# Patient Record
Sex: FEMALE | Race: WHITE | Employment: UNEMPLOYED | URBAN - METROPOLITAN AREA
[De-identification: names, ages, dates, MRNs, and addresses within clinical notes are randomized per-mention and may not be internally consistent; named-entity substitution may affect disease eponyms.]

---

## 2018-09-10 ENCOUNTER — HOSPITAL ENCOUNTER (EMERGENCY)
Age: 43
Discharge: HOME | End: 2018-09-10
Payer: COMMERCIAL

## 2018-09-10 VITALS
HEART RATE: 90 BPM | HEIGHT: 61 IN | RESPIRATION RATE: 18 BRPM | WEIGHT: 170 LBS | OXYGEN SATURATION: 98 % | SYSTOLIC BLOOD PRESSURE: 118 MMHG | BODY MASS INDEX: 32.1 KG/M2 | DIASTOLIC BLOOD PRESSURE: 83 MMHG | TEMPERATURE: 97.6 F

## 2018-09-10 DIAGNOSIS — L02.416 ABSCESS OF LEFT THIGH: Primary | ICD-10-CM

## 2018-09-10 PROCEDURE — 96372 THER/PROPH/DIAG INJ SC/IM: CPT

## 2018-09-10 PROCEDURE — 99281 EMR DPT VST MAYX REQ PHY/QHP: CPT

## 2018-09-10 PROCEDURE — 6370000000 HC RX 637 (ALT 250 FOR IP): Performed by: PHYSICIAN ASSISTANT

## 2018-09-10 PROCEDURE — 6360000002 HC RX W HCPCS: Performed by: PHYSICIAN ASSISTANT

## 2018-09-10 RX ORDER — SULFAMETHOXAZOLE AND TRIMETHOPRIM 800; 160 MG/1; MG/1
2 TABLET ORAL 2 TIMES DAILY
Qty: 40 TABLET | Refills: 0 | Status: SHIPPED | OUTPATIENT
Start: 2018-09-10 | End: 2018-09-20

## 2018-09-10 RX ORDER — KETOROLAC TROMETHAMINE 30 MG/ML
60 INJECTION, SOLUTION INTRAMUSCULAR; INTRAVENOUS ONCE
Status: COMPLETED | OUTPATIENT
Start: 2018-09-10 | End: 2018-09-10

## 2018-09-10 RX ORDER — CEPHALEXIN 500 MG/1
500 CAPSULE ORAL 3 TIMES DAILY
Qty: 30 CAPSULE | Refills: 0 | Status: SHIPPED | OUTPATIENT
Start: 2018-09-10 | End: 2018-09-20

## 2018-09-10 RX ORDER — OXYCODONE HYDROCHLORIDE AND ACETAMINOPHEN 5; 325 MG/1; MG/1
1 TABLET ORAL ONCE
Status: COMPLETED | OUTPATIENT
Start: 2018-09-10 | End: 2018-09-10

## 2018-09-10 RX ORDER — CHOLECALCIFEROL (VITAMIN D3) 125 MCG
500 CAPSULE ORAL DAILY
COMMUNITY
End: 2019-08-26

## 2018-09-10 RX ORDER — IBUPROFEN AND FAMOTIDINE 26.6; 8 MG/1; MG/1
1 TABLET, FILM COATED ORAL 3 TIMES DAILY PRN
COMMUNITY
End: 2019-05-07 | Stop reason: ALTCHOICE

## 2018-09-10 RX ORDER — HYDROCODONE BITARTRATE AND ACETAMINOPHEN 5; 325 MG/1; MG/1
1 TABLET ORAL EVERY 6 HOURS PRN
Qty: 20 TABLET | Refills: 0 | Status: SHIPPED | OUTPATIENT
Start: 2018-09-10 | End: 2018-09-15

## 2018-09-10 RX ADMIN — KETOROLAC TROMETHAMINE 60 MG: 30 INJECTION, SOLUTION INTRAMUSCULAR at 08:29

## 2018-09-10 RX ADMIN — OXYCODONE HYDROCHLORIDE AND ACETAMINOPHEN 1 TABLET: 5; 325 TABLET ORAL at 08:29

## 2018-09-10 ASSESSMENT — PAIN SCALES - GENERAL: PAINLEVEL_OUTOF10: 6

## 2018-09-10 NOTE — ED PROVIDER NOTES
[facility]      HPI:  9/10/18,   Time: 8:26 AM         Kimberly Wolfe is a 41 y.o. female presenting to the ED for painful swelling left thigh, beginning 3 days ago. The complaint has been persistent, moderate in severity, and worsened by pressure/walking. Pt states she was in bed Friday night and started feeling something itching. States the area started turning red and swelling. Continues to itch and burn. Increased pain with pressure/touching. Reports no drainage from the site. .  Denies fever, chills or vomiting. No hx of skin or soft tissue infection. She is not a diabetic. ROS:     Constitutional: Negative for fever and chills  HENT: Negative for ear pain, sore throat and sinus pressure  Eyes: Negative for pain, discharge and redness  Respiratory:  Negative for shortness of breath, cough and wheezing  Cardiovascular: Negative for CP, edema or palpitations  Gastrointestinal: Negative for nausea, vomiting, diarrhea and abdominal distention  Genitourinary: Negative for dysuria and frequency  Musculoskeletal:  See HPI  Skin: See HPI  Neurological: Negative for weakness and headaches  Hematological: Negative for adenopathy    All other systems reviewed and are negative      --------------------------------------------- PAST HISTORY ---------------------------------------------  Past Medical History:  has a past medical history of Flank Pain; Hx of cardiovascular stress test; Lupus; RA (rheumatoid arthritis) ([facility] [address] Radiculopathy; and [facility] Past Surgical History:  has a past surgical history that includes  section; Dilation and curettage of uterus; and Knee arthroscopy (Left, 5 27 16). Social History:  reports that she has never smoked. She has never used smokeless tobacco. She reports that she does not drink alcohol or use drugs. Family History: family history includes Cancer in her father; Heart Failure in her mother;  Thyroid Disease in her maternal grandmother, mother, sister, sister, and sister. The patients home medications have been reviewed. Allergies: Methotrexate derivatives    -------------------------------------------------- RESULTS -------------------------------------------------  All laboratory and radiology results have been personally reviewed by myself   LABS:  No results found for this visit on 09/10/18. RADIOLOGY:  Interpreted by Radiologist.  No orders to display       ------------------------- NURSING NOTES AND VITALS REVIEWED ---------------------------   The nursing notes within the ED encounter and vital signs as below have been reviewed. /83   Pulse 90   Temp 97.6 °F (36.4 °C) (Oral)   Resp 18   Ht 5' 1\" (1.549 m)   Wt 170 lb (77.1 kg)   SpO2 98%   BMI 32.12 kg/m²   Oxygen Saturation Interpretation: Normal      ---------------------------------------------------PHYSICAL EXAM--------------------------------------      Constitutional/General: Alert and oriented x3, well appearing, non toxic in NAD  Head: NC/AT  Eyes: PERRL, EOMI  Mouth: Oropharynx clear, handling secretions, no trismus  Neck: Supple, full ROM, no meningeal signs  Pulmonary: Lungs clear to auscultation bilaterally, no wheezes, rales, or rhonchi. Not in respiratory distress  Cardiovascular:  Regular rate and rhythm, no murmurs, gallops, or rubs. 2+ distal pulses  Extremities: Moves all extremities x 4. Pt has small abscess posterior left thigh. The central area of redness and swelling is 3-4 cm. Indurated and very painful. There is larger area of erythema/cellulitis measuring 16 cm x 12 cm. Warm. Warm and well perfused  Skin: See above  Neurologic: GCS 15,  Intact.   No focal deficits  Psych: Normal Affect      ------------------------------ ED COURSE/MEDICAL DECISION MAKING----------------------  Medications   ketorolac (TORADOL) injection 60 mg (60 mg Intramuscular Given 9/10/18 0850)   oxyCODONE-acetaminophen (PERCOCET) 5-325 MG per tablet 1

## 2018-09-10 NOTE — ED NOTES
Colby Dimas in room draining the abscess to the back of the left thigh. Packing inserted per her.   Dressing applied and taped securely in place     Ryland Keating, [address]  09/10/18 4010

## 2018-09-13 ENCOUNTER — OFFICE VISIT (OUTPATIENT)
Dept: SURGERY | Age: 43
End: 2018-09-13
Payer: COMMERCIAL

## 2018-09-13 VITALS
HEART RATE: 80 BPM | BODY MASS INDEX: 32.1 KG/M2 | DIASTOLIC BLOOD PRESSURE: 72 MMHG | WEIGHT: 170 LBS | HEIGHT: 61 IN | RESPIRATION RATE: 14 BRPM | SYSTOLIC BLOOD PRESSURE: 112 MMHG | TEMPERATURE: 98.5 F

## 2018-09-13 DIAGNOSIS — L02.91 ABSCESS: Primary | ICD-10-CM

## 2018-09-13 PROCEDURE — 99204 OFFICE O/P NEW MOD 45 MIN: CPT | Performed by: SURGERY

## 2018-09-13 RX ORDER — IBUPROFEN AND FAMOTIDINE 26.6; 8 MG/1; MG/1
1 TABLET, FILM COATED ORAL EVERY 6 HOURS PRN
Qty: 20 TABLET | Refills: 1 | Status: SHIPPED | OUTPATIENT
Start: 2018-09-13 | End: 2019-05-07

## 2018-10-31 ENCOUNTER — HOSPITAL ENCOUNTER (EMERGENCY)
Age: 43
Discharge: HOME | End: 2018-10-31
Attending: EMERGENCY MEDICINE
Payer: COMMERCIAL

## 2018-10-31 VITALS
TEMPERATURE: 98.4 F | DIASTOLIC BLOOD PRESSURE: 88 MMHG | HEART RATE: 91 BPM | OXYGEN SATURATION: 100 % | HEIGHT: 61 IN | RESPIRATION RATE: 16 BRPM | SYSTOLIC BLOOD PRESSURE: 143 MMHG | BODY MASS INDEX: 32.47 KG/M2 | WEIGHT: 172 LBS

## 2018-10-31 DIAGNOSIS — L03.317 CELLULITIS OF BUTTOCK, RIGHT: Primary | ICD-10-CM

## 2018-10-31 PROCEDURE — 99283 EMERGENCY DEPT VISIT LOW MDM: CPT

## 2018-10-31 RX ORDER — SULFAMETHOXAZOLE AND TRIMETHOPRIM 800; 160 MG/1; MG/1
2 TABLET ORAL 2 TIMES DAILY
Qty: 40 TABLET | Refills: 0 | Status: SHIPPED | OUTPATIENT
Start: 2018-10-31 | End: 2018-11-10

## 2018-10-31 RX ORDER — IBUPROFEN 800 MG/1
800 TABLET ORAL EVERY 6 HOURS PRN
Qty: 21 TABLET | Refills: 0 | Status: SHIPPED | OUTPATIENT
Start: 2018-10-31 | End: 2019-05-22

## 2018-10-31 ASSESSMENT — PAIN DESCRIPTION - LOCATION: LOCATION: BACK

## 2018-10-31 ASSESSMENT — PAIN SCALES - GENERAL: PAINLEVEL_OUTOF10: 6

## 2018-10-31 ASSESSMENT — PAIN DESCRIPTION - ORIENTATION: ORIENTATION: LOWER

## 2018-10-31 ASSESSMENT — PAIN DESCRIPTION - PAIN TYPE: TYPE: ACUTE PAIN

## 2018-11-02 ENCOUNTER — HOSPITAL ENCOUNTER (EMERGENCY)
Age: 43
Discharge: HOME | End: 2018-11-02
Attending: EMERGENCY MEDICINE
Payer: COMMERCIAL

## 2018-11-02 VITALS
SYSTOLIC BLOOD PRESSURE: 103 MMHG | TEMPERATURE: 99.1 F | BODY MASS INDEX: 34.36 KG/M2 | OXYGEN SATURATION: 98 % | HEART RATE: 71 BPM | WEIGHT: 182 LBS | DIASTOLIC BLOOD PRESSURE: 58 MMHG | RESPIRATION RATE: 14 BRPM | HEIGHT: 61 IN

## 2018-11-02 DIAGNOSIS — L05.01 PILONIDAL CYST WITH ABSCESS: Primary | ICD-10-CM

## 2018-11-02 DIAGNOSIS — L03.317 CELLULITIS OF BUTTOCK: ICD-10-CM

## 2018-11-02 LAB
ALBUMIN SERPL-MCNC: 3.6 G/DL (ref 3.5–5.2)
ALP BLD-CCNC: 75 U/L (ref 35–104)
ALT SERPL-CCNC: 23 U/L (ref 0–32)
ANION GAP SERPL CALCULATED.3IONS-SCNC: 11 MMOL/L (ref 7–16)
ANISOCYTOSIS: ABNORMAL
AST SERPL-CCNC: 19 U/L (ref 0–31)
BASOPHILS ABSOLUTE: 0 E9/L (ref 0–0.2)
BASOPHILS RELATIVE PERCENT: 0.2 % (ref 0–2)
BILIRUB SERPL-MCNC: <0.2 MG/DL (ref 0–1.2)
BUN BLDV-MCNC: 9 MG/DL (ref 6–20)
CALCIUM SERPL-MCNC: 8.3 MG/DL (ref 8.6–10.2)
CHLORIDE BLD-SCNC: 100 MMOL/L (ref 98–107)
CO2: 25 MMOL/L (ref 22–29)
CREAT SERPL-MCNC: 1 MG/DL (ref 0.5–1)
EOSINOPHILS ABSOLUTE: 0.27 E9/L (ref 0.05–0.5)
EOSINOPHILS RELATIVE PERCENT: 5.5 % (ref 0–6)
GFR AFRICAN AMERICAN: >60
GFR NON-AFRICAN AMERICAN: >60 ML/MIN/1.73
GLUCOSE BLD-MCNC: 92 MG/DL (ref 74–109)
HCT VFR BLD CALC: 34.7 % (ref 34–48)
HEMOGLOBIN: 10.8 G/DL (ref 11.5–15.5)
LACTIC ACID: 1.3 MMOL/L (ref 0.5–2.2)
LYMPHOCYTES ABSOLUTE: 0.34 E9/L (ref 1.5–4)
LYMPHOCYTES RELATIVE PERCENT: 7.3 % (ref 20–42)
MCH RBC QN AUTO: 24.1 PG (ref 26–35)
MCHC RBC AUTO-ENTMCNC: 31.1 % (ref 32–34.5)
MCV RBC AUTO: 77.3 FL (ref 80–99.9)
MONOCYTES ABSOLUTE: 0.29 E9/L (ref 0.1–0.95)
MONOCYTES RELATIVE PERCENT: 5.5 % (ref 2–12)
NEUTROPHILS ABSOLUTE: 4.02 E9/L (ref 1.8–7.3)
NEUTROPHILS RELATIVE PERCENT: 81.8 % (ref 43–80)
OVALOCYTES: ABNORMAL
PDW BLD-RTO: 14.1 FL (ref 11.5–15)
PLATELET # BLD: 198 E9/L (ref 130–450)
PMV BLD AUTO: 11.2 FL (ref 7–12)
POIKILOCYTES: ABNORMAL
POLYCHROMASIA: ABNORMAL
POTASSIUM SERPL-SCNC: 3.9 MMOL/L (ref 3.5–5)
RBC # BLD: 4.49 E12/L (ref 3.5–5.5)
SODIUM BLD-SCNC: 136 MMOL/L (ref 132–146)
SPHEROCYTES: ABNORMAL
TOTAL PROTEIN: 6.7 G/DL (ref 6.4–8.3)
WBC # BLD: 4.9 E9/L (ref 4.5–11.5)

## 2018-11-02 PROCEDURE — 2580000003 HC RX 258: Performed by: STUDENT IN AN ORGANIZED HEALTH CARE EDUCATION/TRAINING PROGRAM

## 2018-11-02 PROCEDURE — 85025 COMPLETE CBC W/AUTO DIFF WBC: CPT

## 2018-11-02 PROCEDURE — 80053 COMPREHEN METABOLIC PANEL: CPT

## 2018-11-02 PROCEDURE — 87075 CULTR BACTERIA EXCEPT BLOOD: CPT

## 2018-11-02 PROCEDURE — 87186 SC STD MICRODIL/AGAR DIL: CPT

## 2018-11-02 PROCEDURE — 87205 SMEAR GRAM STAIN: CPT

## 2018-11-02 PROCEDURE — 83605 ASSAY OF LACTIC ACID: CPT

## 2018-11-02 PROCEDURE — 99283 EMERGENCY DEPT VISIT LOW MDM: CPT

## 2018-11-02 PROCEDURE — 87040 BLOOD CULTURE FOR BACTERIA: CPT

## 2018-11-02 PROCEDURE — 87070 CULTURE OTHR SPECIMN AEROBIC: CPT

## 2018-11-02 PROCEDURE — 36415 COLL VENOUS BLD VENIPUNCTURE: CPT

## 2018-11-02 RX ORDER — 0.9 % SODIUM CHLORIDE 0.9 %
1000 INTRAVENOUS SOLUTION INTRAVENOUS ONCE
Status: COMPLETED | OUTPATIENT
Start: 2018-11-02 | End: 2018-11-02

## 2018-11-02 RX ORDER — CEPHALEXIN 500 MG/1
500 CAPSULE ORAL 4 TIMES DAILY
Qty: 40 CAPSULE | Refills: 0 | Status: SHIPPED | OUTPATIENT
Start: 2018-11-02 | End: 2018-11-12

## 2018-11-02 RX ADMIN — SODIUM CHLORIDE 1000 ML: 9 INJECTION, SOLUTION INTRAVENOUS at 22:43

## 2018-11-02 ASSESSMENT — ENCOUNTER SYMPTOMS
DIARRHEA: 0
CONSTIPATION: 0
ABDOMINAL PAIN: 0
VOMITING: 0
COUGH: 0
COLOR CHANGE: 0
NAUSEA: 0
SHORTNESS OF BREATH: 0
WHEEZING: 0

## 2018-11-02 ASSESSMENT — PAIN DESCRIPTION - LOCATION: LOCATION: BUTTOCKS

## 2018-11-02 ASSESSMENT — PAIN SCALES - GENERAL: PAINLEVEL_OUTOF10: 7

## 2018-11-02 ASSESSMENT — PAIN DESCRIPTION - PAIN TYPE: TYPE: ACUTE PAIN

## 2018-11-03 NOTE — ED PROVIDER NOTES
Patient is a 59-year-old female with history of lupus, rheumatoid arthritis, who presents to the ED with possible pilonidal cyst that was seen on Wednesday. Patient was seen here in the ED on Wednesday, was found to have a potential pilonidal abscess, ultrasound was performed, which did not show any obvious abscess at that time. Patient was discharged, and given Bactrim at that time. Since then, she states that she has had worsening symptoms, that after she had taken the Bactrim, she felt red, and began having systemic joint pains. She states that today, she had worsening swollen hands. She states that she has had a fever of 101, and given Tylenol a few hours ago. Patient was told to follow up with their PCP, but could not get anything scheduled until Tuesday. Patient states that she has had diffuse body aches, has had more difficult time bending her joints. She does state that everytime she takes bactrim, she gets a lupus flare, and the red diffuse erythema. The history is provided by the patient. No  was used. Review of Systems   Constitutional: Positive for chills, diaphoresis and fever. Respiratory: Negative for cough, shortness of breath and wheezing. Cardiovascular: Negative for chest pain and palpitations. Gastrointestinal: Negative for abdominal pain, constipation, diarrhea, nausea and vomiting. Genitourinary: Negative for dysuria and hematuria. Musculoskeletal: Positive for arthralgias and joint swelling. Negative for gait problem, neck pain and neck stiffness. Skin: Positive for wound (pilonidal cyst). Negative for color change, pallor and rash. Neurological: Positive for dizziness and light-headedness. Negative for syncope, numbness and headaches. Psychiatric/Behavioral: Negative for confusion and decreased concentration. The patient is not nervous/anxious. Physical Exam   Constitutional: She is oriented to person, place, and time.  She appears well-developed and well-nourished. No distress. HENT:   Head: Normocephalic and atraumatic. Right Ear: External ear normal.   Left Ear: External ear normal.   Eyes: Pupils are equal, round, and reactive to light. EOM are normal.   Neck: Normal range of motion. Cardiovascular: Normal rate, regular rhythm, normal heart sounds and intact distal pulses. Exam reveals no gallop and no friction rub. No murmur heard. Pulmonary/Chest: Effort normal and breath sounds normal. No respiratory distress. She has no wheezes. She has no rales. She exhibits no tenderness. Abdominal: Soft. Bowel sounds are normal. She exhibits no distension and no mass. There is no tenderness. There is no rebound and no guarding. Musculoskeletal: Normal range of motion. She exhibits no edema, tenderness or deformity. Neurological: She is alert and oriented to person, place, and time. No cranial nerve deficit. Skin: Skin is warm. No rash noted. She is not diaphoretic. There is erythema (diffuse). No pallor. Diaphoretic    Pilonidal cyst, hard and indurated, with mild amount of exudate, surrounding blanching erythematous skin. Psychiatric: She has a normal mood and affect. Her behavior is normal. Judgment and thought content normal.   Nursing note and vitals reviewed. Procedures  --------------------------------------------- PAST HISTORY ---------------------------------------------  Past Medical History:  has a past medical history of Flank Pain; Hx of cardiovascular stress test; Lupus; RA (rheumatoid arthritis) ([facility] [address] Radiculopathy; and Sjogren's disease ([facility] [address] Past Surgical History:  has a past surgical history that includes  section; Dilation and curettage of uterus; and Knee arthroscopy (Left, 5 27 16). Social History:  reports that she has never smoked. She has never used smokeless tobacco. She reports that she does not drink alcohol or use drugs.     Family History: family history includes Cancer in her

## 2018-11-05 LAB
ANAEROBIC CULTURE: NORMAL
GRAM STAIN RESULT: ABNORMAL
ORGANISM: ABNORMAL
WOUND/ABSCESS: ABNORMAL
WOUND/ABSCESS: ABNORMAL

## 2018-11-08 LAB
BLOOD CULTURE, ROUTINE: NORMAL
CULTURE, BLOOD 2: NORMAL

## 2019-05-07 ENCOUNTER — OFFICE VISIT (OUTPATIENT)
Dept: PAIN MANAGEMENT | Age: 44
End: 2019-05-07
Payer: COMMERCIAL

## 2019-05-07 ENCOUNTER — PREP FOR PROCEDURE (OUTPATIENT)
Dept: PAIN MANAGEMENT | Age: 44
End: 2019-05-07

## 2019-05-07 VITALS
WEIGHT: 169 LBS | SYSTOLIC BLOOD PRESSURE: 106 MMHG | BODY MASS INDEX: 31.91 KG/M2 | HEIGHT: 61 IN | TEMPERATURE: 98.5 F | RESPIRATION RATE: 16 BRPM | HEART RATE: 64 BPM | DIASTOLIC BLOOD PRESSURE: 66 MMHG

## 2019-05-07 DIAGNOSIS — M54.16 LUMBAR RADICULOPATHY: Primary | ICD-10-CM

## 2019-05-07 DIAGNOSIS — M54.16 LUMBAR RADICULOPATHY: ICD-10-CM

## 2019-05-07 DIAGNOSIS — M51.36 DDD (DEGENERATIVE DISC DISEASE), LUMBAR: ICD-10-CM

## 2019-05-07 DIAGNOSIS — M51.36 DDD (DEGENERATIVE DISC DISEASE), LUMBAR: Primary | ICD-10-CM

## 2019-05-07 DIAGNOSIS — M47.817 LUMBOSACRAL SPONDYLOSIS WITHOUT MYELOPATHY: ICD-10-CM

## 2019-05-07 DIAGNOSIS — M53.3 SACROILIAC DYSFUNCTION: ICD-10-CM

## 2019-05-07 PROBLEM — M51.369 DDD (DEGENERATIVE DISC DISEASE), LUMBAR: Status: ACTIVE | Noted: 2019-05-07

## 2019-05-07 PROCEDURE — 99204 OFFICE O/P NEW MOD 45 MIN: CPT | Performed by: ANESTHESIOLOGY

## 2019-05-07 RX ORDER — SENNOSIDES 8.6 MG
650 CAPSULE ORAL EVERY 8 HOURS PRN
COMMUNITY
End: 2019-06-18

## 2019-05-07 ASSESSMENT — PATIENT HEALTH QUESTIONNAIRE - PHQ9
SUM OF ALL RESPONSES TO PHQ QUESTIONS 1-9: 0
SUM OF ALL RESPONSES TO PHQ QUESTIONS 1-9: 0
SUM OF ALL RESPONSES TO PHQ9 QUESTIONS 1 & 2: 0
2. FEELING DOWN, DEPRESSED OR HOPELESS: 0
1. LITTLE INTEREST OR PLEASURE IN DOING THINGS: 0

## 2019-05-07 NOTE — PROGRESS NOTES
HISTORY OF PRESENT ILLNESS:        Torie Germain presents today, 5/7/2019, to the [address] with complaints of pain in her back, neck, both hips and both shoulders, and left foot which is described as aching, burning, sharp, stabbing and cramping. She reports that the pain has been present for 21 years. Torie Germain rates the pain as a 10/10 on her worst day and a 5/10 on her best day, on the VAS scale. Jose Schafer has  numbness which does radiate to both lower extremities. She states the pain began following childbirth. Alleviating factors include: heat, medications. Aggravating factors include:  cold, movement, walking, standing, bending, lifting. She states her pain is constant. Jose Schafer states that the pain does keep her from sleeping at night. PREVIOUS THERAPIES:  She has not had any Injections. She has tried Physical Therapy 10 years ago, 2 years ago did yoga. She has had Chiropractic Care 5 years ago, pt states she thinks it helped. Medications which have helped her pain include:  none. Medications which have not helped her pain include:  neurontin. She is currently prescribed advil or tylenol arthiritis by Dr. Ayala Roldan. She took her last dose of pain medication, tylenol arthritis four days ago. She is not on anticoagulation medications. She is not under psychiatric care. Please see EMR (Media) for Quality of Life, Zung Depression and Falls Risk Assessment scores. VITAL SIGNS:  /66   Pulse 64   Temp 98.5 °F (36.9 °C) (Oral)   Resp 16   Ht 5' 1\" (1.549 m)   Wt 169 lb (76.7 kg)   Breastfeeding?  No   BMI 31.93 kg/m²

## 2019-05-07 NOTE — PROGRESS NOTES
[address]        [address] [facility], [address] [facility]      [phone]          Consult Note      Patient:  FAVIOLA Lopez 1975    Date of Service:  19    Requesting Physician:  Oliver Marcelo MD    Reason for Consult:      Patient presents with complaints of low back and b/l LE pain for about 20 yrs. HISTORY OF PRESENT ILLNESS:      Ms. Toni Oglesby is a 41 yrs F with h/o chronic low back pain and b/l lower extremity pain for about 20 yrs. Pain mainly in the low back in the lumbar area and radiates to the b/l Lower extremities upto the foot. Feels left foot tingling/ numbness over the last year or so. Pain does radiate to both lower extremities. She  has numbness, tingling of the left leg and does not have bladder or bowel dysfunction. She follows up with rheumatologist for h/o sjogren's/ rheumatoid / SLE. Previous treatments:   Physical Therapy   Chiropractic treatment  Yoga/ Pilates  Ongoing Core exercises for the last year. TENS  and medications- NSAID's, oral steroids, gabapentin etc,. Does not tolerate meds well   Conservative RX has not provided good long term relief. Toni Oglesby rates the pain as a 10/10 on her worst day and a 5/10 on her best day, on the VAS scale. Alleviating factors include: heat, medications. Aggravating factors include:  cold, movement, walking, standing, bending, lifting. She states her pain is constant. Patience Creeks states that the pain does keep her from sleeping at night. She has not been on anticoagulation medications to include none and has not been on herbal supplements. She is not diabetic. Social:  She is a   Social drinking  Denies smoking or illicit drug use. Imaging:     Findings: Bone marrow signal is normal. No fracture, subluxation, or   marrow replacement disorder.  The conus medullaris is at T12.       The discs at L1-2, L2-3 and L3-4 are normal.       At L4-5, there is dehydration of the disc with mild bulging but no   central canal or foraminal stenosis. The disc bulges eccentrically and   may contact the right exiting L4 nerve root.       There is a discrete disc extrusion at L5-S1. The disc extrusion is 1   cm in breadth and 5 mm in depth. It may contact the traversing S1   nerve roots in the canal. A broad-based protrusion is also present at   this level and there is disc material in both neuroforamina. However,   I believe it is below the level of the exiting nerve roots.           Impression   1. The seminal abnormality is a relatively large central disc   extrusion at L5-S1.   2. The underlying disc protrudes into the lower halves of both   neuroforamina   3. There is bulging of the disc at L4-5 which is eccentric to the   right and may extend into the right neuroforamen. Past Medical History:   Diagnosis Date    Flank Pain     right    H/O rheumatoid arthritis     Hx of cardiovascular stress test 2013    treadmill nuclear stress    Joint pain     Low back pain     Lupus     Neck pain     RA (rheumatoid arthritis) ([address]     RH    Radiculopathy     Sjogren's disease ([facility] [address]        Past Surgical History:   Procedure Laterality Date     SECTION      x2    COLONOSCOPY      DILATION AND CURETTAGE OF UTERUS      x2    KNEE ARTHROSCOPY Left 5 27 16    medial menisectomy, debridement, acl synovectomy, chondroplasty       Prior to Admission medications    Medication Sig Start Date End Date Taking?  Authorizing Provider   acetaminophen (TYLENOL 8 HOUR ARTHRITIS PAIN) 650 MG extended release tablet Take 650 mg by mouth every 8 hours as needed for Pain   Yes Historical Provider, MD   ibuprofen (ADVIL;MOTRIN) 800 MG tablet Take 1 tablet by mouth every 6 hours as needed for Pain 10/31/18  Yes Cary Barnhart, APRN - CNP   vitamin B-12 (CYANOCOBALAMIN) 500 MCG tablet Take 500 mcg by mouth daily   Yes Historical Provider, MD levonorgestrel (MIRENA) IUD 52 mg 1 each by Intrauterine route once Placed in 2015   Yes Historical Provider, MD   loratadine (CLARITIN) 10 MG tablet Take 10 mg by mouth daily as needed (Allergies)    Yes Historical Provider, MD   omeprazole (PRILOSEC) 20 MG capsule Take 20 mg by mouth daily as needed (Reflux)    Yes Historical Provider, MD       Allergies   Allergen Reactions    Methotrexate Derivatives Nausea And Vomiting and Other (See Comments)     Mouth sores       Social History     Socioeconomic History    Marital status:      Spouse name: Not on file    Number of children: Not on file    Years of education: Not on file    Highest education level: Not on file   Occupational History    Not on file   Social Needs    Financial resource strain: Not on file    Food insecurity:     Worry: Not on file     Inability: Not on file    Transportation needs:     Medical: Not on file     Non-medical: Not on file   Tobacco Use    Smoking status: Never Smoker    Smokeless tobacco: Never Used   Substance and Sexual Activity    Alcohol use: No     Comment: wine occ    Drug use: No    Sexual activity: Yes     Partners: Male   Lifestyle    Physical activity:     Days per week: Not on file     Minutes per session: Not on file    Stress: Not on file   Relationships    Social connections:     Talks on phone: Not on file     Gets together: Not on file     Attends Anabaptist service: Not on file     Active member of club or organization: Not on file     Attends meetings of clubs or organizations: Not on file     Relationship status: Not on file    Intimate partner violence:     Fear of current or ex partner: Not on file     Emotionally abused: Not on file     Physically abused: Not on file     Forced sexual activity: Not on file   Other Topics Concern    Not on file   Social History Narrative    Not on file       Family History   Problem Relation Age of Onset    Heart Failure Mother     Thyroid Disease points in rhomboids:absent bilaterally  Trigger points in Paravertebral:absent bilaterally  Trigger points in supraspinatus/infraspinatus:absent  Spurling's:negative right, negative left    SI joint tenderness:positive right, positive left              MARIO test:positive right, positive             left  Piriformis tenderness:negative right, negative left  Trochanteric bursa tenderness:negative right, positive left  SLR:negative right, negative left, sitting     Extremities:    Tremors:None bilaterally upper and lower  Range of motion:Generally normal shoulders, pain with internal rotation of hips negative. Intact:Yes  Varicose veins:absent   Pulses:present Lt radial  Cyanosis:none  Edema:none x all 4 extremities    Knee:    Knee ROM normal- no pain    Neurological:      Sensory:normal to light touch     Motor:   Right Grip5/5              Left Grip5/5               Right Bicep5/5           Left Bicep5/5              Right Triceps5/5       Left Triceps5/5          Right Deltoid5/5     Left Deltoid5/5                  Right Quadriceps5/5          Left Quadriceps5/5           Right Gastrocnemius5/5    Left Gastrocnemius5/5  Right Ant Tibialis5/5  Left Ant Tibialis5/5    Reflexes:    Right Brachioradialis reflex2+  Left Brachioradialis reflex2+  Right Biceps reflex2+  Left Biceps reflex2+  Right Triceps reflex2+  Left Triceps reflex2+  Right Quadriceps reflex2+  Left Quadriceps reflex2+  Right Achilles reflex2+  Left Achilles reflex2+  Gait:normal    Dermatology:    Skin: redness over the face    Assessment/Plan:     Diagnosis Orders   1. Lumbar radiculopathy     2. Chronic low back pain with sciatica, sciatica laterality unspecified, unspecified back pain laterality     3. DDD (degenerative disc disease), lumbar     4. Lumbosacral spondylosis without myelopathy     5. Sacroiliac dysfunction         43 yrs f with h/o Chronic low back and LE pain.     Mri changes significant at L5-S1 and also changes noted at L4-5    Also has facet and SIJ pain. Failed conservative RX including PT/ chiro/ meds    Lumbar epidural steroid injection L5- S1 (left paramedian approach). If axial low back pain persists, consider lumbar facet MBNB     Future SIj interventions if SIj pain persists. Continue HEP. Counseling:  weight reduction and regular exercise program.     Reviewed referral documents and imaging    Urine screen today- no    OARRS report reviewed- yes. Not on narcotics    Patient encouraged to stay active and to lose weight  Treatment plan discussed with the patient including medication and procedure side effects       Marci Doty MD     Dear Dr. Morena Ibrahim,   Thank you for referring Ms. Nena Em and allowing us to participate in her care. Please do not hesitate to contact me if you have any questions regarding her care.     Lan Deutsch MD      CC:  Judy Mccormick MD  [place] [address]

## 2019-05-22 RX ORDER — MULTIVIT-MIN/IRON/FOLIC ACID/K 18-600-40
2000 CAPSULE ORAL
COMMUNITY
End: 2019-08-26

## 2019-05-28 ENCOUNTER — HOSPITAL ENCOUNTER (OUTPATIENT)
Dept: OPERATING ROOM | Age: 44
Setting detail: OUTPATIENT SURGERY
Discharge: HOME | End: 2019-05-28
Attending: ANESTHESIOLOGY
Payer: COMMERCIAL

## 2019-05-28 ENCOUNTER — HOSPITAL ENCOUNTER (OUTPATIENT)
Age: 44
Setting detail: OUTPATIENT SURGERY
Discharge: HOME | End: 2019-05-28
Attending: ANESTHESIOLOGY | Admitting: ANESTHESIOLOGY
Payer: COMMERCIAL

## 2019-05-28 VITALS
OXYGEN SATURATION: 98 % | DIASTOLIC BLOOD PRESSURE: 75 MMHG | RESPIRATION RATE: 16 BRPM | HEART RATE: 64 BPM | SYSTOLIC BLOOD PRESSURE: 115 MMHG

## 2019-05-28 DIAGNOSIS — M51.36 DDD (DEGENERATIVE DISC DISEASE), LUMBAR: ICD-10-CM

## 2019-05-28 PROCEDURE — 7100000010 HC PHASE II RECOVERY - FIRST 15 MIN: Performed by: ANESTHESIOLOGY

## 2019-05-28 PROCEDURE — 3600000005 HC SURGERY LEVEL 5 BASE: Performed by: ANESTHESIOLOGY

## 2019-05-28 PROCEDURE — 3209999900 FLUORO FOR SURGICAL PROCEDURES

## 2019-05-28 PROCEDURE — 62323 NJX INTERLAMINAR LMBR/SAC: CPT | Performed by: ANESTHESIOLOGY

## 2019-05-28 PROCEDURE — 2500000003 HC RX 250 WO HCPCS: Performed by: ANESTHESIOLOGY

## 2019-05-28 PROCEDURE — 7100000011 HC PHASE II RECOVERY - ADDTL 15 MIN: Performed by: ANESTHESIOLOGY

## 2019-05-28 PROCEDURE — 6360000002 HC RX W HCPCS: Performed by: ANESTHESIOLOGY

## 2019-05-28 PROCEDURE — 2709999900 HC NON-CHARGEABLE SUPPLY: Performed by: ANESTHESIOLOGY

## 2019-05-28 PROCEDURE — 6360000004 HC RX CONTRAST MEDICATION: Performed by: ANESTHESIOLOGY

## 2019-05-28 RX ORDER — LIDOCAINE HYDROCHLORIDE 5 MG/ML
INJECTION, SOLUTION INFILTRATION; INTRAVENOUS PRN
Status: DISCONTINUED | OUTPATIENT
Start: 2019-05-28 | End: 2019-05-28 | Stop reason: ALTCHOICE

## 2019-05-28 ASSESSMENT — PAIN - FUNCTIONAL ASSESSMENT: PAIN_FUNCTIONAL_ASSESSMENT: 0-10

## 2019-05-28 ASSESSMENT — PAIN SCALES - GENERAL
PAINLEVEL_OUTOF10: 0
PAINLEVEL_OUTOF10: 0

## 2019-05-28 NOTE — H&P
[facility]  [address] [facility], [address] [place] [address]  [phone]    Procedure History & Physical      Kai Faust     HPI:    Patient  is here for [facility]  for Low back/LE pain  Labs/imaging studies reviewed   All question and concerns addressed including R/B/A associated with the procedure    Past Medical History:   Diagnosis Date    Flank Pain     right    H/O rheumatoid arthritis     Hx of cardiovascular stress test 2013    treadmill nuclear stress    Joint pain     Low back pain     Lupus     Neck pain     RA (rheumat[facility]) ([facility] [address]     RH    Radiculopathy     Sjogren's disease ([facility] [address] [facility]        Past Surgical History:   Procedure Laterality Date     SECTION      x2    COLONOSCOPY      DILATION AND CURETTAGE OF UTERUS      x2    KNEE ARTHROSCOPY Left 16    medial menisectomy, debridement, acl synovectomy, chondroplasty       Prior to Admission medications    Medication Sig Start Date End Date Taking?  Authorizing Provider   Cholecalciferol (VITAMIN D) 2000 units CAPS capsule Take 2,000 Units by mouth every 7 days   Yes Historical Provider, MD   acetaminophen (TYLENOL 8 HOUR ARTHRITIS PAIN) 650 MG extended release tablet Take 650 mg by mouth every 8 hours as needed for Pain   Yes Historical Provider, MD   vitamin B-12 (CYANOCOBALAMIN) 500 MCG tablet Take 500 mcg by mouth daily   Yes Historical Provider, MD   levonorgestrel (MIRENA) IUD 52 mg 1 each by Intrauterine route once Placed in    Yes Historical Provider, MD   loratadine (CLARITIN) 10 MG tablet Take 10 mg by mouth daily as needed (Allergies)    Yes Historical Provider, MD   omeprazole (PRILOSEC) 20 MG capsule Take 20 mg by mouth daily as needed (Reflux)    Yes Historical Provider, MD       Allergies   Allergen Reactions    Methotrexate Derivatives Nausea And Vomiting and Other (See Comments)     Mouth sores       Social History     Socioeconomic History    Marital status:  Spouse name: Not on file    Number of children: Not on file    Years of education: Not on file    Highest education level: Not on file   Occupational History    Not on file   Social Needs    Financial resource strain: Not on file    Food insecurity:     Worry: Not on file     Inability: Not on file    Transportation needs:     Medical: Not on file     Non-medical: Not on file   Tobacco Use    Smoking status: Never Smoker    Smokeless tobacco: Never Used   Substance and Sexual Activity    Alcohol use: No     Comment: wine occ    Drug use: No    Sexual activity: Yes     Partners: Male   Lifestyle    Physical activity:     Days per week: Not on file     Minutes per session: Not on file    Stress: Not on file   Relationships    Social connections:     Talks on phone: Not on file     Gets together: Not on file     Attends Worship service: Not on file     Active member of club or organization: Not on file     Attends meetings of clubs or organizations: Not on file     Relationship status: Not on file    Intimate partner violence:     Fear of current or ex partner: Not on file     Emotionally abused: Not on file     Physically abused: Not on file     Forced sexual activity: Not on file   Other Topics Concern    Not on file   Social History Narrative    Not on file       Family History   Problem Relation Age of Onset    Heart Failure Mother     Thyroid Disease Mother     Cancer Father     Thyroid Disease Sister     Thyroid Disease Maternal Grandmother     Thyroid Disease Sister     Thyroid Disease Sister          REVIEW OF SYSTEMS:    CONSTITUTIONAL:  negative for  fevers, chills, sweats and fatigue    RESPIRATORY:  negative for  dry cough, cough with sputum, dyspnea, wheezing and chest pain    CARDIOVASCULAR:  negative for chest pain, dyspnea, palpitations, syncope    GASTROINTESTINAL:  negative for nausea, vomiting, change in bowel habits, diarrhea, constipation and abdominal pain    MUSCULOSKELETAL: negative for muscle weakness    SKIN: negative for itching or rashes. BEHAVIOR/PSYCH:  negative for poor appetite, increased appetite, decreased sleep and poor concentration    All other systems negative      PHYSICAL EXAM:    VITALS:  /85   Pulse 76   Resp 18   SpO2 99%     CONSTITUTIONAL:  awake, alert, cooperative, no apparent distress, and appears stated age    EYES: PERRLA, EOMI    LUNGS:  No increased work of breathing, no audible wheezing    CARDIOVASCULAR:  regular rate and rhythm    ABDOMEN:  Soft non tender non distended     EXTREMITIES: no signs of clubbing or cyanosis. MUSCULOSKELETAL: negative for flaccid muscle tone or spastic movements. SKIN: gross examination reveals no signs of rashes, or diaphoresis. NEURO: Cranial nerves II-XII grossly intact. No signs of agitated mood.        Assessment/Plan:    Patient  is here for [facility]  for Low back/LE pain    Ninfa Macias MD

## 2019-05-28 NOTE — OP NOTE
2019    Patient: Torie Germain  :  1975  Age:  41 y.o. Sex:  female     PRE-OPERATIVE DIAGNOSIS: Lumbar disc displacement, lumbar radiculopathy. POST-OPERATIVE DIAGNOSIS: Same. PROCEDURE: Fluoroscopic guided therapeutic lumbar epidural steroid injection at the L5-S1 level (# 1). SURGEON: Ninfa Macias MD    ANESTHESIA: Local    ESTIMATED BLOOD LOSS: None.  ______________________________________________________________________    BRIEF HISTORY:  Torie Germain comes in today for first lumbar epidural injection at L5-S1 level. The potential complications of this procedure were discussed with her again today. She has elected to undergo the aforementioned procedure. Candelario complete History & Physical examination were reviewed in depth, a copy of which is in the chart. DESCRIPTION OF PROCEDURE:    After confirming written and informed consent, a time-out was performed and Candelario name and date of birth, the procedure to be performed as well as the plan for the location of the needle insertion were confirmed. The patient was brought into the procedure room and placed in the prone position on the fluoroscopy table. A pillow was placed under the patient's lower abdomen/upper pelvis to increase lumbar interlaminar space. Standard monitors were placed, and vital signs were observed throughout the procedure. The area of the lumbar spine was prepped with chloraprep and draped in a sterile manner. The L5-S1 interspace was identified and marked under AP fluoroscopy. The skin and subcutaneous tissues at the above level were anesthestized with 0.5% lidocaine. With intermittent fluoroscopy, an # 18 gauge 3 1/2 tuohy epidural needle was inserted and directed toward the interlaminar space.  The needle was slowly advanced using loss of resistance technique and 5 cc glass syringe  until the tip of the epidural needle has passed through the ligamentum flavum and entered the epidural space. AP and lateral fluoroscopic imaging is performed to verify that the epidural needle is properly placed. Negative aspiration of blood and CSF was confirmed. 0.5 ml of omnipaque 240 was used for confirmation of even epidural spread under both live and AP fluoroscopy. After negative aspiration, a solution of 0.5 % Lidocaine 3 ml and 40 mg DepoMedrol was easily injected. The needle was gently removed intact . The patient neck was cleaned and a Band-Aid was placed over the needle insertion point. Disposition the patient tolerated the procedure well and there were no complications . Vital signs remained stable throughout the procedure. The patient was escorted to the recovery area where they remained until discharge and written discharge instructions for the procedure were given. Plan: Lilia Madden will return to our pain management center as scheduled.      Toni Muller MD

## 2019-06-18 ENCOUNTER — PREP FOR PROCEDURE (OUTPATIENT)
Dept: PAIN MANAGEMENT | Age: 44
End: 2019-06-18

## 2019-06-18 ENCOUNTER — OFFICE VISIT (OUTPATIENT)
Dept: PAIN MANAGEMENT | Age: 44
End: 2019-06-18
Payer: COMMERCIAL

## 2019-06-18 VITALS
RESPIRATION RATE: 16 BRPM | OXYGEN SATURATION: 97 % | TEMPERATURE: 98.5 F | HEART RATE: 68 BPM | SYSTOLIC BLOOD PRESSURE: 118 MMHG | HEIGHT: 61 IN | BODY MASS INDEX: 31.15 KG/M2 | WEIGHT: 165 LBS | DIASTOLIC BLOOD PRESSURE: 68 MMHG

## 2019-06-18 DIAGNOSIS — M51.36 DDD (DEGENERATIVE DISC DISEASE), LUMBAR: ICD-10-CM

## 2019-06-18 DIAGNOSIS — M54.16 LUMBAR RADICULOPATHY: Primary | ICD-10-CM

## 2019-06-18 DIAGNOSIS — M47.817 LUMBOSACRAL SPONDYLOSIS WITHOUT MYELOPATHY: ICD-10-CM

## 2019-06-18 DIAGNOSIS — M53.3 SACROILIAC DYSFUNCTION: ICD-10-CM

## 2019-06-18 DIAGNOSIS — G89.4 CHRONIC PAIN SYNDROME: ICD-10-CM

## 2019-06-18 PROCEDURE — 99214 OFFICE O/P EST MOD 30 MIN: CPT | Performed by: ANESTHESIOLOGY

## 2019-06-18 PROCEDURE — 99213 OFFICE O/P EST LOW 20 MIN: CPT | Performed by: ANESTHESIOLOGY

## 2019-06-18 NOTE — PROGRESS NOTES
Lakisha Jordan presents to the [address] [address] on 6/18/2019. Lorena Sandoval is complaining of pain lower back pain that goes down both legs. The pain is constant. The pain is described as aching, throbbing, shooting, stabbing, sharp, tender, burning, exhausting, penetrating, nagging, miserable, tiring and unbearable. Pain is rated on her best day at a 4, on her worst day at a 10, and on average at a 7 on the VAS scale. She took her last dose of nothing         Any procedures since your last visit: yes, with 100 % relief for a week and one half. All the pain was transferred to the other side. She has not been on anticoagulation medications to include none and is managed by . Pacemaker or defibrilator: No Physician managing device is .        /68 (Site: Right Upper Arm, Position: Sitting, Cuff Size: Medium Adult)   Pulse 68   Temp 98.5 °F (36.9 °C) (Oral)   Resp 16   Ht 5' 1\" (1.549 m)   Wt 165 lb (74.8 kg)   SpO2 97%   BMI 31.18 kg/m²

## 2019-06-18 NOTE — PROGRESS NOTES
[address] [facility], [address] [place] [address] Adebayo  [phone]    Follow up Note      Toni Oglesby     Date of Visit:  6/18/2019    CC:  Patient presents for follow up   Chief Complaint   Patient presents with    Back Pain     lower back and pelvis down right and left leg       HPI:    Low back and B/l LE pain. S/P ILESI which provided significant pain relief of almost 100 % for a week and half. Significant pain relief on the left side but right side still bothering. Now pain is back to baseline mainly on the right side and also notices pain on the left side. No new weakness or numbness or bowel or bladder symptoms. She follows up with rheumatologist for h/o sjogren's/ rheumatoid / SLE.     Previous treatments:   Physical Therapy   Chiropractic treatment  Yoga/ Pilates  Ongoing Core exercises for the last year. TENS  and medications- NSAID's, oral steroids, gabapentin etc,. Does not tolerate meds well   Conservative RX has not provided good long term relief.     Pain > 6. Continues HEP    Alleviating factors include: heat, medications.       Aggravating factors include:  cold, movement, walking, standing, bending, lifting.  She states her pain is constant.  Dannielle states that the pain does keep her from sleeping at night.       She has not been on anticoagulation medications to include none and has not been on herbal supplements. She is not diabetic. Imaging:      Findings: Bone marrow signal is normal. No fracture, subluxation, or   marrow replacement disorder. The conus medullaris is at T12.       The discs at L1-2, L2-3 and L3-4 are normal.       At L4-5, there is dehydration of the disc with mild bulging but no   central canal or foraminal stenosis. The disc bulges eccentrically and   may contact the right exiting L4 nerve root.       There is a discrete disc extrusion at L5-S1. The disc extrusion is 1   cm in breadth and 5 mm in depth.  It may contact the traversing S1   nerve roots in the canal. A broad-based protrusion is also present at   this level and there is disc material in both neuroforamina. However,   I believe it is below the level of the exiting nerve roots.           Impression   1. The seminal abnormality is a relatively large central disc   extrusion at L5-S1.   2. The underlying disc protrudes into the lower halves of both   neuroforamina   3. There is bulging of the disc at L4-5 which is eccentric to the   right and may extend into the right neuroforamen. Potential Aberrant Drug-Related Behavior:  No    Urine Drug Screening:No    OARRS report[de-identified] yes- not on chronic narcotics    Past Medical History:   Diagnosis Date    Flank Pain     right    H/O rheumatoid arthritis     Hx of cardiovascular stress test 2013    treadmill nuclear stress    Joint pain     Low back pain     Lupus (HCC)     Neck pain     RA (rheumatoid arthritis) ([address]     RH    Radiculopathy     Sjogren's disease ([address]        Past Surgical History:   Procedure Laterality Date     SECTION      x2    COLONOSCOPY      DILATION AND CURETTAGE OF UTERUS      x2    EPIDURAL STEROID INJECTION Left 2019    LUMBAR EPIDURAL STEROID INJECTION L5-S1 (LEFT PARAMEDIAN APPROACH) UNDER FLUOROSCOPIC GUIDANCE) performed by Jadyn Lucas MD at [facility] ARTHROSCOPY Left 16    medial menisectomy, debridement, acl synovectomy, chondroplasty    NERVE BLOCK Left 2019    lumbar       Prior to Admission medications    Medication Sig Start Date End Date Taking?  Authorizing Provider   Cholecalciferol (VITAMIN D) 2000 units CAPS capsule Take 2,000 Units by mouth every 7 days   Yes Historical Provider, MD   vitamin B-12 (CYANOCOBALAMIN) 500 MCG tablet Take 500 mcg by mouth daily   Yes Historical Provider, MD   levonorgestrel (MIRENA) IUD 52 mg 1 each by Intrauterine route once Placed in    Yes Historical Provider, MD loratadine (CLARITIN) 10 MG tablet Take 10 mg by mouth daily as needed (Allergies)    Yes Historical Provider, MD   omeprazole (PRILOSEC) 20 MG capsule Take 20 mg by mouth daily as needed (Reflux)    Yes Historical Provider, MD       Allergies   Allergen Reactions    Methotrexate Derivatives Nausea And Vomiting and Other (See Comments)     Mouth sores       Social History     Socioeconomic History    Marital status:      Spouse name: Not on file    Number of children: Not on file    Years of education: Not on file    Highest education level: Not on file   Occupational History    Not on file   Social Needs    Financial resource strain: Not on file    Food insecurity:     Worry: Not on file     Inability: Not on file    Transportation needs:     Medical: Not on file     Non-medical: Not on file   Tobacco Use    Smoking status: Never Smoker    Smokeless tobacco: Never Used   Substance and Sexual Activity    Alcohol use: No     Comment: wine occ    Drug use: No    Sexual activity: Yes     Partners: Male   Lifestyle    Physical activity:     Days per week: Not on file     Minutes per session: Not on file    Stress: Not on file   Relationships    Social connections:     Talks on phone: Not on file     Gets together: Not on file     Attends Episcopal service: Not on file     Active member of club or organization: Not on file     Attends meetings of clubs or organizations: Not on file     Relationship status: Not on file    Intimate partner violence:     Fear of current or ex partner: Not on file     Emotionally abused: Not on file     Physically abused: Not on file     Forced sexual activity: Not on file   Other Topics Concern    Not on file   Social History Narrative    Not on file       Family History   Problem Relation Age of Onset    Heart Failure Mother     Thyroid Disease Mother     Cancer Father     Thyroid Disease Sister     Thyroid Disease Maternal Grandmother     Thyroid Disease Sister     Thyroid Disease Sister        REVIEW OF SYSTEMS:     Marivel Rehman denies fever/chills, chest pain, shortness of breath, new bowel or bladder complaints. All other review of systems was negative. PHYSICAL EXAMINATION:      /68 (Site: Right Upper Arm, Position: Sitting, Cuff Size: Medium Adult)   Pulse 68   Temp 98.5 °F (36.9 °C) (Oral)   Resp 16   Ht 5' 1\" (1.549 m)   Wt 165 lb (74.8 kg)   SpO2 97%   BMI 31.18 kg/m²   General:       General appearance:   pleasant and well-hydrated. , in no distress and A & O x3  Build:Obese  Function:Moves about room without difficulty.     HEENT:     Head:normocephalic, atraumatic  Pupils:regular, round, equal  Sclera: icterus absent     Lungs:     Breathing:normal breathing pattern      CVS :   RRR     Abdomen:     Shape:non-distended and normal  Tenderness:none  Guarding:none     Cervical spine:     Inspection:normal  Palpation:tenderness paravertebral muscles, tenderness trapezium, left, right and positive. Range of motion:abnormal mildly flexion, extension rotation bilateral and is  painful.     Thoracic spine:     Spine inspection:normal   Palpation:non-tender midline and paraspinals, left and right. Range of motion:normal in flexion, extension rotation bilateral and is not painful.     Lumbar spine:     Spine inspection:normal   CVA tenderness:No   Palpation:tenderness paravertebral muscles, left, right and positive. Range of motion: Reduced, Pain on Lateral bending, flexion, extension rotation bilateral and is Painful.   B/l Lumbar paraspinal tenderness +  B/l Lumbar facet loading +  SIJ tenderness + bilateral  Sensory and motor in b/l LE normal  DTRs' b/l LE equal     Musculoskeletal:     Trigger points in trapezius:absent bilaterally  Trigger points in rhomboids:absent bilaterally  Trigger points in Paravertebral:absent bilaterally  Trigger points in supraspinatus/infraspinatus:absent  Spurling's:negative right, negative left    SI joint sides.    Will schedule for bilateral Lumbar TFESI at L5-S1 under fluoroscopy. Continue HEP.    NSAID's for prn use. Counseling reg : regular HEP and weight watching.       Desean Gatica MD

## 2019-07-01 ENCOUNTER — PREP FOR PROCEDURE (OUTPATIENT)
Dept: PAIN MANAGEMENT | Age: 44
End: 2019-07-01

## 2019-07-01 DIAGNOSIS — M54.16 LUMBAR RADICULOPATHY: Primary | ICD-10-CM

## 2019-07-09 ENCOUNTER — HOSPITAL ENCOUNTER (OUTPATIENT)
Age: 44
Setting detail: OUTPATIENT SURGERY
Discharge: HOME | End: 2019-07-09
Attending: ANESTHESIOLOGY | Admitting: ANESTHESIOLOGY
Payer: COMMERCIAL

## 2019-07-09 ENCOUNTER — HOSPITAL ENCOUNTER (OUTPATIENT)
Dept: OPERATING ROOM | Age: 44
Setting detail: OUTPATIENT SURGERY
Discharge: HOME | End: 2019-07-09
Attending: ANESTHESIOLOGY
Payer: COMMERCIAL

## 2019-07-09 VITALS
HEART RATE: 58 BPM | DIASTOLIC BLOOD PRESSURE: 71 MMHG | SYSTOLIC BLOOD PRESSURE: 110 MMHG | RESPIRATION RATE: 16 BRPM | OXYGEN SATURATION: 97 %

## 2019-07-09 DIAGNOSIS — M51.36 LUMBAR DEGENERATIVE DISC DISEASE: ICD-10-CM

## 2019-07-09 LAB
HCG, URINE, POC: NEGATIVE
Lab: NORMAL
NEGATIVE QC PASS/FAIL: NORMAL
POSITIVE QC PASS/FAIL: NORMAL

## 2019-07-09 PROCEDURE — 2709999900 HC NON-CHARGEABLE SUPPLY: Performed by: ANESTHESIOLOGY

## 2019-07-09 PROCEDURE — 6360000002 HC RX W HCPCS: Performed by: ANESTHESIOLOGY

## 2019-07-09 PROCEDURE — 64484 NJX AA&/STRD TFRM EPI L/S EA: CPT | Performed by: ANESTHESIOLOGY

## 2019-07-09 PROCEDURE — 81025 URINE PREGNANCY TEST: CPT | Performed by: ANESTHESIOLOGY

## 2019-07-09 PROCEDURE — 6360000004 HC RX CONTRAST MEDICATION: Performed by: ANESTHESIOLOGY

## 2019-07-09 PROCEDURE — 7100000011 HC PHASE II RECOVERY - ADDTL 15 MIN: Performed by: ANESTHESIOLOGY

## 2019-07-09 PROCEDURE — 64483 NJX AA&/STRD TFRM EPI L/S 1: CPT | Performed by: ANESTHESIOLOGY

## 2019-07-09 PROCEDURE — 3600000005 HC SURGERY LEVEL 5 BASE: Performed by: ANESTHESIOLOGY

## 2019-07-09 PROCEDURE — 2500000003 HC RX 250 WO HCPCS: Performed by: ANESTHESIOLOGY

## 2019-07-09 PROCEDURE — 3600000015 HC SURGERY LEVEL 5 ADDTL 15MIN: Performed by: ANESTHESIOLOGY

## 2019-07-09 PROCEDURE — 3209999900 FLUORO FOR SURGICAL PROCEDURES

## 2019-07-09 PROCEDURE — 7100000010 HC PHASE II RECOVERY - FIRST 15 MIN: Performed by: ANESTHESIOLOGY

## 2019-07-09 RX ORDER — LIDOCAINE HYDROCHLORIDE 5 MG/ML
INJECTION, SOLUTION INFILTRATION; INTRAVENOUS PRN
Status: DISCONTINUED | OUTPATIENT
Start: 2019-07-09 | End: 2019-07-09 | Stop reason: ALTCHOICE

## 2019-07-09 ASSESSMENT — PAIN - FUNCTIONAL ASSESSMENT: PAIN_FUNCTIONAL_ASSESSMENT: 0-10

## 2019-07-09 ASSESSMENT — PAIN DESCRIPTION - LOCATION: LOCATION: FOOT

## 2019-07-09 ASSESSMENT — PAIN SCALES - GENERAL
PAINLEVEL_OUTOF10: 0
PAINLEVEL_OUTOF10: 1

## 2019-07-09 ASSESSMENT — PAIN DESCRIPTION - DESCRIPTORS: DESCRIPTORS: ACHING

## 2019-07-09 NOTE — H&P
Vomiting and Other (See Comments)     Mouth sores       Social History     Socioeconomic History    Marital status:      Spouse name: Not on file    Number of children: Not on file    Years of education: Not on file    Highest education level: Not on file   Occupational History    Not on file   Social Needs    Financial resource strain: Not on file    Food insecurity:     Worry: Not on file     Inability: Not on file    Transportation needs:     Medical: Not on file     Non-medical: Not on file   Tobacco Use    Smoking status: Never Smoker    Smokeless tobacco: Never Used   Substance and Sexual Activity    Alcohol use: No     Comment: wine occ    Drug use: No    Sexual activity: Yes     Partners: Male   Lifestyle    Physical activity:     Days per week: Not on file     Minutes per session: Not on file    Stress: Not on file   Relationships    Social connections:     Talks on phone: Not on file     Gets together: Not on file     Attends Quaker service: Not on file     Active member of club or organization: Not on file     Attends meetings of clubs or organizations: Not on file     Relationship status: Not on file    Intimate partner violence:     Fear of current or ex partner: Not on file     Emotionally abused: Not on file     Physically abused: Not on file     Forced sexual activity: Not on file   Other Topics Concern    Not on file   Social History Narrative    Not on file       Family History   Problem Relation Age of Onset    Heart Failure Mother     Thyroid Disease Mother     Cancer Father     Thyroid Disease Sister     Thyroid Disease Maternal Grandmother     Thyroid Disease Sister     Thyroid Disease Sister          REVIEW OF SYSTEMS:    CONSTITUTIONAL:  negative for  fevers, chills, sweats and fatigue    RESPIRATORY:  negative for  dry cough, cough with sputum, dyspnea, wheezing and chest pain    CARDIOVASCULAR:  negative for chest pain, dyspnea, palpitations,

## 2019-07-09 NOTE — OP NOTE
2019    Patient: Som Ho  :  1975  Age:  41 y.o. Sex:  female     PRE-OPERATIVE DIAGNOSIS: Lumbar disc displacement, lumbar neural foraminal stenosis, lumbar radiculopathy. POST-OPERATIVE DIAGNOSIS: Same. PROCEDURE: Right L4 and Left L5 Transforaminal epidural steroid injection under fluoroscopic guidance. SURGEON: Sav Bassett MD    ANESTHESIA: Local    ESTIMATED BLOOD LOSS: None.  ______________________________________________________________________  BRIEF HISTORY: Som Ho comes in today for the Right L4 and Left L5 transforaminal epidural steroid injection under fluoroscopic guidance . The potential complications of this procedure were discussed with her again today. She has elected to undergo the aforementioned procedure. Candelario complete History & Physical examination were reviewed in depth, a copy of which is in the chart. DESCRIPTION OF PROCEDURE:    After confirming written and informed consent, a time-out was performed and Candelario name and date of birth, the procedure to be performed as well as the plan for the location of the needle insertion were confirmed. The patient was brought into the procedure room and placed in the prone position on the fluoroscopy table. Standard monitors were placed and vital signs were observed throughout the procedure. The area of the lumbar spine was prepped with chloraprep and draped in a sterile manner. The vertebral body was identified with AP fluoroscopy. An oblique view was obtained to better visualize the inferior junction of the pedicle and transverse process . The 6 o'clock position of the pedicle was marked and identified. The skin and subcutaneous tissue were anesthetized with 0.5% lidocaine. A # 22 gauge pencil point needle was directed toward the targeted point at Right L4 and Left L5 under fluoroscopy until bone was contacted.  The needle was then walked inferiorly until the neural foramen was

## 2019-08-07 ENCOUNTER — PREP FOR PROCEDURE (OUTPATIENT)
Dept: PAIN MANAGEMENT | Age: 44
End: 2019-08-07

## 2019-08-07 ENCOUNTER — OFFICE VISIT (OUTPATIENT)
Dept: PAIN MANAGEMENT | Age: 44
End: 2019-08-07
Payer: COMMERCIAL

## 2019-08-07 VITALS
WEIGHT: 165 LBS | TEMPERATURE: 97.8 F | DIASTOLIC BLOOD PRESSURE: 66 MMHG | RESPIRATION RATE: 16 BRPM | SYSTOLIC BLOOD PRESSURE: 100 MMHG | BODY MASS INDEX: 31.15 KG/M2 | HEART RATE: 62 BPM | HEIGHT: 61 IN

## 2019-08-07 DIAGNOSIS — M47.817 LUMBOSACRAL SPONDYLOSIS WITHOUT MYELOPATHY: ICD-10-CM

## 2019-08-07 DIAGNOSIS — M53.3 SACROILIAC DYSFUNCTION: Primary | ICD-10-CM

## 2019-08-07 DIAGNOSIS — M54.16 LUMBAR RADICULOPATHY: ICD-10-CM

## 2019-08-07 DIAGNOSIS — M51.36 DDD (DEGENERATIVE DISC DISEASE), LUMBAR: ICD-10-CM

## 2019-08-07 PROCEDURE — 99213 OFFICE O/P EST LOW 20 MIN: CPT | Performed by: ANESTHESIOLOGY

## 2019-08-07 PROCEDURE — 99214 OFFICE O/P EST MOD 30 MIN: CPT | Performed by: ANESTHESIOLOGY

## 2019-08-07 RX ORDER — IBUPROFEN 200 MG
200 TABLET ORAL EVERY 6 HOURS PRN
COMMUNITY
End: 2020-11-17

## 2019-08-07 NOTE — PROGRESS NOTES
[address] [facility], [address] [place] [address] Adebayo  [phone]    Follow up Note      Katie Valentine     Date of Visit:  8/7/2019    CC:  Patient presents for follow up   Chief Complaint   Patient presents with    Follow-up    Back Pain     low back       HPI:    Low back and B/l LE pain. S/P ILESI which provided significant pain relief of almost 100 % for a week and half. Subsequently underwent lumbar TFESI - which helped her LE pain significantly. Currently her pain is mainly in the low back pain. Pain causes functional limitations. Continues HEP    No new weakness or numbness or bowel or bladder symptoms. She follows up with rheumatologist for h/o sjogren's/ rheumatoid / SLE.     Previous treatments:   Physical Therapy   Chiropractic treatment  Yoga/ Pilates  Ongoing Core exercises for the last year. TENS  and medications- NSAID's, oral steroids, gabapentin etc,. Does not tolerate meds well   Conservative RX has not provided good long term relief.     Continues HEP    Alleviating factors include: heat, medications.       Aggravating factors include:  cold, movement, walking, standing, bending, lifting.  She states her pain is constant.  Dannielle states that the pain does keep her from sleeping at night.       She has not been on anticoagulation medications to include none and has not been on herbal supplements. She is not diabetic. Imaging:      Findings: Bone marrow signal is normal. No fracture, subluxation, or   marrow replacement disorder. The conus medullaris is at T12.       The discs at L1-2, L2-3 and L3-4 are normal.       At L4-5, there is dehydration of the disc with mild bulging but no   central canal or foraminal stenosis. The disc bulges eccentrically and   may contact the right exiting L4 nerve root.       There is a discrete disc extrusion at L5-S1. The disc extrusion is 1   cm in breadth and 5 mm in depth.  It may contact the Historical Provider, MD   Cholecalciferol (VITAMIN D) 2000 units CAPS capsule Take 2,000 Units by mouth every 7 days   Yes Historical Provider, MD   levonorgestrel (MIRENA) IUD 52 mg 1 each by Intrauterine route once Placed in 2015   Yes Historical Provider, MD   loratadine (CLARITIN) 10 MG tablet Take 10 mg by mouth daily as needed (Allergies)    Yes Historical Provider, MD   omeprazole (PRILOSEC) 20 MG capsule Take 20 mg by mouth daily as needed (Reflux)    Yes Historical Provider, MD   vitamin B-12 (CYANOCOBALAMIN) 500 MCG tablet Take 500 mcg by mouth daily    Historical Provider, MD       Allergies   Allergen Reactions    Methotrexate Derivatives Nausea And Vomiting and Other (See Comments)     Mouth sores       Social History     Socioeconomic History    Marital status:      Spouse name: Not on file    Number of children: Not on file    Years of education: Not on file    Highest education level: Not on file   Occupational History    Not on file   Social Needs    Financial resource strain: Not on file    Food insecurity:     Worry: Not on file     Inability: Not on file    Transportation needs:     Medical: Not on file     Non-medical: Not on file   Tobacco Use    Smoking status: Never Smoker    Smokeless tobacco: Never Used   Substance and Sexual Activity    Alcohol use: No     Comment: wine occ    Drug use: No    Sexual activity: Yes     Partners: Male   Lifestyle    Physical activity:     Days per week: Not on file     Minutes per session: Not on file    Stress: Not on file   Relationships    Social connections:     Talks on phone: Not on file     Gets together: Not on file     Attends Episcopalian service: Not on file     Active member of club or organization: Not on file     Attends meetings of clubs or organizations: Not on file     Relationship status: Not on file    Intimate partner violence:     Fear of current or ex partner: Not on file     Emotionally abused: Not on file noted at L4-5.     Also has facet and SIJ pain.     Failed conservative RX including PT/ chiro/ meds- and ongoing HEP      Recent Lumbar epidural steroid injection L5- S1  And TFESI has provided good pain relief of the LE pain. Currently SIJ pain is the predominant component. Will schedule for bilateral Sacroiliac joint injection under fluoroscopy. RBA discussed and patient agreed. If axial low back pain persists, consider lumbar facet MBNB. Continue HEP.    NSAID's for prn use. Counseling reg : regular HEP and weight watching.       Kita Pedro MD

## 2019-08-30 ENCOUNTER — TELEPHONE (OUTPATIENT)
Dept: PAIN MANAGEMENT | Age: 44
End: 2019-08-30

## 2019-09-10 ENCOUNTER — INITIAL CONSULT (OUTPATIENT)
Dept: NEUROSURGERY | Age: 44
End: 2019-09-10
Payer: COMMERCIAL

## 2019-09-10 VITALS
SYSTOLIC BLOOD PRESSURE: 109 MMHG | HEIGHT: 61 IN | WEIGHT: 171 LBS | DIASTOLIC BLOOD PRESSURE: 67 MMHG | BODY MASS INDEX: 32.28 KG/M2 | HEART RATE: 65 BPM

## 2019-09-10 DIAGNOSIS — M54.16 LUMBAR RADICULOPATHY: Primary | ICD-10-CM

## 2019-09-10 PROCEDURE — 99203 OFFICE O/P NEW LOW 30 MIN: CPT | Performed by: PHYSICIAN ASSISTANT

## 2019-09-10 RX ORDER — COVID-19 ANTIGEN TEST
KIT MISCELLANEOUS
COMMUNITY
End: 2020-11-17

## 2019-09-10 ASSESSMENT — ENCOUNTER SYMPTOMS
ALLERGIC/IMMUNOLOGIC NEGATIVE: 1
RESPIRATORY NEGATIVE: 1
EYES NEGATIVE: 1
GASTROINTESTINAL NEGATIVE: 1
BACK PAIN: 1

## 2019-09-10 NOTE — PROGRESS NOTES
for the past 22 years. Plan: We will obtain lumbar x-rays and begin PT. She will continue with NSAIDS. If conservative measures fail, we will order a lumbar MRI.         NAVI Nichols

## 2019-09-10 NOTE — PATIENT INSTRUCTIONS
Patient Education        Back Pain: Care Instructions  Your Care Instructions    Back pain has many possible causes. It is often related to problems with muscles and ligaments of the back. It may also be related to problems with the nerves, discs, or bones of the back. Moving, lifting, standing, sitting, or sleeping in an awkward way can strain the back. Sometimes you don't notice the injury until later. Arthritis is another common cause of back pain. Although it may hurt a lot, back pain usually improves on its own within several weeks. Most people recover in 12 weeks or less. Using good home treatment and being careful not to stress your back can help you feel better sooner. Follow-up care is a key part of your treatment and safety. Be sure to make and go to all appointments, and call your doctor if you are having problems. It's also a good idea to know your test results and keep a list of the medicines you take. How can you care for yourself at home? · Sit or lie in positions that are most comfortable and reduce your pain. Try one of these positions when you lie down:  ? Lie on your back with your knees bent and supported by large pillows. ? Lie on the floor with your legs on the seat of a sofa or chair. ? Lie on your side with your knees and hips bent and a pillow between your legs. ? Lie on your stomach if it does not make pain worse. · Do not sit up in bed, and avoid soft couches and twisted positions. Bed rest can help relieve pain at first, but it delays healing. Avoid bed rest after the first day of back pain. · Change positions every 30 minutes. If you must sit for long periods of time, take breaks from sitting. Get up and walk around, or lie in a comfortable position. · Try using a heating pad on a low or medium setting for 15 to 20 minutes every 2 or 3 hours. Try a warm shower in place of one session with the heating pad. · You can also try an ice pack for 10 to 15 minutes every 2 to 3 hours.

## 2019-12-23 ENCOUNTER — APPOINTMENT (OUTPATIENT)
Dept: GENERAL RADIOLOGY | Age: 44
End: 2019-12-23
Payer: COMMERCIAL

## 2019-12-23 ENCOUNTER — HOSPITAL ENCOUNTER (EMERGENCY)
Age: 44
Discharge: HOME | End: 2019-12-23
Payer: COMMERCIAL

## 2019-12-23 VITALS
TEMPERATURE: 98.1 F | SYSTOLIC BLOOD PRESSURE: 126 MMHG | WEIGHT: 171 LBS | BODY MASS INDEX: 32.31 KG/M2 | RESPIRATION RATE: 20 BRPM | HEART RATE: 91 BPM | DIASTOLIC BLOOD PRESSURE: 84 MMHG | OXYGEN SATURATION: 98 %

## 2019-12-23 DIAGNOSIS — J40 BRONCHITIS: Primary | ICD-10-CM

## 2019-12-23 PROCEDURE — 71046 X-RAY EXAM CHEST 2 VIEWS: CPT

## 2019-12-23 PROCEDURE — 99212 OFFICE O/P EST SF 10 MIN: CPT

## 2019-12-23 RX ORDER — AZITHROMYCIN 250 MG/1
TABLET, FILM COATED ORAL
Qty: 6 TABLET | Refills: 0 | Status: SHIPPED | OUTPATIENT
Start: 2019-12-23 | End: 2020-01-02

## 2020-02-26 ENCOUNTER — OFFICE VISIT (OUTPATIENT)
Dept: NEUROSURGERY | Age: 45
End: 2020-02-26
Payer: COMMERCIAL

## 2020-02-26 VITALS
WEIGHT: 177 LBS | SYSTOLIC BLOOD PRESSURE: 124 MMHG | HEIGHT: 61 IN | HEART RATE: 68 BPM | DIASTOLIC BLOOD PRESSURE: 74 MMHG | BODY MASS INDEX: 33.42 KG/M2

## 2020-02-26 PROCEDURE — 99213 OFFICE O/P EST LOW 20 MIN: CPT | Performed by: PHYSICIAN ASSISTANT

## 2020-02-26 RX ORDER — METHOCARBAMOL 750 MG/1
TABLET, FILM COATED ORAL
COMMUNITY
Start: 2020-02-17 | End: 2020-11-17

## 2020-02-26 ASSESSMENT — ENCOUNTER SYMPTOMS
RESPIRATORY NEGATIVE: 1
EYES NEGATIVE: 1
GASTROINTESTINAL NEGATIVE: 1
BACK PAIN: 1
ALLERGIC/IMMUNOLOGIC NEGATIVE: 1

## 2020-02-26 NOTE — PROGRESS NOTES
Subjective:      Patient ID: Lety Edmond is a 45 y.o. female. Back Pain   This is a chronic problem. Episode onset: 22+ years. The problem occurs constantly. The problem has been gradually worsening since onset. The pain is present in the lumbar spine (and left leg pain into the foot. ). The quality of the pain is described as aching. The pain is severe. The symptoms are aggravated by twisting, standing, sitting and bending. She has tried NSAIDs and heat (BELEM, ) for the symptoms. The treatment provided mild relief. Review of Systems   Constitutional: Negative. HENT: Negative. Eyes: Negative. Respiratory: Negative. Cardiovascular: Negative. Gastrointestinal: Negative. Endocrine: Negative. Genitourinary: Negative. Musculoskeletal: Positive for back pain. Skin: Negative. Allergic/Immunologic: Negative. Neurological: Negative. Hematological: Negative. Psychiatric/Behavioral: Negative. Objective:   Physical Exam  Constitutional:       Appearance: She is well-developed. HENT:      Head: Normocephalic and atraumatic. Eyes:      Pupils: Pupils are equal, round, and reactive to light. Neck:      Musculoskeletal: Normal range of motion and neck supple. Pulmonary:      Effort: No respiratory distress. Abdominal:      General: There is no distension. Musculoskeletal:      Comments: Pain with ROM and palpation of the lumbar spine   Skin:     General: Skin is warm and dry. Neurological:      Mental Status: She is alert and oriented to person, place, and time. Cranial Nerves: No cranial nerve deficit. Sensory: Sensory deficit present. Gait: Gait normal.      Deep Tendon Reflexes:      Reflex Scores:       Patellar reflexes are 2+ on the right side and 1+ on the left side. Achilles reflexes are 2+ on the right side and 2+ on the left side.      Comments: Decreased sensation in left L4, L5 and S1 distribtuion         Assessment:      44 year old female with severe low back and left leg pain for the past 22 years. Lumbar CT reveals severe L5-S1 DDD and bilateral foraminal stenosis. Plan: We will obtain lumbar x-rays and begin PT. She will continue with NSAIDS. If conservative measures fail, we will order a lumbar MRI.         NAVI Reed

## 2020-06-11 ENCOUNTER — OFFICE VISIT (OUTPATIENT)
Dept: NEUROSURGERY | Age: 45
End: 2020-06-11
Payer: COMMERCIAL

## 2020-06-11 VITALS
TEMPERATURE: 98.4 F | WEIGHT: 174 LBS | BODY MASS INDEX: 32.85 KG/M2 | DIASTOLIC BLOOD PRESSURE: 80 MMHG | SYSTOLIC BLOOD PRESSURE: 120 MMHG | HEART RATE: 78 BPM | HEIGHT: 61 IN

## 2020-06-11 PROCEDURE — 99213 OFFICE O/P EST LOW 20 MIN: CPT | Performed by: PHYSICIAN ASSISTANT

## 2020-06-11 ASSESSMENT — ENCOUNTER SYMPTOMS
GASTROINTESTINAL NEGATIVE: 1
BACK PAIN: 1
EYES NEGATIVE: 1
RESPIRATORY NEGATIVE: 1
ALLERGIC/IMMUNOLOGIC NEGATIVE: 1

## 2020-06-27 ENCOUNTER — HOSPITAL ENCOUNTER (OUTPATIENT)
Dept: MRI IMAGING | Age: 45
Discharge: HOME | End: 2020-06-29
Payer: COMMERCIAL

## 2020-06-27 ENCOUNTER — HOSPITAL ENCOUNTER (OUTPATIENT)
Dept: GENERAL RADIOLOGY | Age: 45
Discharge: HOME | End: 2020-06-29
Payer: COMMERCIAL

## 2020-06-27 ENCOUNTER — HOSPITAL ENCOUNTER (OUTPATIENT)
Age: 45
Discharge: HOME | End: 2020-06-29
Payer: COMMERCIAL

## 2020-06-27 PROCEDURE — 72148 MRI LUMBAR SPINE W/O DYE: CPT

## 2020-06-27 PROCEDURE — 72120 X-RAY BEND ONLY L-S SPINE: CPT

## 2020-07-07 ENCOUNTER — OFFICE VISIT (OUTPATIENT)
Dept: NEUROSURGERY | Age: 45
End: 2020-07-07
Payer: COMMERCIAL

## 2020-07-07 VITALS
WEIGHT: 173 LBS | TEMPERATURE: 98.3 F | HEIGHT: 61 IN | DIASTOLIC BLOOD PRESSURE: 78 MMHG | HEART RATE: 65 BPM | BODY MASS INDEX: 32.66 KG/M2 | SYSTOLIC BLOOD PRESSURE: 125 MMHG

## 2020-07-07 PROCEDURE — 99213 OFFICE O/P EST LOW 20 MIN: CPT | Performed by: NEUROLOGICAL SURGERY

## 2020-07-07 NOTE — PROGRESS NOTES
Patient is here for follow up consult for: back pain. Her MRI shows a central L5-S1 herniated disk. Physical exam  Alert and Oriented X3  PERRLA, EOMI  SWENSON 5/5  Sensation intact to LT and PP  Reflexes are 2+ and symmetric    A/P: patient is here for follow up for: follow up for back and leg pain. Her MRI shows DDD and herniated disk at L5-S1. She has lupus ans RA. I am recommending facet injections and holding off on fusion as she is so young and could run into adjacent segment disease. She wishes to try facet injections.

## 2020-10-26 ENCOUNTER — OFFICE VISIT (OUTPATIENT)
Dept: ORTHOPEDIC SURGERY | Age: 45
End: 2020-10-26
Payer: COMMERCIAL

## 2020-10-26 VITALS — HEIGHT: 60 IN | TEMPERATURE: 98 F | WEIGHT: 175 LBS | BODY MASS INDEX: 34.36 KG/M2

## 2020-10-26 PROCEDURE — 99203 OFFICE O/P NEW LOW 30 MIN: CPT | Performed by: ORTHOPAEDIC SURGERY

## 2020-10-26 NOTE — PROGRESS NOTES
LIGATION         Current Outpatient Medications:     methocarbamol (ROBAXIN) 750 MG tablet, TK 1 T PO QPM PRN, Disp: , Rfl:     Phenylephrine-DM-GG (MUCINEX FAST-MAX CONGEST COUGH) 5- MG TABS, Take as directed on the package for cough or congestion, Disp: 20 tablet, Rfl: 0    Naproxen Sodium (ALEVE) 220 MG CAPS, Take by mouth, Disp: , Rfl:     Multiple Vitamins-Minerals (THERAPEUTIC MULTIVITAMIN-MINERALS) tablet, Take 1 tablet by mouth daily, Disp: , Rfl:     ibuprofen (ADVIL;MOTRIN) 200 MG tablet, Take 200 mg by mouth every 6 hours as needed for Pain, Disp: , Rfl:     levonorgestrel (MIRENA) IUD 52 mg, 1 each by Intrauterine route once Placed in 2015, Disp: , Rfl:     loratadine (CLARITIN) 10 MG tablet, Take 10 mg by mouth daily as needed (Allergies) , Disp: , Rfl:     omeprazole (PRILOSEC) 20 MG capsule, Take 20 mg by mouth daily as needed (Reflux) , Disp: , Rfl:   Allergies   Allergen Reactions    Methotrexate Derivatives Nausea And Vomiting and Other (See Comments)     Mouth sores     Social History     Socioeconomic History    Marital status:      Spouse name: Not on file    Number of children: Not on file    Years of education: Not on file    Highest education level: Not on file   Occupational History    Not on file   Social Needs    Financial resource strain: Not on file    Food insecurity     Worry: Not on file     Inability: Not on file    Transportation needs     Medical: Not on file     Non-medical: Not on file   Tobacco Use    Smoking status: Never Smoker    Smokeless tobacco: Never Used   Substance and Sexual Activity    Alcohol use: Yes     Comment: wine occ    Drug use: No    Sexual activity: Yes     Partners: Male   Lifestyle    Physical activity     Days per week: Not on file     Minutes per session: Not on file    Stress: Not on file   Relationships    Social connections     Talks on phone: Not on file     Gets together: Not on file     Attends Mandaen service: Not on file     Active member of club or organization: Not on file     Attends meetings of clubs or organizations: Not on file     Relationship status: Not on file    Intimate partner violence     Fear of current or ex partner: Not on file     Emotionally abused: Not on file     Physically abused: Not on file     Forced sexual activity: Not on file   Other Topics Concern    Not on file   Social History Narrative    Not on file     Family History   Problem Relation Age of Onset    Heart Failure Mother     Thyroid Disease Mother     Cancer Father     Thyroid Disease Sister     Thyroid Disease Maternal Grandmother     Thyroid Disease Sister     Thyroid Disease Sister        REVIEW OF SYSTEMS:     General/Constitution:  (-)weight loss, (-)fever, (-)chills, (-)weakness. Skin: (-) rash,(-) psoriasis,(-) eczema, (-)skin cancer. Musculoskeletal: (-) fractures,  (-) dislocations,(-) collagen vascular disease, (-) fibromyalgia, (-) multiple sclerosis, (-) muscular dystrophy, (-) RSD,(-) joint pain (-)swelling, (-) joint pain,swelling. Neurologic: (-) epilepsy, (-)seizures,(-) brain tumor,(-) TIA, (-)stroke, (-)headaches, (-)Parkinson disease,(-) memory loss, (-) LOC. Cardiovascular: (-) Chest pain, (-) swelling in legs/feet, (-) SOB, (-) cramping in legs/feet with walking. Respiratory: (-) SOB, (-) Coughing, (-) night sweats. GI: (-) nausea, (-) vomiting, (-) diarrhea, (-) blood in stool, (-) gastric ulcer. Psychiatric: (-) Depression, (-) Anxiety, (-) bipolar disease, (-) Alzheimer's Disease  Allergic/Immunologic: (-) allergies latex, (-) allergies metal, (-) skin sensitivity. Hematlogic: (-) anemia, (-) blood transfusion, (-) DVT/PE, (-) Clotting disorders      Subjective:    Constitution:  Temp 98 °F (36.7 °C)   Ht 5' (1.524 m)   Wt 175 lb (79.4 kg)   BMI 34.18 kg/m²     Psycihatric:  The patient is alert and oriented x 3, appears to be stated age and in no distress. Respiratory:  Respiratory effort is not labored. Patient is not gasping. Palpation of the chest reveals no tactile fremitus. Skin:  Upon inspection: the skin appears warm, dry and intact. There is not a previous scar over the affected area. There is not any cellulitis, lymphedema or cutaneous lesions noted in the lower extremities. Upon palpation there is no induration noted. Neurologic:  Motor exam of the upper extremities show: The reflexes in biceps/triceps/brachioradialis are equal and symmetric. Sensory exam C5-T1 are normal bilaterally except C6 and C7    Cardiovascular: The vascular exam is normal and is well perfused to distal extremities. There are 2+ radial pulses bilaterally, and motor and sensation is intact to median, ulnar, and radial, musclocutaneus, and axillary nerve distribution and grossly symmetric bilaterally. There is cap refill noted less than two seconds in all digits. There is not edema of the bilateral upper extremities. There is not varicosities noted in the distal extremities. Lymph:  Upon palpation,  there is no lymphadenopathy noted in bilateral upper extremities. Musculoskeletal:  Gait: normal; examination of the nails and digits reveal no cyanosis or clubbing. Cervical Exam:  On physical exam, Christina Casillas is well-developed, well-nourished, oriented to person, place and time. her gait is normal.  On evaluation of hercervical spine, She has limited range of motion of the cervical spine with pain. There is cervical tenderness to palpation. Shoulder Exam:  On evaluation of her bilaterally upper extremities, her left shoulder has no deformity. There is tenderness upon palpation of the trap, cervical and shoots down the arm to fingertips. There is not evidence of scapular dyskinesis. There is not muscle atrophy in shoulder girdle. The range of motion for the Right Shoulder is 150/50/t8 and for the Left shoulder is 140/40/l2.   Right shoulder Motor strength is 5/5 in the supraspinatus, 5/5 internal rotation and 5/5 in external rotation, and Left shoulder motor strength 5/5 in supraspinatus, 5/5 in internal rotation, 5/5 in external rotation. Right shoulder:  negative Impingement , negative Gleason ,negative  Speeds,negative  Apprehension ,negative Alvarez Load Shift, negative Cesar manuver, negative Cross arm test.     Left shoulder:  positive Impingement , positive Gleason ,positive  Speeds,negative  Apprehension ,negative Alvarez Load Shift, positive Cesar manuver, negative Cross arm test.     XRAY:  n/a    MRI:  n/a    Radiographic findings reviewed with patient    Impression:   Encounter Diagnosis   Name Primary?  Cervical stenosis of spine Yes       Plan: Natural history and expected course discussed. Questions answered. Educational material distributed. Reduction in offending activity. Gentle ROM exercises  RICE therapy.    MRI cervical

## 2020-11-03 ENCOUNTER — HOSPITAL ENCOUNTER (OUTPATIENT)
Dept: MRI IMAGING | Age: 45
Discharge: HOME | End: 2020-11-05
Payer: COMMERCIAL

## 2020-11-03 PROCEDURE — 72141 MRI NECK SPINE W/O DYE: CPT

## 2020-11-09 ENCOUNTER — OFFICE VISIT (OUTPATIENT)
Dept: ORTHOPEDIC SURGERY | Age: 45
End: 2020-11-09
Payer: COMMERCIAL

## 2020-11-09 VITALS — TEMPERATURE: 98 F | WEIGHT: 175 LBS | HEIGHT: 60 IN | BODY MASS INDEX: 34.36 KG/M2

## 2020-11-09 PROCEDURE — 99213 OFFICE O/P EST LOW 20 MIN: CPT | Performed by: ORTHOPAEDIC SURGERY

## 2020-11-09 NOTE — PROGRESS NOTES
Chief Complaint   Patient presents with    Pain     f/u MRI cervical spine        Sarah Mac is a 39y.o. year old   female who is seen today  for evaluation of left shoulder pain. She reports the pain has been ongoing for the past 6 weeks. She does not recall a specific injury which started the pain. She reports the pain is worse with activity, better with rest.  The patient does have mechanical symptoms. Shedoes have night pain. She denies a feeling of instability. The prior treatments have been ice, heat ,massage,medrol dose pk, tordol, muscle relaxer. The patient   has not responded to the treatment. The patient is right hand dominant. The patient is working. The patients occupation is .        Chief Complaint   Patient presents with    Pain     f/u MRI cervical spine     Past Medical History:   Diagnosis Date    Flank Pain     right    H/O rheumatoid arthritis     Hx of cardiovascular stress test 2/2013    treadmill nuclear stress    Joint pain     Low back pain     Lupus (HCC)     Neck pain     RA (rheumatoid arthritis) ([address]     RH    Radiculopathy     Sjogren's disease ([address]      Past Surgical History:   Procedure Laterality Date    ANESTHESIA NERVE BLOCK Bilateral 7/9/2019    BILATERAL LUMBAR TRANSFORAMINAL EPIDURAL STERIOD INJECTION AT L5-S1 UNDER FLUOROSCOPY performed by Ivy Fleming MD at [address] [place] [address] Drive      x2    COLONOSCOPY      DILATION AND CURETTAGE OF UTERUS      x2    EPIDURAL STEROID INJECTION Left 5/28/2019    LUMBAR EPIDURAL STEROID INJECTION L5-S1 (LEFT PARAMEDIAN APPROACH) UNDER FLUOROSCOPIC GUIDANCE) performed by Ivy Fleming MD at [facility] 96 ARTHROSCOPY Left 5 27 16    medial menisectomy, debridement, acl synovectomy, chondroplasty    NERVE BLOCK Left 05/28/2019    lumbar    NERVE BLOCK Bilateral 07/09/2019    TUBAL LIGATION         Current Outpatient Medications:     methocarbamol (ROBAXIN) 750 MG tablet, TK 1 T PO QPM PRN, Disp: , Rfl:     Naproxen Sodium (ALEVE) 220 MG CAPS, Take by mouth, Disp: , Rfl:     ibuprofen (ADVIL;MOTRIN) 200 MG tablet, Take 200 mg by mouth every 6 hours as needed for Pain, Disp: , Rfl:     levonorgestrel (MIRENA) IUD 52 mg, 1 each by Intrauterine route once Placed in 2015, Disp: , Rfl:     loratadine (CLARITIN) 10 MG tablet, Take 10 mg by mouth daily as needed (Allergies) , Disp: , Rfl:     omeprazole (PRILOSEC) 20 MG capsule, Take 20 mg by mouth daily as needed (Reflux) , Disp: , Rfl:     Phenylephrine-DM-GG (MUCINEX FAST-MAX CONGEST COUGH) 5- MG TABS, Take as directed on the package for cough or congestion (Patient not taking: Reported on 11/9/2020), Disp: 20 tablet, Rfl: 0    Multiple Vitamins-Minerals (THERAPEUTIC MULTIVITAMIN-MINERALS) tablet, Take 1 tablet by mouth daily, Disp: , Rfl:   Allergies   Allergen Reactions    Methotrexate Derivatives Nausea And Vomiting and Other (See Comments)     Mouth sores     Social History     Socioeconomic History    Marital status:      Spouse name: Not on file    Number of children: Not on file    Years of education: Not on file    Highest education level: Not on file   Occupational History    Not on file   Social Needs    Financial resource strain: Not on file    Food insecurity     Worry: Not on file     Inability: Not on file    Transportation needs     Medical: Not on file     Non-medical: Not on file   Tobacco Use    Smoking status: Never Smoker    Smokeless tobacco: Never Used   Substance and Sexual Activity    Alcohol use: Yes     Comment: wine occ    Drug use: No    Sexual activity: Yes     Partners: Male   Lifestyle    Physical activity     Days per week: Not on file     Minutes per session: Not on file    Stress: Not on file   Relationships    Social connections     Talks on phone: Not on file     Gets together: Not on file     Attends [employer] service: Not on file Active member of club or organization: Not on file     Attends meetings of clubs or organizations: Not on file     Relationship status: Not on file    Intimate partner violence     Fear of current or ex partner: Not on file     Emotionally abused: Not on file     Physically abused: Not on file     Forced sexual activity: Not on file   Other Topics Concern    Not on file   Social History Narrative    Not on file     Family History   Problem Relation Age of Onset    Heart Failure Mother     Thyroid Disease Mother     Cancer Father     Thyroid Disease Sister     Thyroid Disease Maternal Grandmother     Thyroid Disease Sister     Thyroid Disease Sister        REVIEW OF SYSTEMS:     General/Constitution:  (-)weight loss, (-)fever, (-)chills, (-)weakness. Skin: (-) rash,(-) psoriasis,(-) eczema, (-)skin cancer. Musculoskeletal: (-) fractures,  (-) dislocations,(-) collagen vascular disease, (-) fibromyalgia, (-) multiple sclerosis, (-) muscular dystrophy, (-) RSD,(-) joint pain (-)swelling, (-) joint pain,swelling. Neurologic: (-) epilepsy, (-)seizures,(-) brain tumor,(-) TIA, (-)stroke, (-)headaches, (-)Parkinson disease,(-) memory loss, (-) LOC. Cardiovascular: (-) Chest pain, (-) swelling in legs/feet, (-) SOB, (-) cramping in legs/feet with walking. Respiratory: (-) SOB, (-) Coughing, (-) night sweats. GI: (-) nausea, (-) vomiting, (-) diarrhea, (-) blood in stool, (-) gastric ulcer. Psychiatric: (-) Depression, (-) Anxiety, (-) bipolar disease, (-) Alzheimer's Disease  Allergic/Immunologic: (-) allergies latex, (-) allergies metal, (-) skin sensitivity. Hematlogic: (-) anemia, (-) blood transfusion, (-) DVT/PE, (-) Clotting disorders      Subjective:    Constitution:  Temp 98 °F (36.7 °C)   Ht 5' (1.524 m)   Wt 175 lb (79.4 kg)   BMI 34.18 kg/m²     Psycihatric:  The patient is alert and oriented x 3, appears to be stated age and in no distress.       Respiratory:  Respiratory effort is not labored. Patient is not gasping. Palpation of the chest reveals no tactile fremitus. Skin:  Upon inspection: the skin appears warm, dry and intact. There is not a previous scar over the affected area. There is not any cellulitis, lymphedema or cutaneous lesions noted in the lower extremities. Upon palpation there is no induration noted. Neurologic:  Motor exam of the upper extremities show: The reflexes in biceps/triceps/brachioradialis are equal and symmetric. Sensory exam C5-T1 are normal bilaterally except C6 and C7    Cardiovascular: The vascular exam is normal and is well perfused to distal extremities. There are 2+ radial pulses bilaterally, and motor and sensation is intact to median, ulnar, and radial, musclocutaneus, and axillary nerve distribution and grossly symmetric bilaterally. There is cap refill noted less than two seconds in all digits. There is not edema of the bilateral upper extremities. There is not varicosities noted in the distal extremities. Lymph:  Upon palpation,  there is no lymphadenopathy noted in bilateral upper extremities. Musculoskeletal:  Gait: normal; examination of the nails and digits reveal no cyanosis or clubbing. Cervical Exam:  On physical exam, Roxanna Virgen is well-developed, well-nourished, oriented to person, place and time. her gait is normal.  On evaluation of hercervical spine, She has limited range of motion of the cervical spine with pain. There is cervical tenderness to palpation. Shoulder Exam:  On evaluation of her bilaterally upper extremities, her left shoulder has no deformity. There is tenderness upon palpation of the trap, cervical and shoots down the arm to fingertips. There is not evidence of scapular dyskinesis. There is not muscle atrophy in shoulder girdle. The range of motion for the Right Shoulder is 150/50/t8 and for the Left shoulder is 140/40/l2.   Right shoulder Motor strength is 5/5 in the supraspinatus, 5/5 internal rotation and 5/5 in external rotation, and Left shoulder motor strength 5/5 in supraspinatus, 5/5 in internal rotation, 5/5 in external rotation. Right shoulder:  negative Impingement , negative Gleason ,negative  Speeds,negative  Apprehension ,negative Alvarez Load Shift, negative Cesar manuver, negative Cross arm test.     Left shoulder:  positive Impingement , positive Gleason ,positive  Speeds,negative  Apprehension ,negative Alvarez Load Shift, positive Cesar manuver, negative Cross arm test.     XRAY:  n/a    MRI:    1. Mild central spinal canal narrowing at C5-C6, and moderate central spinal    canal narrowing at C6-C7 have slightly increased. 2. Multilevel foraminal narrowing, as above, greatest on the left at C4-C5,    and left at C6-C7. 3. No evidence of an acute fracture. Radiographic findings reviewed with patient    Impression:   Encounter Diagnosis   Name Primary?  Cervical stenosis of spine Yes       Plan: Natural history and expected course discussed. Questions answered. Educational material distributed. Reduction in offending activity. Gentle ROM exercises  RICE therapy.    I discussed with patient that her pains are coming from her cervical spine  Pain management referral for possible epidurals

## 2020-11-17 ENCOUNTER — OFFICE VISIT (OUTPATIENT)
Dept: PAIN MANAGEMENT | Age: 45
End: 2020-11-17
Payer: COMMERCIAL

## 2020-11-17 ENCOUNTER — PREP FOR PROCEDURE (OUTPATIENT)
Dept: PAIN MANAGEMENT | Age: 45
End: 2020-11-17

## 2020-11-17 VITALS
OXYGEN SATURATION: 98 % | SYSTOLIC BLOOD PRESSURE: 115 MMHG | RESPIRATION RATE: 16 BRPM | WEIGHT: 175 LBS | DIASTOLIC BLOOD PRESSURE: 72 MMHG | TEMPERATURE: 97.1 F | BODY MASS INDEX: 34.36 KG/M2 | HEART RATE: 62 BPM | HEIGHT: 60 IN

## 2020-11-17 PROBLEM — M50.30 DDD (DEGENERATIVE DISC DISEASE), CERVICAL: Status: ACTIVE | Noted: 2020-11-17

## 2020-11-17 PROBLEM — M54.12 CERVICAL RADICULOPATHY: Status: ACTIVE | Noted: 2020-11-17

## 2020-11-17 PROBLEM — M43.02 CERVICAL SPONDYLOLYSIS: Status: ACTIVE | Noted: 2020-11-17

## 2020-11-17 PROCEDURE — 99215 OFFICE O/P EST HI 40 MIN: CPT | Performed by: ANESTHESIOLOGY

## 2020-11-17 PROCEDURE — 99213 OFFICE O/P EST LOW 20 MIN: CPT | Performed by: ANESTHESIOLOGY

## 2020-11-17 RX ORDER — METHOCARBAMOL 750 MG/1
750 TABLET, FILM COATED ORAL 2 TIMES DAILY PRN
Qty: 30 TABLET | Refills: 1 | Status: SHIPPED | OUTPATIENT
Start: 2020-11-17 | End: 2020-12-17

## 2020-11-17 RX ORDER — NABUMETONE 750 MG/1
750 TABLET, FILM COATED ORAL 2 TIMES DAILY PRN
Qty: 60 TABLET | Refills: 1 | Status: SHIPPED
Start: 2020-11-17 | End: 2021-01-14

## 2020-11-17 NOTE — PROGRESS NOTES
marrow signal intensity is low in    the T1 weighted imaging, similar to the previous exam, likely related to red    marrow versus anemia.  Endplate degenerative changes are noted at C6-C7.  No    areas of marrow edema to suggest an acute fracture.         SPINAL CORD: The cervical spinal cord demonstrates normal signal intensity    without evidence of an expansile mass lesion.         SOFT TISSUES: No paraspinal masses or edema.         C2-C3: There is a 2 mm central disc protrusion indenting the anterior thecal    sac.  Mild central spinal canal narrowing.  The neural foramen are patent.         C3-C4: There is a central disc protrusion measuring 2 mm indenting the    anterior thecal sac.  Minimal central spinal canal narrowing.  The neural    foramen are patent.         C4-C5: Asymmetric left-sided uncovertebral spurring and facet arthropathy    have increased with moderate left foraminal narrowing, increased in severity. No right foraminal narrowing, disc herniation, or central spinal canal    stenosis.         C5-C6: There is a broad-based central/left paracentral disc protrusion    measuring 3 mm, increased in size.  Mild central spinal canal narrowing has    slightly increased.  Asymmetric left-sided uncovertebral spurring with mild    left foraminal narrowing, increased.  No right foraminal narrowing.         C6-C7: There is a broad-based left paracentral central disc protrusion    measuring 3 mm.  Ligamentum flavum thickening.  Moderate central spinal canal    narrowing has increased.  Asymmetric left-sided uncovertebral spurring with    moderate left foraminal narrowing and mild right foraminal narrowing,    increased in severity.         C7-T1: There is no significant disc protrusion, spinal canal stenosis or    neural foraminal narrowing.              Impression    1. Mild central spinal canal narrowing at C5-C6, and moderate central spinal    canal narrowing at C6-C7 have slightly increased.     2. Multilevel foraminal narrowing, as above, greatest on the left at C4-C5,    and left at C6-C7. 3. No evidence of an acute fracture.           X ray C spine: 10/26/2020: Impression    Degenerative disc changes at C6-7        MRI of LS spine: 6/27/2020:  FINDINGS:    BONES/ALIGNMENT: There is mild degenerative disc disease at L4-L5, and L5-S1.     There are Modic type 1 degenerative endplate changes at I6-K2.         SPINAL CORD: The conus terminates normally.         SOFT TISSUES: No paraspinal mass identified.         L1-L2:  The thecal sac and neural foramina are intact.         L2-L3:  The thecal sac and neural foramina are intact.         L3-L4:  The thecal sac and neural foramina are intact.         L4-L5: There is a minimum disc bulge measuring 2 mm.   There is mild facet    and ligamentum flavum hypertrophy.  The thecal sac is not stenotic.  Disc    and/or osteophytes cause minimal narrowing of the neural foramina bilaterally.         L5-S1: There is a disc protrusion with annular fissure centrally at L5-S1    measuring approximately 4.5 mm.  This effaces the anterior aspect of the    thecal sac.  The thecal sac is not significantly stenotic.  The S1 nerve    roots are intact.  There is mild narrowing of the neural foramina bilaterally.         There may be slight increase in size of the disc protrusion compared to the    prior study.              Impression    Disc protrusion with annular fissure at L5-S1.  No significant stenosis of    the thecal sac.  The S1 nerve roots are intact.  There is mild narrowing of    the neural foramina bilaterally at L5-S1.         Mild narrowing of the neural foramina at L4-L5 as discussed above.          Potential Aberrant Drug-Related Behavior:  No    Urine Drug Screening:No    OARRS report[de-identified]  11/17/20  yes- not on chronic narcotics    Past Medical History:   Diagnosis Date    Flank Pain     right    H/O rheumatoid arthritis     Hx of cardiovascular stress test 2/2013 treadmill nuclear stress    Joint pain     Low back pain     Lupus (HCC)     Neck pain     RA [facility]rhe[facility]oid arthritis) ([facility])     RH    Radiculopathy     Sjogren's disease ([facility] [address]        Past Surgical History:   Procedure Laterality Date    ANESTHESIA NERVE BLOCK Bilateral 7/9/2019    BILATERAL LUMBAR TRANSFORAMINAL EPIDURAL STERIOD INJECTION AT L5-S1 UNDER FLUOROSCOPY performed by Tremaine Ellis MD at [address] [place] [address] Drive      x2    COLONOSCOPY      DILATION AND CURETTAGE OF UTERUS      x2    EPIDURAL STEROID INJECTION Left 5/28/2019    LUMBAR EPIDURAL STEROID INJECTION L5-S1 (LEFT PARAMEDIAN APPROACH) UNDER FLUOROSCOPIC GUIDANCE) performed by Tremaine Ellis MD at [facility] 96 ARTHROSCOPY Left 5 27 16    medial menisectomy, debridement, acl synovectomy, chondroplasty    NERVE BLOCK Left 05/28/2019    lumbar    NERVE BLOCK Bilateral 07/09/2019    TUBAL LIGATION         Prior to Admission medications    Medication Sig Start Date End Date Taking?  Authorizing Provider   methocarbamol (ROBAXIN) 750 MG tablet TK 1 T PO QPM PRN 2/17/20  Yes Historical Provider, MD   Naproxen Sodium (ALEVE) 220 MG CAPS Take by mouth   Yes Historical Provider, MD   Multiple Vitamins-Minerals (THERAPEUTIC MULTIVITAMIN-MINERALS) tablet Take 1 tablet by mouth daily   Yes Historical Provider, MD   ibuprofen (ADVIL;MOTRIN) 200 MG tablet Take 200 mg by mouth every 6 hours as needed for Pain   Yes Historical Provider, MD   loratadine (CLARITIN) 10 MG tablet Take 10 mg by mouth daily as needed (Allergies)    Yes Historical Provider, MD   omeprazole (PRILOSEC) 20 MG capsule Take 20 mg by mouth daily as needed (Reflux)    Yes Historical Provider, MD       Allergies   Allergen Reactions    Methotrexate Derivatives Nausea And Vomiting and Other (See Comments)     Mouth sores       Social History     Socioeconomic History    Marital status:      Spouse name: Not on file    Number of children: Not on file    Years of education: Not on file    Highest education level: Not on file   Occupational History    Not on file   Social Needs    Financial resource strain: Not on file    Food insecurity     Worry: Not on file     Inability: Not on file    Transportation needs     Medical: Not on file     Non-medical: Not on file   Tobacco Use    Smoking status: Never Smoker    Smokeless tobacco: Never Used   Substance and Sexual Activity    Alcohol use: Yes     Comment: wine occ    Drug use: No    Sexual activity: Yes     Partners: Male   Lifestyle    Physical activity     Days per week: Not on file     Minutes per session: Not on file    Stress: Not on file   Relationships    Social connections     Talks on phone: Not on file     Gets together: Not on file     Attends Sikhism service: Not on file     Active member of club or organization: Not on file     Attends meetings of clubs or organizations: Not on file     Relationship status: Not on file    Intimate partner violence     Fear of current or ex partner: Not on file     Emotionally abused: Not on file     Physically abused: Not on file     Forced sexual activity: Not on file   Other Topics Concern    Not on file   Social History Narrative    Not on file       Family History   Problem Relation Age of Onset    Heart Failure Mother     Thyroid Disease Mother     Cancer Father     Thyroid Disease Sister     Thyroid Disease Maternal Grandmother     Thyroid Disease Sister     Thyroid Disease Sister        REVIEW OF SYSTEMS:     Oma Mars denies fever/chills, chest pain, shortness of breath, new bowel or bladder complaints. All other review of systems was negative. PHYSICAL EXAMINATION:      /72   Pulse 62   Temp 97.1 °F (36.2 °C) (Infrared)   Resp 16   Ht 5' (1.524 m)   Wt 175 lb (79.4 kg)   SpO2 98%   BMI 34.18 kg/m²   General:       General appearance:   pleasant and well-hydrated.    , in no distress and A & O x3  Build:Obese  Function:Moves about room without difficulty.     HEENT:     Head:normocephalic, atraumatic  Pupils:regular, round, equal  Sclera: icterus absent     Lungs:     Breathing:normal breathing pattern      CVS :   RRR     Abdomen:     Shape:non-distended and normal  Tenderness:none  Guarding:none     Cervical spine:     Inspection:normal  Palpation:tenderness paravertebral muscles, tenderness trapezium, left, right and positive. Range of motion:abnormal mildly flexion, extension rotation bilateral and is painful.     Thoracic spine:     Spine inspection:normal   Palpation:non-tender midline and paraspinals, left and right. Range of motion:normal in flexion, extension rotation bilateral and is not painful.     Lumbar spine:     Spine inspection:normal   CVA tenderness:No   Palpation:tenderness paravertebral muscles, left, right and positive. Range of motion: Reduced, Pain on Lateral bending, flexion, extension rotation bilateral and is Painful. B/l Lumbar paraspinal tenderness +  B/l Lumbar facet loading +  SIJ tenderness + bilateral,   Sensory and motor in b/l LE normal  DTRs' b/l LE equal     Musculoskeletal:     Trigger points in trapezius:absent bilaterally  Trigger points in rhomboids:absent bilaterally  Trigger points in Paravertebral:absent bilaterally  Trigger points in supraspinatus/infraspinatus:absent  Spurling's:negative right, negative left    SI joint tenderness:positive right, positive left              MARIO test:positive right, positive             left  Piriformis tenderness:negative right, negative left  Trochanteric bursa tenderness:negative right, positive left  SLR:negative right, negative left, sitting      Extremities:     Tremors:None bilaterally upper and lower  Range of motion:Generally normal shoulders, pain with internal rotation of hips negative.   Intact:Yes  Varicose veins:absent   Pulses:present Lt radial  Cyanosis:none  Edema:none x all 4 extremities     Knee:     Knee ROM normal- no pain     Neurological:        Sensory:normal to light touch - except for sensory changes over the left UE.     Motor:   Right Grip5/5              Left Grip5/5               Right Bicep5/5           Left Bicep5/5              Right Triceps5/5       Left Triceps5/5          Right Deltoid5/5     Left Deltoid5/5                  Right Quadriceps5/5          Left Quadriceps5/5           Right Gastrocnemius5/5    Left Gastrocnemius5/5  Right Ant Tibialis5/5  Left Ant Tibialis5/5     Reflexes:    Right Brachioradialis reflex2+  Left Brachioradialis reflex2+  Right Biceps reflex2+  Left Biceps reflex2+  Right Triceps reflex2+  Left Triceps reflex2+  Right Quadriceps reflex2+  Left Quadriceps reflex2+  Right Achilles reflex2+  Left Achilles reflex2+  Gait:normal     Dermatology:     Skin: redness over the face      Assessment/Plan:   Diagnosis Orders   1. DDD (degenerative disc disease), cervical     2. Cervical radiculopathy     3. Cervical spondylolysis     4. DDD (degenerative disc disease), lumbar     5. Lumbosacral spondylosis without myelopathy     6. Sacroiliac dysfunction         39 y.o.  lady with neck pain and left UE pain. Acute exacerbation - not responding to conservative treatment. Failed Meds/ HEP / oral steroids. Recent MRI of C spine and LS spine reviewed. Schedule for cervical epidural steroid injection under fluoroscopy. RBA discussed and patient agreed to proceed. Set up for PT ( can start after pain relief - since pain limits exercise). Relafen for prn use. Use instructions and side effects explained. Methocarbamol for prn use for muscle spasm . Use instructions and side effects explained. Also has H/o Chronic low back and LE pain. Excellent pain relief form the prior BELEM.     Recent MRI reviewed. Has been evaluated by Dr. Shine Overall recently- did not recommend surgery.     Has facet and SIJ pain.     Failed conservative RX including PT/ chiro/ meds- and ongoing HEP    Lumbar epidural steroid injection L5- S1  And TFESI has provided good pain relief of the LE pain. Plan SIJ injection Vs Facet injection in future if low back pain worsens. Continue HEP.    NSAID's for prn use. Counseling reg : regular HEP and weight watching, appropriate medication use     Recent notes form ortho and NSG, findings of the MRI of LS spine and C spine reviewed, treatment plan discussed with the patient.     John Holland MD    CC:  Daniel Flores MD

## 2020-11-17 NOTE — PROGRESS NOTES
Do you currently have any of the following:    Fever: No  Headache:  No  Cough: No  Shortness of breath: No  Exposed to anyone with these symptoms: Irina Medrano presents to the [facility] on 11/17/2020. Laura Hickey is complaining of pain in her neck and radiates down left arm. . The pain is constant. The pain is described as aching, throbbing, shooting, stabbing and sharp. Pain is rated on her best day at a 8, on her worst day at a 10, and on average at a 9 on the VAS scale. She took her last dose of Aleve PRN . Laura Hickey does not have issues with constipation. Any procedures since your last visit: No    She is  on NSAIDS and  is not on anticoagulation medications to include none and is managed by NA. Pacemaker or defibrilator: No Physician managing device is NA. Medication Contract and Consent for Opioid Use Documents Filed     Patient Documents       Type of Document Status Date Received Received By Description     Medication Contract Received 5/7/2019  8:46 AM SANDRITA KABA [facility] Management Pt Agreement 5/7/2019                   /72   Pulse 62   Temp 97.1 °F (36.2 °C) (Infrared)   Resp 16   Ht 5' (1.524 m)   Wt 175 lb (79.4 kg)   SpO2 98%   BMI 34.18 kg/m²      No LMP recorded. Patient has had an ablation.

## 2020-12-01 ENCOUNTER — HOSPITAL ENCOUNTER (OUTPATIENT)
Age: 45
Discharge: HOME | End: 2020-12-03
Payer: COMMERCIAL

## 2020-12-01 PROCEDURE — U0003 INFECTIOUS AGENT DETECTION BY NUCLEIC ACID (DNA OR RNA); SEVERE ACUTE RESPIRATORY SYNDROME CORONAVIRUS 2 (SARS-COV-2) (CORONAVIRUS DISEASE [COVID-19]), AMPLIFIED PROBE TECHNIQUE, MAKING USE OF HIGH THROUGHPUT TECHNOLOGIES AS DESCRIBED BY CMS-2020-01-R: HCPCS

## 2020-12-03 ENCOUNTER — TELEPHONE (OUTPATIENT)
Dept: PAIN MANAGEMENT | Age: 45
End: 2020-12-03

## 2020-12-03 LAB
SARS-COV-2: NOT DETECTED
SOURCE: NORMAL

## 2020-12-03 NOTE — TELEPHONE ENCOUNTER
12-3-20-call to [facility], advised her to hold her relafen 6 days before her 12-8-20 procedure, last dose to be 12-1-20, she shows an understanding.     Loni Hanks RN  Pain Management

## 2020-12-08 ENCOUNTER — HOSPITAL ENCOUNTER (OUTPATIENT)
Dept: OPERATING ROOM | Age: 45
Setting detail: OUTPATIENT SURGERY
Discharge: HOME | End: 2020-12-08
Attending: ANESTHESIOLOGY
Payer: COMMERCIAL

## 2020-12-08 ENCOUNTER — HOSPITAL ENCOUNTER (OUTPATIENT)
Age: 45
Setting detail: OUTPATIENT SURGERY
Discharge: HOME | End: 2020-12-08
Attending: ANESTHESIOLOGY | Admitting: ANESTHESIOLOGY
Payer: COMMERCIAL

## 2020-12-08 VITALS
SYSTOLIC BLOOD PRESSURE: 127 MMHG | WEIGHT: 170 LBS | HEIGHT: 60 IN | BODY MASS INDEX: 33.38 KG/M2 | RESPIRATION RATE: 10 BRPM | DIASTOLIC BLOOD PRESSURE: 82 MMHG | OXYGEN SATURATION: 99 % | HEART RATE: 58 BPM

## 2020-12-08 PROCEDURE — 7100000011 HC PHASE II RECOVERY - ADDTL 15 MIN: Performed by: ANESTHESIOLOGY

## 2020-12-08 PROCEDURE — 3600000005 HC SURGERY LEVEL 5 BASE: Performed by: ANESTHESIOLOGY

## 2020-12-08 PROCEDURE — 2500000003 HC RX 250 WO HCPCS: Performed by: ANESTHESIOLOGY

## 2020-12-08 PROCEDURE — 62321 NJX INTERLAMINAR CRV/THRC: CPT | Performed by: ANESTHESIOLOGY

## 2020-12-08 PROCEDURE — 6360000002 HC RX W HCPCS: Performed by: ANESTHESIOLOGY

## 2020-12-08 PROCEDURE — 3209999900 FLUORO FOR SURGICAL PROCEDURES

## 2020-12-08 PROCEDURE — 3600000015 HC SURGERY LEVEL 5 ADDTL 15MIN: Performed by: ANESTHESIOLOGY

## 2020-12-08 PROCEDURE — 6360000004 HC RX CONTRAST MEDICATION: Performed by: ANESTHESIOLOGY

## 2020-12-08 PROCEDURE — 2580000003 HC RX 258: Performed by: ANESTHESIOLOGY

## 2020-12-08 PROCEDURE — 2709999900 HC NON-CHARGEABLE SUPPLY: Performed by: ANESTHESIOLOGY

## 2020-12-08 PROCEDURE — 7100000010 HC PHASE II RECOVERY - FIRST 15 MIN: Performed by: ANESTHESIOLOGY

## 2020-12-08 RX ORDER — METHYLPREDNISOLONE ACETATE 40 MG/ML
INJECTION, SUSPENSION INTRA-ARTICULAR; INTRALESIONAL; INTRAMUSCULAR; SOFT TISSUE PRN
Status: DISCONTINUED | OUTPATIENT
Start: 2020-12-08 | End: 2020-12-08 | Stop reason: ALTCHOICE

## 2020-12-08 RX ORDER — SODIUM CHLORIDE 9 MG/ML
INJECTION INTRAVENOUS PRN
Status: DISCONTINUED | OUTPATIENT
Start: 2020-12-08 | End: 2020-12-08 | Stop reason: ALTCHOICE

## 2020-12-08 RX ORDER — LIDOCAINE HYDROCHLORIDE 5 MG/ML
INJECTION, SOLUTION INFILTRATION; INTRAVENOUS PRN
Status: DISCONTINUED | OUTPATIENT
Start: 2020-12-08 | End: 2020-12-08 | Stop reason: ALTCHOICE

## 2020-12-08 ASSESSMENT — PAIN - FUNCTIONAL ASSESSMENT: PAIN_FUNCTIONAL_ASSESSMENT: 0-10

## 2020-12-08 ASSESSMENT — PAIN DESCRIPTION - PAIN TYPE: TYPE: CHRONIC PAIN

## 2020-12-08 ASSESSMENT — PAIN DESCRIPTION - LOCATION: LOCATION: NECK

## 2020-12-08 ASSESSMENT — PAIN SCALES - GENERAL
PAINLEVEL_OUTOF10: 0
PAINLEVEL_OUTOF10: 3

## 2020-12-08 ASSESSMENT — PAIN DESCRIPTION - DESCRIPTORS: DESCRIPTORS: ACHING

## 2020-12-08 NOTE — H&P
Disease Sister          REVIEW OF SYSTEMS:    CONSTITUTIONAL:  negative for  fevers, chills, sweats and fatigue    RESPIRATORY:  negative for  dry cough, cough with sputum, dyspnea, wheezing and chest pain    CARDIOVASCULAR:  negative for chest pain, dyspnea, palpitations, syncope    GASTROINTESTINAL:  negative for nausea, vomiting, change in bowel habits, diarrhea, constipation and abdominal pain    MUSCULOSKELETAL: negative for muscle weakness    SKIN: negative for itching or rashes. BEHAVIOR/PSYCH:  negative for poor appetite, increased appetite, decreased sleep and poor concentration    All other systems negative      PHYSICAL EXAM:    VITALS:  /73   Pulse 57   Resp 16   Ht 5' (1.524 m)   Wt 170 lb (77.1 kg)   SpO2 100%   BMI 33.20 kg/m²     CONSTITUTIONAL:  awake, alert, cooperative, no apparent distress, and appears stated age    EYES: PERRLA, EOMI    LUNGS:  No increased work of breathing, no audible wheezing    CARDIOVASCULAR:  regular rate and rhythm    ABDOMEN:  Soft non tender non distended     EXTREMITIES: no signs of clubbing or cyanosis. MUSCULOSKELETAL: negative for flaccid muscle tone or spastic movements. SKIN: gross examination reveals no signs of rashes, or diaphoresis. NEURO: Cranial nerves II-XII grossly intact. No signs of agitated mood. Assessment/Plan:    Patient  is here for Cervical BELEM for neck pain/ UE pain. The patient was counseled at length about the risks of ric Covid-19 during their perioperative period and any recovery window from their procedure. The patient was made aware that ric Covid-19  may worsen their prognosis for recovering from their procedure  and lend to a higher morbidity and/or mortality risk. All material risks, benefits, and reasonable alternatives including postponing the procedure were discussed. The patient does wish to proceed with the procedure at this time.       Hari Gonzalez MD

## 2020-12-08 NOTE — OP NOTE
Operative Note      Patient: Yaquelin Donohue  YOB: 1975  MRN: [id number]    Date of Procedure: 2020    Pre-Op Diagnosis: CERVICAL RADICULOPATHY, cervical DDD    Post-Op Diagnosis: Same       Procedure(s):  CERVICAL EPIDURAL STEROID INJECTION UNDER FLUOROSCOPIC GUIDANCE AT C7-T1 LEFT PARAMEDIAN (CPT 36907)    Surgeon(s):  Loi Patel MD    Assistant:   * No surgical staff found *    Anesthesia: Local    Estimated Blood Loss (mL): Minimal    Complications: None    Specimens:   * No specimens in log *    Implants:  * No implants in log *      Drains: * No LDAs found *    Findings: good needle placement    Detailed Description of Procedure:   2020    Patient: Yaquelin Donohue  :  1975  Age:  39 y.o. Sex:  female     PRE-PROCEDURE DIAGNOSIS: Cervical degenerative disc disease, cervical radicular pain. POST-PROCEDURE DIAGNOSIS: Same. PROCEDURE: Fluoroscopic guided cervical epidural steroid injection, #1 at C7-T1 level. SURGEON: Loi Patel MD    ANESTHESIA: local    ESTIMATED BLOOD LOSS: None.  ______________________________________________________________________  BRIEF HISTORY:  Yaquelin Donohue comes in today for the first  therapeutic cervical epidural injection under fluoroscopic guidance. The potential complications of this procedure were discussed with the her again today. She has elected to undergo the aforementioned procedure. Candelario complete History & Physical examination were reviewed in depth, a copy of which is in the chart. DESCRIPTION OF PROCEDURE:    After confirming written and informed consent, a time-out was performed and Candelario name and date of birth, the procedure to be performed as well as the plan for the location of the needle insertion were confirmed. The patient was brought into the procedure room and placed in the prone position with the head flexed midline on the fluoroscopy table.  A pillow was placed under the patient's head to increase cervical interlaminar space. Standard monitors were placed, and vital signs were observed throughout the procedure. The area was prepped with chloraprep and the C7-T1 interspace was marked under fluoroscopy. The skin and subcutaneous tissues at the above level were anesthestized with 0.5% lidocaine. An # 18 gauge 3-1/2 tuohy epidural needle was inserted and advanced toward the inferior lamina until bony contact was made. The needle was then advanced superiorly toward epidural space . From this point on hanging drop/loss of resistance technique with 5 cc glass syringe was used to confirm entrance of the needle into the epidural space under intermittent lateral fluoroscopy. Once in the epidural space , negative aspiration for blood and CSF was confirmed . Needle tip placement was confirmed by visualizing epidural spread of 1 ml of omnipaque 300 visualized in both AP and lateral live fluoroscopic views. Then after negative aspiration, a solution of 0.9 % Saline 3 ml and 60 mg DepoMedrol was easily injected. The needle was gently removed intact. The patient neck was cleaned and a Band-Aid was placed over the needle insertion point. Disposition the patient tolerated the procedure well and there were no complications . Vital signs remained stable throughout the procedure. The patient was escorted to the recovery area where they remained until discharge and written discharge instructions for the procedure were given. Plan: Ronen Lou will return to our pain management center as scheduled.      Derick Gan MD

## 2021-01-08 ENCOUNTER — OFFICE VISIT (OUTPATIENT)
Dept: PAIN MANAGEMENT | Age: 46
End: 2021-01-08
Payer: COMMERCIAL

## 2021-01-08 VITALS
TEMPERATURE: 96.9 F | DIASTOLIC BLOOD PRESSURE: 68 MMHG | WEIGHT: 178 LBS | HEART RATE: 72 BPM | RESPIRATION RATE: 16 BRPM | HEIGHT: 60 IN | BODY MASS INDEX: 34.95 KG/M2 | SYSTOLIC BLOOD PRESSURE: 112 MMHG

## 2021-01-08 DIAGNOSIS — M51.36 DDD (DEGENERATIVE DISC DISEASE), LUMBAR: ICD-10-CM

## 2021-01-08 DIAGNOSIS — M43.02 CERVICAL SPONDYLOLYSIS: ICD-10-CM

## 2021-01-08 DIAGNOSIS — M47.817 LUMBOSACRAL SPONDYLOSIS WITHOUT MYELOPATHY: ICD-10-CM

## 2021-01-08 DIAGNOSIS — M50.30 DDD (DEGENERATIVE DISC DISEASE), CERVICAL: Primary | ICD-10-CM

## 2021-01-08 DIAGNOSIS — M54.12 CERVICAL RADICULOPATHY: ICD-10-CM

## 2021-01-08 PROCEDURE — 99213 OFFICE O/P EST LOW 20 MIN: CPT | Performed by: ANESTHESIOLOGY

## 2021-01-08 RX ORDER — METHOCARBAMOL 750 MG/1
750 TABLET, FILM COATED ORAL 4 TIMES DAILY
COMMUNITY
End: 2021-08-18

## 2021-01-08 NOTE — PROGRESS NOTES
Do you currently have any of the following:    Fever: No  Headache:  No  Cough: No  Shortness of breath: No  Exposed to anyone with these symptoms: Irina Steiner presents to the [facility] on 1/8/2021. Fei Amador is complaining of pain down her spine into sciatic. . The pain is intermittent. The pain is described as pressure when she lays down at night. . Pain is rated on her best day at a 2, on her worst day at a 10, and on average at a 4 on the VAS scale. She took her last dose of Relafen . Fei Amador does not have issues with constipation. Any procedures since your last visit: Yes, with 99 % relief. She is  on NSAIDS and  is not on anticoagulation medications to include none and is managed by NA. Pacemaker or defibrillator: No Physician managing device is NA. Medication Contract and Consent for Opioid Use Documents Filed     Patient Documents       Type of Document Status Date Received Received By Description     Medication Contract Received 5/7/2019  8:46 AM SANDRITA KABA Pain Management Pt Agreement 5/7/2019                   /68   Pulse 72   Temp 96.9 °F (36.1 °C) (Infrared)   Resp 16   Ht 5' (1.524 m)   Wt 178 lb (80.7 kg)   BMI 34.76 kg/m²      No LMP recorded. Patient has had an ablation.

## 2021-01-08 NOTE — PROGRESS NOTES
Pt    [address]  [address] [facility], [address] [facility]  [phone]    Follow up Note      Rommel Dodge     Date of Visit:  1/8/2021    CC:  Patient presents for follow up   Chief Complaint   Patient presents with    Follow Up After Procedure     CERVICAL EPIDURAL STEROID INJECTION UNDER FLUOROSCOPIC GUIDANCE AT C7-T1 LEFT PARAMEDIAN (CPT 77033)       HPI:    H/o Neck pain and upper extremity pain radiating to the left UE. Failed conservative treatment with NSAID's, muscle relaxants, oral steroids and HEP. S/P Cervical BELEM on 12/8/2020 with excellent pain relief of more than 90%. Overall she is doing very well at this point of time. She also has H/o chronic low back and B/l LE pain. S/P BELEM in 2019 with excellent pain relief. Does not have significant LE pain. Pain is mainly in the low back. She has been evaluated with Dr. Elbert Lobato in the past and recommended lumbar facet interventions. Low back pain is currently stable. Continues HEP    No new weakness or numbness or bowel or bladder symptoms. Alleviating factors include: heat, medications.       She follows up with rheumatologist for h/o sjogren's/ rheumatoid / SLE. Nursing notes and details of the pain history reviewed. Please see intake notes for details.     Previous treatments:   Physical Therapy   Chiropractic treatment  Ongoing Core exercises for the last year. TENS  and medications- NSAID's, oral steroids, gabapentin etc,. Does not tolerate meds well   Conservative RX has not provided good long term relief.     Continues HEP    She has not been on anticoagulation medications to include none and has not been on herbal supplements. She is not diabetic.     Imaging:    MRI of C spine: 11/3/2020:     FINDINGS:    BONES/ALIGNMENT: There is straightening of the cervical spine with loss of    the normal lordotic curvature.  The bone marrow signal intensity is low in the T1 weighted imaging, similar to the previous exam, likely related to red    marrow versus anemia.  Endplate degenerative changes are noted at C6-C7.  No    areas of marrow edema to suggest an acute fracture.         SPINAL CORD: The cervical spinal cord demonstrates normal signal intensity    without evidence of an expansile mass lesion.         SOFT TISSUES: No paraspinal masses or edema.         C2-C3: There is a 2 mm central disc protrusion indenting the anterior thecal    sac.  Mild central spinal canal narrowing.  The neural foramen are patent.         C3-C4: There is a central disc protrusion measuring 2 mm indenting the    anterior thecal sac.  Minimal central spinal canal narrowing.  The neural    foramen are patent.         C4-C5: Asymmetric left-sided uncovertebral spurring and facet arthropathy    have increased with moderate left foraminal narrowing, increased in severity. No right foraminal narrowing, disc herniation, or central spinal canal    stenosis.         C5-C6: There is a broad-based central/left paracentral disc protrusion    measuring 3 mm, increased in size.  Mild central spinal canal narrowing has    slightly increased.  Asymmetric left-sided uncovertebral spurring with mild    left foraminal narrowing, increased.  No right foraminal narrowing.         C6-C7: There is a broad-based left paracentral central disc protrusion    measuring 3 mm.  Ligamentum flavum thickening.  Moderate central spinal canal    narrowing has increased.  Asymmetric left-sided uncovertebral spurring with    moderate left foraminal narrowing and mild right foraminal narrowing,    increased in severity.         C7-T1: There is no significant disc protrusion, spinal canal stenosis or    neural foraminal narrowing.              Impression    1. Mild central spinal canal narrowing at C5-C6, and moderate central spinal    canal narrowing at C6-C7 have slightly increased. 2. Multilevel foraminal narrowing, as above, greatest on the left at C4-C5,    and left at C6-C7. 3. No evidence of an acute fracture.           X ray C spine: 10/26/2020: Impression    Degenerative disc changes at C6-7        MRI of LS spine: 6/27/2020:  FINDINGS:    BONES/ALIGNMENT: There is mild degenerative disc disease at L4-L5, and L5-S1.     There are Modic type 1 degenerative endplate changes at C6-B4.         SPINAL CORD: The conus terminates normally.         SOFT TISSUES: No paraspinal mass identified.         L1-L2:  The thecal sac and neural foramina are intact.         L2-L3:  The thecal sac and neural foramina are intact.         L3-L4:  The thecal sac and neural foramina are intact.         L4-L5: There is a minimum disc bulge measuring 2 mm.   There is mild facet    and ligamentum flavum hypertrophy.  The thecal sac is not stenotic.  Disc    and/or osteophytes cause minimal narrowing of the neural foramina bilaterally.         L5-S1: There is a disc protrusion with annular fissure centrally at L5-S1    measuring approximately 4.5 mm.  This effaces the anterior aspect of the    thecal sac.  The thecal sac is not significantly stenotic.  The S1 nerve    roots are intact.  There is mild narrowing of the neural foramina bilaterally.         There may be slight increase in size of the disc protrusion compared to the    prior study.              Impression    Disc protrusion with annular fissure at L5-S1.  No significant stenosis of    the thecal sac.  The S1 nerve roots are intact.  There is mild narrowing of    the neural foramina bilaterally at L5-S1.         Mild narrowing of the neural foramina at L4-L5 as discussed above.          Potential Aberrant Drug-Related Behavior:  No    Urine Drug Screening:No    OARRS report[de-identified]  1/8/21  yes- not on chronic narcotics    Past Medical History:   Diagnosis Date    Flank Pain     right    H/O rheumatoid arthritis  Hx of cardiovascular stress test 2/2013    treadmill nuclear stress    Joint pain     Low back pain     Lupus (HCC)     Neck pain     RA (rheumatoid arthritis) ([facility] [address]     RH    Radiculopathy     Sjogren's disease ([facility] [address]        Past Surgical History:   Procedure Laterality Date    ANESTHESIA NERVE BLOCK Bilateral 7/9/2019    BILATERAL LUMBAR TRANSFORAMINAL EPIDURAL STERIOD INJECTION AT L5-S1 UNDER FLUOROSCOPY performed by Shay Posey MD at [address] [place] [address] Drive      x2    COLONOSCOPY      DILATION AND CURETTAGE OF UTERUS      x2    EPIDURAL STEROID INJECTION Left 5/28/2019    LUMBAR EPIDURAL STEROID INJECTION L5-S1 (LEFT PARAMEDIAN APPROACH) UNDER FLUOROSCOPIC GUIDANCE) performed by Shay Posey MD at [facility] 96 ARTHROSCOPY Left 5 27 16    medial menisectomy, debridement, acl synovectomy, chondroplasty    NERVE BLOCK Left 05/28/2019    lumbar    NERVE BLOCK Bilateral 07/09/2019    NERVE BLOCK Left 12/08/2020    Cervical epidural steroid injection under fouroscopic guidance c7-t1 left paramedian    PAIN MANAGEMENT PROCEDURE Left 12/8/2020    CERVICAL EPIDURAL STEROID INJECTION UNDER FLUOROSCOPIC GUIDANCE AT C7-T1 LEFT PARAMEDIAN (CPT 30107) performed by Shay Posey MD at [address] [place] [address]         Prior to Admission medications    Medication Sig Start Date End Date Taking?  Authorizing Provider   methocarbamol (ROBAXIN) 750 MG tablet Take 750 mg by mouth 4 times daily   Yes Historical Provider, MD   nabumetone (RELAFEN) 750 MG tablet Take 1 tablet by mouth 2 times daily as needed for Pain 11/17/20  Yes Shay Posey MD   Multiple Vitamins-Minerals (THERAPEUTIC MULTIVITAMIN-MINERALS) tablet Take 1 tablet by mouth daily   Yes Historical Provider, MD   loratadine (CLARITIN) 10 MG tablet Take 10 mg by mouth daily as needed (Allergies)    Yes Historical Provider, MD omeprazole (PRILOSEC) 20 MG capsule Take 20 mg by mouth daily as needed (Reflux)    Yes Historical Provider, MD       Allergies   Allergen Reactions    Methotrexate Derivatives Nausea And Vomiting and Other (See Comments)     Mouth sores       Social History     Socioeconomic History    Marital status:      Spouse name: Not on file    Number of children: Not on file    Years of education: Not on file    Highest education level: Not on file   Occupational History    Not on file   Social Needs    Financial resource strain: Not on file    Food insecurity     Worry: Not on file     Inability: Not on file    Transportation needs     Medical: Not on file     Non-medical: Not on file   Tobacco Use    Smoking status: Never Smoker    Smokeless tobacco: Never Used   Substance and Sexual Activity    Alcohol use: Yes     Comment: wine occ    Drug use: No    Sexual activity: Yes     Partners: Male   Lifestyle    Physical activity     Days per week: Not on file     Minutes per session: Not on file    Stress: Not on file   Relationships    Social connections     Talks on phone: Not on file     Gets together: Not on file     Attends Congregation service: Not on file     Active member of club or organization: Not on file     Attends meetings of clubs or organizations: Not on file     Relationship status: Not on file    Intimate partner violence     Fear of current or ex partner: Not on file     Emotionally abused: Not on file     Physically abused: Not on file     Forced sexual activity: Not on file   Other Topics Concern    Not on file   Social History Narrative    Not on file       Family History   Problem Relation Age of Onset    Heart Failure Mother     Thyroid Disease Mother     Cancer Father     Thyroid Disease Sister     Thyroid Disease Maternal Grandmother     Thyroid Disease Sister     Thyroid Disease Sister        REVIEW OF SYSTEMS: [facility] denies fever/chills, chest pain, shortness of breath, new bowel or bladder complaints. All other review of systems was negative. PHYSICAL EXAMINATION:      /68   Pulse 72   Temp 96.9 °F (36.1 °C) (Infrared)   Resp 16   Ht 5' (1.524 m)   Wt 178 lb (80.7 kg)   BMI 34.76 kg/m²   General:       General appearance:   pleasant and well-hydrated. , in no distress and A & O x3  Build:Obese  Function:Moves about room without difficulty.     HEENT:     Head:normocephalic, atraumatic  Pupils:regular, round, equal  Sclera: icterus absent     Lungs:     Breathing:normal breathing pattern      CVS :   RRR     Abdomen:     Shape:non-distended and normal  Tenderness:none  Guarding:none     Cervical spine:     Inspection:normal  Palpation:tenderness paravertebral muscles, tenderness trapezium, left, right and negative  Range of motion:abnormal mildly flexion, extension rotation bilateral and is painful.     Thoracic spine:     Spine inspection:normal   Palpation:non-tender midline and paraspinals, left and right. Range of motion:normal in flexion, extension rotation bilateral and is not painful.     Lumbar spine:     Spine inspection:normal   CVA tenderness:No   Palpation:tenderness paravertebral muscles, left, right and positive. Range of motion: Reduced, Pain on Lateral bending, flexion, extension rotation bilateral and is Painful.   B/l Lumbar paraspinal tenderness +  B/l Lumbar facet loading +  SIJ tenderness + bilateral,   Sensory and motor in b/l LE normal  DTRs' b/l LE equal     Musculoskeletal:     Trigger points in trapezius:absent bilaterally  Trigger points in rhomboids:absent bilaterally  Trigger points in Paravertebral:absent bilaterally  Trigger points in supraspinatus/infraspinatus:absent  Spurling's:negative right, negative left    SI joint tenderness:positive right, positive left              MARIO test:positive right, positive             left Piriformis tenderness:negative right, negative left  Trochanteric bursa tenderness:negative right, positive left  SLR:negative right, negative left, sitting      Extremities:     Tremors:None bilaterally upper and lower  Edema:none x all 4 extremities     Knee:     Knee ROM normal- no pain     Neurological:        Sensory:normal to light touch - except for sensory changes over the left UE.     Motor:   Right Grip5/5              Left Grip5/5               Right Bicep5/5           Left Bicep5/5              Right Triceps5/5       Left Triceps5/5          Right Deltoid5/5     Left Deltoid5/5                  Right Quadriceps5/5          Left Quadriceps5/5           Right Gastrocnemius5/5    Left Gastrocnemius5/5  Right Ant Tibialis5/5  Left Ant Tibialis5/5     Gait:normal     Dermatology:     Skin: redness over the face      Assessment/Plan:   Diagnosis Orders   1. DDD (degenerative disc disease), cervical     2. Cervical radiculopathy     3. Cervical spondylolysis     4. DDD (degenerative disc disease), lumbar     5. Lumbosacral spondylosis without myelopathy         39 y.o.  lady with neck pain and left UE pain. Acute exacerbation - Failed Meds/ HEP / oral steroids. MRI of C spine and LS spine reviewed. S/P  cervical epidural steroid injection  On 12/8/2020 with > 90% pain relief. PT    Relafen for prn use. Use instructions and side effects explained. Methocarbamol for prn use for muscle spasm . Use instructions and side effects explained. Also has H/o Chronic low back and LE pain. Excellent pain relief form the prior BELEM.     Prior MRI reviewed. Has been evaluated by Dr. Issa Sewell recently- did not recommend surgery. Has facet and SIJ pain.     Plan SIJ injection Vs Facet injection in future if low back pain worsens. Continue HEP.    NSAID's for prn use.     Counseling reg : regular HEP and weight watching, appropriate medication use Recent notes form ortho and NSG, findings of the MRI of LS spine and C spine reviewed, treatment plan discussed with the patient. She has been having chest discomfort intermittently has history of reflux disease and a hiatal hernia. Recommend follow-up with her primary care physician or GI to address this.     Follow-up in 3 months    John Lau MD    CC:  Grace Luis MD

## 2021-01-14 RX ORDER — NABUMETONE 750 MG/1
TABLET, FILM COATED ORAL
Qty: 60 TABLET | Refills: 1 | Status: SHIPPED
Start: 2021-01-14 | End: 2021-04-15

## 2021-02-10 ENCOUNTER — HOSPITAL ENCOUNTER (OUTPATIENT)
Dept: ULTRASOUND IMAGING | Age: 46
Discharge: HOME | End: 2021-02-10
Payer: COMMERCIAL

## 2021-02-10 DIAGNOSIS — K80.50 BILIARY COLIC: ICD-10-CM

## 2021-02-10 PROCEDURE — 76705 ECHO EXAM OF ABDOMEN: CPT

## 2021-02-11 ENCOUNTER — TELEPHONE (OUTPATIENT)
Dept: SURGERY | Age: 46
End: 2021-02-11

## 2021-02-11 ENCOUNTER — HOSPITAL ENCOUNTER (OUTPATIENT)
Age: 46
Discharge: HOME | End: 2021-02-13
Payer: COMMERCIAL

## 2021-02-11 ENCOUNTER — OFFICE VISIT (OUTPATIENT)
Dept: SURGERY | Age: 46
End: 2021-02-11
Payer: COMMERCIAL

## 2021-02-11 VITALS
SYSTOLIC BLOOD PRESSURE: 124 MMHG | DIASTOLIC BLOOD PRESSURE: 78 MMHG | HEIGHT: 60 IN | BODY MASS INDEX: 34.95 KG/M2 | HEART RATE: 68 BPM | WEIGHT: 178 LBS | TEMPERATURE: 97.8 F | OXYGEN SATURATION: 98 %

## 2021-02-11 DIAGNOSIS — Z01.818 PRE-OP TESTING: Primary | ICD-10-CM

## 2021-02-11 DIAGNOSIS — K80.50 BILIARY COLIC: Primary | ICD-10-CM

## 2021-02-11 DIAGNOSIS — Z01.818 PRE-OP TESTING: ICD-10-CM

## 2021-02-11 PROCEDURE — U0003 INFECTIOUS AGENT DETECTION BY NUCLEIC ACID (DNA OR RNA); SEVERE ACUTE RESPIRATORY SYNDROME CORONAVIRUS 2 (SARS-COV-2) (CORONAVIRUS DISEASE [COVID-19]), AMPLIFIED PROBE TECHNIQUE, MAKING USE OF HIGH THROUGHPUT TECHNOLOGIES AS DESCRIBED BY CMS-2020-01-R: HCPCS

## 2021-02-11 PROCEDURE — 99204 OFFICE O/P NEW MOD 45 MIN: CPT | Performed by: SURGERY

## 2021-02-11 RX ORDER — PANTOPRAZOLE SODIUM 40 MG/1
40 TABLET, DELAYED RELEASE ORAL PRN
COMMUNITY
Start: 2021-01-15

## 2021-02-11 NOTE — TELEPHONE ENCOUNTER
Patient provided with surgery instructions during office visit. Patient scheduled for follow up visit with Dr. Stacy Delcid on 03/01/2021. Surgery scheduling form faxed and confirmation received.     Electronically signed by Jennifer Luna MA on 2/11/2021 at 10:22 AM

## 2021-02-11 NOTE — PROGRESS NOTES
 nabumetone (RELAFEN) 750 MG tablet TAKE 1 TABLET BY MOUTH TWICE DAILY AS NEEDED FOR PAIN (Patient not taking: Reported on 2/11/2021) 60 tablet 1    methocarbamol (ROBAXIN) 750 MG tablet Take 750 mg by mouth 4 times daily      Multiple Vitamins-Minerals (THERAPEUTIC MULTIVITAMIN-MINERALS) tablet Take 1 tablet by mouth daily      loratadine (CLARITIN) 10 MG tablet Take 10 mg by mouth daily as needed (Allergies)       omeprazole (PRILOSEC) 20 MG capsule Take 20 mg by mouth daily as needed (Reflux)        No current facility-administered medications for this visit. Allergies   Allergen Reactions    Methotrexate Derivatives Nausea And Vomiting and Other (See Comments)     Mouth sores       The patient has a family history that is negative for severe cardiovascular or respiratory issues, negative for reaction to anesthesia.     Social History     Socioeconomic History    Marital status:      Spouse name: Not on file    Number of children: Not on file    Years of education: Not on file    Highest education level: Not on file   Occupational History    Not on file   Social Needs    Financial resource strain: Not on file    Food insecurity     Worry: Not on file     Inability: Not on file    Transportation needs     Medical: Not on file     Non-medical: Not on file   Tobacco Use    Smoking status: Never Smoker    Smokeless tobacco: Never Used   Substance and Sexual Activity    Alcohol use: Yes     Comment: wine occ    Drug use: No    Sexual activity: Yes     Partners: Male   Lifestyle    Physical activity     Days per week: Not on file     Minutes per session: Not on file    Stress: Not on file   Relationships    Social connections     Talks on phone: Not on file     Gets together: Not on file     Attends Shinto service: Not on file     Active member of club or organization: Not on file     Attends meetings of clubs or organizations: Not on file     Relationship status: Not on file  Intimate partner violence     Fear of current or ex partner: Not on file     Emotionally abused: Not on file     Physically abused: Not on file     Forced sexual activity: Not on file   Other Topics Concern    Not on file   Social History Narrative    Not on file           Review of Systems  Review of Systems -  General ROS: negative for - chills, fatigue or malaise  ENT ROS: negative for - hearing change, nasal congestion or nasal discharge  Allergy and Immunology ROS: negative for - hives, itchy/watery eyes or nasal congestion  Hematological and Lymphatic ROS: negative for - blood clots, blood transfusions, bruising or fatigue  Endocrine ROS: negative for - malaise/lethargy, mood swings, palpitations or polydipsia/polyuria  Breast ROS: negative for - new or changing breast lumps or nipple changes  Respiratory ROS: negative for - sputum changes, stridor, tachypnea or wheezing  Cardiovascular ROS: negative for - irregular heartbeat, loss of consciousness, murmur or orthopnea  Gastrointestinal ROS: negative for - constipation, diarrhea, gas/bloating, heartburn or hematemesis, pain and nausea  Genito-Urinary ROS: negative for -  genital discharge, genital ulcers or hematuria  Musculoskeletal ROS: negative for - gait disturbance, muscle pain or muscular weakness    Physical exam:   /78   Pulse 68   Temp 97.8 °F (36.6 °C) (Temporal)   Ht 5' (1.524 m)   Wt 178 lb (80.7 kg)   SpO2 98%   BMI 34.76 kg/m²   General appearance:  NAD  Pyscho/social: negative for tremors and hallucinations  Head: NCAT, PERRLA, EOMI, red conjunctiva  Neck: supple, no masses  Lungs: CTAB, equal chest rise bilateral  Heart: Reg rate  Abdomen: soft, nontender, nondistended  Skin; no lesions  Gu: no cva tenderness  Extremities: extremities normal, atraumatic, no cyanosis or edema    Assessment:  Biliary colic     Plan:   To OR Discussed the risk, benefits and alternatives of surgery including wound infections, bleeding, scar and hernia formation and the risks of general anesthetic including MI, CVA, sudden death or reactions to anesthetic medications. The patient understands the risks and alternatives and the possibility of converting to an open procedure. All questions were answered to the patient's satisfaction and they freely signed the consent. We discussed with a normal gb workup but classic symptoms of gallbladder disease the chance of symptomatic resolution is 85% and there is a chance of no changes of symptoms post op. They understand and wish to proceed.       Yrn Zhou MD  10:18 AM  2/11/2021

## 2021-02-11 NOTE — TELEPHONE ENCOUNTER
Prior Authorization Form:      DEMOGRAPHICS:                     Patient Name:  Mike Calderon  Patient :  1975            Insurance:  Payor: Hamida Warren / Plan: Hamida Warren - [place] PPO / Product Type: *No Product type* /   Insurance ID Number:    Payor/Plan Subscr  Sex Relation Sub. Ins. ID Effective Group Num   1.  [address] 1975 Male Spouse [id number]                                    [id number]         DIAGNOSIS & PROCEDURE:                       Procedure/Operation: lap melly           CPT Code: 24596    Diagnosis:  Biliary colic    ZQY99 Code: K28.10    Location:  [address] [place] [address]    Surgeon:  Ashley Fernández INFORMATION:                          Date: 2021    Time:               Anesthesia:                                                         Status:  Outpatient        Special Comments:         Electronically signed by Ethel Archuleta MA on 2021 at 10:21 AM

## 2021-02-12 ENCOUNTER — PREP FOR PROCEDURE (OUTPATIENT)
Dept: SURGERY | Age: 46
End: 2021-02-12

## 2021-02-12 RX ORDER — SODIUM CHLORIDE 0.9 % (FLUSH) 0.9 %
10 SYRINGE (ML) INJECTION PRN
Status: CANCELLED | OUTPATIENT
Start: 2021-02-12

## 2021-02-12 RX ORDER — SODIUM CHLORIDE 0.9 % (FLUSH) 0.9 %
10 SYRINGE (ML) INJECTION EVERY 12 HOURS SCHEDULED
Status: CANCELLED | OUTPATIENT
Start: 2021-02-12

## 2021-02-12 RX ORDER — SODIUM CHLORIDE, SODIUM LACTATE, POTASSIUM CHLORIDE, CALCIUM CHLORIDE 600; 310; 30; 20 MG/100ML; MG/100ML; MG/100ML; MG/100ML
INJECTION, SOLUTION INTRAVENOUS CONTINUOUS
Status: CANCELLED | OUTPATIENT
Start: 2021-02-12

## 2021-02-13 LAB
SARS-COV-2: NOT DETECTED
SOURCE: NORMAL

## 2021-02-15 NOTE — PROGRESS NOTES
[address] [facility]                                                                                                                    PRE OP INSTRUCTIONS FOR  Dayron Sanchez        Date: 2/15/2021    Date of surgery: 2/16/21   Arrival Time: Hospital will call you between 5pm and 7pm with your final arrival time for surgery    1. Do not eat or drink anything after midnight prior to surgery. This includes no water, chewing gum, mints or ice chips. 2. Take the following medications with a small sip of water on the morning of Surgery: pantoprazole, prn robaxin     3. Diabetics may take evening dose of insulin but none after midnight. If you feel symptomatic or low blood sugar morning of surgery drink 1-2 ounces of apple juice only. 4. Aspirin, Ibuprofen, Advil, Naproxen, Vitamin E and other Anti-inflammatory products should be stopped  before surgery  as directed by your physician. Take Tylenol only unless instructed otherwise by your surgeon. 5. Check with your Doctor regarding stopping Plavix, Coumadin, Lovenox, Eliquis, Effient, or other blood thinners. 6. Do not smoke,use illicit drugs and do not drink any alcoholic beverages 24 hours prior to surgery. 7. You may brush your teeth the morning of surgery. DO NOT SWALLOW WATER    8. You MUST make arrangements for a responsible adult to take you home after your surgery. You will not be allowed to leave alone or drive yourself home. It is strongly suggested someone stay with you the first 24 hrs. Your surgery will be cancelled if you do not have a ride home. 9. PEDIATRIC PATIENTS ONLY:  A parent/legal guardian must accompany a child scheduled for surgery and plan to stay at the hospital until the child is discharged. Please do not bring other children with you.     10. Please wear simple, loose fitting clothing to the hospital.  Claudia Gene not bring valuables (money, credit cards, checkbooks, etc.) Do not wear any makeup (including no eye makeup) or nail polish on your fingers or toes. 11. DO NOT wear any jewelry or piercings on day of surgery. All body piercing jewelry must be removed. 12. Shower the night before surgery with ___Antibacterial soap /COLT WIPES________    13. TOTAL JOINT REPLACEMENT/HYSTERECTOMY PATIENTS ONLY---Remember to bring Blood Bank bracelet to the hospital on the day of surgery. 14. If you have a Living Will and Durable Power of  for Healthcare, please bring in a copy. 15. If appropriate bring crutches, inspirex, WALKER, CANE etc... 12. Notify your Surgeon if you develop any illness between now and surgery time, cough, cold, fever, sore throat, nausea, vomiting, etc.  Please notify your surgeon if you experience dizziness, shortness of breath or blurred vision between now & the time of your surgery. 17. If you have ___dentures, they will be removed before going to the OR; we will provide you a container. If you wear ___contact lenses or ___glasses, they will be removed; please bring a case for them. 18. To provide excellent care visitors will be limited to 1 in the room at any given time. 19. Please bring picture ID and insurance card. 20. Sleep apnea patients need to bring CPAP AND SETTINGS to hospital on day of surgery. 21. During flu season no children under the age of 15 are permitted in the hospital for the safety of all patients. 22. Other please check in at the information desk. Wear a mask. Please call AMBULATORY CARE if you have any further questions.    [address] [facility]     [address]

## 2021-02-15 NOTE — PROGRESS NOTES
Pt denies vominting/dehydration. Also, states would like to 'waive pregnancy test' due to tubal. I explained it would be at the discretion of the anesthesiologist. She voiced understanding.

## 2021-02-16 ENCOUNTER — ANESTHESIA EVENT (OUTPATIENT)
Dept: OPERATING ROOM | Age: 46
End: 2021-02-16
Payer: COMMERCIAL

## 2021-02-16 ENCOUNTER — TELEPHONE (OUTPATIENT)
Dept: SURGERY | Age: 46
End: 2021-02-16

## 2021-02-16 ENCOUNTER — PREP FOR PROCEDURE (OUTPATIENT)
Dept: SURGERY | Age: 46
End: 2021-02-16

## 2021-02-16 ENCOUNTER — HOSPITAL ENCOUNTER (OUTPATIENT)
Age: 46
Setting detail: OUTPATIENT SURGERY
Discharge: HOME | End: 2021-02-16
Attending: SURGERY | Admitting: SURGERY
Payer: COMMERCIAL

## 2021-02-16 ENCOUNTER — ANESTHESIA (OUTPATIENT)
Dept: OPERATING ROOM | Age: 46
End: 2021-02-16
Payer: COMMERCIAL

## 2021-02-16 VITALS
RESPIRATION RATE: 6 BRPM | OXYGEN SATURATION: 98 % | DIASTOLIC BLOOD PRESSURE: 71 MMHG | SYSTOLIC BLOOD PRESSURE: 149 MMHG

## 2021-02-16 VITALS
HEIGHT: 60 IN | WEIGHT: 175 LBS | HEART RATE: 68 BPM | TEMPERATURE: 97.3 F | RESPIRATION RATE: 16 BRPM | OXYGEN SATURATION: 95 % | SYSTOLIC BLOOD PRESSURE: 123 MMHG | BODY MASS INDEX: 34.36 KG/M2 | DIASTOLIC BLOOD PRESSURE: 69 MMHG

## 2021-02-16 DIAGNOSIS — G89.18 POST-OP PAIN: Primary | ICD-10-CM

## 2021-02-16 LAB — HCG(URINE) PREGNANCY TEST: NEGATIVE

## 2021-02-16 PROCEDURE — 6360000002 HC RX W HCPCS

## 2021-02-16 PROCEDURE — 7100000011 HC PHASE II RECOVERY - ADDTL 15 MIN: Performed by: SURGERY

## 2021-02-16 PROCEDURE — 43282 LAP PARAESOPH HER RPR W/MESH: CPT | Performed by: SURGERY

## 2021-02-16 PROCEDURE — 81025 URINE PREGNANCY TEST: CPT

## 2021-02-16 PROCEDURE — 7100000010 HC PHASE II RECOVERY - FIRST 15 MIN: Performed by: SURGERY

## 2021-02-16 PROCEDURE — 3600000014 HC SURGERY LEVEL 4 ADDTL 15MIN: Performed by: SURGERY

## 2021-02-16 PROCEDURE — 2580000003 HC RX 258: Performed by: SURGERY

## 2021-02-16 PROCEDURE — 2500000003 HC RX 250 WO HCPCS: Performed by: NURSE ANESTHETIST, CERTIFIED REGISTERED

## 2021-02-16 PROCEDURE — 7100000001 HC PACU RECOVERY - ADDTL 15 MIN: Performed by: SURGERY

## 2021-02-16 PROCEDURE — 3700000000 HC ANESTHESIA ATTENDED CARE: Performed by: SURGERY

## 2021-02-16 PROCEDURE — C1781 MESH (IMPLANTABLE): HCPCS | Performed by: SURGERY

## 2021-02-16 PROCEDURE — 15734 MUSCLE-SKIN GRAFT TRUNK: CPT | Performed by: SURGERY

## 2021-02-16 PROCEDURE — 3700000001 HC ADD 15 MINUTES (ANESTHESIA): Performed by: SURGERY

## 2021-02-16 PROCEDURE — 7100000000 HC PACU RECOVERY - FIRST 15 MIN: Performed by: SURGERY

## 2021-02-16 PROCEDURE — 6360000002 HC RX W HCPCS: Performed by: SURGERY

## 2021-02-16 PROCEDURE — 2500000003 HC RX 250 WO HCPCS: Performed by: SURGERY

## 2021-02-16 PROCEDURE — 3600000004 HC SURGERY LEVEL 4 BASE: Performed by: SURGERY

## 2021-02-16 PROCEDURE — 2720000010 HC SURG SUPPLY STERILE: Performed by: SURGERY

## 2021-02-16 PROCEDURE — 6360000002 HC RX W HCPCS: Performed by: NURSE ANESTHETIST, CERTIFIED REGISTERED

## 2021-02-16 PROCEDURE — 6360000002 HC RX W HCPCS: Performed by: ANESTHESIOLOGY

## 2021-02-16 PROCEDURE — 2709999900 HC NON-CHARGEABLE SUPPLY: Performed by: SURGERY

## 2021-02-16 DEVICE — MESH SURG W7XL10CM SEPRA TECHNOLOGY RECT PHASIX: Type: IMPLANTABLE DEVICE | Site: ABDOMEN | Status: FUNCTIONAL

## 2021-02-16 RX ORDER — MEPERIDINE HYDROCHLORIDE 25 MG/ML
12.5 INJECTION INTRAMUSCULAR; INTRAVENOUS; SUBCUTANEOUS EVERY 5 MIN PRN
Status: DISCONTINUED | OUTPATIENT
Start: 2021-02-16 | End: 2021-02-16 | Stop reason: HOSPADM

## 2021-02-16 RX ORDER — IBUPROFEN 800 MG/1
800 TABLET ORAL EVERY 6 HOURS PRN
Qty: 20 TABLET | Refills: 0 | Status: SHIPPED | OUTPATIENT
Start: 2021-02-16 | End: 2021-04-15

## 2021-02-16 RX ORDER — KETOROLAC TROMETHAMINE 30 MG/ML
INJECTION, SOLUTION INTRAMUSCULAR; INTRAVENOUS
Status: COMPLETED
Start: 2021-02-16 | End: 2021-02-16

## 2021-02-16 RX ORDER — CEFAZOLIN SODIUM 2 G/50ML
2000 SOLUTION INTRAVENOUS
Status: COMPLETED | OUTPATIENT
Start: 2021-02-16 | End: 2021-02-16

## 2021-02-16 RX ORDER — OXYCODONE HYDROCHLORIDE AND ACETAMINOPHEN 5; 325 MG/1; MG/1
1 TABLET ORAL EVERY 4 HOURS PRN
Status: DISCONTINUED | OUTPATIENT
Start: 2021-02-16 | End: 2021-02-16 | Stop reason: HOSPADM

## 2021-02-16 RX ORDER — MEPERIDINE HYDROCHLORIDE 25 MG/ML
INJECTION INTRAMUSCULAR; INTRAVENOUS; SUBCUTANEOUS
Status: COMPLETED
Start: 2021-02-16 | End: 2021-02-16

## 2021-02-16 RX ORDER — SODIUM CHLORIDE 0.9 % (FLUSH) 0.9 %
10 SYRINGE (ML) INJECTION PRN
Status: DISCONTINUED | OUTPATIENT
Start: 2021-02-16 | End: 2021-02-16 | Stop reason: HOSPADM

## 2021-02-16 RX ORDER — SODIUM CHLORIDE, SODIUM LACTATE, POTASSIUM CHLORIDE, CALCIUM CHLORIDE 600; 310; 30; 20 MG/100ML; MG/100ML; MG/100ML; MG/100ML
INJECTION, SOLUTION INTRAVENOUS CONTINUOUS
Status: DISCONTINUED | OUTPATIENT
Start: 2021-02-16 | End: 2021-02-16 | Stop reason: HOSPADM

## 2021-02-16 RX ORDER — BUPIVACAINE HYDROCHLORIDE AND EPINEPHRINE 2.5; 5 MG/ML; UG/ML
INJECTION, SOLUTION EPIDURAL; INFILTRATION; INTRACAUDAL; PERINEURAL PRN
Status: DISCONTINUED | OUTPATIENT
Start: 2021-02-16 | End: 2021-02-16 | Stop reason: ALTCHOICE

## 2021-02-16 RX ORDER — HYDROCODONE BITARTRATE AND ACETAMINOPHEN 5; 325 MG/1; MG/1
1 TABLET ORAL EVERY 6 HOURS PRN
Qty: 10 TABLET | Refills: 0 | Status: SHIPPED | OUTPATIENT
Start: 2021-02-16 | End: 2021-02-19

## 2021-02-16 RX ORDER — KETOROLAC TROMETHAMINE 30 MG/ML
30 INJECTION, SOLUTION INTRAMUSCULAR; INTRAVENOUS ONCE
Status: COMPLETED | OUTPATIENT
Start: 2021-02-16 | End: 2021-02-16

## 2021-02-16 RX ORDER — SODIUM CHLORIDE 0.9 % (FLUSH) 0.9 %
10 SYRINGE (ML) INJECTION EVERY 12 HOURS SCHEDULED
Status: CANCELLED | OUTPATIENT
Start: 2021-02-16

## 2021-02-16 RX ORDER — GLYCOPYRROLATE 1 MG/5 ML
SYRINGE (ML) INTRAVENOUS PRN
Status: DISCONTINUED | OUTPATIENT
Start: 2021-02-16 | End: 2021-02-16 | Stop reason: SDUPTHER

## 2021-02-16 RX ORDER — ONDANSETRON 2 MG/ML
INJECTION INTRAMUSCULAR; INTRAVENOUS PRN
Status: DISCONTINUED | OUTPATIENT
Start: 2021-02-16 | End: 2021-02-16 | Stop reason: SDUPTHER

## 2021-02-16 RX ORDER — MIDAZOLAM HYDROCHLORIDE 1 MG/ML
INJECTION INTRAMUSCULAR; INTRAVENOUS PRN
Status: DISCONTINUED | OUTPATIENT
Start: 2021-02-16 | End: 2021-02-16 | Stop reason: SDUPTHER

## 2021-02-16 RX ORDER — CEFAZOLIN SODIUM 2 G/50ML
2000 SOLUTION INTRAVENOUS
Status: DISCONTINUED | OUTPATIENT
Start: 2021-02-16 | End: 2021-02-16 | Stop reason: SDUPTHER

## 2021-02-16 RX ORDER — SODIUM CHLORIDE 0.9 % (FLUSH) 0.9 %
10 SYRINGE (ML) INJECTION EVERY 12 HOURS SCHEDULED
Status: DISCONTINUED | OUTPATIENT
Start: 2021-02-16 | End: 2021-02-16 | Stop reason: HOSPADM

## 2021-02-16 RX ORDER — PROPOFOL 10 MG/ML
INJECTION, EMULSION INTRAVENOUS PRN
Status: DISCONTINUED | OUTPATIENT
Start: 2021-02-16 | End: 2021-02-16 | Stop reason: SDUPTHER

## 2021-02-16 RX ORDER — NEOSTIGMINE METHYLSULFATE 1 MG/ML
INJECTION, SOLUTION INTRAVENOUS PRN
Status: DISCONTINUED | OUTPATIENT
Start: 2021-02-16 | End: 2021-02-16 | Stop reason: SDUPTHER

## 2021-02-16 RX ORDER — SODIUM CHLORIDE 0.9 % (FLUSH) 0.9 %
10 SYRINGE (ML) INJECTION EVERY 12 HOURS SCHEDULED
Status: DISCONTINUED | OUTPATIENT
Start: 2021-02-16 | End: 2021-02-16 | Stop reason: SDUPTHER

## 2021-02-16 RX ORDER — PROCHLORPERAZINE EDISYLATE 5 MG/ML
5 INJECTION INTRAMUSCULAR; INTRAVENOUS
Status: DISCONTINUED | OUTPATIENT
Start: 2021-02-16 | End: 2021-02-16 | Stop reason: HOSPADM

## 2021-02-16 RX ORDER — DEXAMETHASONE SODIUM PHOSPHATE 4 MG/ML
INJECTION, SOLUTION INTRA-ARTICULAR; INTRALESIONAL; INTRAMUSCULAR; INTRAVENOUS; SOFT TISSUE PRN
Status: DISCONTINUED | OUTPATIENT
Start: 2021-02-16 | End: 2021-02-16 | Stop reason: SDUPTHER

## 2021-02-16 RX ORDER — SODIUM CHLORIDE, SODIUM LACTATE, POTASSIUM CHLORIDE, CALCIUM CHLORIDE 600; 310; 30; 20 MG/100ML; MG/100ML; MG/100ML; MG/100ML
INJECTION, SOLUTION INTRAVENOUS CONTINUOUS
Status: CANCELLED | OUTPATIENT
Start: 2021-02-16

## 2021-02-16 RX ORDER — ONDANSETRON 2 MG/ML
4 INJECTION INTRAMUSCULAR; INTRAVENOUS
Status: DISCONTINUED | OUTPATIENT
Start: 2021-02-16 | End: 2021-02-16 | Stop reason: HOSPADM

## 2021-02-16 RX ORDER — FENTANYL CITRATE 50 UG/ML
INJECTION, SOLUTION INTRAMUSCULAR; INTRAVENOUS PRN
Status: DISCONTINUED | OUTPATIENT
Start: 2021-02-16 | End: 2021-02-16 | Stop reason: SDUPTHER

## 2021-02-16 RX ORDER — ROCURONIUM BROMIDE 10 MG/ML
INJECTION, SOLUTION INTRAVENOUS PRN
Status: DISCONTINUED | OUTPATIENT
Start: 2021-02-16 | End: 2021-02-16 | Stop reason: SDUPTHER

## 2021-02-16 RX ORDER — LIDOCAINE HYDROCHLORIDE 20 MG/ML
INJECTION, SOLUTION INTRAVENOUS PRN
Status: DISCONTINUED | OUTPATIENT
Start: 2021-02-16 | End: 2021-02-16 | Stop reason: SDUPTHER

## 2021-02-16 RX ORDER — DIPHENHYDRAMINE HYDROCHLORIDE 50 MG/ML
12.5 INJECTION INTRAMUSCULAR; INTRAVENOUS
Status: DISCONTINUED | OUTPATIENT
Start: 2021-02-16 | End: 2021-02-16 | Stop reason: HOSPADM

## 2021-02-16 RX ORDER — SODIUM CHLORIDE 0.9 % (FLUSH) 0.9 %
10 SYRINGE (ML) INJECTION PRN
Status: CANCELLED | OUTPATIENT
Start: 2021-02-16

## 2021-02-16 RX ORDER — SODIUM CHLORIDE 0.9 % (FLUSH) 0.9 %
10 SYRINGE (ML) INJECTION PRN
Status: DISCONTINUED | OUTPATIENT
Start: 2021-02-16 | End: 2021-02-16 | Stop reason: SDUPTHER

## 2021-02-16 RX ORDER — LABETALOL HYDROCHLORIDE 5 MG/ML
5 INJECTION, SOLUTION INTRAVENOUS EVERY 10 MIN PRN
Status: DISCONTINUED | OUTPATIENT
Start: 2021-02-16 | End: 2021-02-16 | Stop reason: HOSPADM

## 2021-02-16 RX ADMIN — KETOROLAC TROMETHAMINE 30 MG: 30 INJECTION, SOLUTION INTRAMUSCULAR; INTRAVENOUS at 13:50

## 2021-02-16 RX ADMIN — DEXAMETHASONE SODIUM PHOSPHATE 4 MG: 4 INJECTION, SOLUTION INTRAMUSCULAR; INTRAVENOUS at 12:19

## 2021-02-16 RX ADMIN — MEPERIDINE HYDROCHLORIDE 12.5 MG: 25 INJECTION INTRAMUSCULAR; INTRAVENOUS; SUBCUTANEOUS at 13:58

## 2021-02-16 RX ADMIN — FENTANYL CITRATE 50 MCG: 50 INJECTION, SOLUTION INTRAMUSCULAR; INTRAVENOUS at 12:55

## 2021-02-16 RX ADMIN — MIDAZOLAM 2 MG: 1 INJECTION INTRAMUSCULAR; INTRAVENOUS at 12:15

## 2021-02-16 RX ADMIN — MEPERIDINE HYDROCHLORIDE 12.5 MG: 25 INJECTION INTRAMUSCULAR; INTRAVENOUS; SUBCUTANEOUS at 13:25

## 2021-02-16 RX ADMIN — LIDOCAINE HYDROCHLORIDE 50 MG: 20 INJECTION, SOLUTION INTRAVENOUS at 12:19

## 2021-02-16 RX ADMIN — Medication 0.6 MG: at 12:54

## 2021-02-16 RX ADMIN — HYDROMORPHONE HYDROCHLORIDE 0.5 MG: 1 INJECTION, SOLUTION INTRAMUSCULAR; INTRAVENOUS; SUBCUTANEOUS at 13:45

## 2021-02-16 RX ADMIN — SODIUM CHLORIDE, POTASSIUM CHLORIDE, SODIUM LACTATE AND CALCIUM CHLORIDE: 600; 310; 30; 20 INJECTION, SOLUTION INTRAVENOUS at 11:53

## 2021-02-16 RX ADMIN — Medication 0.5 MG: at 13:45

## 2021-02-16 RX ADMIN — CEFAZOLIN SODIUM 2000 MG: 2 SOLUTION INTRAVENOUS at 12:15

## 2021-02-16 RX ADMIN — ONDANSETRON 4 MG: 2 INJECTION INTRAMUSCULAR; INTRAVENOUS at 12:55

## 2021-02-16 RX ADMIN — ROCURONIUM BROMIDE 30 MG: 10 SOLUTION INTRAVENOUS at 12:19

## 2021-02-16 RX ADMIN — ROCURONIUM BROMIDE 10 MG: 10 SOLUTION INTRAVENOUS at 12:34

## 2021-02-16 RX ADMIN — SODIUM CHLORIDE, POTASSIUM CHLORIDE, SODIUM LACTATE AND CALCIUM CHLORIDE: 600; 310; 30; 20 INJECTION, SOLUTION INTRAVENOUS at 12:15

## 2021-02-16 RX ADMIN — DEXAMETHASONE SODIUM PHOSPHATE 4 MG: 4 INJECTION, SOLUTION INTRAMUSCULAR; INTRAVENOUS at 12:55

## 2021-02-16 RX ADMIN — FENTANYL CITRATE 100 MCG: 50 INJECTION, SOLUTION INTRAMUSCULAR; INTRAVENOUS at 12:20

## 2021-02-16 RX ADMIN — FENTANYL CITRATE 50 MCG: 50 INJECTION, SOLUTION INTRAMUSCULAR; INTRAVENOUS at 12:51

## 2021-02-16 RX ADMIN — PROPOFOL 200 MG: 10 INJECTION, EMULSION INTRAVENOUS at 12:19

## 2021-02-16 RX ADMIN — FENTANYL CITRATE 50 MCG: 50 INJECTION, SOLUTION INTRAMUSCULAR; INTRAVENOUS at 12:33

## 2021-02-16 RX ADMIN — KETOROLAC TROMETHAMINE 30 MG: 30 INJECTION, SOLUTION INTRAMUSCULAR at 13:50

## 2021-02-16 RX ADMIN — ONDANSETRON 4 MG: 2 INJECTION INTRAMUSCULAR; INTRAVENOUS at 12:19

## 2021-02-16 RX ADMIN — Medication 3 MG: at 12:54

## 2021-02-16 ASSESSMENT — PULMONARY FUNCTION TESTS
PIF_VALUE: 21
PIF_VALUE: 15
PIF_VALUE: 21
PIF_VALUE: 17
PIF_VALUE: 3
PIF_VALUE: 20
PIF_VALUE: 18
PIF_VALUE: 0
PIF_VALUE: 0
PIF_VALUE: 2
PIF_VALUE: 0
PIF_VALUE: 0
PIF_VALUE: 15
PIF_VALUE: 2
PIF_VALUE: 17
PIF_VALUE: 0
PIF_VALUE: 1
PIF_VALUE: 15
PIF_VALUE: 1
PIF_VALUE: 20
PIF_VALUE: 15
PIF_VALUE: 1

## 2021-02-16 ASSESSMENT — PAIN - FUNCTIONAL ASSESSMENT: PAIN_FUNCTIONAL_ASSESSMENT: 0-10

## 2021-02-16 ASSESSMENT — PAIN SCALES - GENERAL
PAINLEVEL_OUTOF10: 0
PAINLEVEL_OUTOF10: 10
PAINLEVEL_OUTOF10: 9

## 2021-02-16 ASSESSMENT — PAIN DESCRIPTION - DESCRIPTORS
DESCRIPTORS: ACHING;SORE
DESCRIPTORS: ACHING;DISCOMFORT;PRESSURE

## 2021-02-16 ASSESSMENT — PAIN DESCRIPTION - PAIN TYPE: TYPE: SURGICAL PAIN

## 2021-02-16 ASSESSMENT — PAIN DESCRIPTION - FREQUENCY: FREQUENCY: CONTINUOUS

## 2021-02-16 ASSESSMENT — PAIN DESCRIPTION - ONSET: ONSET: AWAKENED FROM SLEEP

## 2021-02-16 NOTE — PROGRESS NOTES
Pt ambulated in the hallway, voided. Requesting discharge, met criteria. Instructions reviewed and additional hiatal hernia info given and reviewed by pt's .

## 2021-02-16 NOTE — ANESTHESIA POSTPROCEDURE EVALUATION
Department of Anesthesiology  Postprocedure Note    Patient: Rommel Dodge  MRN: [id number]  YOB: 1975  Date of evaluation: 2/16/2021  Time:  2:08 PM     Procedure Summary     Date: 02/16/21 Room / Location: [address] [place] [address] 03 / [facility]    Anesthesia Start: 3597 Anesthesia Stop: 1025    Procedure: LAPAROSCOPIC HIATAL HERNIA RTEPAIR WITH MESH (N/A Abdomen) Diagnosis: (HIATAL HERNIA)    Surgeons: Nicky Olvera MD Responsible Provider: Joe Choi MD    Anesthesia Type: general ASA Status: 2          Anesthesia Type: general    Fredi Phase I: Fredi Score: 8    Fredi Phase II:      Last vitals: Reviewed and per EMR flowsheets.        Anesthesia Post Evaluation    Patient location during evaluation: PACU  Patient participation: complete - patient participated  Level of consciousness: awake and alert  Pain score: 4  Airway patency: patent  Nausea & Vomiting: no nausea and no vomiting  Complications: no  Cardiovascular status: hemodynamically stable  Respiratory status: acceptable  Hydration status: euvolemic

## 2021-02-16 NOTE — TELEPHONE ENCOUNTER
Due to patient being more symptomatic, she has decided to change her surgery from a lap melly to a hiatal hernia repair. Glory in surgery scheduling and Bisi in PAT, and Dr. Veronika Lynn notified. Hiatal diet and instructions were faxed to [facility] per Andrew Newton at [phone]. Post op details also changed.     Electronically signed by William Yoon MA on 2/16/2021 at 8:27 AM

## 2021-02-16 NOTE — H&P
General Surgery History and Physical    Patient's Name/Date of Birth: Rommel Dodge / 1975    Date: February 16, 2021     Surgeon: Bharti Cortez M.D.    PCP: Da Angeles MD     Chief Complaint: hiatal hernia    HPI:   Rommel Dodge is a 39 y.o. female who presents for evaluation of symptomatic hiatal hernia that was responsive to medical treatment but has become recalcitrant. Has sever GERD, EGD showed hiatal hernia, Has not had manometry.   Timing is constant, radiation to epigastrium, alleviated by nothing and started years ago and severity is 2-7/10       Past Medical History:   Diagnosis Date    Flank Pain     right    H/O rheumatoid arthritis     Hx of cardiovascular stress test 2/2013    treadmill nuclear stress    Joint pain     Low back pain     Lupus (HCC)     Neck pain     RA (rheumatoid arthritis) ([address]     RH    Radiculopathy     Sjogren's disease [facility]        Past Surgical History:   Procedure Laterality Date    ANESTHESIA NERVE BLOCK Bilateral 7/9/2019    BILATERAL LUMBAR TRANSFORAMINAL EPIDURAL STERIOD INJECTION AT L5-S1 UNDER FLUOROSCOPY performed by Tyler Moon MD at [address] [place] [address] Drive      x2    COLONOSCOPY      DILATION AND CURETTAGE OF UTERUS      x2    ENDOSCOPY, COLON, DIAGNOSTIC      EPIDURAL STEROID INJECTION Left 5/28/2019    LUMBAR EPIDURAL STEROID INJECTION L5-S1 (LEFT PARAMEDIAN APPROACH) UNDER FLUOROSCOPIC GUIDANCE) performed by Tyler Moon MD at [facility] 96 ARTHROSCOPY Left 5 27 16    medial menisectomy, debridement, acl synovectomy, chondroplasty    NERVE BLOCK Left 05/28/2019    lumbar    NERVE BLOCK Bilateral 07/09/2019    NERVE BLOCK Left 12/08/2020    Cervical epidural steroid injection under fouroscopic guidance c7-t1 left paramedian    PAIN MANAGEMENT PROCEDURE Left 12/8/2020    CERVICAL EPIDURAL STEROID INJECTION UNDER FLUOROSCOPIC GUIDANCE AT C7-T1 LEFT PARAMEDIAN (CPT 55419) performed by Tim Andre Cameron Carrasco MD at [facility]      TUBAL LIGATION         Current Facility-Administered Medications   Medication Dose Route Frequency Provider Last Rate Last Admin    HYDROmorphone (DILAUDID) injection 0.25 mg  0.25 mg Intravenous Q5 Min PRN Carlos Amato MD        HYDROmorphone (DILAUDID) injection 0.5 mg  0.5 mg Intravenous Q5 Min PRN Carlos Amato MD        HYDROmorphone (DILAUDID) injection 0.25 mg  0.25 mg Intravenous Q5 Min PRN Carlos Amato MD        HYDROmorphone (DILAUDID) injection 0.5 mg  0.5 mg Intravenous Q5 Min PRN Carlos Amato MD        oxyCODONE-acetaminophen (PERCOCET) 5-325 MG per tablet 1 tablet  1 tablet Oral Q4H PRN Carlos Amato MD        diphenhydrAMINE (BENADRYL) injection 12.5 mg  12.5 mg Intravenous Once PRN Carlos Amato MD        ondansetron [facility]) injection 4 mg  4 mg Intravenous Once PRN Carlos Amato MD        prochlorperazine (COMPAZINE) injection 5 mg  5 mg Intravenous Once PRN Carlos Amato MD        labetalol (NORMODYNE;TRANDATE) injection 5 mg  5 mg Intravenous Q10 Min PRN Carlos Amato MD        meperidine (DEMEROL) injection 12.5 mg  12.5 mg Intravenous Q5 Min PRN Carlos Amato MD        ceFAZolin (ANCEF) 2000 mg in dextrose 3 % 50 mL IVPB (duplex)  2,000 mg Intravenous On Call to [address] [place] [address] 58, MD        lactated ringers infusion   Intravenous Continuous Clary Degroot  mL/hr at 02/16/21 1153 New Bag at 02/16/21 1153    lactated ringers infusion   Intravenous Continuous Clary Degroot MD        sodium chloride flush 0.9 % injection 10 mL  10 mL Intravenous 2 times per day Clary Degroot MD        sodium chloride flush 0.9 % injection 10 mL  10 mL Intravenous PRN Clary Degroot MD           Allergies   Allergen Reactions    Methotrexate Derivatives Nausea And Vomiting and Other (See Comments)     Mouth sores       The patient has a family history that is negative for severe cardiovascular or respiratory issues, negative for reaction to anesthesia.     Social History     Socioeconomic History    Marital status:      Spouse name: Not on file    Number of children: Not on file    Years of education: Not on file    Highest education level: Not on file   Occupational History    Not on file   Social Needs    Financial resource strain: Not on file    Food insecurity     Worry: Not on file     Inability: Not on file    Transportation needs     Medical: Not on file     Non-medical: Not on file   Tobacco Use    Smoking status: Never Smoker    Smokeless tobacco: Never Used   Substance and Sexual Activity    Alcohol use: Yes     Comment: wine occ    Drug use: No    Sexual activity: Yes     Partners: Male   Lifestyle    Physical activity     Days per week: Not on file     Minutes per session: Not on file    Stress: Not on file   Relationships    Social connections     Talks on phone: Not on file     Gets together: Not on file     Attends Church service: Not on file     Active member of club or organization: Not on file     Attends meetings of clubs or organizations: Not on file     Relationship status: Not on file    Intimate partner violence     Fear of current or ex partner: Not on file     Emotionally abused: Not on file     Physically abused: Not on file     Forced sexual activity: Not on file   Other Topics Concern    Not on file   Social History Narrative    Not on file           Review of Systems  Review of Systems -  General ROS: negative for - chills, fatigue or malaise  ENT ROS: negative for - hearing change, nasal congestion or nasal discharge  Allergy and Immunology ROS: negative for - hives, itchy/watery eyes or nasal congestion  Hematological and Lymphatic ROS: negative for - blood clots, blood transfusions, bruising or fatigue  Endocrine ROS: negative for - malaise/lethargy, mood swings, palpitations or polydipsia/polyuria  Respiratory ROS: negative for - sputum changes, stridor, tachypnea or wheezing  Cardiovascular ROS: negative for - irregular heartbeat, loss of consciousness, murmur or orthopnea  Gastrointestinal ROS: negative for - constipation, diarrhea, gas/bloating, or hematemesis, positive for heartburn and other epigastric pain  Genito-Urinary ROS: negative for -  genital discharge, genital ulcers or hematuria  Musculoskeletal ROS: negative for - gait disturbance, muscle pain or muscular weakness    Physical exam:   BP (!) 109/56   Pulse 75   Temp 98.2 °F (36.8 °C) (Infrared)   Resp 16   Ht 5' (1.524 m)   Wt 175 lb (79.4 kg)   SpO2 98%   BMI 34.18 kg/m²   General appearance:  NAD  Pyscho/social: negative for tremors and hallucinations  Head: NCAT, PERRLA, EOMI, red conjunctiva  Neck: supple, no masses  Lungs: CTAB, equal chest rise bilateral  Heart: Reg rate  Abdomen: soft, nontender, nondistended  Skin; no lesions  Gu: no cva tenderness  Extremities: extremities normal, atraumatic, no cyanosis or edema      Assessment:  39 y.o. female with  symptomatic hiatal hernia    Plan:   TO OR   Discussed the risk, benefits and alternatives of surgery including wound infections, bleeding, scar and hernia formation and the risks of general anesthetic including MI, CVA, sudden death or reactions to anesthetic medications. The patient understands the risks and alternatives and the possibility of converting to an open procedure. All questions were answered to the patient's satisfaction and they freely signed the consent.       Guilherme Max MD  12:21 PM  2/16/2021

## 2021-02-16 NOTE — ANESTHESIA PRE PROCEDURE
Department of Anesthesiology  Preprocedure Note       Name:  Toney Muniz   Age:  39 y.o.  :  1975                                          MRN:  [id number]         Date:  2021      Surgeon: Vika Damon):  Kody Lazo MD    Procedure: Procedure(s):  LAPAROSCOPIC HIATAL HERNIA RTEPAIR WITH MESH POSS OPEN    Medications prior to admission:   Prior to Admission medications    Medication Sig Start Date End Date Taking? Authorizing Provider   medroxyPROGESTERone Acetate (DEPO-PROVERA IM) Inject into the muscle every 3 months   Yes Historical Provider, MD   pantoprazole (PROTONIX) 40 MG tablet TAKE 1 TABLET BY MOUTH EVERY DAY 1/15/21   Historical Provider, MD   nabumetone (RELAFEN) 750 MG tablet TAKE 1 TABLET BY MOUTH TWICE DAILY AS NEEDED FOR PAIN  Patient not taking: Reported on 2021   Joni Owens MD   methocarbamol (ROBAXIN) 750 MG tablet Take 750 mg by mouth 4 times daily    Historical Provider, MD   Multiple Vitamins-Minerals (THERAPEUTIC MULTIVITAMIN-MINERALS) tablet Take 1 tablet by mouth daily    Historical Provider, MD   loratadine (CLARITIN) 10 MG tablet Take 10 mg by mouth daily as needed (Allergies)     Historical Provider, MD       Current medications:    No current facility-administered medications for this encounter.       Current Outpatient Medications   Medication Sig Dispense Refill    medroxyPROGESTERone Acetate (DEPO-PROVERA IM) Inject into the muscle every 3 months      pantoprazole (PROTONIX) 40 MG tablet TAKE 1 TABLET BY MOUTH EVERY DAY      nabumetone (RELAFEN) 750 MG tablet TAKE 1 TABLET BY MOUTH TWICE DAILY AS NEEDED FOR PAIN (Patient not taking: Reported on 2021) 60 tablet 1    methocarbamol (ROBAXIN) 750 MG tablet Take 750 mg by mouth 4 times daily      Multiple Vitamins-Minerals (THERAPEUTIC MULTIVITAMIN-MINERALS) tablet Take 1 tablet by mouth daily      loratadine (CLARITIN) 10 MG tablet Take 10 mg by mouth daily as needed (Allergies) 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    Sensory peripheral neuropathy G60.8    Cold feet R20.9    Insomnia G47.00    Lupus ([address] M32.9    Tear, knee, medial meniscus S83.249A    ACL tear S83.519A    Post-traumatic osteoarthritis of left knee M17.32    Synovitis M65.9    Lumbar radiculopathy M54.16    DDD (degenerative disc disease), lumbar M51.36    Lumbosacral spondylosis without myelopathy M47.817    Sacroiliac dysfunction M53.3    DDD (degenerative disc disease), cervical M50.30    Cervical radiculopathy M54.12    Cervical spondylolysis M43.02       Past Medical History:        Diagnosis Date    Flank Pain     right    H/O rheumatoid arthritis     Hx of cardiovascular stress test 2/2013    treadmill nuclear stress    Joint pain     Low back pain     Lupus (HCC)     Neck pain     RA (rheumatoid arthritis) ([facility]     RH    Radiculopathy     Sjogren's disease ([address]        Past Surgical History:        Procedure Laterality Date    ANESTHESIA NERVE BLOCK Bilateral 7/9/2019    BILATERAL LUMBAR TRANSFORAMINAL EPIDURAL STERIOD INJECTION AT L5-S1 UNDER FLUOROSCOPY performed by Alexandra Shirley MD at [address] [place] [address] Drive      x2    COLONOSCOPY      DILATION AND CURETTAGE OF UTERUS      x2    ENDOSCOPY, COLON, DIAGNOSTIC      EPIDURAL STEROID INJECTION Left 5/28/2019    LUMBAR EPIDURAL STEROID INJECTION L5-S1 (LEFT PARAMEDIAN APPROACH) UNDER FLUOROSCOPIC GUIDANCE) performed by Alexandra Shirley MD at [facility] 96 ARTHROSCOPY Left 5 27 16    medial menisectomy, debridement, acl synovectomy, chondroplasty    NERVE BLOCK Left 05/28/2019    lumbar    NERVE BLOCK Bilateral 07/09/2019    NERVE BLOCK Left 12/08/2020    Cervical epidural steroid injection under fouroscopic guidance c7-t1 left paramedian    PAIN MANAGEMENT PROCEDURE Left 12/8/2020    CERVICAL EPIDURAL STEROID INJECTION UNDER FLUOROSCOPIC GUIDANCE AT C7-T1 LEFT PARAMEDIAN (CPT 21263) performed by Alexandra Shirley MD at [address]         Social History:    Social History     Tobacco Use    Smoking status: Never Smoker    Smokeless tobacco: Never Used   Substance Use Topics    Alcohol use: Yes     Comment: wine occ                                Counseling given: Not Answered      Vital Signs (Current):   Vitals:    02/15/21 1045   Weight: 175 lb (79.4 kg)   Height: 5' (1.524 m)                                              BP Readings from Last 3 Encounters:   02/11/21 124/78   01/08/21 112/68   12/08/20 127/82       NPO Status:                                                                                 BMI:   Wt Readings from Last 3 Encounters:   02/15/21 175 lb (79.4 kg)   02/11/21 178 lb (80.7 kg)   01/08/21 178 lb (80.7 kg)     Body mass index is 34.18 kg/m². CBC:   Lab Results   Component Value Date    WBC 4.9 11/02/2018    RBC 4.49 11/02/2018    HGB 10.8 11/02/2018    HCT 34.7 11/02/2018    MCV 77.3 11/02/2018    RDW 14.1 11/02/2018     11/02/2018       CMP:   Lab Results   Component Value Date     11/02/2018    K 3.9 11/02/2018     11/02/2018    CO2 25 11/02/2018    BUN 9 11/02/2018    CREATININE 1.0 11/02/2018    GFRAA >60 11/02/2018    LABGLOM >60 11/02/2018    GLUCOSE 92 11/02/2018    GLUCOSE 97 08/30/2011    PROT 6.7 11/02/2018    CALCIUM 8.3 11/02/2018    BILITOT <0.2 11/02/2018    ALKPHOS 75 11/02/2018    AST 19 11/02/2018    ALT 23 11/02/2018       POC Tests: No results for input(s): POCGLU, POCNA, POCK, POCCL, POCBUN, POCHEMO, POCHCT in the last 72 hours.     Coags: No results found for: PROTIME, INR, APTT    HCG (If Applicable): No results found for: PREGTESTUR, PREGSERUM, HCG, HCGQUANT     ABGs: No results found for: PHART, PO2ART, BYV8BQL, JJT7XOM, BEART, O0QGYDAG     Type & Screen (If Applicable):  No results found for: LABABO, LABRH    Drug/Infectious Status (If Applicable):  No results found for: HIV, HEPCAB    COVID-19 Screening (If Applicable):   Lab Results   Component Value Date    COVID19 Not Detected 02/11/2021         Anesthesia Evaluation  Patient summary reviewed  Airway: Mallampati: III  TM distance: >3 FB   Neck ROM: full  Mouth opening: > = 3 FB Dental:      Comment: Dentition intact, a couple of lower molars are missing, patient denies any loose teeth. Pulmonary:Negative Pulmonary ROS and normal exam  breath sounds clear to auscultation                             Cardiovascular:Negative CV ROS  Exercise tolerance: good (>4 METS),           Rhythm: regular  Rate: normal                    Neuro/Psych:   (+) neuromuscular disease (Chronic lumbar back pain):,              ROS comment: Cervical and lumbar spine disk herniations GI/Hepatic/Renal:   (+) hiatal hernia, GERD: poorly controlled,           Endo/Other:    (+) : arthritis (DDD affecting cervical and lumbar spine, lumbosacral spondylosis ):., .                  ROS comment: Lupus, Sjogren's disease  Abdominal:   (+) obese,         Vascular: negative vascular ROS. Anesthesia Plan      general     ASA 2       Induction: intravenous. Anesthetic plan and risks discussed with patient. Plan discussed with CRNA.                 Iban Zuniga MD   2/16/2021

## 2021-02-16 NOTE — OP NOTE
blunt  dissection. We were able to expose the entire esophagus and stomach that was  into this hiatal hernia and reduce it into the abdomen. We bluntly dissected  around the entire esophagus, mobilizing that down into the abdomen to have  approximately 3 cm of intraabdominal esophagus. At this time, we repaired the crura in a posterior fashion with a running,  nonabsorbable V-Loc suture. Once this was completed, a Bio A mesh was cut to keyhole around the esophagus and was placed posteriorly and sewn in place with v lock vicryl sutures. A falciform myofascial flap was then placed by taking the falciform down from abdominal wall and was placed posteriorly around to buttress our repair and protect the esophagus from the mesh. This was to support our repair as well as to  anchor the stomach and esophagus intra-abdominally. This was also done with  V naheed sutures. Once this was completed, an EGD scope was done to assure that  this was proper. The repair did not appear to be too tight, and the  endoscope passed easily into the stomach. It was retroflexed at that point  and the hiatus was intact without appearing to be too tight. The endoscope  was then removed. . Pneumoperitoneum was  evacuated. All lap and suture counts were correct, and the trocar sites were  closed with 4-0 Monocryl sutures in a subcuticular fashion, and then  Dermabond was placed. The patient tolerated the procedure well and was taken to PACU.

## 2021-02-16 NOTE — TELEPHONE ENCOUNTER
Prior Authorization Form:      DEMOGRAPHICS:                     Patient Name:  Emily Hay  Patient :  1975            Insurance:  Payor: Alex Inoue / Plan: Marlan Inoue - [place] PPO / Product Type: *No Product type* /   Insurance ID Number:    Payor/Plan Subscr  Sex Relation Sub. Ins. ID Effective Group Num   1.  [address] 1975 Male Spouse [id number]                                    [id number]         DIAGNOSIS & PROCEDURE:                       Procedure/Operation: lap hiatal hernia repair w/ mesh           CPT Code: 32353    Diagnosis:  Hiatal hernia    ICD10 Code: K44.9    Location:  [address] [place] [address]    Surgeon:  Darcy Mojica INFORMATION:                          Date: 2021    Time:               Anesthesia:                                                         Status:  Outpatient        Special Comments:         Electronically signed by Bree Munoz MA on 2021 at 8:35 AM

## 2021-03-01 ENCOUNTER — TELEPHONE (OUTPATIENT)
Dept: SURGERY | Age: 46
End: 2021-03-01

## 2021-03-01 ENCOUNTER — OFFICE VISIT (OUTPATIENT)
Dept: SURGERY | Age: 46
End: 2021-03-01

## 2021-03-01 VITALS
HEIGHT: 60 IN | TEMPERATURE: 97.1 F | BODY MASS INDEX: 34.36 KG/M2 | SYSTOLIC BLOOD PRESSURE: 112 MMHG | DIASTOLIC BLOOD PRESSURE: 72 MMHG | WEIGHT: 175 LBS | HEART RATE: 62 BPM

## 2021-03-01 DIAGNOSIS — K44.9 HIATAL HERNIA: Primary | ICD-10-CM

## 2021-03-01 PROCEDURE — 99024 POSTOP FOLLOW-UP VISIT: CPT | Performed by: SURGERY

## 2021-03-01 NOTE — TELEPHONE ENCOUNTER
Patient provided with surgery instructions during office visit. Patient scheduled for follow up visit with Dr. Leena Cornelius on 04/29/2021. Surgery scheduling form faxed and confirmation received.     Electronically signed by Nhung Wong MA on 3/1/2021 at 2:30 PM

## 2021-03-01 NOTE — TELEPHONE ENCOUNTER
Prior Authorization Form:      DEMOGRAPHICS:                     Patient Name:  Monica Delgado  Patient :  1975            Insurance:  Payor: Dimple Gonzalez 150 / Plan: Dimple Gonzalez 150 - [place] PPO / Product Type: *No Product type* /   Insurance ID Number:    Payor/Plan Subscr  Sex Relation Sub. Ins. ID Effective Group Num   1.  [address] 1975 Male Spouse [id number]                                    [id number]         DIAGNOSIS & PROCEDURE:                       Procedure/Operation: lap melly           CPT Code: 25474    Diagnosis:  Biliary colic    RN1 Code: P15.63    Location:  [facility]    Surgeon:  Neville Brunson INFORMATION:                          Date: 2021    Time:               Anesthesia:                                                         Status:  Outpatient        Special Comments:           Electronically signed by Gabi Mccoy MA on 3/1/2021 at 2:29 PM

## 2021-03-01 NOTE — PROGRESS NOTES
Surgery Progress Note            Chief complaint:   Patient Active Problem List   Diagnosis    Intercostal neuralgia    Sensory peripheral neuropathy    Cold feet    Insomnia    Lupus (HCC)    Tear, knee, medial meniscus    ACL tear    Post-traumatic osteoarthritis of left knee    Synovitis    Lumbar radiculopathy    DDD (degenerative disc disease), lumbar    Lumbosacral spondylosis without myelopathy    Sacroiliac dysfunction    DDD (degenerative disc disease), cervical    Cervical radiculopathy    Cervical spondylolysis    Hiatal hernia       S: doing well    O:   Vitals:    03/01/21 1334   BP: 112/72   Pulse: 62   Temp: 97.1 °F (36.2 °C)     No intake or output data in the 24 hours ending 03/01/21 1420        Labs:  No results for input(s): WBC, HGB, HCT in the last 72 hours. Invalid input(s): PLR  Lab Results   Component Value Date    CREATININE 1.0 11/02/2018    BUN 9 11/02/2018     11/02/2018    K 3.9 11/02/2018     11/02/2018    CO2 25 11/02/2018     No results for input(s): LIPASE, AMYLASE in the last 72 hours. Physical exam:   /72   Pulse 62   Temp 97.1 °F (36.2 °C) (Temporal)   Ht 5' (1.524 m)   Wt 175 lb (79.4 kg)   BMI 34.18 kg/m²   General appearance: NAD  Head: NCAT  Neck: supple, no masses  Lungs: equal chest rise bilateral  Heart: S1S2 present  Abdomen: soft, nontender, nondistended  Skin; no new lesions, incisions clean and intact  Gu: no cva tenderness  Extremities: extremities normal, atraumatic, no cyanosis or edema    A:  Post op lap HHR with mesh    P: follow up as needed.      Abel Green MD  3/1/2021

## 2021-03-14 ENCOUNTER — PREP FOR PROCEDURE (OUTPATIENT)
Dept: SURGERY | Age: 46
End: 2021-03-14

## 2021-03-14 RX ORDER — SODIUM CHLORIDE, SODIUM LACTATE, POTASSIUM CHLORIDE, CALCIUM CHLORIDE 600; 310; 30; 20 MG/100ML; MG/100ML; MG/100ML; MG/100ML
INJECTION, SOLUTION INTRAVENOUS CONTINUOUS
Status: CANCELLED | OUTPATIENT
Start: 2021-03-14

## 2021-03-14 RX ORDER — SODIUM CHLORIDE 0.9 % (FLUSH) 0.9 %
10 SYRINGE (ML) INJECTION PRN
Status: CANCELLED | OUTPATIENT
Start: 2021-03-14

## 2021-03-14 RX ORDER — SODIUM CHLORIDE 0.9 % (FLUSH) 0.9 %
10 SYRINGE (ML) INJECTION EVERY 12 HOURS SCHEDULED
Status: CANCELLED | OUTPATIENT
Start: 2021-03-14

## 2021-04-12 ENCOUNTER — HOSPITAL ENCOUNTER (OUTPATIENT)
Age: 46
Discharge: HOME | End: 2021-04-14
Payer: COMMERCIAL

## 2021-04-12 DIAGNOSIS — Z01.818 PREOP TESTING: ICD-10-CM

## 2021-04-12 PROCEDURE — U0005 INFEC AGEN DETEC AMPLI PROBE: HCPCS

## 2021-04-12 PROCEDURE — U0003 INFECTIOUS AGENT DETECTION BY NUCLEIC ACID (DNA OR RNA); SEVERE ACUTE RESPIRATORY SYNDROME CORONAVIRUS 2 (SARS-COV-2) (CORONAVIRUS DISEASE [COVID-19]), AMPLIFIED PROBE TECHNIQUE, MAKING USE OF HIGH THROUGHPUT TECHNOLOGIES AS DESCRIBED BY CMS-2020-01-R: HCPCS

## 2021-04-15 RX ORDER — MULTIVIT-MIN/IRON/FOLIC ACID/K 18-600-40
CAPSULE ORAL DAILY
COMMUNITY

## 2021-04-15 NOTE — PROGRESS NOTES
[facility]                                                                                                                    PRE OP INSTRUCTIONS FOR  Gail Cook        Date: 4/15/2021    Date of surgery: 4/16/2021  Arrival Time: Hospital will call you between 5pm and 7pm with your final arrival time for surgery    1. Do not eat or drink anything after 12 prior to surgery. This includes no water, chewing gum, mints or ice chips. 2. Take the following medications with a small sip of water on the morning of Surgery: none     3. Diabetics may take evening dose of insulin but none after midnight. If you feel symptomatic or low blood sugar morning of surgery drink 1-2 ounces of apple juice only. 4. Aspirin, Ibuprofen, Advil, Naproxen, Vitamin E and other Anti-inflammatory products should be stopped  before surgery  as directed by your physician. Take Tylenol only unless instructed otherwise by your surgeon. 5. Check with your Doctor regarding stopping Plavix, Coumadin, Lovenox, Eliquis, Effient, or other blood thinners. 6. Do not smoke,use illicit drugs and do not drink any alcoholic beverages 24 hours prior to surgery. 7. You may brush your teeth the morning of surgery. DO NOT SWALLOW WATER    8. You MUST make arrangements for a responsible adult to take you home after your surgery. You will not be allowed to leave alone or drive yourself home. It is strongly suggested someone stay with you the first 24 hrs. Your surgery will be cancelled if you do not have a ride home. 9. PEDIATRIC PATIENTS ONLY:  A parent/legal guardian must accompany a child scheduled for surgery and plan to stay at the hospital until the child is discharged. Please do not bring other children with you.     10. Please wear simple, loose fitting clothing to the hospital.  Angelashe Mirza not bring valuables (money, credit cards, checkbooks, etc.) Do not wear any makeup (including no eye makeup) or nail polish on your fingers or toes. 11. DO NOT wear any jewelry or piercings on day of surgery. All body piercing jewelry must be removed. 12. Shower the night before surgery with _x__Antibacterial soap /COLT WIPES________    13. TOTAL JOINT REPLACEMENT/HYSTERECTOMY PATIENTS ONLY---Remember to bring Blood Bank bracelet to the hospital on the day of surgery. 14. If you have a Living Will and Durable Power of  for Healthcare, please bring in a copy. 15. If appropriate bring crutches, inspirex, WALKER, CANE etc... 12. Notify your Surgeon if you develop any illness between now and surgery time, cough, cold, fever, sore throat, nausea, vomiting, etc.  Please notify your surgeon if you experience dizziness, shortness of breath or blurred vision between now & the time of your surgery. 17. If you have ___dentures, they will be removed before going to the OR; we will provide you a container. If you wear ___contact lenses or ___glasses, they will be removed; please bring a case for them. 18. To provide excellent care visitors will be limited to 2 in the room at any given time. 19. Please bring picture ID and insurance card. 20. Sleep apnea patients need to bring CPAP AND SETTINGS to hospital on day of surgery. 21. During flu season no children under the age of 15 are permitted in the hospital for the safety of all patients. 22. Other                Please call AMBULATORY CARE if you have any further questions.    [address]     [address]

## 2021-04-16 ENCOUNTER — ANESTHESIA EVENT (OUTPATIENT)
Dept: OPERATING ROOM | Age: 46
End: 2021-04-16
Payer: COMMERCIAL

## 2021-04-16 ENCOUNTER — HOSPITAL ENCOUNTER (OUTPATIENT)
Age: 46
Setting detail: OUTPATIENT SURGERY
Discharge: HOME | End: 2021-04-16
Attending: SURGERY | Admitting: SURGERY
Payer: COMMERCIAL

## 2021-04-16 ENCOUNTER — ANESTHESIA (OUTPATIENT)
Dept: OPERATING ROOM | Age: 46
End: 2021-04-16
Payer: COMMERCIAL

## 2021-04-16 VITALS
WEIGHT: 167 LBS | OXYGEN SATURATION: 94 % | DIASTOLIC BLOOD PRESSURE: 76 MMHG | RESPIRATION RATE: 16 BRPM | BODY MASS INDEX: 32.79 KG/M2 | TEMPERATURE: 96.7 F | SYSTOLIC BLOOD PRESSURE: 121 MMHG | HEIGHT: 60 IN | HEART RATE: 63 BPM

## 2021-04-16 VITALS
RESPIRATION RATE: 14 BRPM | SYSTOLIC BLOOD PRESSURE: 142 MMHG | DIASTOLIC BLOOD PRESSURE: 88 MMHG | OXYGEN SATURATION: 99 %

## 2021-04-16 DIAGNOSIS — Z01.818 PREOP TESTING: Primary | ICD-10-CM

## 2021-04-16 LAB
HCG(URINE) PREGNANCY TEST: NEGATIVE
SARS-COV-2: NOT DETECTED
SOURCE: NORMAL

## 2021-04-16 PROCEDURE — 3600000014 HC SURGERY LEVEL 4 ADDTL 15MIN: Performed by: SURGERY

## 2021-04-16 PROCEDURE — 6360000002 HC RX W HCPCS

## 2021-04-16 PROCEDURE — 3700000000 HC ANESTHESIA ATTENDED CARE: Performed by: SURGERY

## 2021-04-16 PROCEDURE — 6360000002 HC RX W HCPCS: Performed by: ANESTHESIOLOGY

## 2021-04-16 PROCEDURE — 2580000003 HC RX 258: Performed by: SURGERY

## 2021-04-16 PROCEDURE — 7100000011 HC PHASE II RECOVERY - ADDTL 15 MIN: Performed by: SURGERY

## 2021-04-16 PROCEDURE — 3600000004 HC SURGERY LEVEL 4 BASE: Performed by: SURGERY

## 2021-04-16 PROCEDURE — 47562 LAPAROSCOPIC CHOLECYSTECTOMY: CPT | Performed by: SURGERY

## 2021-04-16 PROCEDURE — 7100000010 HC PHASE II RECOVERY - FIRST 15 MIN: Performed by: SURGERY

## 2021-04-16 PROCEDURE — 6360000002 HC RX W HCPCS: Performed by: SURGERY

## 2021-04-16 PROCEDURE — 2500000003 HC RX 250 WO HCPCS: Performed by: SURGERY

## 2021-04-16 PROCEDURE — 7100000000 HC PACU RECOVERY - FIRST 15 MIN: Performed by: SURGERY

## 2021-04-16 PROCEDURE — 6360000002 HC RX W HCPCS: Performed by: NURSE ANESTHETIST, CERTIFIED REGISTERED

## 2021-04-16 PROCEDURE — 7100000001 HC PACU RECOVERY - ADDTL 15 MIN: Performed by: SURGERY

## 2021-04-16 PROCEDURE — 81025 URINE PREGNANCY TEST: CPT

## 2021-04-16 PROCEDURE — 99024 POSTOP FOLLOW-UP VISIT: CPT | Performed by: SURGERY

## 2021-04-16 PROCEDURE — 2709999900 HC NON-CHARGEABLE SUPPLY: Performed by: SURGERY

## 2021-04-16 PROCEDURE — 2500000003 HC RX 250 WO HCPCS: Performed by: NURSE ANESTHETIST, CERTIFIED REGISTERED

## 2021-04-16 PROCEDURE — 88304 TISSUE EXAM BY PATHOLOGIST: CPT

## 2021-04-16 PROCEDURE — 2720000010 HC SURG SUPPLY STERILE: Performed by: SURGERY

## 2021-04-16 PROCEDURE — 3700000001 HC ADD 15 MINUTES (ANESTHESIA): Performed by: SURGERY

## 2021-04-16 RX ORDER — SODIUM CHLORIDE 0.9 % (FLUSH) 0.9 %
10 SYRINGE (ML) INJECTION EVERY 12 HOURS SCHEDULED
Status: DISCONTINUED | OUTPATIENT
Start: 2021-04-16 | End: 2021-04-16 | Stop reason: HOSPADM

## 2021-04-16 RX ORDER — CEFAZOLIN SODIUM 2 G/50ML
2000 SOLUTION INTRAVENOUS
Status: COMPLETED | OUTPATIENT
Start: 2021-04-16 | End: 2021-04-16

## 2021-04-16 RX ORDER — ROCURONIUM BROMIDE 10 MG/ML
INJECTION, SOLUTION INTRAVENOUS PRN
Status: DISCONTINUED | OUTPATIENT
Start: 2021-04-16 | End: 2021-04-16 | Stop reason: SDUPTHER

## 2021-04-16 RX ORDER — HYDROCODONE BITARTRATE AND ACETAMINOPHEN 5; 325 MG/1; MG/1
1 TABLET ORAL EVERY 6 HOURS PRN
Qty: 10 TABLET | Refills: 0 | Status: SHIPPED | OUTPATIENT
Start: 2021-04-16 | End: 2021-04-19

## 2021-04-16 RX ORDER — PROCHLORPERAZINE EDISYLATE 5 MG/ML
5 INJECTION INTRAMUSCULAR; INTRAVENOUS
Status: COMPLETED | OUTPATIENT
Start: 2021-04-16 | End: 2021-04-16

## 2021-04-16 RX ORDER — ONDANSETRON 2 MG/ML
INJECTION INTRAMUSCULAR; INTRAVENOUS PRN
Status: DISCONTINUED | OUTPATIENT
Start: 2021-04-16 | End: 2021-04-16 | Stop reason: SDUPTHER

## 2021-04-16 RX ORDER — MIDAZOLAM HYDROCHLORIDE 1 MG/ML
INJECTION INTRAMUSCULAR; INTRAVENOUS PRN
Status: DISCONTINUED | OUTPATIENT
Start: 2021-04-16 | End: 2021-04-16 | Stop reason: SDUPTHER

## 2021-04-16 RX ORDER — PROMETHAZINE HYDROCHLORIDE 25 MG/ML
6.25 INJECTION, SOLUTION INTRAMUSCULAR; INTRAVENOUS
Status: DISCONTINUED | OUTPATIENT
Start: 2021-04-16 | End: 2021-04-16 | Stop reason: HOSPADM

## 2021-04-16 RX ORDER — PROCHLORPERAZINE EDISYLATE 5 MG/ML
INJECTION INTRAMUSCULAR; INTRAVENOUS
Status: COMPLETED
Start: 2021-04-16 | End: 2021-04-16

## 2021-04-16 RX ORDER — MEPERIDINE HYDROCHLORIDE 25 MG/ML
12.5 INJECTION INTRAMUSCULAR; INTRAVENOUS; SUBCUTANEOUS EVERY 5 MIN PRN
Status: DISCONTINUED | OUTPATIENT
Start: 2021-04-16 | End: 2021-04-16 | Stop reason: HOSPADM

## 2021-04-16 RX ORDER — MEPERIDINE HYDROCHLORIDE 25 MG/ML
INJECTION INTRAMUSCULAR; INTRAVENOUS; SUBCUTANEOUS
Status: COMPLETED
Start: 2021-04-16 | End: 2021-04-16

## 2021-04-16 RX ORDER — SODIUM CHLORIDE, SODIUM LACTATE, POTASSIUM CHLORIDE, CALCIUM CHLORIDE 600; 310; 30; 20 MG/100ML; MG/100ML; MG/100ML; MG/100ML
INJECTION, SOLUTION INTRAVENOUS CONTINUOUS
Status: DISCONTINUED | OUTPATIENT
Start: 2021-04-16 | End: 2021-04-16 | Stop reason: HOSPADM

## 2021-04-16 RX ORDER — DEXAMETHASONE SODIUM PHOSPHATE 10 MG/ML
INJECTION, SOLUTION INTRAMUSCULAR; INTRAVENOUS PRN
Status: DISCONTINUED | OUTPATIENT
Start: 2021-04-16 | End: 2021-04-16 | Stop reason: SDUPTHER

## 2021-04-16 RX ORDER — GLYCOPYRROLATE 1 MG/5 ML
SYRINGE (ML) INTRAVENOUS PRN
Status: DISCONTINUED | OUTPATIENT
Start: 2021-04-16 | End: 2021-04-16 | Stop reason: SDUPTHER

## 2021-04-16 RX ORDER — SODIUM CHLORIDE 0.9 % (FLUSH) 0.9 %
10 SYRINGE (ML) INJECTION PRN
Status: DISCONTINUED | OUTPATIENT
Start: 2021-04-16 | End: 2021-04-16 | Stop reason: HOSPADM

## 2021-04-16 RX ORDER — NEOSTIGMINE METHYLSULFATE 1 MG/ML
INJECTION, SOLUTION INTRAVENOUS PRN
Status: DISCONTINUED | OUTPATIENT
Start: 2021-04-16 | End: 2021-04-16 | Stop reason: SDUPTHER

## 2021-04-16 RX ORDER — KETOROLAC TROMETHAMINE 30 MG/ML
30 INJECTION, SOLUTION INTRAMUSCULAR; INTRAVENOUS ONCE
Status: COMPLETED | OUTPATIENT
Start: 2021-04-16 | End: 2021-04-16

## 2021-04-16 RX ORDER — FENTANYL CITRATE 50 UG/ML
INJECTION, SOLUTION INTRAMUSCULAR; INTRAVENOUS PRN
Status: DISCONTINUED | OUTPATIENT
Start: 2021-04-16 | End: 2021-04-16 | Stop reason: SDUPTHER

## 2021-04-16 RX ORDER — LABETALOL HYDROCHLORIDE 5 MG/ML
5 INJECTION, SOLUTION INTRAVENOUS EVERY 10 MIN PRN
Status: DISCONTINUED | OUTPATIENT
Start: 2021-04-16 | End: 2021-04-16 | Stop reason: HOSPADM

## 2021-04-16 RX ORDER — BUPIVACAINE HYDROCHLORIDE AND EPINEPHRINE 2.5; 5 MG/ML; UG/ML
INJECTION, SOLUTION EPIDURAL; INFILTRATION; INTRACAUDAL; PERINEURAL PRN
Status: DISCONTINUED | OUTPATIENT
Start: 2021-04-16 | End: 2021-04-16 | Stop reason: ALTCHOICE

## 2021-04-16 RX ORDER — HYDROCODONE BITARTRATE AND ACETAMINOPHEN 5; 325 MG/1; MG/1
2 TABLET ORAL PRN
Status: DISCONTINUED | OUTPATIENT
Start: 2021-04-16 | End: 2021-04-16 | Stop reason: HOSPADM

## 2021-04-16 RX ORDER — LIDOCAINE HYDROCHLORIDE 20 MG/ML
INJECTION, SOLUTION INTRAVENOUS PRN
Status: DISCONTINUED | OUTPATIENT
Start: 2021-04-16 | End: 2021-04-16 | Stop reason: SDUPTHER

## 2021-04-16 RX ORDER — PROPOFOL 10 MG/ML
INJECTION, EMULSION INTRAVENOUS PRN
Status: DISCONTINUED | OUTPATIENT
Start: 2021-04-16 | End: 2021-04-16 | Stop reason: SDUPTHER

## 2021-04-16 RX ORDER — DIPHENHYDRAMINE HYDROCHLORIDE 50 MG/ML
12.5 INJECTION INTRAMUSCULAR; INTRAVENOUS
Status: DISCONTINUED | OUTPATIENT
Start: 2021-04-16 | End: 2021-04-16 | Stop reason: HOSPADM

## 2021-04-16 RX ORDER — KETOROLAC TROMETHAMINE 30 MG/ML
INJECTION, SOLUTION INTRAMUSCULAR; INTRAVENOUS
Status: COMPLETED
Start: 2021-04-16 | End: 2021-04-16

## 2021-04-16 RX ORDER — HYDROCODONE BITARTRATE AND ACETAMINOPHEN 5; 325 MG/1; MG/1
1 TABLET ORAL PRN
Status: DISCONTINUED | OUTPATIENT
Start: 2021-04-16 | End: 2021-04-16 | Stop reason: HOSPADM

## 2021-04-16 RX ORDER — IBUPROFEN 800 MG/1
800 TABLET ORAL EVERY 6 HOURS PRN
Qty: 20 TABLET | Refills: 0 | Status: SHIPPED | OUTPATIENT
Start: 2021-04-16 | End: 2021-07-28 | Stop reason: ALTCHOICE

## 2021-04-16 RX ADMIN — PROPOFOL 160 MG: 10 INJECTION, EMULSION INTRAVENOUS at 07:35

## 2021-04-16 RX ADMIN — SODIUM CHLORIDE, POTASSIUM CHLORIDE, SODIUM LACTATE AND CALCIUM CHLORIDE: 600; 310; 30; 20 INJECTION, SOLUTION INTRAVENOUS at 07:25

## 2021-04-16 RX ADMIN — KETOROLAC TROMETHAMINE 30 MG: 30 INJECTION, SOLUTION INTRAMUSCULAR; INTRAVENOUS at 08:43

## 2021-04-16 RX ADMIN — MIDAZOLAM 2 MG: 1 INJECTION INTRAMUSCULAR; INTRAVENOUS at 07:29

## 2021-04-16 RX ADMIN — HYDROMORPHONE HYDROCHLORIDE 0.25 MG: 1 INJECTION, SOLUTION INTRAMUSCULAR; INTRAVENOUS; SUBCUTANEOUS at 08:38

## 2021-04-16 RX ADMIN — MEPERIDINE HYDROCHLORIDE 12.5 MG: 25 INJECTION INTRAMUSCULAR; INTRAVENOUS; SUBCUTANEOUS at 08:55

## 2021-04-16 RX ADMIN — Medication 3 MG: at 07:54

## 2021-04-16 RX ADMIN — KETOROLAC TROMETHAMINE 30 MG: 30 INJECTION, SOLUTION INTRAMUSCULAR at 08:43

## 2021-04-16 RX ADMIN — CEFAZOLIN SODIUM 2000 MG: 2 SOLUTION INTRAVENOUS at 07:33

## 2021-04-16 RX ADMIN — SODIUM CHLORIDE, POTASSIUM CHLORIDE, SODIUM LACTATE AND CALCIUM CHLORIDE: 600; 310; 30; 20 INJECTION, SOLUTION INTRAVENOUS at 06:11

## 2021-04-16 RX ADMIN — HYDROMORPHONE HYDROCHLORIDE 0.25 MG: 1 INJECTION, SOLUTION INTRAMUSCULAR; INTRAVENOUS; SUBCUTANEOUS at 08:33

## 2021-04-16 RX ADMIN — DEXAMETHASONE SODIUM PHOSPHATE 10 MG: 10 INJECTION, SOLUTION INTRAMUSCULAR; INTRAVENOUS at 07:40

## 2021-04-16 RX ADMIN — MEPERIDINE HYDROCHLORIDE 12.5 MG: 25 INJECTION INTRAMUSCULAR; INTRAVENOUS; SUBCUTANEOUS at 09:12

## 2021-04-16 RX ADMIN — FENTANYL CITRATE 25 MCG: 50 INJECTION, SOLUTION INTRAMUSCULAR; INTRAVENOUS at 08:06

## 2021-04-16 RX ADMIN — LIDOCAINE HYDROCHLORIDE 60 MG: 20 INJECTION, SOLUTION INTRAVENOUS at 07:35

## 2021-04-16 RX ADMIN — FENTANYL CITRATE 100 MCG: 50 INJECTION, SOLUTION INTRAMUSCULAR; INTRAVENOUS at 07:35

## 2021-04-16 RX ADMIN — PROCHLORPERAZINE EDISYLATE 5 MG: 5 INJECTION INTRAMUSCULAR; INTRAVENOUS at 09:08

## 2021-04-16 RX ADMIN — ONDANSETRON 4 MG: 2 INJECTION INTRAMUSCULAR; INTRAVENOUS at 07:57

## 2021-04-16 RX ADMIN — FENTANYL CITRATE 50 MCG: 50 INJECTION, SOLUTION INTRAMUSCULAR; INTRAVENOUS at 07:39

## 2021-04-16 RX ADMIN — ROCURONIUM BROMIDE 30 MG: 10 SOLUTION INTRAVENOUS at 07:35

## 2021-04-16 RX ADMIN — FENTANYL CITRATE 50 MCG: 50 INJECTION, SOLUTION INTRAMUSCULAR; INTRAVENOUS at 07:44

## 2021-04-16 RX ADMIN — Medication 0.6 MG: at 07:54

## 2021-04-16 RX ADMIN — FENTANYL CITRATE 25 MCG: 50 INJECTION, SOLUTION INTRAMUSCULAR; INTRAVENOUS at 08:03

## 2021-04-16 ASSESSMENT — PULMONARY FUNCTION TESTS
PIF_VALUE: 30
PIF_VALUE: 4
PIF_VALUE: 2
PIF_VALUE: 16
PIF_VALUE: 26
PIF_VALUE: 10
PIF_VALUE: 19
PIF_VALUE: 30
PIF_VALUE: 30
PIF_VALUE: 29
PIF_VALUE: 18
PIF_VALUE: 10

## 2021-04-16 ASSESSMENT — PAIN SCALES - GENERAL
PAINLEVEL_OUTOF10: 2
PAINLEVEL_OUTOF10: 0

## 2021-04-16 ASSESSMENT — PAIN - FUNCTIONAL ASSESSMENT: PAIN_FUNCTIONAL_ASSESSMENT: PREVENTS OR INTERFERES SOME ACTIVE ACTIVITIES AND ADLS

## 2021-04-16 ASSESSMENT — PAIN DESCRIPTION - ONSET: ONSET: ON-GOING

## 2021-04-16 ASSESSMENT — PAIN DESCRIPTION - PROGRESSION
CLINICAL_PROGRESSION: GRADUALLY WORSENING
CLINICAL_PROGRESSION: GRADUALLY IMPROVING
CLINICAL_PROGRESSION: GRADUALLY IMPROVING

## 2021-04-16 ASSESSMENT — PAIN DESCRIPTION - FREQUENCY
FREQUENCY: CONTINUOUS
FREQUENCY: CONTINUOUS

## 2021-04-16 ASSESSMENT — PAIN DESCRIPTION - DESCRIPTORS
DESCRIPTORS: PRESSURE
DESCRIPTORS: PRESSURE

## 2021-04-16 ASSESSMENT — PAIN DESCRIPTION - PAIN TYPE: TYPE: SURGICAL PAIN

## 2021-04-16 ASSESSMENT — LIFESTYLE VARIABLES: SMOKING_STATUS: 0

## 2021-04-16 NOTE — ANESTHESIA PRE PROCEDURE
PRN Beau Vargas MD        Or    HYDROcodone-acetaminophen [facility]) 5-325 MG per tablet 2 tablet  2 tablet Oral PRN Beau Vargas MD        diphenhydrAMINE (BENADRYL) injection 12.5 mg  12.5 mg Intravenous Once PRN Beau Vargas MD        prochlorperazine (COMPAZINE) injection 5 mg  5 mg Intravenous Once PRN Beau Vargas MD        promethazine [facility]) injection 6.25 mg  6.25 mg Intramuscular Once PRN Beau Vargas MD        labetalol (NORMODYNE;TRANDATE) injection 5 mg  5 mg Intravenous Q10 Min PRN Beau Vargas MD        meperidine (DEMEROL) injection 12.5 mg  12.5 mg Intravenous Q5 Min PRN Beau Vargas MD           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    Sensory peripheral neuropathy G60.8    Cold feet R20.9    Insomnia G47.00    Lupus ([address] M32.9    Tear, knee, medial meniscus S83.249A    ACL tear S83.519A    Post-traumatic osteoarthritis of left knee M17.32    Synovitis M65.9    Lumbar radiculopathy M54.16    DDD (degenerative disc disease), lumbar M51.36    Lumbosacral spondylosis without myelopathy M47.817    Sacroiliac dysfunction M53.3    DDD (degenerative disc disease), cervical M50.30    Cervical radiculopathy M54.12    Cervical spondylolysis M43.02    Hiatal hernia K44.9       Past Medical History:        Diagnosis Date    Flank Pain     right    H/O rheumatoid arthritis     Hx of cardiovascular stress test 2/2013    treadmill nuclear stress    Joint pain     Low back pain     Lupus (HCC)     Neck pain     RA (rheumatoid arthritis) ([address]     RH    Radiculopathy     Sjogren's disease ([address]        Past Surgical History:        Procedure Laterality Date    ANESTHESIA NERVE BLOCK Bilateral 7/9/2019    BILATERAL LUMBAR TRANSFORAMINAL EPIDURAL STERIOD HGB 10.8 11/02/2018    HCT 34.7 11/02/2018    MCV 77.3 11/02/2018    RDW 14.1 11/02/2018     11/02/2018       CMP:   Lab Results   Component Value Date     11/02/2018    K 3.9 11/02/2018     11/02/2018    CO2 25 11/02/2018    BUN 9 11/02/2018    CREATININE 1.0 11/02/2018    GFRAA >60 11/02/2018    LABGLOM >60 11/02/2018    GLUCOSE 92 11/02/2018    GLUCOSE 97 08/30/2011    PROT 6.7 11/02/2018    CALCIUM 8.3 11/02/2018    BILITOT <0.2 11/02/2018    ALKPHOS 75 11/02/2018    AST 19 11/02/2018    ALT 23 11/02/2018       POC Tests: No results for input(s): POCGLU, POCNA, POCK, POCCL, POCBUN, POCHEMO, POCHCT in the last 72 hours. Coags: No results found for: PROTIME, INR, APTT    HCG (If Applicable):   Lab Results   Component Value Date    PREGTESTUR NEGATIVE 04/16/2021        ABGs: No results found for: PHART, PO2ART, YBW1OAL, CQY3SGU, BEART, U0BDZUVK     Type & Screen (If Applicable):  No results found for: LABABO, LABRH    Drug/Infectious Status (If Applicable):  No results found for: HIV, HEPCAB    COVID-19 Screening (If Applicable):   Lab Results   Component Value Date    COVID19 Not Detected 04/12/2021         Anesthesia Evaluation  Patient summary reviewed  Airway: Mallampati: III  TM distance: >3 FB   Neck ROM: full  Mouth opening: > = 3 FB Dental:      Comment: Dentition intact, a couple of lower molars are missing, patient denies any loose teeth.     Pulmonary:Negative Pulmonary ROS and normal exam  breath sounds clear to auscultation      (-) not a current smoker                           Cardiovascular:Negative CV ROS  Exercise tolerance: good (>4 METS),           Rhythm: regular  Rate: normal                    Neuro/Psych:   (+) neuromuscular disease (Chronic lumbar back pain):,              ROS comment: Cervical and lumbar spine disk herniations GI/Hepatic/Renal:   (+) hiatal hernia, GERD: poorly controlled,           Endo/Other:    (+) : arthritis (DDD affecting cervical and lumbar spine, lumbosacral spondylosis ):., .                  ROS comment: Lupus, Sjogren's disease  Abdominal:   (+) obese,         Vascular: negative vascular ROS. Anesthesia Plan      general     ASA 2       Induction: intravenous. Anesthetic plan and risks discussed with patient. Plan discussed with CRNA.                   Rao Hopkins MD   4/16/2021

## 2021-04-16 NOTE — OP NOTE
DATE OF PROCEDURE:  4/16/2021      SURGEON:  Amadou Harman      PREOPERATIVE DIAGNOSIS:  Biliary colic. POSTOPERATIVE DIAGNOSIS:  same      OPERATION:  Laparoscopic cholecystectomy. ANESTHESIA:  General.      ESTIMATED BLOOD LOSS:minimal      COMPLICATIONS:  None. FLUIDS:  Crystalloid. SPECIMEN:  Gallbladder. DISPOSITION:  To  home. INDICATION:  Varsha Slaughter is a 39 y.o. female who presented     for evaluation of right upper quadrant abdominal pain consistent with  colic. We explained the risks, benefits, potential outcomes, and   alternatives of treatment to the aforementioned procedure, including risk of   potential biliary leak or bile duct injury requiring further surgeries, infection or   bleeding requiring procedure or surgeries. she agreed to proceed   understanding those risks and potential outcomes. PROCEDURE:  The patient was brought into the operative suite and was given   preoperative antibiotics 30 minutes before incision, was placed under general   anesthesia, and then was prepped and draped in normal sterile condition. Once this was done, a 5-mm incision was made supraumbilically and a Veress   needle was passed through this incision into the peritoneum. Once this was done, CO2 was used to insufflate the abdomen to a pressure of 15 mmHg. At that time, the Veress needle was removed and replaced with a 5-mm trocar. The laparoscope was placed through that trocar and port, and once this was done, under direct visualization with   the laparoscope, an additional  10-mm port was placed in the subxiphoid area. Two right lateral abdominal ports were placed, 5 mm in size, under direct   visualization with the laparoscope. Once this was done, the gallbladder was retracted cephaladly with blunt   graspers. Blunt dissection as well as electrocautery were able to free the   gallbladder and expose the cystic duct and artery. These were taken with   ligamax clip appliers, 2 distally and 1 proximally and then ligated with   Endoshears. Electrocautery then was able to remove the gallbladder from   liver bed. Any bleeding was electrocauterized for hemostasis. The   gallbladder was removed previous to this with the grasper. It was sent for   specimen at that time. The 10-mm abdominal incision and defect in the fascia was closed in a   figure-of-8 fashion with 0 Vicryl suture. Once this was done, the skin was   approximated with 4-0 Monocryl suture in a subcuticular fashion. The patient   was then woken up in stable and taken to PACU.     Shella Skiff, MD  7:54 AM  4/16/2021

## 2021-04-16 NOTE — H&P
General Surgery History and Physical    Patient's Name/Date of Birth: Joseph Love / 1975    Date: April 16, 2021     Surgeon: Nghia Du M.D.    PCP: Rosibel Bailey MD     Chief Complaint: biliary colic    HPI:   Joseph Love is a 39 y.o. female who presents for evaluation of biliary colic.  Timing is intermittent, radiation to back, alleviated by npo and started several weeks ago      Past Medical History:   Diagnosis Date    Flank Pain     right    H/O rheumatoid arthritis     Hx of cardiovascular stress test 2/2013    treadmill nuclear stress    Joint pain     Low back pain     Lupus (HCC)     Neck pain     RA (rheumatoid [facility]) ([facility]     RH    Radiculopathy     Sjogren's disease ([facility]        Past Surgical History:   Procedure Laterality Date    ANESTHESIA NERVE BLOCK Bilateral 7/9/2019    BILATERAL LUMBAR TRANSFORAMINAL EPIDURAL STERIOD INJECTION AT L5-S1 UNDER FLUOROSCOPY performed by Ifrah Perez MD at [facility]      x2    COLONOSCOPY      DILATION AND CURETTAGE OF UTERUS      x2    ENDOSCOPY, COLON, DIAGNOSTIC      EPIDURAL STEROID INJECTION Left 5/28/2019    LUMBAR EPIDURAL STEROID INJECTION L5-S1 (LEFT PARAMEDIAN APPROACH) UNDER FLUOROSCOPIC GUIDANCE) performed by Ifrah Perez MD at [address] [place] [address] N/A 2/16/2021    LAPAROSCOPIC HIATAL HERNIA RTEPAIR WITH MESH performed by Steven Feliciano MD at [place] [address] ARTHROSCOPY Left 5 27 16    medial menisectomy, debridement, acl synovectomy, chondroplasty    NERVE BLOCK Left 05/28/2019    lumbar    NERVE BLOCK Bilateral 07/09/2019    NERVE BLOCK Left 12/08/2020    Cervical epidural steroid injection under fouroscopic guidance c7-t1 left paramedian    PAIN MANAGEMENT PROCEDURE Left 12/8/2020    CERVICAL EPIDURAL STEROID INJECTION UNDER FLUOROSCOPIC GUIDANCE AT C7-T1 LEFT PARAMEDIAN (CPT 34572) performed by Ifrah Perez MD at [facility] RHINOPLASTY      TUBAL LIGATION      TUBAL LIGATION         Current Facility-Administered Medications   Medication Dose Route Frequency Provider Last Rate Last Admin    ceFAZolin (ANCEF) 2000 mg in dextrose 3 % 50 mL IVPB (duplex)  2,000 mg Intravenous On Call to [address], MD        lactated ringers infusion   Intravenous Continuous Barry Conner  mL/hr at 04/16/21 0611 New Bag at 04/16/21 0611    sodium chloride flush 0.9 % injection 10 mL  10 mL Intravenous 2 times per day Barry Conner MD        sodium chloride flush 0.9 % injection 10 mL  10 mL Intravenous PRN Barry Conner MD        HYDROmorphone (DILAUDID) injection 0.25 mg  0.25 mg Intravenous Q5 Min PRN Demetrius Gomez MD        HYDROmorphone (DILAUDID) injection 0.5 mg  0.5 mg Intravenous Q5 Min PRN Demetrius Gomez MD        HYDROmorphone (DILAUDID) injection 0.25 mg  0.25 mg Intravenous Q5 Min PRN Demetrius Goemz MD        HYDROmorphone (DILAUDID) injection 0.5 mg  0.5 mg Intravenous Q5 Min PRN Demetrius Gomez MD        HYDROcodone-acetaminophen [facility]) 5-325 MG per tablet 1 tablet  1 tablet Oral PRN Demetrius Gomez MD        Or    HYDROcodone-acetaminophen [facility]) 5-325 MG per tablet 2 tablet  2 tablet Oral PRRIYA Gomez MD        diphenhydrAMINE (BENADRYL) injection 12.5 mg  12.5 mg Intravenous Once PRN Demetrius Gomez MD        prochlorperazine (COMPAZINE) injection 5 mg  5 mg Intravenous Once PRN Demetrius Gomez MD        promethazine (PHENERGAN) injection 6.25 mg  6.25 mg Intramuscular Once PRN Demetrius Gomez MD        labetalol (NORMODYNE;TRANDATE) injection 5 mg  5 mg Intravenous Q10 Min PRN Demetrius Gomez MD        meperidine (DEMEROL) injection 12.5 mg  12.5 mg Intravenous Q5 Min PRN Demetrius Gomez MD           Allergies   Allergen Reactions    Methotrexate Derivatives Nausea And Vomiting and Other (See Comments)     Mouth sores       The patient has a family history that is negative for severe cardiovascular or respiratory issues, negative for reaction to anesthesia.     Social History     Socioeconomic History    Marital status:      Spouse name: Not on file    Number of children: Not on file    Years of education: Not on file    Highest education level: Not on file   Occupational History    Not on file   Social Needs    Financial resource strain: Not on file    Food insecurity     Worry: Not on file     Inability: Not on file    Transportation needs     Medical: Not on file     Non-medical: Not on file   Tobacco Use    Smoking status: Never Smoker    Smokeless tobacco: Never Used   Substance and Sexual Activity    Alcohol use: Yes     Comment: wine occ    Drug use: No    Sexual activity: Yes     Partners: Male   Lifestyle    Physical activity     Days per week: Not on file     Minutes per session: Not on file    Stress: Not on file   Relationships    Social connections     Talks on phone: Not on file     Gets together: Not on file     Attends [employer] service: Not on file     Active member of club or organization: Not on file     Attends meetings of clubs or organizations: Not on file     Relationship status: Not on file    Intimate partner violence     Fear of current or ex partner: Not on file     Emotionally abused: Not on file     Physically abused: Not on file     Forced sexual activity: Not on file   Other Topics Concern    Not on file   Social History Narrative    Not on file           Review of Systems  Review of Systems -  General ROS: negative for - chills, fatigue or malaise  ENT ROS: negative for - hearing change, nasal congestion or nasal discharge  Allergy and Immunology ROS: negative for - hives, itchy/watery eyes or nasal congestion  Hematological and Lymphatic ROS: negative for - blood clots, blood transfusions, bruising or fatigue  Endocrine ROS: negative for - malaise/lethargy, mood swings, palpitations or polydipsia/polyuria  Breast ROS: negative for - new or changing breast lumps or nipple changes  Respiratory ROS: negative for - sputum changes, stridor, tachypnea or wheezing  Cardiovascular ROS: negative for - irregular heartbeat, loss of consciousness, murmur or orthopnea  Gastrointestinal ROS: negative for - constipation, diarrhea, gas/bloating, heartburn or hematemesis, pain and nausea  Genito-Urinary ROS: negative for -  genital discharge, genital ulcers or hematuria  Musculoskeletal ROS: negative for - gait disturbance, muscle pain or muscular weakness    Physical exam:   /63   Pulse 82   Temp 97.5 °F (36.4 °C) (Infrared)   Resp 16   Ht 5' (1.524 m)   Wt 167 lb (75.8 kg)   SpO2 97%   BMI 32.61 kg/m²   General appearance:  NAD  Pyscho/social: negative for tremors and hallucinations  Head: NCAT, PERRLA, EOMI, red conjunctiva  Neck: supple, no masses  Lungs: CTAB, equal chest rise bilateral  Heart: Reg rate  Abdomen: soft, nontender, nondistended  Skin; no lesions  Gu: no cva tenderness  Extremities: extremities normal, atraumatic, no cyanosis or edema    Assessment:  Biliary colic     Plan: To OR  Discussed the risk, benefits and alternatives of surgery including wound infections, bleeding, scar and hernia formation and the risks of general anesthetic including MI, CVA, sudden death or reactions to anesthetic medications. The patient understands the risks and alternatives and the possibility of converting to an open procedure. All questions were answered to the patient's satisfaction and they freely signed the consent. We discussed with a normal gb workup but classic symptoms of gallbladder disease the chance of symptomatic resolution is 85% and there is a chance of no changes of symptoms post op. They understand and wish to proceed.       Leila Lynn MD  7:09 AM  4/16/2021

## 2021-04-16 NOTE — ANESTHESIA POSTPROCEDURE EVALUATION
Department of Anesthesiology  Postprocedure Note    Patient: Kathryn Gonsales  MRN: [id number]  YOB: 1975  Date of evaluation: 4/16/2021  Time:  10:56 AM     Procedure Summary     Date: 04/16/21 Room / Location: [address] [place] [address] 03 / [address] [facility]    Anesthesia Start: [place] Anesthesia Stop: 3135    Procedure: LAPAROSCOPIC CHOLECYSTECTOMY (N/A Abdomen) Diagnosis: (BILIARY COLIC)    Surgeons: Steve Ness MD Responsible Provider: Gunnar Roth MD    Anesthesia Type: general ASA Status: 2          Anesthesia Type: general    Fredi Phase I: Fredi Score: 10    Fredi Phase II: Fredi Score: 10    Last vitals: Reviewed and per EMR flowsheets.        Anesthesia Post Evaluation    Patient location during evaluation: PACU  Patient participation: complete - patient participated  Level of consciousness: awake  Airway patency: patent  Nausea & Vomiting: no nausea and no vomiting  Complications: no  Cardiovascular status: hemodynamically stable  Respiratory status: acceptable  Hydration status: stable

## 2021-04-29 ENCOUNTER — OFFICE VISIT (OUTPATIENT)
Dept: SURGERY | Age: 46
End: 2021-04-29

## 2021-04-29 VITALS
BODY MASS INDEX: 32.79 KG/M2 | TEMPERATURE: 98.2 F | WEIGHT: 167 LBS | DIASTOLIC BLOOD PRESSURE: 81 MMHG | HEART RATE: 65 BPM | HEIGHT: 60 IN | SYSTOLIC BLOOD PRESSURE: 114 MMHG

## 2021-04-29 DIAGNOSIS — K80.20 SYMPTOMATIC CHOLELITHIASIS: Primary | ICD-10-CM

## 2021-04-29 PROCEDURE — 99024 POSTOP FOLLOW-UP VISIT: CPT | Performed by: SURGERY

## 2021-04-29 RX ORDER — KETOROLAC TROMETHAMINE 5 MG/ML
SOLUTION OPHTHALMIC
COMMUNITY
Start: 2021-04-07 | End: 2021-07-28

## 2021-04-29 RX ORDER — NEOMYCIN SULFATE, POLYMYXIN B SULFATE AND DEXAMETHASONE 3.5; 10000; 1 MG/ML; [USP'U]/ML; MG/ML
SUSPENSION/ DROPS OPHTHALMIC
COMMUNITY
Start: 2021-04-07 | End: 2021-07-28

## 2021-04-29 NOTE — PROGRESS NOTES
Surgery Progress Note            Chief complaint:   Patient Active Problem List   Diagnosis    Intercostal neuralgia    Sensory peripheral neuropathy    Cold feet    Insomnia    Lupus (HCC)    Tear, knee, medial meniscus    ACL tear    Post-traumatic osteoarthritis of left knee    Synovitis    Lumbar radiculopathy    DDD (degenerative disc disease), lumbar    Lumbosacral spondylosis without myelopathy    Sacroiliac dysfunction    DDD (degenerative disc disease), cervical    Cervical radiculopathy    Cervical spondylolysis    Hiatal hernia    Biliary colic       S: doing well    O:   Vitals:    04/29/21 0827   BP: 114/81   Pulse: 65   Temp: 98.2 °F (36.8 °C)     No intake or output data in the 24 hours ending 04/29/21 0838        Labs:  No results for input(s): WBC, HGB, HCT in the last 72 hours. Invalid input(s): PLR  Lab Results   Component Value Date    CREATININE 1.0 11/02/2018    BUN 9 11/02/2018     11/02/2018    K 3.9 11/02/2018     11/02/2018    CO2 25 11/02/2018     No results for input(s): LIPASE, AMYLASE in the last 72 hours. Physical exam:   /81   Pulse 65   Temp 98.2 °F (36.8 °C) (Temporal)   Ht 5' (1.524 m)   Wt 167 lb (75.8 kg)   BMI 32.61 kg/m²   General appearance: NAD  Head: NCAT  Neck: supple, no masses  Lungs: equal chest rise bilateral  Heart: S1S2 present  Abdomen: soft, nontender, nondistended  Skin; no new lesions, incisions clean and intact  Gu: no cva tenderness  Extremities: extremities normal, atraumatic, no cyanosis or edema    A:  Post op jaz pickard     P: follow up as needed.      Joyce Reeves MD  4/29/2021

## 2021-07-28 ENCOUNTER — PREP FOR PROCEDURE (OUTPATIENT)
Dept: PAIN MANAGEMENT | Age: 46
End: 2021-07-28

## 2021-07-28 ENCOUNTER — OFFICE VISIT (OUTPATIENT)
Dept: PAIN MANAGEMENT | Age: 46
End: 2021-07-28
Payer: COMMERCIAL

## 2021-07-28 VITALS
OXYGEN SATURATION: 95 % | HEART RATE: 59 BPM | WEIGHT: 167 LBS | SYSTOLIC BLOOD PRESSURE: 122 MMHG | HEIGHT: 60 IN | TEMPERATURE: 97.8 F | DIASTOLIC BLOOD PRESSURE: 80 MMHG | BODY MASS INDEX: 32.79 KG/M2 | RESPIRATION RATE: 16 BRPM

## 2021-07-28 DIAGNOSIS — M51.36 DDD (DEGENERATIVE DISC DISEASE), LUMBAR: ICD-10-CM

## 2021-07-28 DIAGNOSIS — M43.02 CERVICAL SPONDYLOLYSIS: ICD-10-CM

## 2021-07-28 DIAGNOSIS — M50.30 DDD (DEGENERATIVE DISC DISEASE), CERVICAL: ICD-10-CM

## 2021-07-28 DIAGNOSIS — M54.16 LUMBAR RADICULOPATHY: ICD-10-CM

## 2021-07-28 DIAGNOSIS — M54.12 CERVICAL RADICULOPATHY: Primary | ICD-10-CM

## 2021-07-28 PROCEDURE — 99213 OFFICE O/P EST LOW 20 MIN: CPT | Performed by: NURSE PRACTITIONER

## 2021-07-28 RX ORDER — METAXALONE 800 MG/1
800 TABLET ORAL 3 TIMES DAILY
Qty: 30 TABLET | Refills: 0 | Status: SHIPPED
Start: 2021-07-28 | End: 2021-08-18 | Stop reason: SDUPTHER

## 2021-07-28 NOTE — PROGRESS NOTES
[facility] PRE-ADMISSION TESTING INSTRUCTIONS    The Preadmission Testing patient is instructed accordingly using the following criteria (check applicable):    ARRIVAL INSTRUCTIONS:  [x] Parking the day of Surgery is located in the Main Entrance lot. Upon entering the door, make an immediate right to the surgery reception desk    [x] Bring photo ID and insurance card    [] Bring in a copy of Living will or Durable Power of  papers. [x] Please be sure to arrange for responsible adult to provide transportation to and from the hospital    [x] Please arrange for responsible adult to be with you for the 24 hour period post procedure due to having anesthesia      GENERAL INSTRUCTIONS:    [x] Nothing by mouth after midnight, including gum, candy, mints or water    [x] You may brush your teeth, but do not swallow any water    [x] Take medications as instructed with 1-2 oz of water    [x] Stop herbal supplements and vitamins 5 days prior to procedure    [] Follow preop dosing of blood thinners per physician instructions    [] Take 1/2 dose of evening insulin, but no insulin after midnight    [] No oral diabetic medications after midnight    [] If diabetic and have low blood sugar or feel symptomatic, take 1-2oz apple juice only    [] Bring inhalers day of surgery    [] Bring C-PAP/ Bi-Pap day of surgery    [x] Bring urine specimen day of surgery    [x] Shower or bath with soap, lather and rinse well, AM of Surgery, no lotion, powders or creams to surgical site    [] Follow bowel prep as instructed per surgeon    [x] No tobacco products within 24 hours of surgery     [x] No alcohol or illegal drug use within 24 hours of surgery.     [x] Jewelry, body piercing's, eyeglasses, contact lenses and dentures are not permitted into surgery (bring cases)      [x] Please do not wear any nail polish, make up or hair products on the day of surgery    [x] You can expect a call the business day prior to procedure to notify you if your arrival time changes    [x] If you receive a survey after surgery we would greatly appreciate your comments    [] Parent/guardian of a minor must accompany their child and remain on the premises  the entire time they are under our care     [] Pediatric patients may bring favorite toy, blanket or comfort item with them    [] A caregiver or family member must remain with the patient during their stay if they are mentally handicapped, have dementia, disoriented or unable to use a call light or would be a safety concern if left unattended    [x] Please notify surgeon if you develop any illness between now and time of surgery (cold, cough, sore throat, fever, nausea, vomiting) or any signs of infections  including skin, wounds, and dental.    [x]  The Outpatient Pharmacy is available to fill your prescription here on your day of surgery, ask your preop nurse for details    [] Other instructions    EDUCATIONAL MATERIALS PROVIDED:    [] PAT Preoperative Education Packet/Booklet     [] Medication List    [] Transfusion bracelet applied with instructions    [] Shower with soap, lather and rinse well, and use CHG wipes provided the evening before surgery as instructed    [] Incentive spirometer with instructions

## 2021-07-28 NOTE — PROGRESS NOTES
[address] [place] [address], [address] [place] [id number]  [phone]    Follow up Note      [facility]     Date of Visit:  7/28/2021    CC:  Patient presents for follow up worsened neck pain    HPI: Followed for acute exacerbation of right sided neck pain radiating to right shoulder down to mid thoracic spine with weakness and n/t to right arm. Neck pain is main complaint today. Pt has a hx Chronic low back and B/l LE pain. History of Cervical BELEM on 12/8/2020 with excellent pain relief of more than 90% and BELEM in 2019 with excellent pain relief. No new numbness or bowel or bladder symptoms. Pertinent information:  Failed conservative treatment with NSAID's, muscle relaxants, oral steroids and HEP. She has been previously evaluated by Dr. Jp Pearson and recommended lumbar facet interventions. Followed by rheumatologist for h/o sjogren's/ rheumatoid / SLE.       She has not been on anticoagulation medications to include none and has not been on herbal supplements.  She is not diabetic. Nursing notes and details of the pain history reviewed. Please see intake notes for details. Previous treatments:   Physical Therapy   Chiropractic treatment  Ongoing Core exercises for the last year. TENS and medications- NSAID's, oral steroids, gabapentin,baclofen, tizanidine, lyrica, flexeril, diclofenac, mobic  Does not tolerate meds well   Conservative RX has not provided good long term relief.   Alleviating factors include: heat, medications.      Imaging:     MRI of C spine: 11/3/2020:      FINDINGS:    BONES/ALIGNMENT: There is straightening of the cervical spine with loss of    the normal lordotic curvature.  The bone marrow signal intensity is low in    the T1 weighted imaging, similar to the previous exam, likely related to red    marrow versus anemia.  Endplate degenerative changes are noted at C6-C7.  No    areas of marrow edema to suggest an acute fracture.         SPINAL CORD: The cervical spinal cord demonstrates normal signal intensity    without evidence of an expansile mass lesion.         SOFT TISSUES: No paraspinal masses or edema.         C2-C3: There is a 2 mm central disc protrusion indenting the anterior thecal    sac.  Mild central spinal canal narrowing.  The neural foramen are patent.         C3-C4: There is a central disc protrusion measuring 2 mm indenting the    anterior thecal sac.  Minimal central spinal canal narrowing.  The neural    foramen are patent.         C4-C5: Asymmetric left-sided uncovertebral spurring and facet arthropathy    have increased with moderate left foraminal narrowing, increased in severity. No right foraminal narrowing, disc herniation, or central spinal canal    stenosis.         C5-C6: There is a broad-based central/left paracentral disc protrusion    measuring 3 mm, increased in size.  Mild central spinal canal narrowing has    slightly increased.  Asymmetric left-sided uncovertebral spurring with mild    left foraminal narrowing, increased.  No right foraminal narrowing.         C6-C7: There is a broad-based left paracentral central disc protrusion    measuring 3 mm.  Ligamentum flavum thickening.  Moderate central spinal canal    narrowing has increased.  Asymmetric left-sided uncovertebral spurring with    moderate left foraminal narrowing and mild right foraminal narrowing,    increased in severity.         C7-T1: There is no significant disc protrusion, spinal canal stenosis or    neural foraminal narrowing.              Impression    1. Mild central spinal canal narrowing at C5-C6, and moderate central spinal    canal narrowing at C6-C7 have slightly increased. 2. Multilevel foraminal narrowing, as above, greatest on the left at C4-C5,    and left at C6-C7. 3. No evidence of an acute fracture.            X ray C spine: 10/26/2020:    Impression    Degenerative disc changes at C6-7          MRI of LS spine: 6/27/2020:  FINDINGS:    BONES/ALIGNMENT: There is mild degenerative disc disease at L4-L5, and L5-S1.     There are Modic type 1 degenerative endplate changes at Z4-Q3.         SPINAL CORD: The conus terminates normally.         SOFT TISSUES: No paraspinal mass identified.         L1-L2:  The thecal sac and neural foramina are intact.         L2-L3:  The thecal sac and neural foramina are intact.         L3-L4:  The thecal sac and neural foramina are intact.         L4-L5: There is a minimum disc bulge measuring 2 mm.   There is mild facet    and ligamentum flavum hypertrophy.  The thecal sac is not stenotic.  Disc    and/or osteophytes cause minimal narrowing of the neural foramina bilaterally.         L5-S1: There is a disc protrusion with annular fissure centrally at L5-S1    measuring approximately 4.5 mm.  This effaces the anterior aspect of the    thecal sac.  The thecal sac is not significantly stenotic.  The S1 nerve    roots are intact.  There is mild narrowing of the neural foramina bilaterally.         There may be slight increase in size of the disc protrusion compared to the    prior study.              Impression    Disc protrusion with annular fissure at L5-S1.  No significant stenosis of    the thecal sac.  The S1 nerve roots are intact.  There is mild narrowing of    the neural foramina bilaterally at L5-S1.         Mild narrowing of the neural foramina at L4-L5 as discussed above.           Potential Aberrant Drug-Related Behavior:  No     Urine Drug Screening:No     OARRS report[de-identified]  1/8/21  yes- not on chronic narcotics      Past Medical History:   Diagnosis Date    Flank Pain     right    H/O rheumatoid arthritis     Hx of cardiovascular stress test 2/2013    treadmill nuclear stress    Joint pain     Low back pain     Lupus (HCC)     Neck pain     RA [facility]rheumatoid arthritis) ([facility])     RH    Radiculopathy     Sjogren's disease ([facility] [address]        Past Surgical History:   Procedure Laterality Date  ANESTHESIA NERVE BLOCK Bilateral 7/9/2019    BILATERAL LUMBAR TRANSFORAMINAL EPIDURAL STERIOD INJECTION AT L5-S1 UNDER FLUOROSCOPY performed by Laura Han MD at [address]      x2   [address], LAPAROSCOPIC N/A 4/16/2021    LAPAROSCOPIC CHOLECYSTECTOMY performed by Ngoc Barron MD at [address]      x2    ENDOSCOPY, COLON, DIAGNOSTIC      EPIDURAL STEROID INJECTION Left 5/28/2019    LUMBAR EPIDURAL STEROID INJECTION L5-S1 (LEFT PARAMEDIAN APPROACH) UNDER FLUOROSCOPIC GUIDANCE) performed by Laura Han MD at [address] [facility] N/A 2/16/2021    LAPAROSCOPIC HIATAL HERNIA RTEPAIR WITH MESH performed by Ngoc Barron MD at [facility] ARTHROSCOPY Left 5 27 16    medial menisectomy, debridement, acl synovectomy, chondroplasty    NERVE BLOCK Left 05/28/2019    lumbar    NERVE BLOCK Bilateral 07/09/2019    NERVE BLOCK Left 12/08/2020    Cervical epidural steroid injection under fouroscopic guidance c7-t1 left paramedian    PAIN MANAGEMENT PROCEDURE Left 12/8/2020    CERVICAL EPIDURAL STEROID INJECTION UNDER FLUOROSCOPIC GUIDANCE AT C7-T1 LEFT PARAMEDIAN (CPT 75955) performed by Laura Han MD at [address] [facility]         Prior to Admission medications    Medication Sig Start Date End Date Taking?  Authorizing Provider   ketorolac (ACULAR) 0.5 % ophthalmic solution INSTILL 1 DROP INTO OPERATED EYE EVERY DAY 4/7/21   Historical Provider, MD   neomycin-polymyxin-dexameth (MAXITROL) 3.5-33271-2.1 ophthalmic suspension INSTILL 1 DROP INTO OPERATED EYE EVERY DAY 4/7/21   Historical Provider, MD   ibuprofen (ADVIL;MOTRIN) 800 MG tablet Take 1 tablet by mouth every 6 hours as needed for Pain 4/16/21   Ngoc Barron MD   Cholecalciferol (VITAMIN D) 50 MCG (2000 UT) CAPS capsule Take by mouth    Historical Provider, MD pantoprazole (PROTONIX) 40 MG tablet Take 40 mg by mouth as needed  1/15/21   Historical Provider, MD   methocarbamol (ROBAXIN) 750 MG tablet Take 750 mg by mouth 4 times daily    Historical Provider, MD       Allergies   Allergen Reactions    Methotrexate Derivatives Nausea And Vomiting and Other (See Comments)     Mouth sores       Social History     Socioeconomic History    Marital status:      Spouse name: Not on file    Number of children: Not on file    Years of education: Not on file    Highest education level: Not on file   Occupational History    Not on file   Tobacco Use    Smoking status: Never Smoker    Smokeless tobacco: Never Used   Vaping Use    Vaping Use: Never used   Substance and Sexual Activity    Alcohol use: Yes     Comment: wine occ    Drug use: No    Sexual activity: Yes     Partners: Male   Other Topics Concern    Not on file   Social History Narrative    Not on file     Social Determinants of Health     Financial Resource Strain:     Difficulty of Paying Living Expenses:    Food Insecurity:     Worried About Running Out of Food in the Last Year:     [address] in the Last Year:    Transportation Needs:     Lack of Transportation (Medical):      Lack of Transportation (Non-Medical):    Physical Activity:     Days of Exercise per Week:     Minutes of Exercise per Session:    Stress:     Feeling of Stress :    Social Connections:     Frequency of Communication with Friends and Family:     Frequency of Social Gatherings with Friends and Family:     Attends Orthodoxy Services:     Active Member of Clubs or Organizations:     Attends Club or Organization Meetings:     Marital Status:    Intimate Partner Violence:     Fear of Current or Ex-Partner:     Emotionally Abused:     Physically Abused:     Sexually Abused:        Family History   Problem Relation Age of Onset    Heart Failure Mother     Thyroid Disease Mother     Cancer Father     Thyroid positive left              MARIO test:positive right, positive             left  Piriformis tenderness:negative right, negative left  Trochanteric bursa tenderness:negative right, positive left  SLR:negative right, negative left, sitting      Extremities:     Tremors:None bilaterally upper and lower  Edema:none x all 4 extremities  Limited ROM right shoulder, unable to lift beyond 45 degrees     Knee:     Knee ROM normal- no pain     Neurological:        Sensory:normal to light touch - except for sensory changes over the left UE.     Motor:   Right Grip3/5              Left Grip5/5               Right Bicep3/5           Left Bicep5/5              Right Triceps5/5       Left Triceps5/5          Right Deltoid3/5     Left Deltoid5/5                       Gait:normal     Dermatology:     Skin: redness over the face        Assessment/Plan:    Diagnosis Orders   1. DDD (degenerative disc disease), cervical      2. Cervical radiculopathy      3. Cervical spondylolysis      4. DDD (degenerative disc disease), lumbar      5. Lumbosacral spondylosis without myelopathy         39 y.o.  lady with neck pain and left UE pain. Acute exacerbation  - not responding to conservative treatment for neck pain down right arm accompanied by n/t and limited ROM and right shoulder pain    Also has H/o Chronic low back and LE pain. Excellent pain relief form the prior BELEM. Failed Meds/ HEP / oral steroids    Cervical epidural steroid injection  12/8/2020 with > 90% pain relief. DC Relafen and Robaxin: not helping.      Schedule for Repeat CERVICAL EPIDURAL STEROID INJECTION UNDER FLUOROSCOPIC GUIDANCE AT C7-T1 Right PARAMEDIAN with sedation x1, last injection >90% relief    DC Robaxin    Trial Skelaxin 800mg po bid-tid prn pain x 10 days     Trial Licart sample q 24 hours for acute right shoulder/neck pain    Has been evaluated by Dr. Hadley Marrufo recently- not a surgical candidate     Consider repeat SIJ injection Vs Facet injection in future if low back pain worsens, +facet and SIJ pain. Continue HEP as tolerated. NSAID's for prn use.     Counseling reg : regular HEP and weight watching, appropriate medication use        Electronically signed by Collette Bryant, APRN - CNP on 7/28/21 at 8:50 AM EDT

## 2021-07-28 NOTE — PROGRESS NOTES
Do you currently have any of the following:    Fever: No  Headache:  No  Cough: No  Shortness of breath: No  Exposed to anyone with these symptoms: Irina Medrano presents to the [facility] on 7/28/2021. Laura Hickey is complaining of pain in right shoulder and right side of neck. . The pain is constant. The pain is described as aching, throbbing, shooting, stabbing, sharp and burning. Pain is rated on her best day at a 3, on her worst day at a 10, and on average at a 7 on the VAS scale. She took her last dose of Motrin . Laura Hickey does not have issues with constipation. Any procedures since your last visit: No,    She is  on NSAIDS and  is not on anticoagulation medications to include none and is managed by NA. Pacemaker or defibrillator: No Physician managing device is NA. Medication Contract and Consent for Opioid Use Documents Filed     Patient Documents       Type of Document Status Date Received Received By Description     Medication Contract Received 5/7/2019  8:46 AM SANDRITA KABA Pain Management Pt Agreement 5/7/2019                   /80   Pulse 59   Temp 97.8 °F (36.6 °C) (Infrared)   Resp 16   Ht 5' (1.524 m)   Wt 167 lb (75.8 kg)   SpO2 95%   BMI 32.61 kg/m²      No LMP recorded. Patient has had an ablation.

## 2021-07-28 NOTE — PROGRESS NOTES
Have you been tested for COVID  Yes           Have you been told you were positive for COVID No  Have you had any known exposure to someone that is positive for COVID No  Do you have a cough                   No              Do you have shortness of breath No                 Do you have a sore throat            No                Are you having chills                    No                Are you having muscle aches. No                    Please come to the hospital wearing a mask and have your significant other wear a mask as well. Both of you should check your temperature before leaving to come here,  if it is 100 or higher please call [phone] for instruction.

## 2021-07-29 ENCOUNTER — ANESTHESIA EVENT (OUTPATIENT)
Dept: OPERATING ROOM | Age: 46
End: 2021-07-29
Payer: COMMERCIAL

## 2021-07-29 ENCOUNTER — HOSPITAL ENCOUNTER (OUTPATIENT)
Dept: GENERAL RADIOLOGY | Age: 46
Setting detail: OUTPATIENT SURGERY
Discharge: HOME | End: 2021-07-31
Attending: ANESTHESIOLOGY
Payer: COMMERCIAL

## 2021-07-29 ENCOUNTER — ANESTHESIA (OUTPATIENT)
Dept: OPERATING ROOM | Age: 46
End: 2021-07-29
Payer: COMMERCIAL

## 2021-07-29 ENCOUNTER — HOSPITAL ENCOUNTER (OUTPATIENT)
Age: 46
Setting detail: OUTPATIENT SURGERY
Discharge: HOME | End: 2021-07-29
Attending: ANESTHESIOLOGY | Admitting: ANESTHESIOLOGY
Payer: COMMERCIAL

## 2021-07-29 VITALS
OXYGEN SATURATION: 100 % | HEIGHT: 60 IN | SYSTOLIC BLOOD PRESSURE: 117 MMHG | TEMPERATURE: 97.5 F | WEIGHT: 167 LBS | RESPIRATION RATE: 15 BRPM | DIASTOLIC BLOOD PRESSURE: 61 MMHG | BODY MASS INDEX: 32.79 KG/M2 | HEART RATE: 62 BPM

## 2021-07-29 VITALS — DIASTOLIC BLOOD PRESSURE: 79 MMHG | SYSTOLIC BLOOD PRESSURE: 117 MMHG | OXYGEN SATURATION: 99 %

## 2021-07-29 DIAGNOSIS — R52 PAIN MANAGEMENT: ICD-10-CM

## 2021-07-29 LAB
HCG, URINE, POC: NEGATIVE
Lab: NORMAL
NEGATIVE QC PASS/FAIL: NORMAL
POSITIVE QC PASS/FAIL: NORMAL
SARS-COV-2, NAAT: NOT DETECTED

## 2021-07-29 PROCEDURE — 2500000003 HC RX 250 WO HCPCS: Performed by: ANESTHESIOLOGY

## 2021-07-29 PROCEDURE — 3600000002 HC SURGERY LEVEL 2 BASE: Performed by: ANESTHESIOLOGY

## 2021-07-29 PROCEDURE — 7100000010 HC PHASE II RECOVERY - FIRST 15 MIN: Performed by: ANESTHESIOLOGY

## 2021-07-29 PROCEDURE — 2709999900 HC NON-CHARGEABLE SUPPLY: Performed by: ANESTHESIOLOGY

## 2021-07-29 PROCEDURE — 6360000004 HC RX CONTRAST MEDICATION: Performed by: ANESTHESIOLOGY

## 2021-07-29 PROCEDURE — 6360000002 HC RX W HCPCS: Performed by: NURSE ANESTHETIST, CERTIFIED REGISTERED

## 2021-07-29 PROCEDURE — 6360000002 HC RX W HCPCS: Performed by: ANESTHESIOLOGY

## 2021-07-29 PROCEDURE — 3700000000 HC ANESTHESIA ATTENDED CARE: Performed by: ANESTHESIOLOGY

## 2021-07-29 PROCEDURE — 3700000001 HC ADD 15 MINUTES (ANESTHESIA): Performed by: ANESTHESIOLOGY

## 2021-07-29 PROCEDURE — 7100000011 HC PHASE II RECOVERY - ADDTL 15 MIN: Performed by: ANESTHESIOLOGY

## 2021-07-29 PROCEDURE — 2580000003 HC RX 258: Performed by: ANESTHESIOLOGY

## 2021-07-29 PROCEDURE — 3209999900 FLUORO FOR SURGICAL PROCEDURES

## 2021-07-29 PROCEDURE — 3600000012 HC SURGERY LEVEL 2 ADDTL 15MIN: Performed by: ANESTHESIOLOGY

## 2021-07-29 PROCEDURE — 62321 NJX INTERLAMINAR CRV/THRC: CPT | Performed by: ANESTHESIOLOGY

## 2021-07-29 PROCEDURE — 6360000002 HC RX W HCPCS

## 2021-07-29 PROCEDURE — 87635 SARS-COV-2 COVID-19 AMP PRB: CPT

## 2021-07-29 PROCEDURE — 2580000003 HC RX 258: Performed by: NURSE ANESTHETIST, CERTIFIED REGISTERED

## 2021-07-29 RX ORDER — SODIUM CHLORIDE 9 MG/ML
INJECTION INTRAVENOUS PRN
Status: DISCONTINUED | OUTPATIENT
Start: 2021-07-29 | End: 2021-07-29 | Stop reason: ALTCHOICE

## 2021-07-29 RX ORDER — METHYLPREDNISOLONE ACETATE 40 MG/ML
INJECTION, SUSPENSION INTRA-ARTICULAR; INTRALESIONAL; INTRAMUSCULAR; SOFT TISSUE PRN
Status: DISCONTINUED | OUTPATIENT
Start: 2021-07-29 | End: 2021-07-29 | Stop reason: ALTCHOICE

## 2021-07-29 RX ORDER — FENTANYL CITRATE 50 UG/ML
INJECTION, SOLUTION INTRAMUSCULAR; INTRAVENOUS PRN
Status: DISCONTINUED | OUTPATIENT
Start: 2021-07-29 | End: 2021-07-29 | Stop reason: SDUPTHER

## 2021-07-29 RX ORDER — MIDAZOLAM HYDROCHLORIDE 1 MG/ML
INJECTION INTRAMUSCULAR; INTRAVENOUS PRN
Status: DISCONTINUED | OUTPATIENT
Start: 2021-07-29 | End: 2021-07-29 | Stop reason: SDUPTHER

## 2021-07-29 RX ORDER — SODIUM CHLORIDE 9 MG/ML
INJECTION, SOLUTION INTRAVENOUS CONTINUOUS PRN
Status: DISCONTINUED | OUTPATIENT
Start: 2021-07-29 | End: 2021-07-29 | Stop reason: SDUPTHER

## 2021-07-29 RX ORDER — LIDOCAINE HYDROCHLORIDE 5 MG/ML
INJECTION, SOLUTION INFILTRATION; INTRAVENOUS PRN
Status: DISCONTINUED | OUTPATIENT
Start: 2021-07-29 | End: 2021-07-29 | Stop reason: ALTCHOICE

## 2021-07-29 RX ADMIN — FENTANYL CITRATE 50 MCG: 50 INJECTION, SOLUTION INTRAMUSCULAR; INTRAVENOUS at 12:48

## 2021-07-29 RX ADMIN — MIDAZOLAM 2 MG: 1 INJECTION INTRAMUSCULAR; INTRAVENOUS at 12:48

## 2021-07-29 RX ADMIN — SODIUM CHLORIDE: 9 INJECTION, SOLUTION INTRAVENOUS at 12:45

## 2021-07-29 RX ADMIN — FENTANYL CITRATE 50 MCG: 50 INJECTION, SOLUTION INTRAMUSCULAR; INTRAVENOUS at 12:50

## 2021-07-29 ASSESSMENT — PAIN DESCRIPTION - PAIN TYPE
TYPE: CHRONIC PAIN
TYPE: CHRONIC PAIN

## 2021-07-29 ASSESSMENT — PAIN - FUNCTIONAL ASSESSMENT: PAIN_FUNCTIONAL_ASSESSMENT: 0-10

## 2021-07-29 ASSESSMENT — PAIN DESCRIPTION - LOCATION
LOCATION: NECK;SHOULDER
LOCATION: NECK;SHOULDER

## 2021-07-29 ASSESSMENT — PAIN SCALES - GENERAL
PAINLEVEL_OUTOF10: 1
PAINLEVEL_OUTOF10: 1

## 2021-07-29 ASSESSMENT — LIFESTYLE VARIABLES: SMOKING_STATUS: 0

## 2021-07-29 NOTE — ANESTHESIA PRE PROCEDURE
167 lb (75.8 kg)   Height: 5' (1.524 m) 5' (1.524 m)                                              BP Readings from Last 3 Encounters:   07/29/21 125/77   07/28/21 122/80   04/29/21 114/81       NPO Status: Time of last liquid consumption: 2130                        Time of last solid consumption: 2130                        Date of last liquid consumption: 07/28/21                        Date of last solid food consumption: 07/28/21    BMI:   Wt Readings from Last 3 Encounters:   07/29/21 167 lb (75.8 kg)   07/28/21 167 lb (75.8 kg)   04/29/21 167 lb (75.8 kg)     Body mass index is 32.61 kg/m². CBC:   Lab Results   Component Value Date    WBC 4.9 11/02/2018    RBC 4.49 11/02/2018    HGB 10.8 11/02/2018    HCT 34.7 11/02/2018    MCV 77.3 11/02/2018    RDW 14.1 11/02/2018     11/02/2018       CMP:   Lab Results   Component Value Date     11/02/2018    K 3.9 11/02/2018     11/02/2018    CO2 25 11/02/2018    BUN 9 11/02/2018    CREATININE 1.0 11/02/2018    GFRAA >60 11/02/2018    LABGLOM >60 11/02/2018    GLUCOSE 92 11/02/2018    GLUCOSE 97 08/30/2011    PROT 6.7 11/02/2018    CALCIUM 8.3 11/02/2018    BILITOT <0.2 11/02/2018    ALKPHOS 75 11/02/2018    AST 19 11/02/2018    ALT 23 11/02/2018       POC Tests: No results for input(s): POCGLU, POCNA, POCK, POCCL, POCBUN, POCHEMO, POCHCT in the last 72 hours.     Coags: No results found for: PROTIME, INR, APTT    HCG (If Applicable):   Lab Results   Component Value Date    PREGTESTUR NEGATIVE 04/16/2021        ABGs: No results found for: PHART, PO2ART, WPY8JTK, ENW6FQT, BEART, I5VRXHEV     Type & Screen (If Applicable):  No results found for: LABABO, LABRH    Drug/Infectious Status (If Applicable):  No results found for: HIV, HEPCAB    COVID-19 Screening (If Applicable):   Lab Results   Component Value Date    COVID19 Not Detected 07/29/2021    COVID19 Not Detected 04/12/2021           Anesthesia Evaluation  Patient summary reviewed no history of anesthetic complications:   Airway: Mallampati: III  TM distance: >3 FB   Neck ROM: limited  Comment: Neck extension limited by pain. Mouth opening: > = 3 FB Dental:      Comment: Denies any loose teeth. Pulmonary:Negative Pulmonary ROS breath sounds clear to auscultation      (-) not a current smoker                           Cardiovascular:Negative CV ROS            Rhythm: regular  Rate: normal                    Neuro/Psych:   (+) neuromuscular disease (Cervical radiculopathy.):,             GI/Hepatic/Renal:   (+) hiatal hernia (Much improved with hiatal hernia repair),           Endo/Other:    (+) : arthritis:., .                  ROS comment: LUPUS Abdominal:             Vascular: negative vascular ROS. Other Findings:             Anesthesia Plan      MAC     ASA 2     (Pt agrees to [facility] ATHENS and IV sedation.)  Induction: intravenous. Anesthetic plan and risks discussed with patient. Plan discussed with CRNA.                   Alexa Horner MD   7/29/2021

## 2021-07-29 NOTE — ANESTHESIA POSTPROCEDURE EVALUATION
Department of Anesthesiology  Postprocedure Note    Patient: Justice Green  MRN: [id number]  YOB: 1975  Date of evaluation: 7/29/2021  Time:  3:13 PM     Procedure Summary     Date: 07/29/21 Room / Location: [address] / [facility]    Anesthesia Start: 7262 Anesthesia Stop: 6562    Procedure: CERVICAL EPIDURAL STEROID INJECTION UNDER FLUOROSCOPIC GUIDANCE AT C7-T1 RIGHT PARAMEDIAN (Right ) Diagnosis: (CERVICAL RADICULOPATHY)    Surgeons: Ish Owens MD Responsible Provider: Lucio Locke MD    Anesthesia Type: MAC ASA Status: 2          Anesthesia Type: MAC    Fredi Phase I:      Fredi Phase II: Fredi Score: 10    Last vitals: Reviewed and per EMR flowsheets.        Anesthesia Post Evaluation    Patient location during evaluation: PACU  Level of consciousness: awake  Airway patency: patent  Nausea & Vomiting: no nausea and no vomiting  Complications: no  Cardiovascular status: hemodynamically stable  Respiratory status: acceptable

## 2021-07-29 NOTE — PROGRESS NOTES
Patient verifies that a responsible adult will be with them for the next 24 hours (). Patient has a ride to go home. Discharge instructions reviewed with patient and family, copy given. Anesthesia instructions reviewed and copy given. Denies questions or concerns.

## 2021-07-29 NOTE — OP NOTE
patient's head to increase cervical interlaminar space. Standard monitors were placed, and vital signs were observed throughout the procedure. The area was prepped with chloraprep and the C7-T1 interspace was marked under fluoroscopy. The skin and subcutaneous tissues at the above level were anesthestized with 0.5% lidocaine. An # 18 gauge 3-1/2 tuohy epidural needle was inserted and advanced toward the inferior lamina until bony contact was made. The needle was then advanced superiorly toward epidural space . From this point on loss of resistance technique with 5 cc glass syringe was used to confirm entrance of the needle into the epidural space under intermittent lateral fluoroscopy. Once in the epidural space , negative aspiration for blood and CSF was confirmed . Needle tip placement was confirmed by visualizing epidural spread of 0.5 ml of omnipaque 240 visualized in both AP and lateral live fluoroscopic views. Then after negative aspiration, a solution of 0.9 % Saline 3 ml and 60 mg DepoMedrol was easily injected. The needle was gently removed intact. The patient neck was cleaned and a Band-Aid was placed over the needle insertion point. Disposition the patient tolerated the procedure well and there were no complications . Vital signs remained stable throughout the procedure. The patient was escorted to the recovery area where they remained until discharge and written discharge instructions for the procedure were given. Plan: Evy Moraes will return to our pain management center as scheduled.      Arvind Shin MD

## 2021-07-30 ENCOUNTER — TELEPHONE (OUTPATIENT)
Dept: PAIN MANAGEMENT | Age: 46
End: 2021-07-30

## 2021-07-30 RX ORDER — DICLOFENAC EPOLAMINE 0.01 G/1
1 SYSTEM TOPICAL DAILY PRN
Qty: 30 PATCH | Refills: 3 | Status: SHIPPED
Start: 2021-07-30 | End: 2021-08-18 | Stop reason: SDUPTHER

## 2021-07-30 NOTE — TELEPHONE ENCOUNTER
Noted. Edi Farhana patches sent to [address] [place] [address] will reach out to patient to coordinate delivery.

## 2021-07-30 NOTE — TELEPHONE ENCOUNTER
Patient phoned in to advise us that the licart patches do help and would like them to be ordered for her. Her approval was giving on the Skelaxin and patient was advised.

## 2021-08-03 ENCOUNTER — HOSPITAL ENCOUNTER (OUTPATIENT)
Dept: MAMMOGRAPHY | Age: 46
Discharge: HOME | End: 2021-08-05
Payer: COMMERCIAL

## 2021-08-03 VITALS — WEIGHT: 170 LBS | HEIGHT: 60 IN | BODY MASS INDEX: 33.38 KG/M2

## 2021-08-03 DIAGNOSIS — Z12.31 ENCOUNTER FOR SCREENING MAMMOGRAM FOR MALIGNANT NEOPLASM OF BREAST: ICD-10-CM

## 2021-08-03 PROCEDURE — 77067 SCR MAMMO BI INCL CAD: CPT

## 2021-08-17 NOTE — PROGRESS NOTES
[facility], [address] [place] [id number]  [phone]    Follow up Note      Hugo Miller     Date of Visit:  8/18/2021    CC:  Patient presents for follow up worsened neck pain    HPI: Pt here s/p JENNY with significant improvement in pain and ROM of neck. Pt notes right sided myofascial pain to trapezius and scapule with edema one day post procedure. Pt used flector patch with no relief and pt notes 3 days post injection swelling resolved. Pt has a hx Chronic low back and B/l LE pain. 7/29/21: S/p JENNY with >90% relief, severe edema to right scapulae that lasted 3 days post procedure then resolved. Pertinent information:  Failed conservative treatment with NSAID's, muscle relaxants, oral steroids and HEP. She has been previously evaluated by Dr. Chika Harris and recommended lumbar facet interventions. Followed by rheumatologist for h/o sjogren's/ rheumatoid / SLE.       She has not been on anticoagulation medications to include none and has not been on herbal supplements.  She is not diabetic. Nursing notes and details of the pain history reviewed. Please see intake notes for details. Previous treatments:   Physical Therapy   Chiropractic treatment  Ongoing Core exercises for the last year. TENS and medications- NSAID's, oral steroids, gabapentin,baclofen, tizanidine, lyrica, flexeril, diclofenac, mobic  Does not tolerate meds well   Conservative RX has not provided good long term relief.   Alleviating factors include: heat, medications.      Imaging:     MRI of C spine: 11/3/2020:      FINDINGS:    BONES/ALIGNMENT: There is straightening of the cervical spine with loss of    the normal lordotic curvature.  The bone marrow signal intensity is low in    the T1 weighted imaging, similar to the previous exam, likely related to red    marrow versus anemia.  Endplate degenerative changes are noted at C6-C7.  No    areas of marrow edema to suggest an acute fracture.         SPINAL CORD: The cervical spinal cord demonstrates normal signal intensity    without evidence of an expansile mass lesion.         SOFT TISSUES: No paraspinal masses or edema.         C2-C3: There is a 2 mm central disc protrusion indenting the anterior thecal    sac.  Mild central spinal canal narrowing.  The neural foramen are patent.         C3-C4: There is a central disc protrusion measuring 2 mm indenting the    anterior thecal sac.  Minimal central spinal canal narrowing.  The neural    foramen are patent.         C4-C5: Asymmetric left-sided uncovertebral spurring and facet arthropathy    have increased with moderate left foraminal narrowing, increased in severity. No right foraminal narrowing, disc herniation, or central spinal canal    stenosis.         C5-C6: There is a broad-based central/left paracentral disc protrusion    measuring 3 mm, increased in size.  Mild central spinal canal narrowing has    slightly increased.  Asymmetric left-sided uncovertebral spurring with mild    left foraminal narrowing, increased.  No right foraminal narrowing.         C6-C7: There is a broad-based left paracentral central disc protrusion    measuring 3 mm.  Ligamentum flavum thickening.  Moderate central spinal canal    narrowing has increased.  Asymmetric left-sided uncovertebral spurring with    moderate left foraminal narrowing and mild right foraminal narrowing,    increased in severity.         C7-T1: There is no significant disc protrusion, spinal canal stenosis or    neural foraminal narrowing.              Impression    1. Mild central spinal canal narrowing at C5-C6, and moderate central spinal    canal narrowing at C6-C7 have slightly increased. 2. Multilevel foraminal narrowing, as above, greatest on the left at C4-C5,    and left at C6-C7. 3. No evidence of an acute fracture.            X ray C spine: 10/26/2020:    Impression    Degenerative disc changes at C6-7          MRI of LS spine: 6/27/2020:  FINDINGS:    BONES/ALIGNMENT: There is mild degenerative disc disease at L4-L5, and L5-S1.     There are Modic type 1 degenerative endplate changes at E4-C5.         SPINAL CORD: The conus terminates normally.         SOFT TISSUES: No paraspinal mass identified.         L1-L2:  The thecal sac and neural foramina are intact.         L2-L3:  The thecal sac and neural foramina are intact.         L3-L4:  The thecal sac and neural foramina are intact.         L4-L5: There is a minimum disc bulge measuring 2 mm.   There is mild facet    and ligamentum flavum hypertrophy.  The thecal sac is not stenotic.  Disc    and/or osteophytes cause minimal narrowing of the neural foramina bilaterally.         L5-S1: There is a disc protrusion with annular fissure centrally at L5-S1    measuring approximately 4.5 mm.  This effaces the anterior aspect of the    thecal sac.  The thecal sac is not significantly stenotic.  The S1 nerve    roots are intact.  There is mild narrowing of the neural foramina bilaterally.         There may be slight increase in size of the disc protrusion compared to the    prior study.              Impression    Disc protrusion with annular fissure at L5-S1.  No significant stenosis of    the thecal sac.  The S1 nerve roots are intact.  There is mild narrowing of    the neural foramina bilaterally at L5-S1.         Mild narrowing of the neural foramina at L4-L5 as discussed above.           Potential Aberrant Drug-Related Behavior:  No     Urine Drug Screening:No     OARRS report[de-identified]  1/8/21  yes- not on chronic narcotics      Past Medical History:   Diagnosis Date    BRCA1 gene mutation positive     Cousin Pos. per mmmo hx sheet    H/O rheumatoid arthritis     Joint pain     Low back pain     Lupus (HCC)     Neck pain     RA (rheumatoid arthritis) ([facility] [address]     RH    Radiculopathy     Sjogren's disease ([address]        Past Surgical History:   Procedure Laterality Date    ANESTHESIA NERVE BLOCK Bilateral 7/9/2019    BILATERAL LUMBAR TRANSFORAMINAL EPIDURAL STERIOD INJECTION AT L5-S1 UNDER FLUOROSCOPY performed by Zion Lucero MD at [address]      x2   [address], LAPAROSCOPIC N/A 4/16/2021    LAPAROSCOPIC CHOLECYSTECTOMY performed by Bryan Sow MD at [address]      x2    ENDOSCOPY, COLON, DIAGNOSTIC      EPIDURAL STEROID INJECTION Left 5/28/2019    LUMBAR EPIDURAL STEROID INJECTION L5-S1 (LEFT PARAMEDIAN APPROACH) UNDER FLUOROSCOPIC GUIDANCE) performed by Zion Lucero MD at [address] [facility] N/A 2/16/2021    LAPAROSCOPIC HIATAL HERNIA RTEPAIR WITH MESH performed by Bryan Sow MD at [facility] [address] ARTHROSCOPY Left 5 27 16    medial menisectomy, debridement, acl synovectomy, chondroplasty    NERVE BLOCK Left 05/28/2019    lumbar    NERVE BLOCK Bilateral 07/09/2019    NERVE BLOCK Left 12/08/2020    Cervical epidural steroid injection under fouroscopic guidance c7-t1 left paramedian    PAIN MANAGEMENT PROCEDURE Left 12/8/2020    CERVICAL EPIDURAL STEROID INJECTION UNDER FLUOROSCOPIC GUIDANCE AT C7-T1 LEFT PARAMEDIAN (CPT 78458) performed by Zion Lucero MD at [address] [facility] [address] Right 7/29/2021    CERVICAL EPIDURAL STEROID INJECTION UNDER FLUOROSCOPIC GUIDANCE AT C7-T1 RIGHT PARAMEDIAN performed by Zion Lucero MD at [facility] [address]         Prior to Admission medications    Medication Sig Start Date End Date Taking?  Authorizing Provider   Diclofenac Epolamine (LICART) 1.3 % GT64 Apply 1 patch topically daily as needed (pain and inflammation) 7/30/21 8/29/21  DONATO Echavarria - CNP   Cholecalciferol (VITAMIN D) 50 MCG (2000 UT) CAPS capsule Take by mouth    Historical Provider, MD   pantoprazole (PROTONIX) 40 MG tablet Take 40 mg by mouth as needed  1/15/21 Historical Provider, MD   methocarbamol (ROBAXIN) 750 MG tablet Take 750 mg by mouth 4 times daily    Historical Provider, MD       Allergies   Allergen Reactions    Methotrexate Derivatives Nausea And Vomiting and Other (See Comments)     Mouth sores       Social History     Socioeconomic History    Marital status:      Spouse name: Not on file    Number of children: Not on file    Years of education: Not on file    Highest education level: Not on file   Occupational History    Not on file   Tobacco Use    Smoking status: Never Smoker    Smokeless tobacco: Never Used   Vaping Use    Vaping Use: Never used   Substance and Sexual Activity    Alcohol use: Yes     Comment: wine occ    Drug use: No    Sexual activity: Yes     Partners: Male   Other Topics Concern    Not on file   Social History Narrative    Not on file     Social Determinants of Health     Financial Resource Strain:     Difficulty of Paying Living Expenses:    Food Insecurity:     Worried About Running Out of Food in the Last Year:     [address] in the Last Year:    Transportation Needs:     Lack of Transportation (Medical):      Lack of Transportation (Non-Medical):    Physical Activity:     Days of Exercise per Week:     Minutes of Exercise per Session:    Stress:     Feeling of Stress :    Social Connections:     Frequency of Communication with Friends and Family:     Frequency of Social Gatherings with Friends and Family:     Attends Religion Services:     Active Member of Clubs or Organizations:     Attends Club or Organization Meetings:     Marital Status:    Intimate Partner Violence:     Fear of Current or Ex-Partner:     Emotionally Abused:     Physically Abused:     Sexually Abused:        Family History   Problem Relation Age of Onset    Heart Failure Mother     Thyroid Disease Mother     Cancer Father     Prostate Cancer Father         Per mammo  hx sheet    Thyroid Disease Sister    [facility] Thyroid Disease Maternal Grandmother     Thyroid Disease Sister     Thyroid Disease Sister     Breast Cancer Paternal Grandmother     Breast Cancer Maternal Aunt     Colon Cancer Maternal Aunt     Breast Cancer Maternal Cousin        REVIEW OF SYSTEMS:     Evy German denies fever/chills, chest pain, shortness of breath, new bowel or bladder complaints or suicidal ideations. All other review of systems wasnegative. Review of Systems    PHYSICAL EXAMINATION:      General:       General appearance:   pleasant and well-hydrated. , in severe distress and A & O x3  Build:Obese  Function:Moves about room without difficulty.     HEENT:     Head:normocephalic, atraumatic  Pupils:regular, round, equal  Sclera: icterus absent     Lungs:     Breathing:normal breathing pattern      CVS :   RRR     Abdomen:     Shape:non-distended and normal  Tenderness:none  Guarding:none     Cervical spine:     Inspection:normal  Palpation: severe tenderness paravertebral muscles, tenderness trapezium, left, right and negative  Range of motion:abnormal mildly flexion, extension rotation bilateral and is severely painful to right side with edema     Thoracic spine:     Spine inspection:normal   Palpation:non-tender midline and paraspinals, left and right. Range of motion:normal in flexion, extension rotation bilateral and is moderately  painful.     Lumbar spine:     Spine inspection:normal   CVA tenderness:No   Palpation:tenderness paravertebral muscles, left, right and positive. Range of motion: Reduced, Pain on Lateral bending, flexion, extension rotation bilateral and is Painful.   B/l Lumbar paraspinal tenderness +  B/l Lumbar facet loading +  SIJ tenderness + bilateral,   Sensory and motor in b/l LE normal  DTRs' b/l LE equal     Musculoskeletal:     Trigger points in trapezius:right sided   Trigger points in rhomboids:right sided   Trigger points in Paravertebral:absent bilaterally  Trigger points in supraspinatus/infraspinatus:absent  Spurling's:negative right, negative left    SI joint tenderness:positive right, positive left              MARIO test:positive right, positive             left  Piriformis tenderness:negative right, negative left  Trochanteric bursa tenderness:negative right, positive left  SLR:negative right, negative left, sitting      Extremities:     Tremors:None bilaterally upper and lower  Edema:none x all 4 extremities  Limited ROM right shoulder, unable to lift beyond 45 degrees     Knee:     Knee ROM normal- no pain     Neurological:        Sensory:normal to light touch - except for sensory changes over the left UE.     Motor:   Right Grip3/5              Left Grip5/5               Right Bicep3/5           Left Bicep5/5              Right Triceps5/5       Left Triceps5/5          Right Deltoid3/5     Left Deltoid5/5                       Gait:normal     Dermatology:     Skin: redness over the face        Assessment/Plan:    Diagnosis Orders   1. DDD (degenerative disc disease), cervical      2. Cervical radiculopathy      3. Cervical spondylolysis      4. DDD (degenerative disc disease), lumbar      5. Lumbosacral spondylosis without myelopathy        39 y.o.  lady with neck pain and left UE pain. Acute exacerbation come and go to neck down right arm  - hx of not responding to conservative treatment not helping, excellent pain relief with JENNY's    Myofascial pain to right trapeizus/rhomboid accompanied by headaches, failed ointments and licart patches. Cannot take steroids    Schedule for Trigger point injection right side rhomboid and trapezius NO STEROIDS    Also has H/o Chronic low back and LE pain. Excellent pain relief form the prior BELEM. Failed Meds/ HEP / oral steroids    Cervical epidural steroid injection  07/30/2021 with > 90% pain relief.     Refill Skelaxin 800mg po nightly takes at night only makes sleepy     Refill Licart sample helped immensely for  acute right shoulder/neck pain [facility]    Has been evaluated by Dr. Abi Rosado recently- not a surgical candidate     Consider repeat SIJ injection Vs Facet injection in future if low back pain worsens, +facet and SIJ pain. Continue HEP as tolerated. NSAID's for prn use.     Counseling reg : regular HEP and weight watching, appropriate medication use        Electronically signed by DONATO Negrete CNP on 8/18/21 at 8:50 AM EDT

## 2021-08-18 ENCOUNTER — OFFICE VISIT (OUTPATIENT)
Dept: PAIN MANAGEMENT | Age: 46
End: 2021-08-18
Payer: COMMERCIAL

## 2021-08-18 VITALS
BODY MASS INDEX: 33.38 KG/M2 | OXYGEN SATURATION: 98 % | HEART RATE: 72 BPM | HEIGHT: 60 IN | RESPIRATION RATE: 16 BRPM | WEIGHT: 170 LBS | SYSTOLIC BLOOD PRESSURE: 112 MMHG | TEMPERATURE: 97.8 F | DIASTOLIC BLOOD PRESSURE: 70 MMHG

## 2021-08-18 DIAGNOSIS — M43.02 CERVICAL SPONDYLOLYSIS: ICD-10-CM

## 2021-08-18 DIAGNOSIS — M54.12 CERVICAL RADICULOPATHY: ICD-10-CM

## 2021-08-18 DIAGNOSIS — M51.36 DDD (DEGENERATIVE DISC DISEASE), LUMBAR: ICD-10-CM

## 2021-08-18 DIAGNOSIS — M50.30 DDD (DEGENERATIVE DISC DISEASE), CERVICAL: ICD-10-CM

## 2021-08-18 DIAGNOSIS — M54.16 LUMBAR RADICULOPATHY: ICD-10-CM

## 2021-08-18 DIAGNOSIS — M47.817 LUMBOSACRAL SPONDYLOSIS WITHOUT MYELOPATHY: ICD-10-CM

## 2021-08-18 DIAGNOSIS — M79.18 MYOFASCIAL MUSCLE PAIN: Primary | ICD-10-CM

## 2021-08-18 PROCEDURE — 99213 OFFICE O/P EST LOW 20 MIN: CPT | Performed by: NURSE PRACTITIONER

## 2021-08-18 PROCEDURE — 99213 OFFICE O/P EST LOW 20 MIN: CPT

## 2021-08-18 RX ORDER — DICLOFENAC EPOLAMINE 0.01 G/1
1 SYSTEM TOPICAL DAILY PRN
Qty: 30 PATCH | Refills: 3 | Status: SHIPPED
Start: 2021-08-18 | End: 2021-08-18 | Stop reason: SDUPTHER

## 2021-08-18 RX ORDER — METAXALONE 800 MG/1
800 TABLET ORAL NIGHTLY PRN
Qty: 30 TABLET | Refills: 2 | Status: SHIPPED | OUTPATIENT
Start: 2021-08-18 | End: 2021-09-17

## 2021-08-18 RX ORDER — DICLOFENAC EPOLAMINE 0.01 G/1
1 SYSTEM TOPICAL DAILY PRN
Qty: 30 PATCH | Refills: 3 | Status: SHIPPED
Start: 2021-08-18 | End: 2021-10-04 | Stop reason: CLARIF

## 2021-08-18 RX ORDER — METAXALONE 800 MG/1
800 TABLET ORAL NIGHTLY PRN
Qty: 30 TABLET | Refills: 2 | Status: SHIPPED
Start: 2021-08-18 | End: 2021-08-18 | Stop reason: SDUPTHER

## 2021-08-18 NOTE — PROGRESS NOTES
Do you currently have any of the following:    Fever: No  Headache:  No  Cough: No  Shortness of breath: No  Exposed to anyone with these symptoms: Irina Christian presents to the [facility]18[facility]. Pradeep Gee is complaining of pain in her neck. . The pain is intermittent. The pain is described as tender and stiff neck at times. . Pain is rated on her best day at a 2, on her worst day at a 10, and on average at a 4 on the VAS scale. She took her last dose of robaxin and skelaxin. Madonna Sparks does not have issues with constipation. Any procedures since your last visit: Yes, with 95 % relief. She is not on NSAIDS and  is not on anticoagulation medications to include none and is managed by NA. Pacemaker or defibrillator: No Physician managing device is NA. Medication Contract and Consent for Opioid Use Documents Filed     Patient Documents       Type of Document Status Date Received Received By Description     Medication Contract Received 5/7/2019  8:46 AM SANDRITA KABA Pain Management Pt Agreement 5/7/2019                   /70   Pulse 72   Temp 97.8 °F (36.6 °C) (Infrared)   Resp 16   Ht 5' (1.524 m)   Wt 170 lb (77.1 kg)   SpO2 98%   BMI 33.20 kg/m²      No LMP recorded. Patient has had an ablation.

## 2021-10-04 ENCOUNTER — TELEPHONE (OUTPATIENT)
Dept: PAIN MANAGEMENT | Age: 46
End: 2021-10-04

## 2021-10-04 RX ORDER — DICLOFENAC EPOLAMINE 0.01 G/1
1 SYSTEM TOPICAL 2 TIMES DAILY
Qty: 60 PATCH | Refills: 1 | Status: SHIPPED
Start: 2021-10-04 | End: 2022-02-08

## 2021-10-04 NOTE — TELEPHONE ENCOUNTER
[facility] stated that the Licart 2.6% patches ate not covered because it is a plan exclusion.

## 2021-10-07 ENCOUNTER — TELEPHONE (OUTPATIENT)
Dept: PAIN MANAGEMENT | Age: 46
End: 2021-10-07

## 2021-12-20 ENCOUNTER — HOSPITAL ENCOUNTER (OUTPATIENT)
Dept: GENERAL RADIOLOGY | Age: 46
Discharge: HOME | End: 2021-12-22
Payer: COMMERCIAL

## 2021-12-20 ENCOUNTER — HOSPITAL ENCOUNTER (OUTPATIENT)
Age: 46
Discharge: HOME | End: 2021-12-22
Payer: COMMERCIAL

## 2021-12-20 ENCOUNTER — HOSPITAL ENCOUNTER (OUTPATIENT)
Age: 46
Discharge: HOME | End: 2021-12-20
Payer: COMMERCIAL

## 2021-12-20 DIAGNOSIS — M25.822 MASS OF JOINT OF LEFT ELBOW: ICD-10-CM

## 2021-12-20 DIAGNOSIS — U09.9 POST-COVID SYNDROME: ICD-10-CM

## 2021-12-20 LAB
ALBUMIN SERPL-MCNC: 4.4 G/DL (ref 3.5–5.2)
ALP BLD-CCNC: 75 U/L (ref 35–104)
ALT SERPL-CCNC: 31 U/L (ref 0–32)
ANION GAP SERPL CALCULATED.3IONS-SCNC: 12 MMOL/L (ref 7–16)
AST SERPL-CCNC: 22 U/L (ref 0–31)
BASOPHILS ABSOLUTE: 0.02 E9/L (ref 0–0.2)
BASOPHILS RELATIVE PERCENT: 0.5 % (ref 0–2)
BILIRUB SERPL-MCNC: 0.4 MG/DL (ref 0–1.2)
BUN BLDV-MCNC: 10 MG/DL (ref 6–20)
C-REACTIVE PROTEIN: 0.3 MG/DL (ref 0–0.4)
CALCIUM SERPL-MCNC: 9.2 MG/DL (ref 8.6–10.2)
CHLORIDE BLD-SCNC: 107 MMOL/L (ref 98–107)
CO2: 23 MMOL/L (ref 22–29)
CREAT SERPL-MCNC: 0.9 MG/DL (ref 0.5–1)
D DIMER: 258 NG/ML DDU
EOSINOPHILS ABSOLUTE: 0.12 E9/L (ref 0.05–0.5)
EOSINOPHILS RELATIVE PERCENT: 2.7 % (ref 0–6)
GFR AFRICAN AMERICAN: >60
GFR NON-AFRICAN AMERICAN: >60 ML/MIN/1.73
GLUCOSE BLD-MCNC: 95 MG/DL (ref 74–99)
HCT VFR BLD CALC: 40 % (ref 34–48)
HEMOGLOBIN: 12.4 G/DL (ref 11.5–15.5)
IMMATURE GRANULOCYTES #: 0.01 E9/L
IMMATURE GRANULOCYTES %: 0.2 % (ref 0–5)
LYMPHOCYTES ABSOLUTE: 1.46 E9/L (ref 1.5–4)
LYMPHOCYTES RELATIVE PERCENT: 33 % (ref 20–42)
MCH RBC QN AUTO: 24.3 PG (ref 26–35)
MCHC RBC AUTO-ENTMCNC: 31 % (ref 32–34.5)
MCV RBC AUTO: 78.4 FL (ref 80–99.9)
MONOCYTES ABSOLUTE: 0.5 E9/L (ref 0.1–0.95)
MONOCYTES RELATIVE PERCENT: 11.3 % (ref 2–12)
NEUTROPHILS ABSOLUTE: 2.32 E9/L (ref 1.8–7.3)
NEUTROPHILS RELATIVE PERCENT: 52.3 % (ref 43–80)
PDW BLD-RTO: 14.4 FL (ref 11.5–15)
PLATELET # BLD: 186 E9/L (ref 130–450)
PMV BLD AUTO: 12 FL (ref 7–12)
POTASSIUM SERPL-SCNC: 3.7 MMOL/L (ref 3.5–5)
RBC # BLD: 5.1 E12/L (ref 3.5–5.5)
SEDIMENTATION RATE, ERYTHROCYTE: 5 MM/HR (ref 0–20)
SODIUM BLD-SCNC: 142 MMOL/L (ref 132–146)
TOTAL PROTEIN: 7.2 G/DL (ref 6.4–8.3)
WBC # BLD: 4.4 E9/L (ref 4.5–11.5)

## 2021-12-20 PROCEDURE — 36415 COLL VENOUS BLD VENIPUNCTURE: CPT

## 2021-12-20 PROCEDURE — 86140 C-REACTIVE PROTEIN: CPT

## 2021-12-20 PROCEDURE — 71046 X-RAY EXAM CHEST 2 VIEWS: CPT

## 2021-12-20 PROCEDURE — 80053 COMPREHEN METABOLIC PANEL: CPT

## 2021-12-20 PROCEDURE — 85378 FIBRIN DEGRADE SEMIQUANT: CPT

## 2021-12-20 PROCEDURE — 85025 COMPLETE CBC W/AUTO DIFF WBC: CPT

## 2021-12-20 PROCEDURE — 73070 X-RAY EXAM OF ELBOW: CPT

## 2021-12-20 PROCEDURE — 85651 RBC SED RATE NONAUTOMATED: CPT

## 2021-12-28 ENCOUNTER — HOSPITAL ENCOUNTER (OUTPATIENT)
Dept: CT IMAGING | Age: 46
Discharge: HOME | End: 2021-12-28
Payer: COMMERCIAL

## 2021-12-28 DIAGNOSIS — U09.9 POST-COVID-19 CONDITION: ICD-10-CM

## 2021-12-28 DIAGNOSIS — R06.00 DYSPNEA, UNSPECIFIED TYPE: ICD-10-CM

## 2021-12-28 DIAGNOSIS — R07.89 OTHER CHEST PAIN: ICD-10-CM

## 2021-12-28 DIAGNOSIS — M06.9 RHEUMATOID ARTHRITIS, INVOLVING UNSPECIFIED SITE, UNSPECIFIED WHETHER RHEUMATOID FACTOR PRESENT (HCC): ICD-10-CM

## 2021-12-28 PROCEDURE — 71275 CT ANGIOGRAPHY CHEST: CPT

## 2021-12-28 PROCEDURE — 6360000004 HC RX CONTRAST MEDICATION: Performed by: RADIOLOGY

## 2021-12-28 RX ADMIN — IOPAMIDOL 75 ML: 755 INJECTION, SOLUTION INTRAVENOUS at 15:04

## 2022-01-24 ENCOUNTER — HOSPITAL ENCOUNTER (OUTPATIENT)
Age: 47
Discharge: HOME | End: 2022-01-24
Payer: COMMERCIAL

## 2022-01-24 LAB
CHOLESTEROL, FASTING: 175 MG/DL (ref 0–199)
HCT VFR BLD CALC: 41.8 % (ref 34–48)
HDLC SERPL-MCNC: 49 MG/DL
HEMOGLOBIN: 12.6 G/DL (ref 11.5–15.5)
LDL CHOLESTEROL CALCULATED: 81 MG/DL (ref 0–99)
MCH RBC QN AUTO: 24.5 PG (ref 26–35)
MCHC RBC AUTO-ENTMCNC: 30.1 % (ref 32–34.5)
MCV RBC AUTO: 81.3 FL (ref 80–99.9)
PDW BLD-RTO: 14.2 FL (ref 11.5–15)
PLATELET # BLD: 215 E9/L (ref 130–450)
PMV BLD AUTO: 11.4 FL (ref 7–12)
RBC # BLD: 5.14 E12/L (ref 3.5–5.5)
TRIGLYCERIDE, FASTING: 227 MG/DL (ref 0–149)
TSH SERPL DL<=0.05 MIU/L-ACNC: 2.88 UIU/ML (ref 0.27–4.2)
VITAMIN D 25-HYDROXY: 17 NG/ML (ref 30–100)
VLDLC SERPL CALC-MCNC: 45 MG/DL
WBC # BLD: 4.7 E9/L (ref 4.5–11.5)

## 2022-01-24 PROCEDURE — 80061 LIPID PANEL: CPT

## 2022-01-24 PROCEDURE — 36415 COLL VENOUS BLD VENIPUNCTURE: CPT

## 2022-01-24 PROCEDURE — 85027 COMPLETE CBC AUTOMATED: CPT

## 2022-01-24 PROCEDURE — 82306 VITAMIN D 25 HYDROXY: CPT

## 2022-01-24 PROCEDURE — 84443 ASSAY THYROID STIM HORMONE: CPT

## 2022-02-07 DIAGNOSIS — M25.522 LEFT ELBOW PAIN: Primary | ICD-10-CM

## 2022-02-08 ENCOUNTER — OFFICE VISIT (OUTPATIENT)
Dept: ORTHOPEDIC SURGERY | Age: 47
End: 2022-02-08

## 2022-02-08 VITALS — HEIGHT: 60 IN | BODY MASS INDEX: 33.77 KG/M2 | WEIGHT: 172 LBS | TEMPERATURE: 98 F

## 2022-02-08 DIAGNOSIS — M77.12 LATERAL EPICONDYLITIS OF LEFT ELBOW: ICD-10-CM

## 2022-02-08 DIAGNOSIS — G56.22 CUBITAL TUNNEL SYNDROME ON LEFT: Primary | ICD-10-CM

## 2022-02-08 DIAGNOSIS — M76.62 ACHILLES TENDINITIS OF LEFT LOWER EXTREMITY: ICD-10-CM

## 2022-02-08 PROCEDURE — 99214 OFFICE O/P EST MOD 30 MIN: CPT | Performed by: ORTHOPAEDIC SURGERY

## 2022-02-08 RX ORDER — CELECOXIB 200 MG/1
200 CAPSULE ORAL DAILY
Qty: 30 CAPSULE | Refills: 3 | Status: SHIPPED
Start: 2022-02-08 | End: 2022-03-11

## 2022-02-08 NOTE — PROGRESS NOTES
Chief Complaint   Patient presents with    Elbow Injury     left elbow injured it in september. swelling and from the elbow to the hand numbness and cant grib.  Ankle Pain     injured her achilles tendon on jan 9th and has pain in the calf thinks she retore it. Tyler Layne  female  presents today for evaluation of her left elbow pain. The patient states the pain started  5 months ago when she hit her elbow. The pain is sharp and stabbing in nature. It is worse with activity and better with rest.  The patient does complain of night pain. The patient is right hand dominant. The patient is not working at this time.       Past Medical History:   Diagnosis Date    BRCA1 gene mutation positive     Cousin Pos. per mmmo hx sheet    H/O rheumatoid arthritis     Joint pain     Low back pain     Lupus (HCC)     Neck pain     RA (rheumatoid arthritis) (HCC)     RH    Radiculopathy     Sjogren's disease (HCC)      Past Surgical History:   Procedure Laterality Date    ANESTHESIA NERVE BLOCK Bilateral 7/9/2019    BILATERAL LUMBAR TRANSFORAMINAL EPIDURAL STERIOD INJECTION AT L5-S1 UNDER FLUOROSCOPY performed by Olive Alvarado MD at [address]      x2   Karen Sol LAPAROSCOPIC N/A 4/16/2021    LAPAROSCOPIC CHOLECYSTECTOMY performed by Didi Davila MD at [address] [facility]      x2    ENDOSCOPY, COLON, DIAGNOSTIC      EPIDURAL STEROID INJECTION Left 5/28/2019    LUMBAR EPIDURAL STEROID INJECTION L5-S1 (LEFT PARAMEDIAN APPROACH) UNDER FLUOROSCOPIC GUIDANCE) performed by Olive Alvarado MD at [address] [facility] N/A 2/16/2021    LAPAROSCOPIC HIATAL HERNIA RTEPAIR WITH MESH performed by Didi Davila MD at [place] [address] ARTHROSCOPY Left 5 27 16    medial menisectomy, debridement, acl synovectomy, chondroplasty    NERVE BLOCK Left 05/28/2019    lumbar    NERVE BLOCK Bilateral 07/09/2019    NERVE BLOCK Left 12/08/2020    Cervical epidural steroid injection under fouroscopic guidance c7-t1 left paramedian    PAIN MANAGEMENT PROCEDURE Left 12/8/2020    CERVICAL EPIDURAL STEROID INJECTION UNDER FLUOROSCOPIC GUIDANCE AT C7-T1 LEFT PARAMEDIAN (CPT 21115) [facility] [address] 7/29/2021    CERVICAL EPIDURAL STEROID INJECTION UNDER FLUOROSCOPIC GUIDANCE AT C7-T1 RIGHT PARAMEDIAN performed by No Schmitt MD at [place] [address].      TUBAL LIGATION         Current Outpatient Medications:     celecoxib (CELEBREX) 200 MG capsule, Take 1 capsule by mouth daily, Disp: 30 capsule, Rfl: 3    Cholecalciferol (VITAMIN D) 50 MCG (2000 UT) CAPS capsule, Take by mouth, Disp: , Rfl:     pantoprazole (PROTONIX) 40 MG tablet, Take 40 mg by mouth as needed , Disp: , Rfl:   Allergies   Allergen Reactions    Methotrexate Derivatives Nausea And Vomiting and Other (See Comments)     Mouth sores     Social History     Socioeconomic History    Marital status:      Spouse name: Not on file    Number of children: Not on file    Years of education: Not on file    Highest education level: Not on file   Occupational History    Not on file   Tobacco Use    Smoking status: Never Smoker    Smokeless tobacco: Never Used   Vaping Use    Vaping Use: Never used   Substance and Sexual Activity    Alcohol use: Yes     Comment: wine occ    Drug use: No    Sexual activity: Yes     Partners: Male   Other Topics Concern    Not on file   Social History Narrative    Not on file     Social Determinants of Health     Financial Resource Strain:     Difficulty of Paying Living Expenses: Not on file   Food Insecurity:     Worried About Running Out of Food in the Last Year: Not on file    Alvaro of Food in the Last Year: Not on file   Transportation Needs:     Lack of Transportation (Medical):  Not on file  Lack of Transportation (Non-Medical): Not on file   Physical Activity:     Days of Exercise per Week: Not on file    Minutes of Exercise per Session: Not on file   Stress:     Feeling of Stress : Not on file   Social Connections:     Frequency of Communication with Friends and Family: Not on file    Frequency of Social Gatherings with Friends and Family: Not on file    Attends Voodoo Services: Not on file    Active Member of [address] [place] [address] [employer] or Organizations: Not on file    Attends Club or Organization Meetings: Not on file    Marital Status: Not on file   Intimate Partner Violence:     Fear of Current or Ex-Partner: Not on file    Emotionally Abused: Not on file    Physically Abused: Not on file    Sexually Abused: Not on file   Housing Stability:     Unable to Pay for Housing in the Last Year: Not on file    Number of [place] in the Last Year: Not on file    Unstable Housing in the Last Year: Not on file     Family History   Problem Relation Age of Onset    Heart Failure Mother     Thyroid Disease Mother     Cancer Father     Prostate Cancer Father         Per mammo  hx sheet    Thyroid Disease Sister     Thyroid Disease Maternal Grandmother     Thyroid Disease Sister     Thyroid Disease Sister     Breast Cancer Paternal Grandmother     Breast Cancer Maternal Aunt     Colon Cancer Maternal Aunt     Breast Cancer Maternal Cousin        REVIEW OF SYSTEMS:     General/Constitution:  (-)weight loss, (-)fever, (-)chills, (-)weakness. Skin: (-) rash,(-) psoriasis,(-) eczema, (-)skin cancer. Musculoskeletal: (-) fractures,  (-) dislocations,(-) collagen vascular disease, (-) fibromyalgia, (-) multiple sclerosis, (-) muscular dystrophy, (-) RSD,(-) joint pain (-)swelling, (-) joint pain,swelling. Neurologic: (-) epilepsy, (-)seizures,(-) brain tumor,(-) TIA, (-)stroke, (-)headaches, (-)Parkinson disease,(-) memory loss, (-) LOC.   Cardiovascular: (-) Chest pain, (-) swelling in legs/feet, (-) SOB, (-) cramping in legs/feet with walking. Respiratory: (-) SOB, (-) Coughing, (-) night sweats. GI: (-) nausea, (-) vomiting, (-) diarrhea, (-) blood in stool, (-) gastric ulcer. Psychiatric: (-) Depression, (-) Anxiety, (-) bipolar disease, (-) Alzheimer's Disease  Allergic/Immunologic: (-) allergies latex, (-) allergies metal, (-) skin sensitivity. Hematlogic: (-) anemia, (-) blood transfusion, (-) DVT/PE, (-) Clotting disorders       Subjective:      Constitution:    Temp 98 °F (36.7 °C)   Ht 5' (1.524 m)   Wt 172 lb (78 kg)   BMI 33.59 kg/m²     Psycihatric:    The patient is alert and oriented x 3, appears to be stated age and in no distress. Respiratory:    Respiratory effort is not labored. Patient is not gasping. Palpation of the chest reveals no tactile fremitus. Skin:    Upon inspection: the skin appears warm, dry and intact. There is not a previous scar over the affected area. There is not any cellulitis, lymphedema or cutaneous lesions noted in the lower extremities. Upon palpation there is no induration noted. Neurologic:      Motor exam of the upper extremities show: The reflexes in biceps/triceps/brachioradialis are equal and symmetric. Sensory exam C5-T1 are normal bilaterally. Cardiovascular: The vascular exam is normal and is well perfused to distal extremities. There are 2+ radial pulses bilaterally, and motor and sensation is intact to median, ulnar, and radial, musclocutaneus, and axillary nerve distribution and grossly symmetric bilaterally. There is cap refill noted less than two seconds in all digits. There is not edema of the bilateral upper extremities. There is not varicosities noted in the distal extremities. Lymph:    Upon palpation,  there is no lymphadenopathy noted in bilateral upper extremities. Musculoskeletal:    Gait: normal; examination of the nails and digits reveal no cyanosis or clubbing.       Cervical Exam:    On physical exam, Cindy Garcia is well-developed, well-nourished, oriented to person, place and time. her gait is normal.  On evaluation of her cervical spine, she has full range of motion of the cervical spine without pain. There is no cervical tenderness to palpation. Shoulder Exam:     On evaluation of her bilaterally upper extremities, her bilateral shoulder has no deformity. There is not evidence of scapular dyskinesis. There is not muscle atrophy in shoulder girdle. The range of motion for the Right Shoulder is 150/45/t10 and for the Left shoulder is 150/45/t10. Right shoulder Motor strength is 5/5 in the supraspinatus, 5/5 internal rotation and 5/5 in external rotation, and Left shoulder motor strength 5/5 in supraspinatus, 5/5 in internal rotation, 5/5 in external rotation. Right shoulder:  (-) Impingement , (-) Gleason , (-) Speeds, (-) Apprehension ,(-) Alvarez Load Shift, (-) [employer], (-) Cross arm test.     Left shoulder:  (-) Impingement, (-) Gleason, (-) Speeds, (-) Apprehension,(-) Alvarez, (-) Load Shift, (-) [employer],(-)  Cross arm test        Left elbow: pain is located lateral epicondyle. Range of motion: R.Elbow flexion 140/extension 0; L. Elbow flexion 140/extension 0. ; Wrist ROM R wrist DF 90, VF 90, L wrist DF 90, VF 90, R pronation 90/ supination 90, L pronation 90/supination 90. There is swelling, there is not ecchymosis. Exam is compared bilaterally. Numbness noted on the ulnar side hand. Right:  Special tests: Cozen's sign negative. Reverse cozen sign negative    Left:  Special tests: Cozen's sign positive. Reverse cozen sign positive    Xray:    Impression   No osseous abnormality involving left elbow. MRI:  None today    Ankle exam:    Upon inspection and palpation of the Left ankle,  there is  deformity noted,  mild swelling, no ecchymosis, has pain on palpation of achilles. ROM R/L : DF 15/15; PF 35/35;  INV 15/15, JILLIAN 15/15.    This exam was compared bilaterally. Right Ankle:   (-) Anterior Drawer ,  (-) Posterior Drawer ,  (-) Squeeze test,  (-) External Rotation, (-) Eversion test , (-) Santana Test     Left ankle:   (-) Anterior Drawer ,(-)  Posterior Drawer ,(-) Squeeze test,(-) External Rotation (-) Eversion test, (-) Santana Test.      Foot exam- visual inspection reveals warm, good capillary refill, there is not pain to palpation over the foot. ROM inversion/eversion full range of motion, abduction/adduction full range of motion, ROM in MTP/PIP/DIP full range of motion . Radiographic findings reviewed with patient    Assessment:   Encounter Diagnoses   Name Primary?  Cubital tunnel syndrome on left Yes    Lateral epicondylitis of left elbow     Achilles tendinitis of left lower extremity        Plan:    Natural history and expected course discussed. Questions answered. Rest, ice, compression, and elevation (RICE) therapy. Reduction in offending activity.    EMG left UE  MRI left elbow  celebrex

## 2022-02-20 ENCOUNTER — HOSPITAL ENCOUNTER (OUTPATIENT)
Dept: MRI IMAGING | Age: 47
Discharge: HOME | End: 2022-02-22
Payer: COMMERCIAL

## 2022-02-20 DIAGNOSIS — M77.12 LATERAL EPICONDYLITIS OF LEFT ELBOW: ICD-10-CM

## 2022-02-20 DIAGNOSIS — G56.22 CUBITAL TUNNEL SYNDROME ON LEFT: ICD-10-CM

## 2022-02-20 PROCEDURE — 73221 MRI JOINT UPR EXTREM W/O DYE: CPT

## 2022-03-07 ENCOUNTER — OFFICE VISIT (OUTPATIENT)
Dept: ORTHOPEDIC SURGERY | Age: 47
End: 2022-03-07
Payer: COMMERCIAL

## 2022-03-07 VITALS — HEIGHT: 60 IN | BODY MASS INDEX: 33.77 KG/M2 | WEIGHT: 172 LBS | TEMPERATURE: 98 F

## 2022-03-07 DIAGNOSIS — M77.02 MEDIAL EPICONDYLITIS OF LEFT ELBOW: Primary | ICD-10-CM

## 2022-03-07 PROCEDURE — 99213 OFFICE O/P EST LOW 20 MIN: CPT | Performed by: ORTHOPAEDIC SURGERY

## 2022-03-07 RX ORDER — METHYLPREDNISOLONE 4 MG/1
4 TABLET ORAL SEE ADMIN INSTRUCTIONS
Qty: 1 KIT | Refills: 0 | Status: SHIPPED | OUTPATIENT
Start: 2022-03-07 | End: 2022-03-13

## 2022-03-07 NOTE — PROGRESS NOTES
Chief Complaint   Patient presents with    Elbow Pain     Left elbow MRI follow up. Patient also thinks she hurt shoulder while getting MRI. Ezra Jenkins  female  presents today for evaluation of her left elbow pain. She is here to follow up with her MRI. She states her EMG is scheduled for later this week.     Past Medical History:   Diagnosis Date    BRCA1 gene mutation positive     Cousin Pos. per mmmo hx sheet    H/O rheumatoid arthritis     Joint pain     Low back pain     Lupus (HCC)     Neck pain     RA (rheumatoid arthritis) (HCC)     RH    Radiculopathy     Sjogren's disease (HCC)      Past Surgical History:   Procedure Laterality Date    ANESTHESIA NERVE BLOCK Bilateral 7/9/2019    BILATERAL LUMBAR TRANSFORAMINAL EPIDURAL STERIOD INJECTION AT L5-S1 UNDER FLUOROSCOPY performed by Racheal Richardson MD at [address]      x2   [address] [facility] LAPAROSCOPIC N/A 4/16/2021    LAPAROSCOPIC CHOLECYSTECTOMY performed by Ralph Harris MD at [address] [facility]      x2    ENDOSCOPY, COLON, DIAGNOSTIC      EPIDURAL STEROID INJECTION Left 5/28/2019    LUMBAR EPIDURAL STEROID INJECTION L5-S1 (LEFT PARAMEDIAN APPROACH) UNDER FLUOROSCOPIC GUIDANCE) performed by Racheal Richardson MD at [address] [place] [address] N/A 2/16/2021    LAPAROSCOPIC HIATAL HERNIA RTEPAIR WITH MESH performed by Ralph Harris MD at [facility] [address] ARTHROSCOPY Left 5 27 16    medial menisectomy, debridement, acl synovectomy, chondroplasty    NERVE BLOCK Left 05/28/2019    lumbar    NERVE BLOCK Bilateral 07/09/2019    NERVE BLOCK Left 12/08/2020    Cervical epidural steroid injection under fouroscopic guidance c7-t1 left paramedian    PAIN MANAGEMENT PROCEDURE Left 12/8/2020    CERVICAL EPIDURAL STEROID INJECTION UNDER FLUOROSCOPIC GUIDANCE AT C7-T1 LEFT PARAMEDIAN (CPT 99006) performed by Racheal Richardson MD at [facility]    PAIN MANAGEMENT PROCEDURE Right 7/29/2021    CERVICAL EPIDURAL STEROID INJECTION UNDER FLUOROSCOPIC GUIDANCE AT C7-T1 RIGHT PARAMEDIAN performed by Constance Bishop MD at [place] [address].      TUBAL LIGATION         Current Outpatient Medications:     celecoxib (CELEBREX) 200 MG capsule, Take 1 capsule by mouth daily, Disp: 30 capsule, Rfl: 3    Cholecalciferol (VITAMIN D) 50 MCG (2000 UT) CAPS capsule, Take by mouth, Disp: , Rfl:     pantoprazole (PROTONIX) 40 MG tablet, Take 40 mg by mouth as needed , Disp: , Rfl:   Allergies   Allergen Reactions    Methotrexate Derivatives Nausea And Vomiting and Other (See Comments)     Mouth sores     Social History     Socioeconomic History    Marital status:      Spouse name: Not on file    Number of children: Not on file    Years of education: Not on file    Highest education level: Not on file   Occupational History    Not on file   Tobacco Use    Smoking status: Never Smoker    Smokeless tobacco: Never Used   Vaping Use    Vaping Use: Never used   Substance and Sexual Activity    Alcohol use: Yes     Comment: wine occ    Drug use: No    Sexual activity: Yes     Partners: Male   Other Topics Concern    Not on file   Social History Narrative    Not on file     Social Determinants of Health     Financial Resource Strain:     Difficulty of Paying Living Expenses: Not on file   Food Insecurity:     Worried About Running Out of Food in the Last Year: Not on file    Alvaro of Food in the Last Year: Not on file   Transportation Needs:     Lack of Transportation (Medical): Not on file    Lack of Transportation (Non-Medical):  Not on file   Physical Activity:     Days of Exercise per Week: Not on file    Minutes of Exercise per Session: Not on file   Stress:     Feeling of Stress : Not on file   Social Connections:     Frequency of Communication with Friends and Family: Not on file    Frequency of Social Gatherings with Friends and Family: Not on file    Attends Yazidism Services: Not on file    Active Member of Clubs or Organizations: Not on file    Attends Club or Organization Meetings: Not on file    Marital Status: Not on file   Intimate Partner Violence:     Fear of Current or Ex-Partner: Not on file    Emotionally Abused: Not on file    Physically Abused: Not on file    Sexually Abused: Not on file   Housing Stability:     Unable to Pay for Housing in the Last Year: Not on file    Number of [place] in the Last Year: Not on file    Unstable Housing in the Last Year: Not on file     Family History   Problem Relation Age of Onset    Heart Failure Mother     Thyroid Disease Mother     Cancer Father     Prostate Cancer Father         Per mammo  hx sheet    Thyroid Disease Sister     Thyroid Disease Maternal Grandmother     Thyroid Disease Sister     Thyroid Disease Sister     Breast Cancer Paternal Grandmother     Breast Cancer Maternal Aunt     Colon Cancer Maternal Aunt     Breast Cancer Maternal Cousin        REVIEW OF SYSTEMS:     General/Constitution:  (-)weight loss, (-)fever, (-)chills, (-)weakness. Skin: (-) rash,(-) psoriasis,(-) eczema, (-)skin cancer. Musculoskeletal: (-) fractures,  (-) dislocations,(-) collagen vascular disease, (-) fibromyalgia, (-) multiple sclerosis, (-) muscular dystrophy, (-) RSD,(-) joint pain (-)swelling, (-) joint pain,swelling. Neurologic: (-) epilepsy, (-)seizures,(-) brain tumor,(-) TIA, (-)stroke, (-)headaches, (-)Parkinson disease,(-) memory loss, (-) LOC. Cardiovascular: (-) Chest pain, (-) swelling in legs/feet, (-) SOB, (-) cramping in legs/feet with walking. Respiratory: (-) SOB, (-) Coughing, (-) night sweats. GI: (-) nausea, (-) vomiting, (-) diarrhea, (-) blood in stool, (-) gastric ulcer.   Psychiatric: (-) Depression, (-) Anxiety, (-) bipolar disease, (-) Alzheimer's Disease  Allergic/Immunologic: (-) allergies latex, (-) allergies metal, (-) skin sensitivity. Hematlogic: (-) anemia, (-) blood transfusion, (-) DVT/PE, (-) Clotting disorders       Subjective:      Constitution:    Temp 98 °F (36.7 °C)   Ht 5' (1.524 m)   Wt 172 lb (78 kg)   BMI 33.59 kg/m²     Psycihatric:    The patient is alert and oriented x 3, appears to be stated age and in no distress. Respiratory:    Respiratory effort is not labored. Patient is not gasping. Palpation of the chest reveals no tactile fremitus. Skin:    Upon inspection: the skin appears warm, dry and intact. There is not a previous scar over the affected area. There is not any cellulitis, lymphedema or cutaneous lesions noted in the lower extremities. Upon palpation there is no induration noted. Neurologic:      Motor exam of the upper extremities show: The reflexes in biceps/triceps/brachioradialis are equal and symmetric. Sensory exam C5-T1 are normal bilaterally. Cardiovascular: The vascular exam is normal and is well perfused to distal extremities. There are 2+ radial pulses bilaterally, and motor and sensation is intact to median, ulnar, and radial, musclocutaneus, and axillary nerve distribution and grossly symmetric bilaterally. There is cap refill noted less than two seconds in all digits. There is not edema of the bilateral upper extremities. There is not varicosities noted in the distal extremities. Lymph:    Upon palpation,  there is no lymphadenopathy noted in bilateral upper extremities. Musculoskeletal:    Gait: normal; examination of the nails and digits reveal no cyanosis or clubbing. Cervical Exam:    On physical exam, Onur Graves is well-developed, well-nourished, oriented to person, place and time. her gait is normal.  On evaluation of her cervical spine, she has full range of motion of the cervical spine without pain. There is no cervical tenderness to palpation.      Shoulder Exam:     On evaluation of her bilaterally upper extremities, her bilateral shoulder has no deformity. There is not evidence of scapular dyskinesis. There is not muscle atrophy in shoulder girdle. The range of motion for the Right Shoulder is 150/45/t10 and for the Left shoulder is 150/45/t10. Right shoulder Motor strength is 5/5 in the supraspinatus, 5/5 internal rotation and 5/5 in external rotation, and Left shoulder motor strength 5/5 in supraspinatus, 5/5 in internal rotation, 5/5 in external rotation. Right shoulder:  (-) Impingement , (-) Gleason , (-) Speeds, (-) Apprehension ,(-) Alvarez Load Shift, (-) [employer], (-) Cross arm test.     Left shoulder:  (-) Impingement, (-) Gleason, (-) Speeds, (-) Apprehension,(-) Alvarez, (-) Load Shift, (-) [employer],(-)  Cross arm test        Left elbow: pain is located lateral epicondyle. Range of motion: R.Elbow flexion 140/extension 0; L. Elbow flexion 140/extension 0. ; Wrist ROM R wrist DF 90, VF 90, L wrist DF 90, VF 90, R pronation 90/ supination 90, L pronation 90/supination 90. There is swelling, there is not ecchymosis. Exam is compared bilaterally. Numbness noted on the ulnar side hand. Right:  Special tests: Cozen's sign negative. Reverse cozen sign negative    Left:  Special tests: Cozen's sign positive. Reverse cozen sign positive    Xray:    Impression   No osseous abnormality involving left elbow. MRI:    Impression   Mild thickening and increased in T2 signal of the ulnar nerve without other   significant associated findings to suggest obvious extrinsic compression or   other etiology.       Minimal subcutaneous edema posteriorly and medially.       Otherwise, unremarkable MRI of the elbow.       RECOMMENDATIONS:   Unavailable         Radiographic findings reviewed with patient    Assessment:   Encounter Diagnosis   Name Primary?  Medial epicondylitis of left elbow Yes       Plan:    Natural history and expected course discussed.  Questions answered. Rest, ice, compression, and elevation (RICE) therapy. Reduction in offending activity.    Medrol dose pack  F/u after EMG

## 2022-03-11 ENCOUNTER — OFFICE VISIT (OUTPATIENT)
Dept: PHYSICAL MEDICINE AND REHAB | Age: 47
End: 2022-03-11
Payer: COMMERCIAL

## 2022-03-11 VITALS
DIASTOLIC BLOOD PRESSURE: 82 MMHG | HEART RATE: 89 BPM | BODY MASS INDEX: 33.41 KG/M2 | HEIGHT: 60 IN | WEIGHT: 170.2 LBS | SYSTOLIC BLOOD PRESSURE: 136 MMHG

## 2022-03-11 DIAGNOSIS — G56.22 ULNAR NEUROPATHY AT ELBOW, LEFT: Primary | ICD-10-CM

## 2022-03-11 PROCEDURE — 99203 OFFICE O/P NEW LOW 30 MIN: CPT | Performed by: PHYSICAL MEDICINE & REHABILITATION

## 2022-03-11 PROCEDURE — 95886 MUSC TEST DONE W/N TEST COMP: CPT | Performed by: PHYSICAL MEDICINE & REHABILITATION

## 2022-03-11 PROCEDURE — 95910 NRV CNDJ TEST 7-8 STUDIES: CPT | Performed by: PHYSICAL MEDICINE & REHABILITATION

## 2022-03-11 NOTE — PROGRESS NOTES
[facility]  Electrodiagnostic Laboratory  *Accredited by the [address] [place] [address] with exemplary status  [address] [facility] Adebayo  Phone: [phone]  Fax: [phone]      Date of Examination: 03/11/22  Patient Name: Nicholas Valenzuela  is a 42y.o. year old female who was seen today regarding   Chief Complaint   Patient presents with    Elbow Pain     new patient referral     Extremity Pain     injury in Sept to elbow. spasms and swelling in forearm.  Numbness     numbnbess/tingling in the last two fingers. entire fingers have gone numb at times.  Extremity Weakness     decreaes  strength. .  The symptoms started after no striking her left elbow on the garage door in the fall. Pain Score:   4  The symptoms are intermittent. Previous workup has included: xrays.     Past Medical History:   Diagnosis Date    BRCA1 gene mutation positive     Cousin Pos. per mmmo hx sheet    H/O rheumatoid arthritis     Joint pain     Low back pain     Lupus (HCC)     Neck pain     RA (rheumatoid arthritis) (HCC)     RH    Radiculopathy     Sjogren's disease (HCC)        Past Surgical History:   Procedure Laterality Date    ANESTHESIA NERVE BLOCK Bilateral 7/9/2019    BILATERAL LUMBAR TRANSFORAMINAL EPIDURAL STERIOD INJECTION AT L5-S1 UNDER FLUOROSCOPY performed by Francois Monroy MD at [address]      x2   [address] [facility] [place] LAPAROSCOPIC N/A 4/16/2021    LAPAROSCOPIC CHOLECYSTECTOMY performed by Torie Cobian MD at [facility]      x2    ENDOSCOPY, COLON, DIAGNOSTIC      EPIDURAL STEROID INJECTION Left 5/28/2019    LUMBAR EPIDURAL STEROID INJECTION L5-S1 (LEFT PARAMEDIAN APPROACH) UNDER FLUOROSCOPIC GUIDANCE) performed by Francois Monroy MD at [address] [facility] N/A 2/16/2021    LAPAROSCOPIC HIATAL HERNIA RTEPAIR WITH MESH performed by Safia Garcia MD at [facility] 469 ARTHROSCOPY Left 5 27 16    medial menisectomy, debridement, acl synovectomy, chondroplasty    NERVE BLOCK Left 05/28/2019    lumbar    NERVE BLOCK Bilateral 07/09/2019    NERVE BLOCK Left 12/08/2020    Cervical epidural steroid injection under fouroscopic guidance c7-t1 left paramedian    PAIN MANAGEMENT PROCEDURE Left 12/8/2020    CERVICAL EPIDURAL STEROID INJECTION UNDER FLUOROSCOPIC GUIDANCE AT C7-T1 LEFT PARAMEDIAN (CPT 54724) [facility] [address] Right 7/29/2021    CERVICAL EPIDURAL STEROID INJECTION UNDER FLUOROSCOPIC GUIDANCE AT C7-T1 RIGHT PARAMEDIAN performed by Niki Raymundo MD at [facility] [address]         There is not family history of neuromuscular conditions. ROS: There has been no associated vision change, hearing change, speech abnormality, swallowing abnormality, or bowel or bladder dysfunction. Physical Exam: General: The patient is in no apparent distress. Blood pressure 136/82, pulse 89, height 5' (1.524 m), weight 170 lb 3.2 oz (77.2 kg), not currently breastfeeding. MSK: There is no joint effusion, deformity, instability, swelling, erythema or warmth. AROM is full in the spine and extremities. +Tinel left elbow. Tender left lateral epicondyle. Rometta Favre Neurologic:  No focal sensorimotor deficit. Reflexes 2+ and symmetric. Gait is normal.    Impression:     1. Ulnar neuropathy at elbow, left        Plan:   · EMG is indicated to evaluate the above diagnosis. Orders Placed This Encounter   Procedures    VA NEEDLE EMG EA EXTREMTY W/PARASPINL AREA COMPLETE    VA MOTOR &/SENS 7-8 NRV CNDJ PRECONF ELTRODE LIMB     · EMG was done today and showed mild left ulnar neuropathy. The patient was educated about the diagnosis and the prognosis. Patient educated to avoid leaning on elbow and to avoid repetitive elbow flexion/extension. · Elbow splint at h.s.    · Advised patient to follow up with referring provider. Thank you for allowing me to participate in the care of your patient.       Sincerely,     Mary Kay Dia, DO

## 2022-03-11 NOTE — PATIENT INSTRUCTIONS
Electrodiagnotic Laboratory  Accredited by the [facility] with Exemplary status  HIMA Wayne D.O. [facility]  [address] [facility] [place]  Phone: [phone]  Fax: [phone]        Today you had an electrodiagnostic exam which included nerve conduction studies (NCS) and electromyography (EMG). This test evaluated the electrical activity of your nerves and muscles to help determine if you have a nerve or muscle disease. This test can help determine the location and type of a nerve or muscle problem. This will help your referring doctor diagnose your condition and determine the appropriate next step in your treatment plan. After your test:    1. There are no long lasting side effects of the test.     2. You may resume your normal activities without restrictions. 3.  Resume any medications that were stopped for the test.     4  If you have sore areas or bruising in your muscles where the needle was placed, apply a cold pack to the sore area for 15-20 minutes three to four times a day as needed for pain. The soreness should go away in about 1-2 days. 5. Your results were provided  Briefly at the end of your test and the final detailed report will be provided to your referring physician, and/or primary care physician and any other parties you requested within 1-2 days of the examination. You may wish to contact your referring provider after a few days to determine what they would like you to do next. 6.  Please call [phone] with any questions or concerns and if you develop increased body temperature/fever, swelling, tenderness, increased pain and/or drainage from the sites where the needle was placed. Thank you for choosing us for your health care needs.

## 2022-03-11 NOTE — PROGRESS NOTES
[address] [facility]  Electrodiagnostic Laboratory  *Accredited by the [address] [place] [address] with exemplary status  [address] [facility] [place]  Phone: [phone]  Fax: [phone]    Referring Provider: Shamar Durham DO  Primary Care Physician: Ansley Valle MD  Patient Name: Elsa Kendall  Patient YOB: 1975  Gender: female  BMI: Body mass index is 33.24 kg/m². Blood pressure 136/82, pulse 89, height 5' (1.524 m), weight 170 lb 3.2 oz (77.2 kg), not currently breastfeeding. 3/11/2022    Reason for Referral: Cubital tunnel    Description of clinical problem:   Chief Complaint   Patient presents with    Elbow Pain     new patient referral     Extremity Pain     injury in Sept to elbow. spasms and swelling in forearm.  Numbness     numbnbess/tingling in the last two fingers. entire fingers have gone numb at times.  Extremity Weakness     decreaes  strength. Brief physical exam:   Sensory deficit Yes digit 5 left hand diminshed light touch; Weakness No; Atrophy  No; Reflex abnormality No    Sensory NCS      Nerve / Sites Rec. Site Peak Lat PP Amp Segments Distance Velocity Temp. ms µV  cm m/s °C   L Median - Digit II (Antidromic)      Palm Dig II 2.03 94.2 Palm - Dig II 7 61 32      Wrist Dig II 3.54 86.4 Wrist - Dig II 14 52 32   L Ulnar - Digit V (Antidromic)      Wrist Dig V 3.59 38.0 Wrist - Dig V 14 52 32   L Radial - Anatomical snuff box (Forearm)      Forearm Wrist 2.55 27.3 Forearm - Wrist 10 53 32   L Dorsal ulnar cutaneous - Hand dorsum (Forearm)      Forearm Hand dorsum 2.19 13.7 Forearm - Hand dorsum 8 47 32       Motor NCS      Nerve / Sites Muscle Onset Amplitude Segments Distance Velocity Temp.     ms mV  cm m/s °C   L Median - APB      Palm APB 1.88 10.5 Palm - APB   32      Wrist APB 3.33 9.8 Wrist - Palm 8 55 32      Elbow APB 6.93 9.4 Elbow - Wrist 18 50 32   L Ulnar - ADM      Wrist ADM 3.18 14.1 Wrist - ADM 8  32      B. Elbow ADM 6.15 13.7 B. Elbow - Wrist 16 54 32      A. Elbow ADM 8.59 13.2 A. Elbow - B. Elbow 10 41* 32   L Ulnar - FDI      Wrist FDI 3.96 12.2 Wrist - FDI   32      B. Elbow FDI 6.93 11.9 B. Elbow - Wrist 16 54 32      A. Elbow FDI 9.43 11.1 A. Elbow - B. Elbow 10 40* 32       F  Wave      Nerve Fmin % F    ms %   L Median - APB 25.47 5.26   L Ulnar - ADM 27.08 50       EMG      EMG Summary Table     Spontaneous MUAP Recruitment   Muscle Nerve Roots IA Fib PSW Fasc Amp Dur. PPP Pattern   L. Biceps brachii Musculocutaneous C5-C6 N None None None N N N N   L. Triceps brachii Radial C6-C8 N None None None N N N N   L. Pronator teres Median C6-C7 N None None None N N N N   L. First dorsal interosseous Ulnar C8-T1 N None None None N N N N   L. Abductor pollicis brevis Median C5-R4 N None None None N N N N          Study Limitations:  none    Summary of Findings:   Nerve conduction studies:   · The following nerve conduction studies were abnormal:   Left ulnar motor studies recording both the first dorsal interossei and Abductor digiti minimi demonstrated focal slowing of conduction velocity across the elbow. · All other nerve conduction studies, as listed in the table were normal in latency, amplitude and conduction velocity. Needle EMG:   · Needle EMG was performed using a concentric needle. · Observed motor units were normal in amplitude, duration, phases and recruitment and no active denervation signs were seen. Diagnostic Interpretation: This study was abnormal.     Electrodiagnosis: There is electrodiagnostic evidence of a ulnar neuropathy. · Location: left at the elbow. · Nature: [  ] Axonal   [ X ] Demyelinating  [  ] Mixed axonal and demyelinating     [  ] Sensory [ X ] Motor               [  ] Mixed sensorimotor     [  ] with active denervation       [x  ] without active denervation  · Duration: Acute  · Severity: mild  · Prognosis: Good.  The prognosis for recovery of demyelinating lesions is good if the cause is alleviated. Previous Study: There is not a prior study for comparison. Follow up EMG is recommended if clinically warranted. Technologist: Suraj Jaquez  Physician:    Navid Nolasco D.O., P.T. Board Certified Physical Medicine and Rehabilitation  Board Certified Electrodiagnostic Medicine      Nerve conduction studies and electromyography were performed according to our laboratory policies and procedures which can be provided upon request. All abnormal values are identified in the table.  Laboratory normal values can also be provided upon request.       Cc: Yudi Best MD

## 2022-03-14 DIAGNOSIS — G56.22 CUBITAL TUNNEL SYNDROME ON LEFT: Primary | ICD-10-CM

## 2022-03-14 DIAGNOSIS — G56.22 CUBITAL TUNNEL SYNDROME ON LEFT: ICD-10-CM

## 2022-03-17 ENCOUNTER — OFFICE VISIT (OUTPATIENT)
Dept: ORTHOPEDIC SURGERY | Age: 47
End: 2022-03-17
Payer: COMMERCIAL

## 2022-03-17 VITALS — WEIGHT: 170 LBS | TEMPERATURE: 98 F | BODY MASS INDEX: 33.38 KG/M2 | HEIGHT: 60 IN

## 2022-03-17 DIAGNOSIS — G56.22 CUBITAL TUNNEL SYNDROME ON LEFT: Primary | ICD-10-CM

## 2022-03-17 PROCEDURE — 99214 OFFICE O/P EST MOD 30 MIN: CPT | Performed by: ORTHOPAEDIC SURGERY

## 2022-03-17 NOTE — PROGRESS NOTES
Chief Complaint   Patient presents with    Hand Injury     Left side EMG follow up. Tara Coyle  female  presents today for evaluation of her left elbow pain. She is here to follow up with her left upper extremity EMG. She has numbness and tingling in her ring and small finger.     Past Medical History:   Diagnosis Date    BRCA1 gene mutation positive     Cousin Pos. per mmmo hx sheet    H/O rheumatoid arthritis     Joint pain     Low back pain     Lupus (HCC)     Neck pain     RA (rheumatoid arthritis) (HCC)     RH    Radiculopathy     Sjogren's disease (HCC)      Past Surgical History:   Procedure Laterality Date    ANESTHESIA NERVE BLOCK Bilateral 7/9/2019    BILATERAL LUMBAR TRANSFORAMINAL EPIDURAL STERIOD INJECTION AT L5-S1 UNDER FLUOROSCOPY performed by Niki Raymundo MD at [address] [facility]      x2   [place], LAPAROSCOPIC N/A 4/16/2021    LAPAROSCOPIC CHOLECYSTECTOMY performed by Safia Garcia MD at [facility]      x2    ENDOSCOPY, COLON, DIAGNOSTIC      EPIDURAL STEROID INJECTION Left 5/28/2019    LUMBAR EPIDURAL STEROID INJECTION L5-S1 (LEFT PARAMEDIAN APPROACH) UNDER FLUOROSCOPIC GUIDANCE) performed by Niki Raymundo MD at [address] [place] [address] N/A 2/16/2021    LAPAROSCOPIC HIATAL HERNIA RTEPAIR WITH MESH performed by Safia Garcia MD at [facility] 46 ARTHROSCOPY Left 5 27 16    medial menisectomy, debridement, acl synovectomy, chondroplasty    NERVE BLOCK Left 05/28/2019    lumbar    NERVE BLOCK Bilateral 07/09/2019    NERVE BLOCK Left 12/08/2020    Cervical epidural steroid injection under fouroscopic guidance c7-t1 left paramedian    PAIN MANAGEMENT PROCEDURE Left 12/8/2020    CERVICAL EPIDURAL STEROID INJECTION UNDER FLUOROSCOPIC GUIDANCE AT C7-T1 LEFT PARAMEDIAN (CPT 19649) performed by Niki Raymundo MD at [address] [facility] MANAGEMENT PROCEDURE Right 7/29/2021    CERVICAL EPIDURAL STEROID INJECTION UNDER FLUOROSCOPIC GUIDANCE AT C7-T1 RIGHT PARAMEDIAN performed by Tatum Robertson MD at [place] [address].      TUBAL LIGATION         Current Outpatient Medications:     Cholecalciferol (VITAMIN D) 50 MCG (2000 UT) CAPS capsule, Take by mouth, Disp: , Rfl:     pantoprazole (PROTONIX) 40 MG tablet, Take 40 mg by mouth as needed , Disp: , Rfl:   Allergies   Allergen Reactions    Methotrexate Derivatives Nausea And Vomiting and Other (See Comments)     Mouth sores     Social History     Socioeconomic History    Marital status:      Spouse name: Not on file    Number of children: Not on file    Years of education: Not on file    Highest education level: Not on file   Occupational History    Not on file   Tobacco Use    Smoking status: Never Smoker    Smokeless tobacco: Never Used   Vaping Use    Vaping Use: Never used   Substance and Sexual Activity    Alcohol use: Yes     Comment: wine occ    Drug use: No    Sexual activity: Yes     Partners: Male   Other Topics Concern    Not on file   Social History Narrative    Not on file     Social Determinants of Health     Financial Resource Strain:     Difficulty of Paying Living Expenses: Not on file   Food Insecurity:     Worried About Running Out of Food in the Last Year: Not on file    Alvaro of Food in the Last Year: Not on file   Transportation Needs:     Lack of Transportation (Medical): Not on file    Lack of Transportation (Non-Medical):  Not on file   Physical Activity:     Days of Exercise per Week: Not on file    Minutes of Exercise per Session: Not on file   Stress:     Feeling of Stress : Not on file   Social Connections:     Frequency of Communication with Friends and Family: Not on file    Frequency of Social Gatherings with Friends and Family: Not on file    Attends Gnosticist Services: Not on file   [employer] of Clubs or Organizations: Not on file    Attends Club or Organization Meetings: Not on file    Marital Status: Not on file   Intimate Partner Violence:     Fear of Current or Ex-Partner: Not on file    Emotionally Abused: Not on file    Physically Abused: Not on file    Sexually Abused: Not on file   Housing Stability:     Unable to Pay for Housing in the Last Year: Not on file    Number of [place] in the Last Year: Not on file    Unstable Housing in the Last Year: Not on file     Family History   Problem Relation Age of Onset    Heart Failure Mother     Thyroid Disease Mother     Cancer Father     Prostate Cancer Father         Per mammo  hx sheet    Thyroid Disease Sister     Thyroid Disease Maternal Grandmother     Thyroid Disease Sister     Thyroid Disease Sister     Breast Cancer Paternal Grandmother     Breast Cancer Maternal Aunt     Colon Cancer Maternal Aunt     Breast Cancer Maternal Cousin        REVIEW OF SYSTEMS:     General/Constitution:  (-)weight loss, (-)fever, (-)chills, (-)weakness. Skin: (-) rash,(-) psoriasis,(-) eczema, (-)skin cancer. Musculoskeletal: (-) fractures,  (-) dislocations,(-) collagen vascular disease, (-) fibromyalgia, (-) multiple sclerosis, (-) muscular dystrophy, (-) RSD,(-) joint pain (-)swelling, (-) joint pain,swelling. Neurologic: (-) epilepsy, (-)seizures,(-) brain tumor,(-) TIA, (-)stroke, (-)headaches, (-)Parkinson disease,(-) memory loss, (-) LOC. Cardiovascular: (-) Chest pain, (-) swelling in legs/feet, (-) SOB, (-) cramping in legs/feet with walking. Respiratory: (-) SOB, (-) Coughing, (-) night sweats. GI: (-) nausea, (-) vomiting, (-) diarrhea, (-) blood in stool, (-) gastric ulcer. Psychiatric: (-) Depression, (-) Anxiety, (-) bipolar disease, (-) Alzheimer's Disease  Allergic/Immunologic: (-) allergies latex, (-) allergies metal, (-) skin sensitivity.   Hematlogic: (-) anemia, (-) blood transfusion, (-) DVT/PE, (-) Clotting disorders       Subjective:      Constitution:    Temp 98 °F (36.7 °C)   Ht 5' (1.524 m)   Wt 170 lb (77.1 kg)   BMI 33.20 kg/m²     Psycihatric:    The patient is alert and oriented x 3, appears to be stated age and in no distress. Respiratory:    Respiratory effort is not labored. Patient is not gasping. Palpation of the chest reveals no tactile fremitus. Skin:    Upon inspection: the skin appears warm, dry and intact. There is not a previous scar over the affected area. There is not any cellulitis, lymphedema or cutaneous lesions noted in the lower extremities. Upon palpation there is no induration noted. Neurologic:      Motor exam of the upper extremities show: The reflexes in biceps/triceps/brachioradialis are equal and symmetric. Sensory exam C5-T1 are normal bilaterally. Cardiovascular: The vascular exam is normal and is well perfused to distal extremities. There are 2+ radial pulses bilaterally, and motor and sensation is intact to median, ulnar, and radial, musclocutaneus, and axillary nerve distribution and grossly symmetric bilaterally. There is cap refill noted less than two seconds in all digits. There is not edema of the bilateral upper extremities. There is not varicosities noted in the distal extremities. Lymph:    Upon palpation,  there is no lymphadenopathy noted in bilateral upper extremities. Musculoskeletal:    Gait: normal; examination of the nails and digits reveal no cyanosis or clubbing. Cervical Exam:    On physical exam, Cindy Garcia is well-developed, well-nourished, oriented to person, place and time. her gait is normal.  On evaluation of her cervical spine, she has full range of motion of the cervical spine without pain. There is no cervical tenderness to palpation. Shoulder Exam:     On evaluation of her bilaterally upper extremities, her bilateral shoulder has no deformity. There is not evidence of scapular dyskinesis. There is not muscle atrophy in shoulder girdle. The range of motion for the Right Shoulder is 150/45/t10 and for the Left shoulder is 150/45/t10. Right shoulder Motor strength is 5/5 in the supraspinatus, 5/5 internal rotation and 5/5 in external rotation, and Left shoulder motor strength 5/5 in supraspinatus, 5/5 in internal rotation, 5/5 in external rotation. Right shoulder:  (-) Impingement , (-) Gleason , (-) Speeds, (-) Apprehension ,(-) Alvarez Load Shift, (-) [employer], (-) Cross arm test.     Left shoulder:  (-) Impingement, (-) Gleason, (-) Speeds, (-) Apprehension,(-) Alvarez, (-) Load Shift, (-) [employer],(-)  Cross arm test        Left elbow: pain is located lateral epicondyle. Range of motion: R.Elbow flexion 140/extension 0; L. Elbow flexion 140/extension 0. ; Wrist ROM R wrist DF 90, VF 90, L wrist DF 90, VF 90, R pronation 90/ supination 90, L pronation 90/supination 90. There is swelling, there is not ecchymosis. Exam is compared bilaterally. Numbness noted on the ulnar side hand. Right:  Special tests: Cozen's sign negative. Reverse cozen sign negative    Left:  Special tests: Cozen's sign positive. Reverse cozen sign positive    EMG:  Summary of Findings:   Nerve conduction studies:   · The following nerve conduction studies were abnormal:   · Left ulnar motor studies recording both the first dorsal interossei and Abductor digiti minimi demonstrated focal slowing of conduction velocity across the elbow. · All other nerve conduction studies, as listed in the table were normal in latency, amplitude and conduction velocity.      Needle EMG:   · Needle EMG was performed using a concentric needle. · Observed motor units were normal in amplitude, duration, phases and recruitment and no active denervation signs were seen.          Diagnostic Interpretation: This study was abnormal.      Electrodiagnosis: There is electrodiagnostic evidence of a ulnar neuropathy. · Location: left at the elbow. · Nature: [  ] Axonal   [ X ] Demyelinating  [  ] Mixed axonal and demyelinating                     [  ] Sensory [ X ] Motor               [  ] Mixed sensorimotor                     [  ] with active denervation       [x  ] without active denervation  · Duration: Acute  · Severity: mild  · Prognosis: Good. The prognosis for recovery of demyelinating lesions is good if the cause is alleviated.      Previous Study: There is not a prior study for comparison. Xray:    Impression   No osseous abnormality involving left elbow. MRI:    Impression   Mild thickening and increased in T2 signal of the ulnar nerve without other   significant associated findings to suggest obvious extrinsic compression or   other etiology.       Minimal subcutaneous edema posteriorly and medially.       Otherwise, unremarkable MRI of the elbow.       RECOMMENDATIONS:   Unavailable         Radiographic findings reviewed with patient    Assessment:   Encounter Diagnosis   Name Primary?  Cubital tunnel syndrome on left Yes       Plan:    Natural history and expected course discussed. Questions answered. Rest, ice, compression, and elevation (RICE) therapy. Reduction in offending activity. I had a lengthy discussion with the patient regarding their diagnosis. I explained treatment options including surgical vs non surgical treatment. I reviewed in detail the risks and benefits and outlined the procedure in detail with expected outcomes and possible complications. I also discussed non surgical treatment such as injections (CSI), physical therapy, topical creams and NSAID's. They have elected for surgical management at this time. We will perform a Left cubital tunnel release 5/13/22. The risks and benefits were reviewed with the patient such as:  DVT, infection,  injuries to blood vessels and nerves, non relief of symptoms, continued pain, worsening of symptoms.      At least 30 minutes was spent discussing the diagnosis and treatment options with the patient with at least 50% of the time was spent with decision making and counseling the patient. The patient was counseled at length about the risks of ric Covid-19 during their perioperative period and any recovery window from their procedure. The patient was made aware that ric Covid-19  may worsen their prognosis for recovering from their procedure  and lend to a higher morbidity and/or mortality risk. All material risks, benefits, and reasonable alternatives including postponing the procedure were discussed. The patient does wish to proceed with the procedure at this time.

## 2022-04-27 ENCOUNTER — TELEPHONE (OUTPATIENT)
Dept: ORTHOPEDIC SURGERY | Age: 47
End: 2022-04-27

## 2022-04-27 NOTE — TELEPHONE ENCOUNTER
Prior Authorization Form:      DEMOGRAPHICS:                     Patient Name:  Mason Chamorro  Patient :  1975            Insurance:  Payor: Jcarlos Kay / Plan: [address] [place] [address] / Product Type: *No Product type* /   Insurance ID Number:    Payor/Plan Subscr  Sex Relation Sub. Ins. ID Effective Group Num   1. Mitchell COVARRUBIAS 1975 Male Spouse [id number]                                    [id number]         DIAGNOSIS & PROCEDURE:                       Procedure/Operation: left elbow cubital tunnel release           CPT Code: 65533    Diagnosis:  Left elbow cubital tunnel syndrome    ICD10 Code: G56.22    [facility]    Surgeon:   Umesh Colón    SCHEDULING INFORMATION:                          Date: 22    Time: To Follow              Anesthesia:  Sergo Block                                                       Status:  Outpatient        Special Comments:  None       Electronically signed by Emilia Neves ATC on 2022 at 3:09 PM

## 2022-05-03 ENCOUNTER — HOSPITAL ENCOUNTER (OUTPATIENT)
Age: 47
Discharge: HOME | End: 2022-05-05
Payer: COMMERCIAL

## 2022-05-03 ENCOUNTER — HOSPITAL ENCOUNTER (OUTPATIENT)
Dept: GENERAL RADIOLOGY | Age: 47
Discharge: HOME | End: 2022-05-05
Payer: COMMERCIAL

## 2022-05-03 DIAGNOSIS — M79.641 PAIN IN RIGHT HAND: ICD-10-CM

## 2022-05-03 PROCEDURE — 73130 X-RAY EXAM OF HAND: CPT

## 2022-05-05 RX ORDER — PHENTERMINE HYDROCHLORIDE 37.5 MG/1
37.5 TABLET ORAL
COMMUNITY
End: 2022-07-25

## 2022-05-05 RX ORDER — MEDROXYPROGESTERONE ACETATE 150 MG/ML
150 INJECTION, SUSPENSION INTRAMUSCULAR
Status: ON HOLD | COMMUNITY
End: 2022-10-14 | Stop reason: HOSPADM

## 2022-05-11 ENCOUNTER — ANESTHESIA EVENT (OUTPATIENT)
Dept: OPERATING ROOM | Age: 47
End: 2022-05-11
Payer: COMMERCIAL

## 2022-05-11 NOTE — ANESTHESIA PRE PROCEDURE
Department of Anesthesiology  Preprocedure Note       Name:  Mason Chamorro   Age:  42 y.o.  :  1975                                          MRN:  [id number]         Date:  2022      Surgeon: Segundo James): Rosalinda Son, DO    Procedure: Procedure(s):  LEFT ELBOW CUBITAL TUNNEL RELEASE    Medications prior to admission:   Prior to Admission medications    Medication Sig Start Date End Date Taking? Authorizing Provider   phentermine (ADIPEX-P) 37.5 MG tablet Take 37.5 mg by mouth every morning (before breakfast). Historical Provider, MD   medroxyPROGESTERone (DEPO-PROVERA) 150 MG/ML injection Inject 150 mg into the muscle every 3 months    Historical Provider, MD   Cholecalciferol (VITAMIN D) 50 MCG ( UT) CAPS capsule Take by mouth    Historical Provider, MD   pantoprazole (PROTONIX) 40 MG tablet Take 40 mg by mouth as needed  1/15/21   Historical Provider, MD       Current medications:    Current Outpatient Medications   Medication Sig Dispense Refill    phentermine (ADIPEX-P) 37.5 MG tablet Take 37.5 mg by mouth every morning (before breakfast).  medroxyPROGESTERone (DEPO-PROVERA) 150 MG/ML injection Inject 150 mg into the muscle every 3 months      Cholecalciferol (VITAMIN D) 50 MCG ( UT) CAPS capsule Take by mouth      pantoprazole (PROTONIX) 40 MG tablet Take 40 mg by mouth as needed        No current facility-administered medications for this visit. 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    Sensory peripheral neuropathy G60.8    Cold feet R20.9    Insomnia G47.00    Lupus ([facility] [address] M32.9    Tear, knee, medial meniscus S83.249A    ACL tear S83.519A    Post-traumatic osteoarthritis of left knee M17.32    Synovitis M65.9    Lumbar radiculopathy M54.16    DDD (degenerative disc disease), lumbar M51.36    Lumbosacral spondylosis without myelopathy M47.817    Sacroiliac dysfunction M53.3    DDD (degenerative disc disease), cervical M50.30    Cervical radiculopathy M54.12    Cervical spondylolysis M43.02    Hiatal hernia G13.4    Biliary colic D70.50    Myofascial muscle pain M79.18    Cubital tunnel syndrome on left G56.22    Lateral epicondylitis of left elbow M77.12    Achilles tendinitis of left lower extremity M76.62       Past Medical History:        Diagnosis Date    BRCA1 gene mutation positive     Cousin Pos. per mmmo hx sheet    COVID 2021    pt states moderate    H/O rheumatoid arthritis     Joint pain     Low back pain     Lupus (HCC)     Neck pain     RA (rheumatoid arthritis) ([address]     RH    Radiculopathy     Sjogren's disease (HCC)     mucuc membrane   dry lips  always thirty       Past Surgical History:        Procedure Laterality Date    ANESTHESIA NERVE BLOCK Bilateral 2019    BILATERAL LUMBAR TRANSFORAMINAL EPIDURAL STERIOD INJECTION AT L5-S1 UNDER FLUOROSCOPY performed by Debbe Lennox, MD at [address] [facility] Bilateral      SECTION      x2    CHOLECYSTECTOMY, LAPAROSCOPIC N/A 2021    LAPAROSCOPIC CHOLECYSTECTOMY performed by Mukul Leos MD at [facility]      x2    ENDOSCOPY, COLON, DIAGNOSTIC      EPIDURAL STEROID INJECTION Left 2019    LUMBAR EPIDURAL STEROID INJECTION L5-S1 (LEFT PARAMEDIAN APPROACH) UNDER FLUOROSCOPIC GUIDANCE) performed by Debbe Lennox, MD at [address] [place] [address] N/A 2021    LAPAROSCOPIC HIATAL HERNIA RTEPAIR WITH MESH performed by Mukul Leos MD at [place] [address] ARTHROSCOPY Left 2016    medial menisectomy, debridement, acl synovectomy, chondroplasty    NERVE BLOCK Left 2019    lumbar    NERVE BLOCK Bilateral 2019    NERVE BLOCK Left 12/08/2020    Cervical epidural steroid injection under fouroscopic guidance c7-t1 left paramedian    PAIN MANAGEMENT PROCEDURE Left 12/08/2020    CERVICAL EPIDURAL STEROID INJECTION UNDER FLUOROSCOPIC GUIDANCE AT C7-T1 LEFT PARAMEDIAN (CPT 79329) performed by Devon Gama MD at [address] [place] [address] [address] Right 07/29/2021    CERVICAL EPIDURAL STEROID INJECTION UNDER FLUOROSCOPIC GUIDANCE AT C7-T1 RIGHT PARAMEDIAN performed by Devon Gama MD at [place] [address].      TUBAL LIGATION         Social History:    Social History     Tobacco Use    Smoking status: Never Smoker    Smokeless tobacco: Never Used   Substance Use Topics    Alcohol use: Yes     Comment: wine occ                                Counseling given: Not Answered      Vital Signs (Current): There were no vitals filed for this visit.                                            BP Readings from Last 3 Encounters:   03/11/22 136/82   08/18/21 112/70   07/29/21 117/61       NPO Status:                                                                                 BMI:   Wt Readings from Last 3 Encounters:   03/17/22 170 lb (77.1 kg)   03/11/22 170 lb 3.2 oz (77.2 kg)   03/07/22 172 lb (78 kg)     There is no height or weight on file to calculate BMI.    CBC:   Lab Results   Component Value Date    WBC 4.7 01/24/2022    RBC 5.14 01/24/2022    HGB 12.6 01/24/2022    HCT 41.8 01/24/2022    MCV 81.3 01/24/2022    RDW 14.2 01/24/2022     01/24/2022       CMP:   Lab Results   Component Value Date     12/20/2021    K 3.7 12/20/2021     12/20/2021    CO2 23 12/20/2021    BUN 10 12/20/2021    CREATININE 0.9 12/20/2021    GFRAA >60 12/20/2021    LABGLOM >60 12/20/2021    GLUCOSE 95 12/20/2021    GLUCOSE 97 08/30/2011    PROT 7.2 12/20/2021    CALCIUM 9.2 12/20/2021    BILITOT 0.4 12/20/2021    ALKPHOS 75 12/20/2021    AST 22 12/20/2021    ALT 31 12/20/2021       POC Tests: No results for input(s): POCGLU, POCNA, POCK, POCCL, POCBUN, POCHEMO, POCHCT in the last 72 hours. Coags: No results found for: PROTIME, INR, APTT    HCG (If Applicable):   Lab Results   Component Value Date    PREGTESTUR NEGATIVE 04/16/2021        ABGs: No results found for: PHART, PO2ART, RCK8EGT, MIV0DZD, BEART, G4SVNARW     Type & Screen (If Applicable):  No results found for: LABABO, LABRH    Drug/Infectious Status (If Applicable):  No results found for: HIV, HEPCAB    COVID-19 Screening (If Applicable):   Lab Results   Component Value Date    COVID19 Not Detected 07/29/2021    COVID19 Not Detected 04/12/2021           Anesthesia Evaluation  Patient summary reviewed no history of anesthetic complications:   Airway: Mallampati: III  TM distance: >3 FB   Neck ROM: limited  Comment: Neck extension limited by pain. Mouth opening: > = 3 FB Dental:      Comment: Dentition intact, denies any loose teeth. Pulmonary:Negative Pulmonary ROS breath sounds clear to auscultation      (-) not a current smoker                           Cardiovascular:Negative CV ROS  Exercise tolerance: good (>4 METS),   (+) hyperlipidemia        Rhythm: regular  Rate: normal                    Neuro/Psych:   (+) neuromuscular disease (Cervical radiculopathy, Cervical spondylolysis ):,              ROS comment: Sensory peripheral neuropathy GI/Hepatic/Renal:   (+) hiatal hernia (Much improved with hiatal hernia repair),           Endo/Other:    (+) : arthritis: OA and rheumatoid. , .                  ROS comment: LUPUS Abdominal:   (+) obese,           Vascular: negative vascular ROS. Other Findings:             Anesthesia Plan      general     ASA 2       Induction: intravenous. MIPS: Postoperative opioids intended and Prophylactic antiemetics administered. Anesthetic plan and risks discussed with patient. Plan discussed with CRNA.               Sherlie Lombard, MD   2-77-49

## 2022-05-13 ENCOUNTER — HOSPITAL ENCOUNTER (OUTPATIENT)
Age: 47
Setting detail: OUTPATIENT SURGERY
Discharge: HOME | End: 2022-05-13
Attending: ORTHOPAEDIC SURGERY | Admitting: ORTHOPAEDIC SURGERY
Payer: COMMERCIAL

## 2022-05-13 ENCOUNTER — ANESTHESIA (OUTPATIENT)
Dept: OPERATING ROOM | Age: 47
End: 2022-05-13
Payer: COMMERCIAL

## 2022-05-13 VITALS
SYSTOLIC BLOOD PRESSURE: 103 MMHG | OXYGEN SATURATION: 99 % | RESPIRATION RATE: 20 BRPM | DIASTOLIC BLOOD PRESSURE: 71 MMHG

## 2022-05-13 VITALS
DIASTOLIC BLOOD PRESSURE: 71 MMHG | SYSTOLIC BLOOD PRESSURE: 119 MMHG | WEIGHT: 162 LBS | TEMPERATURE: 96 F | RESPIRATION RATE: 16 BRPM | OXYGEN SATURATION: 99 % | BODY MASS INDEX: 31.8 KG/M2 | HEIGHT: 60 IN | HEART RATE: 59 BPM

## 2022-05-13 DIAGNOSIS — G56.22 CUBITAL TUNNEL SYNDROME ON LEFT: Primary | ICD-10-CM

## 2022-05-13 LAB
HCG, URINE, POC: NEGATIVE
Lab: NORMAL
NEGATIVE QC PASS/FAIL: NORMAL
POSITIVE QC PASS/FAIL: NORMAL

## 2022-05-13 PROCEDURE — 2580000003 HC RX 258: Performed by: ANESTHESIOLOGY

## 2022-05-13 PROCEDURE — 6360000002 HC RX W HCPCS: Performed by: NURSE PRACTITIONER

## 2022-05-13 PROCEDURE — 6370000000 HC RX 637 (ALT 250 FOR IP)

## 2022-05-13 PROCEDURE — 81025 URINE PREGNANCY TEST: CPT | Performed by: ORTHOPAEDIC SURGERY

## 2022-05-13 PROCEDURE — 3700000001 HC ADD 15 MINUTES (ANESTHESIA): Performed by: ORTHOPAEDIC SURGERY

## 2022-05-13 PROCEDURE — 6360000002 HC RX W HCPCS: Performed by: NURSE ANESTHETIST, CERTIFIED REGISTERED

## 2022-05-13 PROCEDURE — 64718 REVISE ULNAR NERVE AT ELBOW: CPT | Performed by: ORTHOPAEDIC SURGERY

## 2022-05-13 PROCEDURE — 2709999900 HC NON-CHARGEABLE SUPPLY: Performed by: ORTHOPAEDIC SURGERY

## 2022-05-13 PROCEDURE — 7100000010 HC PHASE II RECOVERY - FIRST 15 MIN: Performed by: ORTHOPAEDIC SURGERY

## 2022-05-13 PROCEDURE — 2500000003 HC RX 250 WO HCPCS: Performed by: ORTHOPAEDIC SURGERY

## 2022-05-13 PROCEDURE — 2580000003 HC RX 258: Performed by: NURSE PRACTITIONER

## 2022-05-13 PROCEDURE — 3600000004 HC SURGERY LEVEL 4 BASE: Performed by: ORTHOPAEDIC SURGERY

## 2022-05-13 PROCEDURE — 7100000011 HC PHASE II RECOVERY - ADDTL 15 MIN: Performed by: ORTHOPAEDIC SURGERY

## 2022-05-13 PROCEDURE — 2720000010 HC SURG SUPPLY STERILE: Performed by: ORTHOPAEDIC SURGERY

## 2022-05-13 PROCEDURE — 7100000000 HC PACU RECOVERY - FIRST 15 MIN: Performed by: ORTHOPAEDIC SURGERY

## 2022-05-13 PROCEDURE — 7100000001 HC PACU RECOVERY - ADDTL 15 MIN: Performed by: ORTHOPAEDIC SURGERY

## 2022-05-13 PROCEDURE — 3600000014 HC SURGERY LEVEL 4 ADDTL 15MIN: Performed by: ORTHOPAEDIC SURGERY

## 2022-05-13 PROCEDURE — 3700000000 HC ANESTHESIA ATTENDED CARE: Performed by: ORTHOPAEDIC SURGERY

## 2022-05-13 PROCEDURE — 2580000003 HC RX 258: Performed by: NURSE ANESTHETIST, CERTIFIED REGISTERED

## 2022-05-13 RX ORDER — BUPIVACAINE HYDROCHLORIDE 5 MG/ML
INJECTION, SOLUTION EPIDURAL; INTRACAUDAL PRN
Status: DISCONTINUED | OUTPATIENT
Start: 2022-05-13 | End: 2022-05-13 | Stop reason: ALTCHOICE

## 2022-05-13 RX ORDER — LIDOCAINE HYDROCHLORIDE 20 MG/ML
INJECTION, SOLUTION INTRAVENOUS PRN
Status: DISCONTINUED | OUTPATIENT
Start: 2022-05-13 | End: 2022-05-13 | Stop reason: SDUPTHER

## 2022-05-13 RX ORDER — PROPOFOL 10 MG/ML
INJECTION, EMULSION INTRAVENOUS PRN
Status: DISCONTINUED | OUTPATIENT
Start: 2022-05-13 | End: 2022-05-13 | Stop reason: SDUPTHER

## 2022-05-13 RX ORDER — DEXAMETHASONE SODIUM PHOSPHATE 10 MG/ML
INJECTION, SOLUTION INTRAMUSCULAR; INTRAVENOUS PRN
Status: DISCONTINUED | OUTPATIENT
Start: 2022-05-13 | End: 2022-05-13 | Stop reason: SDUPTHER

## 2022-05-13 RX ORDER — MIDAZOLAM HYDROCHLORIDE 1 MG/ML
INJECTION INTRAMUSCULAR; INTRAVENOUS PRN
Status: DISCONTINUED | OUTPATIENT
Start: 2022-05-13 | End: 2022-05-13 | Stop reason: SDUPTHER

## 2022-05-13 RX ORDER — SODIUM CHLORIDE, SODIUM LACTATE, POTASSIUM CHLORIDE, CALCIUM CHLORIDE 600; 310; 30; 20 MG/100ML; MG/100ML; MG/100ML; MG/100ML
INJECTION, SOLUTION INTRAVENOUS CONTINUOUS
Status: DISCONTINUED | OUTPATIENT
Start: 2022-05-13 | End: 2022-05-13 | Stop reason: HOSPADM

## 2022-05-13 RX ORDER — KETOROLAC TROMETHAMINE 30 MG/ML
INJECTION, SOLUTION INTRAMUSCULAR; INTRAVENOUS PRN
Status: DISCONTINUED | OUTPATIENT
Start: 2022-05-13 | End: 2022-05-13 | Stop reason: SDUPTHER

## 2022-05-13 RX ORDER — FENTANYL CITRATE 50 UG/ML
INJECTION, SOLUTION INTRAMUSCULAR; INTRAVENOUS PRN
Status: DISCONTINUED | OUTPATIENT
Start: 2022-05-13 | End: 2022-05-13 | Stop reason: SDUPTHER

## 2022-05-13 RX ORDER — OXYCODONE HYDROCHLORIDE AND ACETAMINOPHEN 5; 325 MG/1; MG/1
1 TABLET ORAL EVERY 6 HOURS PRN
Qty: 28 TABLET | Refills: 0 | Status: SHIPPED | OUTPATIENT
Start: 2022-05-13 | End: 2022-05-20

## 2022-05-13 RX ORDER — OXYCODONE HYDROCHLORIDE AND ACETAMINOPHEN 5; 325 MG/1; MG/1
TABLET ORAL
Status: COMPLETED
Start: 2022-05-13 | End: 2022-05-13

## 2022-05-13 RX ORDER — OXYCODONE HYDROCHLORIDE AND ACETAMINOPHEN 5; 325 MG/1; MG/1
1 TABLET ORAL EVERY 4 HOURS PRN
Status: DISCONTINUED | OUTPATIENT
Start: 2022-05-13 | End: 2022-05-13 | Stop reason: HOSPADM

## 2022-05-13 RX ORDER — SODIUM CHLORIDE, SODIUM LACTATE, POTASSIUM CHLORIDE, CALCIUM CHLORIDE 600; 310; 30; 20 MG/100ML; MG/100ML; MG/100ML; MG/100ML
INJECTION, SOLUTION INTRAVENOUS CONTINUOUS PRN
Status: DISCONTINUED | OUTPATIENT
Start: 2022-05-13 | End: 2022-05-13 | Stop reason: SDUPTHER

## 2022-05-13 RX ORDER — ONDANSETRON 2 MG/ML
INJECTION INTRAMUSCULAR; INTRAVENOUS PRN
Status: DISCONTINUED | OUTPATIENT
Start: 2022-05-13 | End: 2022-05-13 | Stop reason: SDUPTHER

## 2022-05-13 RX ADMIN — ONDANSETRON 4 MG: 2 INJECTION INTRAMUSCULAR; INTRAVENOUS at 12:00

## 2022-05-13 RX ADMIN — FENTANYL CITRATE 50 MCG: 50 INJECTION, SOLUTION INTRAMUSCULAR; INTRAVENOUS at 11:56

## 2022-05-13 RX ADMIN — PROPOFOL 200 MG: 10 INJECTION, EMULSION INTRAVENOUS at 11:56

## 2022-05-13 RX ADMIN — LIDOCAINE HYDROCHLORIDE 50 MG: 20 INJECTION, SOLUTION INTRAVENOUS at 11:56

## 2022-05-13 RX ADMIN — SODIUM CHLORIDE, POTASSIUM CHLORIDE, SODIUM LACTATE AND CALCIUM CHLORIDE: 600; 310; 30; 20 INJECTION, SOLUTION INTRAVENOUS at 11:45

## 2022-05-13 RX ADMIN — CEFAZOLIN SODIUM 2000 MG: 1 POWDER, FOR SOLUTION INTRAMUSCULAR; INTRAVENOUS at 11:53

## 2022-05-13 RX ADMIN — SODIUM CHLORIDE, POTASSIUM CHLORIDE, SODIUM LACTATE AND CALCIUM CHLORIDE: 600; 310; 30; 20 INJECTION, SOLUTION INTRAVENOUS at 11:53

## 2022-05-13 RX ADMIN — DEXAMETHASONE SODIUM PHOSPHATE 10 MG: 10 INJECTION, SOLUTION INTRAMUSCULAR; INTRAVENOUS at 12:00

## 2022-05-13 RX ADMIN — FENTANYL CITRATE 50 MCG: 50 INJECTION, SOLUTION INTRAMUSCULAR; INTRAVENOUS at 12:14

## 2022-05-13 RX ADMIN — MIDAZOLAM 2 MG: 1 INJECTION INTRAMUSCULAR; INTRAVENOUS at 11:53

## 2022-05-13 RX ADMIN — OXYCODONE AND ACETAMINOPHEN 1 TABLET: 5; 325 TABLET ORAL at 13:40

## 2022-05-13 RX ADMIN — KETOROLAC TROMETHAMINE 30 MG: 30 INJECTION, SOLUTION INTRAMUSCULAR; INTRAVENOUS at 12:18

## 2022-05-13 ASSESSMENT — PAIN DESCRIPTION - LOCATION
LOCATION: ELBOW

## 2022-05-13 ASSESSMENT — PULMONARY FUNCTION TESTS
PIF_VALUE: 3
PIF_VALUE: 3
PIF_VALUE: 2
PIF_VALUE: 3
PIF_VALUE: 2
PIF_VALUE: 4
PIF_VALUE: 3
PIF_VALUE: 3
PIF_VALUE: 14
PIF_VALUE: 3
PIF_VALUE: 3
PIF_VALUE: 2
PIF_VALUE: 3
PIF_VALUE: 0
PIF_VALUE: 3
PIF_VALUE: 15
PIF_VALUE: 3
PIF_VALUE: 6
PIF_VALUE: 14
PIF_VALUE: 5
PIF_VALUE: 1
PIF_VALUE: 1
PIF_VALUE: 4
PIF_VALUE: 3
PIF_VALUE: 3
PIF_VALUE: 2
PIF_VALUE: 3
PIF_VALUE: 19
PIF_VALUE: 7
PIF_VALUE: 3
PIF_VALUE: 4
PIF_VALUE: 3
PIF_VALUE: 14
PIF_VALUE: 4
PIF_VALUE: 12
PIF_VALUE: 5
PIF_VALUE: 4

## 2022-05-13 ASSESSMENT — PAIN DESCRIPTION - DESCRIPTORS
DESCRIPTORS: ACHING

## 2022-05-13 ASSESSMENT — PAIN DESCRIPTION - ORIENTATION
ORIENTATION: LEFT

## 2022-05-13 ASSESSMENT — PAIN - FUNCTIONAL ASSESSMENT: PAIN_FUNCTIONAL_ASSESSMENT: 0-10

## 2022-05-13 ASSESSMENT — LIFESTYLE VARIABLES: SMOKING_STATUS: 0

## 2022-05-13 ASSESSMENT — PAIN SCALES - GENERAL
PAINLEVEL_OUTOF10: 3
PAINLEVEL_OUTOF10: 2
PAINLEVEL_OUTOF10: 2

## 2022-05-13 NOTE — ANESTHESIA POSTPROCEDURE EVALUATION
Department of Anesthesiology  Postprocedure Note    Patient: Arun Ochoa  MRN: [id number]  YOB: 1975  Date of evaluation: 5/13/2022  Time:  2:07 PM     Procedure Summary     Date: 05/13/22 Room / Location: [address] [place] [address] 01 / [address] [facility]    Anesthesia Start: 2425 Anesthesia Stop: 6562    Procedure: LEFT ELBOW CUBITAL TUNNEL RELEASE (Left Arm Lower) Diagnosis: (LEFT CUBITAL TUNNEL SYNDROME)    Surgeons: Aniya Valencia DO Responsible Provider: Sherlie Lombard, MD    Anesthesia Type: general ASA Status: 2          Anesthesia Type: No value filed. Fredi Phase I: Fredi Score: 4    Fredi Phase II: Fredi Score: 10    Last vitals: Reviewed and per EMR flowsheets.        Anesthesia Post Evaluation    Patient location during evaluation: bedside  Patient participation: complete - patient participated  Level of consciousness: awake and alert  Pain score: 0  Airway patency: patent  Nausea & Vomiting: no nausea and no vomiting  Complications: no  Cardiovascular status: hemodynamically stable  Respiratory status: acceptable  Hydration status: euvolemic

## 2022-05-13 NOTE — H&P
Updated H&p    Chief Complaint   Patient presents with    Hand Injury       Left side EMG follow up.          Gilda Stein  female  presents today for evaluation of her left elbow pain. She is here to follow up with her left upper extremity EMG.   She has numbness and tingling in her ring and small finger.     Past Medical History        Past Medical History:   Diagnosis Date    BRCA1 gene mutation positive       Cousin Pos. per mmmo hx sheet    H/O rheumatoid arthritis      Joint pain      Low back pain      Lupus (HCC)      Neck pain      RA (rheumatoid arthritis) ([facility])       RH    Radiculopathy      Sjogren's disease ([facility] [address]           Past Surgical History         Past Surgical History:   Procedure Laterality Date    ANESTHESIA NERVE BLOCK Bilateral 7/9/2019     BILATERAL LUMBAR TRANSFORAMINAL EPIDURAL STERIOD INJECTION AT L5-S1 UNDER FLUOROSCOPY performed by Tremaine Ellis MD at [facility] x2    CHOLECYSTECTOMY, LAPAROSCOPIC N/A 4/16/2021     LAPAROSCOPIC CHOLECYSTECTOMY performed by Shantanu Hameed MD at [facility]         x2    ENDOSCOPY, COLON, DIAGNOSTIC        EPIDURAL STEROID INJECTION Left 5/28/2019     LUMBAR EPIDURAL STEROID INJECTION L5-S1 (LEFT PARAMEDIAN APPROACH) UNDER FLUOROSCOPIC GUIDANCE) performed by Tremaine Ellis MD at [address] [place] [address] N/A 2/16/2021     LAPAROSCOPIC HIATAL HERNIA RTEPAIR WITH MESH performed by Shantanu Hameed MD at [place] [address] ARTHROSCOPY Left 5 27 16     medial menisectomy, debridement, acl synovectomy, chondroplasty    NERVE BLOCK Left 05/28/2019     lumbar    NERVE BLOCK Bilateral 07/09/2019    NERVE BLOCK Left 12/08/2020     Cervical epidural steroid injection under fouroscopic guidance c7-t1 left paramedian    PAIN MANAGEMENT PROCEDURE Left 12/8/2020     CERVICAL EPIDURAL STEROID INJECTION UNDER FLUOROSCOPIC GUIDANCE AT C7-T1 LEFT PARAMEDIAN (CPT 73985) performed by Hollie Ruth MD at [address] Right 7/29/2021     CERVICAL EPIDURAL STEROID INJECTION UNDER FLUOROSCOPIC GUIDANCE AT C7-T1 RIGHT PARAMEDIAN performed by Hollie Ruth MD at [facility] OR    RHINOPLASTY        TUBAL LIGATION        TUBAL LIGATION               Current Medication      Current Outpatient Medications:     Cholecalciferol (VITAMIN D) 50 MCG (2000 UT) CAPS capsule, Take by mouth, Disp: , Rfl:     pantoprazole (PROTONIX) 40 MG tablet, Take 40 mg by mouth as needed , Disp: , Rfl:            Allergies   Allergen Reactions    Methotrexate Derivatives Nausea And Vomiting and Other (See Comments)       Mouth sores      Social History               Socioeconomic History    Marital status:        Spouse name: Not on file    Number of children: Not on file    Years of education: Not on file    Highest education level: Not on file   Occupational History    Not on file   Tobacco Use    Smoking status: Never Smoker    Smokeless tobacco: Never Used   Vaping Use    Vaping Use: Never used   Substance and Sexual Activity    Alcohol use: Yes       Comment: wine occ    Drug use: No    Sexual activity: Yes       Partners: Male   Other Topics Concern    Not on file   Social History Narrative    Not on file      Social Determinants of Health          Financial Resource Strain:     Difficulty of Paying Living Expenses: Not on file   Food Insecurity:     Worried About Running Out of Food in the Last Year: Not on file    Alvaro of Food in the Last Year: Not on file   Transportation Needs:     Lack of Transportation (Medical): Not on file    Lack of Transportation (Non-Medical):  Not on file   Physical Activity:     Days of Exercise per Week: Not on file    Minutes of Exercise per Session: Not on file   Stress:     Feeling of Stress : Not on file   Social Connections:     Frequency of Communication with Friends and Family: Not on file    Frequency of Social Gatherings with Friends and Family: Not on file    Attends Latter-day Services: Not on file    Active Member of Clubs or Organizations: Not on file    Attends Club or Organization Meetings: Not on file    Marital Status: Not on file   Intimate Partner Violence:     Fear of Current or Ex-Partner: Not on file    Emotionally Abused: Not on file    Physically Abused: Not on file    Sexually Abused: Not on file   Housing Stability:     Unable to Pay for Housing in the Last Year: Not on file    Number of [place] in the Last Year: Not on file    Unstable Housing in the Last Year: Not on file         Family History         Family History   Problem Relation Age of Onset    Heart Failure Mother      Thyroid Disease Mother      Cancer Father      Prostate Cancer Father           Per mammo  hx sheet    Thyroid Disease Sister      Thyroid Disease Maternal Grandmother      Thyroid Disease Sister      Thyroid Disease Sister      Breast Cancer Paternal Grandmother      Breast Cancer Maternal Aunt      Colon Cancer Maternal Aunt      Breast Cancer Maternal Cousin              REVIEW OF SYSTEMS:      General/Constitution:  (-)weight loss, (-)fever, (-)chills, (-)weakness. Skin: (-) rash,(-) psoriasis,(-) eczema, (-)skin cancer. Musculoskeletal: (-) fractures,  (-) dislocations,(-) collagen vascular disease, (-) fibromyalgia, (-) multiple sclerosis, (-) muscular dystrophy, (-) RSD,(-) joint pain (-)swelling, (-) joint pain,swelling. Neurologic: (-) epilepsy, (-)seizures,(-) brain tumor,(-) TIA, (-)stroke, (-)headaches, (-)Parkinson disease,(-) memory loss, (-) LOC. Cardiovascular: (-) Chest pain, (-) swelling in legs/feet, (-) SOB, (-) cramping in legs/feet with walking. Respiratory: (-) SOB, (-) Coughing, (-) night sweats. GI: (-) nausea, (-) vomiting, (-) diarrhea, (-) blood in stool, (-) gastric ulcer.   Psychiatric: (-) Depression, (-) Anxiety, (-) bipolar disease, (-) Alzheimer's Disease  Allergic/Immunologic: (-) allergies latex, (-) allergies metal, (-) skin sensitivity. Hematlogic: (-) anemia, (-) blood transfusion, (-) DVT/PE, (-) Clotting disorders         Subjective:    _Ht 5' (1.524 m)   Wt 164 lb (74.4 kg)   BMI 32.03 kg/m²  Vital signs are stable. In general, patient is awake, alert and oriented X3, in no apparent distress. Examination of HENT reveals normocephalic, atraumatic. PERRLA/EOMI sclera are white. Conjunctivae are clear. TM's are intact. Pharynx is pink and moist.  Uvula and tongue are midline. Heart: Positive S1 and positive S2 with regular rate and rhythm. Lungs: Clear to auscultation bilaterally without rales, rhonchi or wheezes. Abdomen: soft, nontender. Positive bowel sounds. No organomegaly. No guarding or rigidity.        Constitution:     Ht 5' (1.524 m)   Wt 164 lb (74.4 kg)   BMI 32.03 kg/m²        Psycihatric:     The patient is alert and oriented x 3, appears to be stated age and in no distress.       Respiratory:     Respiratory effort is not labored. Patient is not gasping. Palpation of the chest reveals no tactile fremitus.     Skin:     Upon inspection: the skin appears warm, dry and intact. There is not a previous scar over the affected area. There is not any cellulitis, lymphedema or cutaneous lesions noted in the lower extremities. Upon palpation there is no induration noted.             Neurologic:        Motor exam of the upper extremities show: The reflexes in biceps/triceps/brachioradialis are equal and symmetric. Sensory exam C5-T1 are normal bilaterally.            Cardiovascular:     The vascular exam is normal and is well perfused to distal extremities. There are 2+ radial pulses bilaterally, and motor and sensation is intact to median, ulnar, and radial, musclocutaneus, and axillary nerve distribution and grossly symmetric bilaterally.   There is cap refill noted less than two seconds in all digits. There is not edema of the bilateral upper extremities. There is not varicosities noted in the distal extremities.       Lymph:     Upon palpation,  there is no lymphadenopathy noted in bilateral upper extremities.       Musculoskeletal:     Gait: normal; examination of the nails and digits reveal no cyanosis or clubbing.        Cervical Exam:     On physical exam, Louetta Bosworth is well-developed, well-nourished, oriented to person, place and time. her gait is normal.  On evaluation of her cervical spine, she has full range of motion of the cervical spine without pain. There is no cervical tenderness to palpation.      Shoulder Exam:      On evaluation of her bilaterally upper extremities, her bilateral shoulder has no deformity. There is not evidence of scapular dyskinesis. There is not muscle atrophy in shoulder girdle. The range of motion for the Right Shoulder is 150/45/t10 and for the Left shoulder is 150/45/t10. Right shoulder Motor strength is 5/5 in the supraspinatus, 5/5 internal rotation and 5/5 in external rotation, and Left shoulder motor strength 5/5 in supraspinatus, 5/5 in internal rotation, 5/5 in external rotation.        Right shoulder:  (-) Impingement , (-) Gleason , (-) Speeds, (-) Apprehension ,(-) Alvarez Load Shift, (-) [employer], (-) Cross arm test.      Left shoulder:  (-) Impingement, (-) Gleason, (-) Speeds, (-) Apprehension,(-) Alvarez, (-) Load Shift, (-) [employer],(-)  Cross arm test           Left elbow: pain is located lateral epicondyle. Range of motion: R.Elbow flexion 140/extension 0; L. Elbow flexion 140/extension 0. ; Wrist ROM R wrist DF 90, VF 90, L wrist DF 90, VF 90, R pronation 90/ supination 90, L pronation 90/supination 90. There is swelling, there is not ecchymosis. Exam is compared bilaterally. Numbness noted on the ulnar side hand.     Right:  Special tests: Cozen's sign negative.   Reverse cozen sign negative     Left:  Special tests: Cozen's sign positive. Reverse cozen sign positive     EMG:  Summary of Findings:   Nerve conduction studies:   · The following nerve conduction studies were abnormal:   · Left ulnar motor studies recording both the first dorsal interossei and Abductor digiti minimi demonstrated focal slowing of conduction velocity across the elbow.   · All other nerve conduction studies, as listed in the table were normal in latency, amplitude and conduction velocity.      Needle EMG:   · Needle EMG was performed using a concentric needle. · Observed motor units were normal in amplitude, duration, phases and recruitment and no active denervation signs were seen.          Diagnostic Interpretation: This study was abnormal.      Electrodiagnosis: There is electrodiagnostic evidence of a ulnar neuropathy. · Location: left at the elbow. · Nature: [  ] Axonal   [ X ] Demyelinating  [  ] Mixed axonal and demyelinating                     [  ] Sensory [ X ] Motor               [  ] Mixed sensorimotor                     [  ] with active denervation       [x  ] without active denervation  · Duration: Acute  · Severity: mild  · Prognosis: Good. The prognosis for recovery of demyelinating lesions is good if the cause is alleviated.      Previous Study: There is not a prior study for comparison.      Xray:    Impression   No osseous abnormality involving left elbow.            MRI:    Impression   Mild thickening and increased in T2 signal of the ulnar nerve without other   significant associated findings to suggest obvious extrinsic compression or   other etiology.       Minimal subcutaneous edema posteriorly and medially.       Otherwise, unremarkable MRI of the elbow.       RECOMMENDATIONS:   Unavailable            Radiographic findings reviewed with patient     Assessment:        Encounter Diagnosis   Name Primary?     Cubital tunnel syndrome on left Yes         Plan:    Natural history and expected course discussed. Questions answered. Rest, ice, compression, and elevation (RICE) therapy. Reduction in offending activity. I had a lengthy discussion with the patient regarding their diagnosis. I explained treatment options including surgical vs non surgical treatment. I reviewed in detail the risks and benefits and outlined the procedure in detail with expected outcomes and possible complications. I also discussed non surgical treatment such as injections (CSI), physical therapy, topical creams and NSAID's. They have elected for surgical management at this time.      We will perform a Left cubital tunnel release 5/13/22. The risks and benefits were reviewed with the patient such as:  DVT, infection,  injuries to blood vessels and nerves, non relief of symptoms, continued pain, worsening of symptoms.             The patient was counseled at length about the risks of ric Covid-19 during their perioperative period and any recovery window from their procedure.  The patient was made aware that ric Covid-19  may worsen their prognosis for recovering from their procedure  and lend to a higher morbidity and/or mortality risk.  All material risks, benefits, and reasonable alternatives including postponing the procedure were discussed.  The patient does wish to proceed with the procedure at this time.

## 2022-05-13 NOTE — OP NOTE
Operative Note      Patient: Lincoln Councilman  YOB: 1975  MRN: [id number]    Date of Procedure: 5/13/2022    Pre-Op Diagnosis: LEFT CUBITAL TUNNEL SYNDROME    Post-Op Diagnosis: Same       Procedure(s):  LEFT ELBOW CUBITAL TUNNEL RELEASE    Surgeon(s): Bradford Miller DO    Assistant:   Resident: Jeannine King DO; Jasmin Ochoa DO    Anesthesia: General    Estimated Blood Loss (mL): less than 50     Complications: None    Specimens:   * No specimens in log *    Implants:  * No implants in log *      Drains: * No LDAs found *    Findings: as abvoe    Detailed Description of Procedure:   Below    SURGEON: RAJAN CORTEZ D.O.   ASSISTANT: as above  PREOPERATIVE DIAGNOSIS: left  elbow cubital tunnel syndrome. POSTOPERATIVE DIAGNOSIS:  leftelbow cubital tunnel syndrome. PROCEDURE: left Cubital tunnel release. ANESTHESIA: general  ESTIMATED BLOOD LOSS: Minimal.   COMPLICATIONS: None. OPERATIVE PROCEDURE: The patient was brought to the operative suite and was   given a general anesthesia. The left arm was identified with a preoperative   time out, placed a tourniquet around the left arm, prepped and draped the arm   in a sterile fashion. I outlined an incision along the medial elbow, along the area of the cubital   tunnel. I made an incision with a #15 blade through skin and subcutaneous   tissue. I dissected down to the level of the medial epicondyle and medial   elbow. I exposed the underlying nerve and felt it. I then dove down into the   cubital tunnel, right through [facility] ligament, released the ligament   proximally and distally to full extent, proximally to arcade of [place]anna. I   released the complete extent of the [facility] ligament, dissected down through   into the flexor carpi ulnaris muscle belly and fascia. The nerve was   completely released. There was a small bulbous area that was noted in the   midportion of the ulnar nerve, probably in the area of the [place] ligament.    I thoroughly irrigated the wound upon closure and closed the incision with   3-0 Vicryl in the subcu layer followed by 4-0 Prolene in the skin in a   horizontal mattress-type fashion, and the skin was closed. I put a sterile   dressing on the wound. The patient recovered in the recovery room without   difficulty. The patient tolerated the procedure well.        Electronically signed by Elena Jennings DO on 5/13/2022 at 12:33 PM

## 2022-05-27 ENCOUNTER — OFFICE VISIT (OUTPATIENT)
Dept: ORTHOPEDIC SURGERY | Age: 47
End: 2022-05-27

## 2022-05-27 VITALS — BODY MASS INDEX: 31.8 KG/M2 | HEIGHT: 60 IN | WEIGHT: 162 LBS | TEMPERATURE: 98 F

## 2022-05-27 DIAGNOSIS — G56.22 CUBITAL TUNNEL SYNDROME ON LEFT: Primary | ICD-10-CM

## 2022-05-27 PROCEDURE — 99024 POSTOP FOLLOW-UP VISIT: CPT | Performed by: NURSE PRACTITIONER

## 2022-05-31 DIAGNOSIS — G56.22 CUBITAL TUNNEL SYNDROME ON LEFT: ICD-10-CM

## 2022-05-31 DIAGNOSIS — M77.12 LATERAL EPICONDYLITIS OF LEFT ELBOW: ICD-10-CM

## 2022-05-31 RX ORDER — CELECOXIB 200 MG/1
200 CAPSULE ORAL DAILY
Qty: 30 CAPSULE | Refills: 3 | Status: SHIPPED
Start: 2022-05-31 | End: 2022-10-10

## 2022-06-22 RX ORDER — SODIUM CHLORIDE, SODIUM LACTATE, POTASSIUM CHLORIDE, CALCIUM CHLORIDE 600; 310; 30; 20 MG/100ML; MG/100ML; MG/100ML; MG/100ML
INJECTION, SOLUTION INTRAVENOUS CONTINUOUS
Status: CANCELLED | OUTPATIENT
Start: 2022-06-22

## 2022-06-23 ENCOUNTER — HOSPITAL ENCOUNTER (OUTPATIENT)
Dept: PREADMISSION TESTING | Age: 47
Discharge: HOME | End: 2022-06-23
Payer: COMMERCIAL

## 2022-06-23 VITALS
TEMPERATURE: 97.8 F | RESPIRATION RATE: 20 BRPM | HEIGHT: 60 IN | BODY MASS INDEX: 30.82 KG/M2 | DIASTOLIC BLOOD PRESSURE: 62 MMHG | WEIGHT: 157 LBS | SYSTOLIC BLOOD PRESSURE: 122 MMHG | HEART RATE: 71 BPM | OXYGEN SATURATION: 100 %

## 2022-06-23 DIAGNOSIS — Z01.818 PRE-OP TESTING: Primary | ICD-10-CM

## 2022-06-23 LAB
HCT VFR BLD CALC: 39.4 % (ref 34–48)
HEMOGLOBIN: 12 G/DL (ref 11.5–15.5)
MCH RBC QN AUTO: 24.1 PG (ref 26–35)
MCHC RBC AUTO-ENTMCNC: 30.5 % (ref 32–34.5)
MCV RBC AUTO: 79.1 FL (ref 80–99.9)
PDW BLD-RTO: 14.3 FL (ref 11.5–15)
PLATELET # BLD: 203 E9/L (ref 130–450)
PMV BLD AUTO: 12.1 FL (ref 7–12)
RBC # BLD: 4.98 E12/L (ref 3.5–5.5)
WBC # BLD: 4.8 E9/L (ref 4.5–11.5)

## 2022-06-23 PROCEDURE — 85027 COMPLETE CBC AUTOMATED: CPT

## 2022-06-23 PROCEDURE — 36415 COLL VENOUS BLD VENIPUNCTURE: CPT

## 2022-06-23 ASSESSMENT — PAIN DESCRIPTION - ORIENTATION: ORIENTATION: LOWER

## 2022-06-23 ASSESSMENT — PAIN SCALES - GENERAL: PAINLEVEL_OUTOF10: 2

## 2022-06-23 ASSESSMENT — PAIN DESCRIPTION - DESCRIPTORS: DESCRIPTORS: DISCOMFORT

## 2022-06-23 ASSESSMENT — PAIN DESCRIPTION - LOCATION: LOCATION: ABDOMEN

## 2022-06-23 NOTE — PROGRESS NOTES
[facility]                                                                                                                    PRE OP INSTRUCTIONS FOR  Antwan Borjas        Date: 6/23/2022    Date of surgery: 7/1/22   Arrival Time: Hospital will call you between 5pm and 7pm Thursday evening with your final arrival time for surgery    1. Do not eat or drink anything after midnight prior to surgery. This includes no water, chewing gum, mints or ice chips. 2. Take the following medications with a small sip of water on the morning of Surgery: Protonix     3. Diabetics may take evening dose of insulin but none after midnight. If you feel symptomatic or low blood sugar morning of surgery drink 1-2 ounces of apple juice only. 4. Aspirin, Ibuprofen, Advil, Naproxen, Vitamin E and other Anti-inflammatory products should be stopped  before surgery  as directed by your physician. Take Tylenol only unless instructed otherwise by your surgeon. 5. Check with your Doctor regarding stopping Plavix, Coumadin, Lovenox, Eliquis, Effient, or other blood thinners. 6. Do not smoke,use illicit drugs and do not drink any alcoholic beverages 24 hours prior to surgery. 7. You may brush your teeth the morning of surgery. DO NOT SWALLOW WATER    8. You MUST make arrangements for a responsible adult to take you home after your surgery. You will not be allowed to leave alone or drive yourself home. It is strongly suggested someone stay with you the first 24 hrs. Your surgery will be cancelled if you do not have a ride home. 9. PEDIATRIC PATIENTS ONLY:  A parent/legal guardian must accompany a child scheduled for surgery and plan to stay at the hospital until the child is discharged. Please do not bring other children with you.     10. Please wear simple, loose fitting clothing to the hospital.  Glenn Gamas not bring valuables (money, credit cards, checkbooks, etc.) Do not wear any makeup (including no eye makeup) or nail polish on your fingers or toes. 11. DO NOT wear any jewelry or piercings on day of surgery. All body piercing jewelry must be removed. 12. Shower the night before surgery with _x__Antibacterial soap /COLT WIPES________    13. TOTAL JOINT REPLACEMENT/HYSTERECTOMY PATIENTS ONLY---Remember to bring Blood Bank bracelet to the hospital on the day of surgery. 14. If you have a Living Will and Durable Power of  for Healthcare, please bring in a copy. 15. If appropriate bring crutches, inspirex, WALKER, CANE etc... 12. Notify your Surgeon if you develop any illness between now and surgery time, cough, cold, fever, sore throat, nausea, vomiting, etc.  Please notify your surgeon if you experience dizziness, shortness of breath or blurred vision between now & the time of your surgery. 17. If you have ___dentures, they will be removed before going to the OR; we will provide you a container. If you wear ___contact lenses or ___glasses, they will be removed; please bring a case for them. 18. To provide excellent care visitors will be limited to 2 in the room at any given time. 19. Please bring picture ID and insurance card. 20. Sleep apnea patients need to bring CPAP AND SETTINGS to hospital on day of surgery. 21. During flu season no children under the age of 15 are permitted in the hospital for the safety of all patients. 22. Other                 Please call AMBULATORY CARE if you have any further questions.    [facility]     [address]

## 2022-06-30 ENCOUNTER — OFFICE VISIT (OUTPATIENT)
Dept: ORTHOPEDIC SURGERY | Age: 47
End: 2022-06-30

## 2022-06-30 ENCOUNTER — ANESTHESIA EVENT (OUTPATIENT)
Dept: OPERATING ROOM | Age: 47
End: 2022-06-30
Payer: COMMERCIAL

## 2022-06-30 VITALS — HEIGHT: 60 IN | BODY MASS INDEX: 31.02 KG/M2 | WEIGHT: 158 LBS | TEMPERATURE: 98 F

## 2022-06-30 DIAGNOSIS — G56.22 CUBITAL TUNNEL SYNDROME ON LEFT: Primary | ICD-10-CM

## 2022-06-30 PROBLEM — N92.1 MENOMETRORRHAGIA: Status: ACTIVE | Noted: 2022-06-30

## 2022-06-30 PROCEDURE — 99024 POSTOP FOLLOW-UP VISIT: CPT | Performed by: ORTHOPAEDIC SURGERY

## 2022-06-30 NOTE — PROGRESS NOTES
Janet Armendariz is here for followup after left cubital tunnel release surgery. Pain is controlled without any medications. . The patient notes improvement in the following symptoms:strength, numbness, pain, sensation. The patient denies fever, wound drainage, increasing redness, pus, increasing pain, increasing swelling. Post op problems reported: none. Objective:         General :    alert, appears stated age and cooperative   Sutures:   Sutures out. Incision:  healing well, no significant drainage, no dehiscence, no significant erythema   Tenderness:  mild   Flexion ROM:  diminished range of motion   Extension ROM:  diminished range of motion   Effusion:  mild         Assessment:        Encounter Diagnosis   Name Primary?  Cubital tunnel syndrome on left Yes            Plan:     Patient will continue with activities as tolerated  HEP  I will order Deirdre scar cream.  I will see her back as needed.

## 2022-07-01 ENCOUNTER — ANESTHESIA (OUTPATIENT)
Dept: OPERATING ROOM | Age: 47
End: 2022-07-01
Payer: COMMERCIAL

## 2022-07-01 ENCOUNTER — HOSPITAL ENCOUNTER (OUTPATIENT)
Age: 47
Setting detail: OUTPATIENT SURGERY
Discharge: HOME | End: 2022-07-01
Attending: OBSTETRICS & GYNECOLOGY | Admitting: OBSTETRICS & GYNECOLOGY
Payer: COMMERCIAL

## 2022-07-01 VITALS
DIASTOLIC BLOOD PRESSURE: 65 MMHG | HEIGHT: 60 IN | SYSTOLIC BLOOD PRESSURE: 107 MMHG | WEIGHT: 158 LBS | HEART RATE: 63 BPM | OXYGEN SATURATION: 99 % | RESPIRATION RATE: 14 BRPM | TEMPERATURE: 97.9 F | BODY MASS INDEX: 31.02 KG/M2

## 2022-07-01 DIAGNOSIS — N92.1 MENOMETRORRHAGIA: Primary | ICD-10-CM

## 2022-07-01 LAB — HCG(URINE) PREGNANCY TEST: NEGATIVE

## 2022-07-01 PROCEDURE — 3700000000 HC ANESTHESIA ATTENDED CARE: Performed by: OBSTETRICS & GYNECOLOGY

## 2022-07-01 PROCEDURE — 7100000010 HC PHASE II RECOVERY - FIRST 15 MIN: Performed by: OBSTETRICS & GYNECOLOGY

## 2022-07-01 PROCEDURE — 7100000011 HC PHASE II RECOVERY - ADDTL 15 MIN: Performed by: OBSTETRICS & GYNECOLOGY

## 2022-07-01 PROCEDURE — 7100000000 HC PACU RECOVERY - FIRST 15 MIN: Performed by: OBSTETRICS & GYNECOLOGY

## 2022-07-01 PROCEDURE — 2580000003 HC RX 258: Performed by: NURSE ANESTHETIST, CERTIFIED REGISTERED

## 2022-07-01 PROCEDURE — 7100000001 HC PACU RECOVERY - ADDTL 15 MIN: Performed by: OBSTETRICS & GYNECOLOGY

## 2022-07-01 PROCEDURE — 3600000013 HC SURGERY LEVEL 3 ADDTL 15MIN: Performed by: OBSTETRICS & GYNECOLOGY

## 2022-07-01 PROCEDURE — 88305 TISSUE EXAM BY PATHOLOGIST: CPT

## 2022-07-01 PROCEDURE — 81025 URINE PREGNANCY TEST: CPT

## 2022-07-01 PROCEDURE — 3600000003 HC SURGERY LEVEL 3 BASE: Performed by: OBSTETRICS & GYNECOLOGY

## 2022-07-01 PROCEDURE — 2709999900 HC NON-CHARGEABLE SUPPLY: Performed by: OBSTETRICS & GYNECOLOGY

## 2022-07-01 PROCEDURE — 6360000002 HC RX W HCPCS: Performed by: NURSE ANESTHETIST, CERTIFIED REGISTERED

## 2022-07-01 PROCEDURE — 2580000003 HC RX 258: Performed by: OBSTETRICS & GYNECOLOGY

## 2022-07-01 PROCEDURE — 3700000001 HC ADD 15 MINUTES (ANESTHESIA): Performed by: OBSTETRICS & GYNECOLOGY

## 2022-07-01 RX ORDER — DEXAMETHASONE SODIUM PHOSPHATE 10 MG/ML
INJECTION, SOLUTION INTRAMUSCULAR; INTRAVENOUS PRN
Status: DISCONTINUED | OUTPATIENT
Start: 2022-07-01 | End: 2022-07-01 | Stop reason: SDUPTHER

## 2022-07-01 RX ORDER — IBUPROFEN 600 MG/1
600 TABLET ORAL EVERY 6 HOURS PRN
Status: DISCONTINUED | OUTPATIENT
Start: 2022-07-03 | End: 2022-07-01 | Stop reason: HOSPADM

## 2022-07-01 RX ORDER — LIDOCAINE HYDROCHLORIDE 20 MG/ML
INJECTION, SOLUTION INTRAVENOUS PRN
Status: DISCONTINUED | OUTPATIENT
Start: 2022-07-01 | End: 2022-07-01 | Stop reason: SDUPTHER

## 2022-07-01 RX ORDER — SODIUM CHLORIDE 0.9 % (FLUSH) 0.9 %
5-40 SYRINGE (ML) INJECTION EVERY 12 HOURS SCHEDULED
Status: DISCONTINUED | OUTPATIENT
Start: 2022-07-01 | End: 2022-07-01 | Stop reason: HOSPADM

## 2022-07-01 RX ORDER — SODIUM CHLORIDE 0.9 % (FLUSH) 0.9 %
5-40 SYRINGE (ML) INJECTION PRN
Status: DISCONTINUED | OUTPATIENT
Start: 2022-07-01 | End: 2022-07-01 | Stop reason: HOSPADM

## 2022-07-01 RX ORDER — FENTANYL CITRATE 50 UG/ML
INJECTION, SOLUTION INTRAMUSCULAR; INTRAVENOUS PRN
Status: DISCONTINUED | OUTPATIENT
Start: 2022-07-01 | End: 2022-07-01 | Stop reason: SDUPTHER

## 2022-07-01 RX ORDER — MEPERIDINE HYDROCHLORIDE 25 MG/ML
12.5 INJECTION INTRAMUSCULAR; INTRAVENOUS; SUBCUTANEOUS EVERY 5 MIN PRN
Status: DISCONTINUED | OUTPATIENT
Start: 2022-07-01 | End: 2022-07-01 | Stop reason: HOSPADM

## 2022-07-01 RX ORDER — IPRATROPIUM BROMIDE AND ALBUTEROL SULFATE 2.5; .5 MG/3ML; MG/3ML
1 SOLUTION RESPIRATORY (INHALATION)
Status: DISCONTINUED | OUTPATIENT
Start: 2022-07-01 | End: 2022-07-01 | Stop reason: HOSPADM

## 2022-07-01 RX ORDER — PROCHLORPERAZINE EDISYLATE 5 MG/ML
5 INJECTION INTRAMUSCULAR; INTRAVENOUS
Status: DISCONTINUED | OUTPATIENT
Start: 2022-07-01 | End: 2022-07-01 | Stop reason: HOSPADM

## 2022-07-01 RX ORDER — MIDAZOLAM HYDROCHLORIDE 1 MG/ML
INJECTION INTRAMUSCULAR; INTRAVENOUS PRN
Status: DISCONTINUED | OUTPATIENT
Start: 2022-07-01 | End: 2022-07-01 | Stop reason: SDUPTHER

## 2022-07-01 RX ORDER — MORPHINE SULFATE 2 MG/ML
2 INJECTION, SOLUTION INTRAMUSCULAR; INTRAVENOUS EVERY 5 MIN PRN
Status: DISCONTINUED | OUTPATIENT
Start: 2022-07-01 | End: 2022-07-01 | Stop reason: HOSPADM

## 2022-07-01 RX ORDER — HYDRALAZINE HYDROCHLORIDE 20 MG/ML
10 INJECTION INTRAMUSCULAR; INTRAVENOUS
Status: DISCONTINUED | OUTPATIENT
Start: 2022-07-01 | End: 2022-07-01 | Stop reason: HOSPADM

## 2022-07-01 RX ORDER — LABETALOL HYDROCHLORIDE 5 MG/ML
10 INJECTION, SOLUTION INTRAVENOUS
Status: DISCONTINUED | OUTPATIENT
Start: 2022-07-01 | End: 2022-07-01 | Stop reason: HOSPADM

## 2022-07-01 RX ORDER — SODIUM CHLORIDE 9 MG/ML
INJECTION, SOLUTION INTRAVENOUS PRN
Status: DISCONTINUED | OUTPATIENT
Start: 2022-07-01 | End: 2022-07-01 | Stop reason: HOSPADM

## 2022-07-01 RX ORDER — KETOROLAC TROMETHAMINE 30 MG/ML
30 INJECTION, SOLUTION INTRAMUSCULAR; INTRAVENOUS
Status: DISCONTINUED | OUTPATIENT
Start: 2022-07-01 | End: 2022-07-01 | Stop reason: HOSPADM

## 2022-07-01 RX ORDER — ONDANSETRON 2 MG/ML
4 INJECTION INTRAMUSCULAR; INTRAVENOUS
Status: DISCONTINUED | OUTPATIENT
Start: 2022-07-01 | End: 2022-07-01 | Stop reason: HOSPADM

## 2022-07-01 RX ORDER — KETOROLAC TROMETHAMINE 30 MG/ML
INJECTION, SOLUTION INTRAMUSCULAR; INTRAVENOUS PRN
Status: DISCONTINUED | OUTPATIENT
Start: 2022-07-01 | End: 2022-07-01 | Stop reason: SDUPTHER

## 2022-07-01 RX ORDER — KETOROLAC TROMETHAMINE 30 MG/ML
30 INJECTION, SOLUTION INTRAMUSCULAR; INTRAVENOUS EVERY 6 HOURS
Status: DISCONTINUED | OUTPATIENT
Start: 2022-07-01 | End: 2022-07-01 | Stop reason: HOSPADM

## 2022-07-01 RX ORDER — IBUPROFEN 600 MG/1
600 TABLET ORAL 4 TIMES DAILY PRN
Qty: 120 TABLET | Refills: 0 | Status: SHIPPED | OUTPATIENT
Start: 2022-07-01

## 2022-07-01 RX ORDER — IBUPROFEN 600 MG/1
600 TABLET ORAL EVERY 6 HOURS PRN
Status: DISCONTINUED | OUTPATIENT
Start: 2022-07-01 | End: 2022-07-01

## 2022-07-01 RX ORDER — PROPOFOL 10 MG/ML
INJECTION, EMULSION INTRAVENOUS PRN
Status: DISCONTINUED | OUTPATIENT
Start: 2022-07-01 | End: 2022-07-01 | Stop reason: SDUPTHER

## 2022-07-01 RX ORDER — ONDANSETRON 2 MG/ML
INJECTION INTRAMUSCULAR; INTRAVENOUS PRN
Status: DISCONTINUED | OUTPATIENT
Start: 2022-07-01 | End: 2022-07-01 | Stop reason: SDUPTHER

## 2022-07-01 RX ORDER — LORAZEPAM 2 MG/ML
0.5 INJECTION INTRAMUSCULAR
Status: DISCONTINUED | OUTPATIENT
Start: 2022-07-01 | End: 2022-07-01 | Stop reason: HOSPADM

## 2022-07-01 RX ORDER — DIPHENHYDRAMINE HYDROCHLORIDE 50 MG/ML
12.5 INJECTION INTRAMUSCULAR; INTRAVENOUS
Status: DISCONTINUED | OUTPATIENT
Start: 2022-07-01 | End: 2022-07-01 | Stop reason: HOSPADM

## 2022-07-01 RX ORDER — SODIUM CHLORIDE, SODIUM LACTATE, POTASSIUM CHLORIDE, CALCIUM CHLORIDE 600; 310; 30; 20 MG/100ML; MG/100ML; MG/100ML; MG/100ML
INJECTION, SOLUTION INTRAVENOUS CONTINUOUS PRN
Status: DISCONTINUED | OUTPATIENT
Start: 2022-07-01 | End: 2022-07-01 | Stop reason: SDUPTHER

## 2022-07-01 RX ORDER — DIPHENHYDRAMINE HYDROCHLORIDE 50 MG/ML
INJECTION INTRAMUSCULAR; INTRAVENOUS PRN
Status: DISCONTINUED | OUTPATIENT
Start: 2022-07-01 | End: 2022-07-01 | Stop reason: SDUPTHER

## 2022-07-01 RX ORDER — ACETAMINOPHEN 325 MG/1
650 TABLET ORAL EVERY 4 HOURS PRN
Status: DISCONTINUED | OUTPATIENT
Start: 2022-07-01 | End: 2022-07-01 | Stop reason: HOSPADM

## 2022-07-01 RX ORDER — SODIUM CHLORIDE, SODIUM LACTATE, POTASSIUM CHLORIDE, CALCIUM CHLORIDE 600; 310; 30; 20 MG/100ML; MG/100ML; MG/100ML; MG/100ML
INJECTION, SOLUTION INTRAVENOUS CONTINUOUS
Status: DISCONTINUED | OUTPATIENT
Start: 2022-07-01 | End: 2022-07-01 | Stop reason: HOSPADM

## 2022-07-01 RX ADMIN — KETOROLAC TROMETHAMINE 30 MG: 30 INJECTION, SOLUTION INTRAMUSCULAR; INTRAVENOUS at 07:18

## 2022-07-01 RX ADMIN — DEXAMETHASONE SODIUM PHOSPHATE 10 MG: 10 INJECTION, SOLUTION INTRAMUSCULAR; INTRAVENOUS at 07:09

## 2022-07-01 RX ADMIN — FENTANYL CITRATE 50 MCG: 50 INJECTION, SOLUTION INTRAMUSCULAR; INTRAVENOUS at 07:28

## 2022-07-01 RX ADMIN — SODIUM CHLORIDE, POTASSIUM CHLORIDE, SODIUM LACTATE AND CALCIUM CHLORIDE: 600; 310; 30; 20 INJECTION, SOLUTION INTRAVENOUS at 06:58

## 2022-07-01 RX ADMIN — PROPOFOL 200 MG: 10 INJECTION, EMULSION INTRAVENOUS at 07:05

## 2022-07-01 RX ADMIN — SODIUM CHLORIDE, POTASSIUM CHLORIDE, SODIUM LACTATE AND CALCIUM CHLORIDE: 600; 310; 30; 20 INJECTION, SOLUTION INTRAVENOUS at 05:28

## 2022-07-01 RX ADMIN — ONDANSETRON 4 MG: 2 INJECTION INTRAMUSCULAR; INTRAVENOUS at 07:09

## 2022-07-01 RX ADMIN — DIPHENHYDRAMINE HYDROCHLORIDE 25 MG: 50 INJECTION, SOLUTION INTRAMUSCULAR; INTRAVENOUS at 07:21

## 2022-07-01 RX ADMIN — MIDAZOLAM 2 MG: 1 INJECTION INTRAMUSCULAR; INTRAVENOUS at 06:58

## 2022-07-01 RX ADMIN — LIDOCAINE HYDROCHLORIDE 60 MG: 20 INJECTION INTRAVENOUS at 07:04

## 2022-07-01 RX ADMIN — FENTANYL CITRATE 50 MCG: 50 INJECTION, SOLUTION INTRAMUSCULAR; INTRAVENOUS at 07:04

## 2022-07-01 ASSESSMENT — PAIN - FUNCTIONAL ASSESSMENT: PAIN_FUNCTIONAL_ASSESSMENT: NONE - DENIES PAIN

## 2022-07-01 ASSESSMENT — PAIN DESCRIPTION - PAIN TYPE: TYPE: ACUTE PAIN;SURGICAL PAIN

## 2022-07-01 ASSESSMENT — PAIN DESCRIPTION - LOCATION: LOCATION: ABDOMEN

## 2022-07-01 ASSESSMENT — PAIN DESCRIPTION - ORIENTATION: ORIENTATION: MID;LOWER

## 2022-07-01 ASSESSMENT — PAIN SCALES - GENERAL: PAINLEVEL_OUTOF10: 4

## 2022-07-01 ASSESSMENT — PAIN DESCRIPTION - FREQUENCY: FREQUENCY: CONTINUOUS

## 2022-07-01 ASSESSMENT — LIFESTYLE VARIABLES: SMOKING_STATUS: 0

## 2022-07-01 NOTE — H&P
Department of Gynecology  H&P Note          CHIEF COMPLAINT:   Menometrorrhagia    History obtained from patient    HISTORY OF PRESENT ILLNESS:     The patient is a 42 y.o. female presenting with heavy and irregular menstrual cycles despite hormonal therapy. Sonogram showed thickened endometrium.   Past Medical History:        Diagnosis Date    BRCA1 gene mutation positive     Cousin Pos. per mmmo hx sheet    COVID 2021    pt states moderate; twice 2021, May 2022    H/O rheumatoid arthritis     Joint pain     Low back pain     Lupus ([address]     Menometrorrhagia 2022    Neck pain     RA (rheumatoid arthritis) ([address]     RH    Radiculopathy     Sjogren's disease ([address]     mucuc membrane   dry lips  always thirty     Past Surgical History:        Procedure Laterality Date    ANESTHESIA NERVE BLOCK Bilateral 2019    BILATERAL LUMBAR TRANSFORAMINAL EPIDURAL STERIOD INJECTION AT L5-S1 UNDER FLUOROSCOPY performed by Bowen Aleman MD at [facility] Left 2022    LEFT ELBOW CUBITAL TUNNEL RELEASE performed by Nick Arroyo DO at [address] [facility] Bilateral      SECTION      x2    CHOLECYSTECTOMY, LAPAROSCOPIC N/A 2021    LAPAROSCOPIC CHOLECYSTECTOMY performed by Georges Woodson MD at [facility]      x2    ENDOSCOPY, COLON, DIAGNOSTIC      EPIDURAL STEROID INJECTION Left 2019    LUMBAR EPIDURAL STEROID INJECTION L5-S1 (LEFT PARAMEDIAN APPROACH) UNDER FLUOROSCOPIC GUIDANCE) performed by Bowen Aleman MD at [facility] N/A 2021    LAPAROSCOPIC HIATAL HERNIA RTEPAIR WITH MESH performed by Georges Woodson MD at [facility] ARTHROSCOPY Left 2016    medial menisectomy, debridement, acl synovectomy, chondroplasty    NERVE BLOCK Left 2019    lumbar    NERVE BLOCK Bilateral 2019    NERVE BLOCK Grandmother     Breast Cancer Maternal Aunt     Colon Cancer Maternal Aunt     Breast Cancer Maternal Cousin        Review of Systems  Review of Systems -  General ROS: negative for - chills, fatigue or malaise  ENT ROS: negative for - hearing change, nasal congestion or nasal discharge  Allergy and Immunology ROS: negative for - hives, itchy/watery eyes or nasal congestion  Hematological and Lymphatic ROS: negative for - blood clots, blood transfusions, bruising or fatigue  Endocrine ROS: negative for - malaise/lethargy, mood swings, palpitations or polydipsia/polyuria  Breast ROS: negative for - new or changing breast lumps or nipple changes  Respiratory ROS: negative for - sputum changes, stridor, tachypnea or wheezing  Cardiovascular ROS: negative for - irregular heartbeat, loss of consciousness, murmur or orthopnea  Gastrointestinal ROS: negative for - constipation, diarrhea, gas/bloating, heartburn or hematemesis  Genito-Urinary ROS: negative for -  genital discharge, genital ulcers or hematuria  Musculoskeletal ROS: negative for - gait disturbance, muscle pain or muscular weakness       PHYSICAL EXAM:    Vitals:  /76   Pulse 67   Temp 97.7 °F (36.5 °C) (Infrared)   Resp 18   Ht 5' (1.524 m)   Wt 158 lb (71.7 kg)   SpO2 98%   BMI 30.86 kg/m²     General appearance:  NAD  Pyscho/social: neg for tremors and hallucinations  Head: NCAT, PERRLA, EOMI, red conjunctiva  Neck: supple, no masses  Lungs: CTAB, equal chest rise bilateral  Heart: Reg rate  Abdomen: soft, moderately tender in RUQ, nondistended  Skin; no lesions  Gu: no cva tenderness  Extremities: extremities normal, atraumatic, no cyanosis or edema    External Genitalia: General appearance; normal, Hair distribution; normal, Lesions absent  Urethral Meatus: Size normal, Location normal, Lesions absent, Prolapse absent  Vagina: General appearance normal, Estrogen effect normal, Discharge absent, Lesions absent, Pelvic support normal  Cervix: General appearance normal, Lesions absent, Discharge absent, Tenderness absent, Enlargement absent, Nodularity absent  Uterus:  Size normal, Contour normal, Position normal  [place]: Masses absent, Tenderness absent    DATA:  Labs:    CBC:   Lab Results   Component Value Date/Time    WBC 4.8 06/23/2022 12:40 PM    RBC 4.98 06/23/2022 12:40 PM    HGB 12.0 06/23/2022 12:40 PM    HCT 39.4 06/23/2022 12:40 PM    MCV 79.1 06/23/2022 12:40 PM    RDW 14.3 06/23/2022 12:40 PM     06/23/2022 12:40 PM       IMPRESSION/RECOMMENDATIONS:      Principal Problem:    Menometrorrhagia  Plan: Hysteroscopy D&C      The patient was counseled at length about the risks of ric Covid-19 during their perioperative period and any recovery window from their procedure. The patient was made aware that ric Covid-19  may worsen their prognosis for recovering from their procedure  and lend to a higher morbidity and/or mortality risk. All material risks, benefits, and reasonable alternatives including postponing the procedure were discussed. The patient does wish to proceed with the procedure at this time.

## 2022-07-01 NOTE — OP NOTE
PATIENT NAME:    Suad Jacobsen    DATE OF PROCEDURE:                      7/1/2022  SURGEON:                                            Vito Junior M.D.   ASSISTANT:   PREOPERATIVE DIAGNOSIS:              Menometrorrhagia   POSTOPERATIVE DIAGNOSIS:            Same   OPERATION:                                          Hysteroscopy and D&C. ANESTHESIA:                                         General.   ESTIMATED BLOOD LOSS:                Minimal.   COMPLICATIONS:                                  None. PROCEDURE NOTE: With the patient in the supine position, general anesthesia was administered without any complications. The patient was then placed in dorsal lithotomy position using cane stirrups. The perineum was scrubbed and draped in the usual fashion. The bladder was catheterized, and clear looking urine was recovered. A heavy weighted retractor was placed in the vagina. The anterior lip of the cervix was grabbed by single-tooth tenaculum. The cervical os was dilated to allow the position of #16 Saskia Rajas without any difficulty. A 5.5 mm diagnostic hysteroscope was introduced into the uterine cavity, and using normal saline as distending medium, diagnostic hysteroscopy was carried out. Thickened endometrium and normal tubal ostia were seen. The hysteroscope was then withdrawn. Next, sharp curettage of the endometrium was performed, and all tissues were submitted to pathology. Inspection revealed excellent hemostasis. The procedure was then terminated. All instruments were removed. The patient was placed in supine position, awakened from anesthesia and transferred to recovery room in good stable condition.        Active Hospital Problems    Diagnosis Date Noted    Menometrorrhagia [N92.1] 06/30/2022     Priority: Conrad Keller MD  7/1/2022  7:43 AM

## 2022-07-01 NOTE — ANESTHESIA POSTPROCEDURE EVALUATION
Department of Anesthesiology  Postprocedure Note    Patient: Yannick Marie  MRN: [id number]  YOB: 1975  Date of evaluation: 7/1/2022      Procedure Summary     Date: 07/01/22 Room / Location: [address] [place] [address]    Anesthesia Start: 5220 Anesthesia Stop: 2317    Procedure: DILATATION AND CURETTAGE HYSTEROSCOPY (N/A Cervix) Diagnosis:       Menometrorrhagia      (Menometrorrhagia [N92.1])    Surgeons: Armida Cruz MD Responsible Provider: Constance Olivarez MD    Anesthesia Type: general ASA Status: 2          Anesthesia Type: No value filed.     Fredi Phase I: Fredi Score: 10    Fredi Phase II:        Anesthesia Post Evaluation    Patient location during evaluation: PACU  Patient participation: complete - patient participated  Level of consciousness: sleepy but conscious  Airway patency: patent  Nausea & Vomiting: no nausea and no vomiting  Complications: no  Cardiovascular status: hemodynamically stable and blood pressure returned to baseline  Respiratory status: room air and acceptable  Hydration status: euvolemic

## 2022-07-01 NOTE — ANESTHESIA POSTPROCEDURE EVALUATION
Department of Anesthesiology  Postprocedure Note    Patient: Aria Johnson  MRN: [id number]  YOB: 1975  Date of evaluation: 7/1/2022      Procedure Summary     Date: 07/01/22 Room / Location: [address] [place] [address]    Anesthesia Start: 9746 Anesthesia Stop: 0000    Procedure: DILATATION AND CURETTAGE HYSTEROSCOPY (N/A Cervix) Diagnosis:       Menometrorrhagia      (Menometrorrhagia [N92.1])    Surgeons: Sandoval Leal MD Responsible Provider: Emilinao Mckeon MD    Anesthesia Type: general ASA Status: 2          Anesthesia Type: No value filed.     Fredi Phase I: Fredi Score: 10    Fredi Phase II: Fredi Score: 10      Anesthesia Post Evaluation    Patient location during evaluation: PACU  Patient participation: complete - patient participated  Level of consciousness: awake  Airway patency: patent  Nausea & Vomiting: no nausea and no vomiting  Complications: no  Cardiovascular status: hemodynamically stable  Respiratory status: acceptable  Hydration status: euvolemic

## 2022-07-01 NOTE — ANESTHESIA PRE PROCEDURE
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    Sensory peripheral neuropathy G60.8    Cold feet R20.9    Insomnia G47.00    Lupus ([address] M32.9    Tear, knee, medial meniscus S83.249A    ACL tear S83.519A    Post-traumatic osteoarthritis of left knee M17.32    Synovitis M65.9    Lumbar radiculopathy M54.16    DDD (degenerative disc disease), lumbar M51.36    Lumbosacral spondylosis without myelopathy M47.817    Sacroiliac dysfunction M53.3    DDD (degenerative disc disease), cervical M50.30    Cervical radiculopathy M54.12    Cervical spondylolysis M43.02    Hiatal hernia U85.2    Biliary colic O29.86    Myofascial muscle pain M79.18    Cubital tunnel syndrome on left G56.22    Lateral epicondylitis of left elbow M77.12    Achilles tendinitis of left lower extremity M76.62    Menometrorrhagia N92.1       Past Medical History:        Diagnosis Date    BRCA1 gene mutation positive     Cousin Pos. per mmmo hx sheet    COVID 2021    pt states moderate; twice 2021, May 2022    H/O rheumatoid arthritis     Joint pain     Low back pain     [facility]     Menometrorrhagia 2022    Neck pain     RA (rheumatoid arthritis) (HCC)     RH    Radiculopathy     Sjogren's disease (HCC)     mucuc membrane   dry lips  always [age]       Past Surgical History:        Procedure Laterality Date    ANESTHESIA NERVE BLOCK Bilateral 2019    BILATERAL LUMBAR TRANSFORAMINAL EPIDURAL STERIOD INJECTION AT L5-S1 UNDER FLUOROSCOPY performed by Floresita Mccain MD at [facility] Left 2022    LEFT ELBOW CUBITAL TUNNEL RELEASE performed by Nazia Fry DO at [address] Bilateral      SECTION      x2    CHOLECYSTECTOMY, LAPAROSCOPIC N/A 2021    LAPAROSCOPIC CHOLECYSTECTOMY performed by Vilma Price MD at [facility]      x2   [facility] ENDOSCOPY, COLON, DIAGNOSTIC      EPIDURAL STEROID INJECTION Left 05/28/2019    LUMBAR EPIDURAL STEROID INJECTION L5-S1 (LEFT PARAMEDIAN APPROACH) UNDER FLUOROSCOPIC GUIDANCE) performed by Hedy Aragon MD at [address] [place] [address] N/A 02/16/2021    LAPAROSCOPIC HIATAL HERNIA RTEPAIR WITH MESH performed by Jessica Walsh MD at [place] [address] ARTHROSCOPY Left 05/27/2016    medial menisectomy, debridement, acl synovectomy, chondroplasty    NERVE BLOCK Left 05/28/2019    lumbar    NERVE BLOCK Bilateral 07/09/2019    NERVE BLOCK Left 12/08/2020    Cervical epidural steroid injection under fouroscopic guidance c7-t1 left paramedian    PAIN MANAGEMENT PROCEDURE Left 12/08/2020    CERVICAL EPIDURAL STEROID INJECTION UNDER FLUOROSCOPIC GUIDANCE AT C7-T1 LEFT PARAMEDIAN (CPT 38825) performed by Hedy Aragon MD at [address] [place] [address] Right 07/29/2021    CERVICAL EPIDURAL STEROID INJECTION UNDER FLUOROSCOPIC GUIDANCE AT C7-T1 RIGHT PARAMEDIAN performed by Hedy Aragon MD at [place] [address].      TUBAL LIGATION         Social History:    Social History     Tobacco Use    Smoking status: Never Smoker    Smokeless tobacco: Never Used   Substance Use Topics    Alcohol use: Yes     Comment: wine occ                                Counseling given: Not Answered      Vital Signs (Current): There were no vitals filed for this visit.                                            BP Readings from Last 3 Encounters:   07/01/22 117/76   06/23/22 122/62   05/13/22 103/71       NPO Status:                                                                                 BMI:   Wt Readings from Last 3 Encounters:   07/01/22 158 lb (71.7 kg)   06/30/22 158 lb (71.7 kg)   06/23/22 157 lb (71.2 kg)     There is no height or weight on file to calculate BMI.    CBC:   Lab Results   Component Value Date/Time    WBC 4.8 06/23/2022 12:40 PM    RBC regular  Rate: normal                    Neuro/Psych:   (+) neuromuscular disease (Cervical radiculopathy, Cervical spondylolysis ):,              ROS comment: Sensory peripheral neuropathy GI/Hepatic/Renal:   (+) hiatal hernia (Much improved with hiatal hernia repair),      (-) no morbid obesity       Endo/Other:    (+) : arthritis: OA and rheumatoid. , .                  ROS comment: LUPUS Abdominal:   (+) obese,           Vascular: negative vascular ROS. Other Findings:             Anesthesia Plan      general     ASA 2       Induction: intravenous. continuous noninvasive hemodynamic monitor  MIPS: Postoperative opioids intended and Prophylactic antiemetics administered. Anesthetic plan and risks discussed with patient. Plan discussed with CRNA. Agree with above assessment, 7.1.22  DONATO Chan - CRNA    DOS STAFF ADDENDUM:    Pt seen and examined, chart reviewed (including anesthesia, drug and allergy history). Anesthetic plan, risks, benefits, alternatives, and personnel involved discussed with patient. Patient verbalized an understanding and agrees to proceed. Plan discussed with care team members and agreed upon.     Davide Quezada MD  Staff Anesthesiologist  6:29 AM    Davide Quezada MD

## 2022-07-25 ENCOUNTER — OFFICE VISIT (OUTPATIENT)
Dept: PAIN MANAGEMENT | Age: 47
End: 2022-07-25
Payer: COMMERCIAL

## 2022-07-25 VITALS
OXYGEN SATURATION: 96 % | BODY MASS INDEX: 31.02 KG/M2 | HEART RATE: 73 BPM | TEMPERATURE: 97.7 F | HEIGHT: 60 IN | SYSTOLIC BLOOD PRESSURE: 126 MMHG | WEIGHT: 158 LBS | RESPIRATION RATE: 16 BRPM | DIASTOLIC BLOOD PRESSURE: 78 MMHG

## 2022-07-25 DIAGNOSIS — M79.18 MYOFASCIAL MUSCLE PAIN: Primary | ICD-10-CM

## 2022-07-25 DIAGNOSIS — M54.12 CERVICAL RADICULOPATHY: ICD-10-CM

## 2022-07-25 DIAGNOSIS — G89.4 CHRONIC PAIN SYNDROME: ICD-10-CM

## 2022-07-25 PROCEDURE — 99213 OFFICE O/P EST LOW 20 MIN: CPT | Performed by: NURSE PRACTITIONER

## 2022-07-25 RX ORDER — MEDROXYPROGESTERONE ACETATE 150 MG/ML
INJECTION, SUSPENSION INTRAMUSCULAR
COMMUNITY
Start: 2022-06-13 | End: 2022-07-25

## 2022-07-25 NOTE — PROGRESS NOTES
[address] [place] [address], [address] [place] [address]  [phone]    Follow up Note      Linsey Moreno     Date of Visit:  7/25/2022    CC:  Patient presents for follow up worsened neck pain    HPI: Pt here with severe right sided myofascial pain to trapezius and scapule with edema and neck pain radiating to right shoulder down arm to fingers. Pt has a hx Chronic low back and B/l LE pain. OF note: Pt developed cataracts after epidural and is hesistant to have any steroid injection. 7/29/21:JENNY with >90% relief    Pertinent information:  Failed conservative treatment with NSAID's, muscle relaxants, oral steroids and HEP. She has been previously evaluated by Dr. Ceci Toney and recommended lumbar facet interventions. Followed by rheumatologist for h/o sjogren's/ rheumatoid / SLE. She has not been on anticoagulation medications to include none and has not been on herbal supplements. She is not diabetic. Nursing notes and details of the pain history reviewed. Please see intake notes for details. Previous treatments:   Physical Therapy   Chiropractic treatment  Ongoing Core exercises for the last year. TENS and medications- NSAID's, oral steroids, gabapentin,baclofen, tizanidine, lyrica, flexeril, diclofenac, mobic, skelaxin, flector patch   Does not tolerate meds well   Conservative RX has not provided good long term relief. Alleviating factors include: heat, medications. Imaging:     MRI of C spine: 11/3/2020:      FINDINGS:    BONES/ALIGNMENT: There is straightening of the cervical spine with loss of    the normal lordotic curvature. The bone marrow signal intensity is low in    the T1 weighted imaging, similar to the previous exam, likely related to red    marrow versus anemia. Endplate degenerative changes are noted at C6-C7. No    areas of marrow edema to suggest an acute fracture.          SPINAL CORD: The cervical spinal cord demonstrates normal signal intensity    without evidence of an expansile mass lesion. SOFT TISSUES: No paraspinal masses or edema. C2-C3: There is a 2 mm central disc protrusion indenting the anterior thecal    sac. Mild central spinal canal narrowing. The neural foramen are patent. C3-C4: There is a central disc protrusion measuring 2 mm indenting the    anterior thecal sac. Minimal central spinal canal narrowing. The neural    foramen are patent. C4-C5: Asymmetric left-sided uncovertebral spurring and facet arthropathy    have increased with moderate left foraminal narrowing, increased in severity. No right foraminal narrowing, disc herniation, or central spinal canal    stenosis. C5-C6: There is a broad-based central/left paracentral disc protrusion    measuring 3 mm, increased in size. Mild central spinal canal narrowing has    slightly increased. Asymmetric left-sided uncovertebral spurring with mild    left foraminal narrowing, increased. No right foraminal narrowing. C6-C7: There is a broad-based left paracentral central disc protrusion    measuring 3 mm. Ligamentum flavum thickening. Moderate central spinal canal    narrowing has increased. Asymmetric left-sided uncovertebral spurring with    moderate left foraminal narrowing and mild right foraminal narrowing,    increased in severity. C7-T1: There is no significant disc protrusion, spinal canal stenosis or    neural foraminal narrowing. Impression    1. Mild central spinal canal narrowing at C5-C6, and moderate central spinal    canal narrowing at C6-C7 have slightly increased. 2. Multilevel foraminal narrowing, as above, greatest on the left at C4-C5,    and left at C6-C7. 3. No evidence of an acute fracture. X ray C spine: 10/26/2020:    Impression    Degenerative disc changes at C6-7          MRI of LS spine: 6/27/2020:  FINDINGS:    BONES/ALIGNMENT: There is mild degenerative disc disease at L4-L5, and L5-S1. There are Modic type 1 degenerative endplate changes at R9-A9. SPINAL CORD: The conus terminates normally. SOFT TISSUES: No paraspinal mass identified. L1-L2:  The thecal sac and neural foramina are intact. L2-L3:  The thecal sac and neural foramina are intact. L3-L4:  The thecal sac and neural foramina are intact. L4-L5: There is a minimum disc bulge measuring 2 mm. There is mild facet    and ligamentum flavum hypertrophy. The thecal sac is not stenotic. Disc    and/or osteophytes cause minimal narrowing of the neural foramina bilaterally. L5-S1: There is a disc protrusion with annular fissure centrally at L5-S1    measuring approximately 4.5 mm. This effaces the anterior aspect of the    thecal sac. The thecal sac is not significantly stenotic. The S1 nerve    roots are intact. There is mild narrowing of the neural foramina bilaterally. There may be slight increase in size of the disc protrusion compared to the    prior study. Impression    Disc protrusion with annular fissure at L5-S1. No significant stenosis of    the thecal sac. The S1 nerve roots are intact. There is mild narrowing of    the neural foramina bilaterally at L5-S1. Mild narrowing of the neural foramina at L4-L5 as discussed above.            Potential Aberrant Drug-Related Behavior:  No     Urine Drug Screening:No     OARRS report[de-identified]  7/8/22  yes- not on chronic narcotics      Past Medical History:   Diagnosis Date    BRCA1 gene mutation positive     Cousin Pos. per mmmo hx sheet    COVID 11/2021    pt states moderate; twice Nov 2021, May 2022    H/O rheumatoid arthritis     Joint pain     Low back pain     Lupus ([facility] [address]     Menometrorrhagia 6/30/2022    Neck pain     RA (rheumatoid arthritis) ([facility])     RH    Radiculopathy     Sjogren's disease ([facility])     mucuc membrane   dry lips  always [age]       Past Surgical History:   Procedure Laterality Date    ANESTHESIA NERVE BLOCK Bilateral 2019    BILATERAL LUMBAR TRANSFORAMINAL EPIDURAL STERIOD INJECTION AT L5-S1 UNDER FLUOROSCOPY performed by Dipesh Martinez MD at [facility] Left 2022    LEFT ELBOW CUBITAL TUNNEL RELEASE performed by Dinesh Ziegler DO at [address] Bilateral      SECTION      x2    CHOLECYSTECTOMY, LAPAROSCOPIC N/A 2021    LAPAROSCOPIC CHOLECYSTECTOMY performed by Adele Sapp MD at [address] [facility]      x2    DILATION AND CURETTAGE OF UTERUS N/A 2022    DILATATION AND CURETTAGE HYSTEROSCOPY performed by Cullen Davis MD at [address] [facility], COLON, DIAGNOSTIC      EPIDURAL STEROID INJECTION Left 2019    LUMBAR EPIDURAL STEROID INJECTION L5-S1 (LEFT PARAMEDIAN APPROACH) UNDER FLUOROSCOPIC GUIDANCE) performed by Dipesh Martinez MD at [address] N/A 2021    LAPAROSCOPIC HIATAL HERNIA RTEPAIR WITH MESH performed by Adele Sapp MD at [facility] ARTHROSCOPY Left 2016    medial menisectomy, debridement, acl synovectomy, chondroplasty    NERVE BLOCK Left 2019    lumbar    NERVE BLOCK Bilateral 2019    NERVE BLOCK Left 2020    Cervical epidural steroid injection under fouroscopic guidance c7-t1 left paramedian    PAIN MANAGEMENT PROCEDURE Left 2020    CERVICAL EPIDURAL STEROID INJECTION UNDER FLUOROSCOPIC GUIDANCE AT C7-T1 LEFT PARAMEDIAN (CPT 93880) performed by Dipesh Martinez MD at [facility] Right 2021    CERVICAL EPIDURAL STEROID INJECTION UNDER FLUOROSCOPIC GUIDANCE AT C7-T1 RIGHT PARAMEDIAN performed by Dipesh Martinez MD at [place] [address]         Prior to Admission medications    Medication Sig Start Date End Date Taking?  Authorizing Provider   ibuprofen (ADVIL;MOTRIN) 600 MG tablet Take 1 tablet by mouth 4 times daily as needed for Pain 7/1/22  Yes Gena Keller MD   celecoxib (CELEBREX) 200 MG capsule TAKE 1 CAPSULE BY MOUTH DAILY 5/31/22 9/28/22 Yes Elyssa Trammell DO   medroxyPROGESTERone (DEPO-PROVERA) 150 MG/ML injection Inject 150 mg into the muscle every 3 months   Yes Historical Provider, MD   Cholecalciferol (VITAMIN D) 50 MCG (2000 UT) CAPS capsule Take by mouth daily    Yes Historical Provider, MD   pantoprazole (PROTONIX) 40 MG tablet Take 40 mg by mouth as needed  1/15/21  Yes Historical Provider, MD       Allergies   Allergen Reactions    Methotrexate Derivatives Nausea And Vomiting and Other (See Comments)     Mouth sores       Social History     Socioeconomic History    Marital status:      Spouse name: Not on file    Number of children: Not on file    Years of education: Not on file    Highest education level: Not on file   Occupational History    Not on file   Tobacco Use    Smoking status: Never    Smokeless tobacco: Never   Vaping Use    Vaping Use: Never used   Substance and Sexual Activity    Alcohol use: Yes     Comment: wine occ    Drug use: No    Sexual activity: Yes     Partners: Male   Other Topics Concern    Not on file   Social History Narrative    Not on file     Social Determinants of Health     Financial Resource Strain: Not on file   Food Insecurity: Not on file   Transportation Needs: Not on file   Physical Activity: Not on file   Stress: Not on file   Social Connections: Not on file   Intimate Partner Violence: Not on file   Housing Stability: Not on file       Family History   Problem Relation Age of Onset    Heart Failure Mother     Thyroid Disease Mother     Cancer Father     Prostate Cancer Father         Per mammo  hx sheet    Thyroid Disease Sister     Thyroid Disease Maternal Grandmother     Thyroid Disease Sister     Thyroid Disease Sister     Breast Cancer Paternal Grandmother     Breast Cancer Maternal Aunt     Colon Cancer Maternal Aunt     Breast Cancer Maternal Cousin        REVIEW OF SYSTEMS:     Robe Alejo denies fever/chills, chest pain, shortness of breath, new bowel or bladder complaints or suicidal ideations. All other review of systems wasnegative. Review of Systems    PHYSICAL EXAMINATION:      General:       General appearance:   pleasant and well-hydrated. , in severe distress and A & O x3  Build:Obese  Function:Moves about room without difficulty. HEENT:     Head:normocephalic, atraumatic  Pupils:regular, round, equal  Sclera: icterus absent     Lungs:     Breathing:normal breathing pattern      CVS :   RRR     Abdomen:     Shape:non-distended and normal  Tenderness:none  Guarding:none     Cervical spine:     Inspection:normal  Palpation: severe tenderness paravertebral muscles, tenderness trapezium, left, right and negative  Range of motion:abnormal mildly flexion, extension rotation bilateral and is severely painful to right side with edema     Thoracic spine:     Spine inspection:normal   Palpation:non-tender midline and paraspinals, left and right. Range of motion:normal in flexion, extension rotation bilateral and is moderately  painful. Lumbar spine:     Spine inspection:normal   CVA tenderness:No   Palpation:tenderness paravertebral muscles, left, right and positive. Range of motion: Reduced, Pain on Lateral bending, flexion, extension rotation bilateral and is Painful.   B/l Lumbar paraspinal tenderness +  B/l Lumbar facet loading +  SIJ tenderness + bilateral,   Sensory and motor in b/l LE normal  DTRs' b/l LE equal     Musculoskeletal:     Trigger points in trapezius:right sided   Trigger points in rhomboids:right sided   Trigger points in Paravertebral:absent bilaterally  Trigger points in supraspinatus/infraspinatus:absent  Spurling's:negative right, negative left    SI joint tenderness:positive right, positive left              MARIO test:positive right, positive             left  Piriformis tenderness:negative right, negative left  Trochanteric bursa tenderness:negative right, positive left  SLR:negative right, negative left, sitting      Extremities:     Tremors:None bilaterally upper and lower  Edema:none x all 4 extremities  Limited ROM right shoulder, unable to lift beyond 45 degrees     Knee:     Knee ROM normal- no pain     Neurological:        Sensory:normal to light touch - except for sensory changes over the left UE. Motor:   Right Grip3/5              Left Grip5/5               Right Bicep3/5           Left Bicep5/5              Right Triceps5/5       Left Triceps5/5          Right Deltoid3/5     Left Deltoid5/5                       Gait:normal     Dermatology:     Skin: redness over the face        Assessment/Plan:    Diagnosis Orders   1. DDD (degenerative disc disease), cervical      2. Cervical radiculopathy      3. Cervical spondylolysis      4. DDD (degenerative disc disease), lumbar      5. Lumbosacral spondylosis without myelopathy        39 y.o.  lady with neck pain and left UE pain. Acute exacerbation come and go to neck down right arm  - hx of not responding to conservative treatment not helping, excellent pain relief with JENNY's but not a candidate for repeating    Myofascial pain to right trapeizus/rhomboid accompanied by headaches, failed ointments and licart patches. Cannot take steroids with hx of cataracts from prior use of steroids    Schedule for Trigger point injection right side rhomboid and trapezius, scapula with NO STEROIDS    Also has H/o Chronic low back and LE pain. Excellent pain relief form the prior BELEM. Failed Meds/ HEP / oral steroids    Cervical epidural steroid injection  07/30/2021 with > 90% pain relief.     Has been evaluated by Dr. Bean De La Cruz recently- not a surgical candidate     Consider repeat SIJ injection Vs Facet injection in future if low back pain worsens, +facet and SIJ pain.    Continue HEP as tolerated. NSAID's for prn use.     Counseling reg : regular HEP and weight watching, appropriate medication use

## 2022-07-26 ENCOUNTER — PROCEDURE VISIT (OUTPATIENT)
Dept: PAIN MANAGEMENT | Age: 47
End: 2022-07-26
Payer: COMMERCIAL

## 2022-07-26 VITALS
HEIGHT: 60 IN | RESPIRATION RATE: 20 BRPM | HEART RATE: 64 BPM | SYSTOLIC BLOOD PRESSURE: 112 MMHG | WEIGHT: 161 LBS | BODY MASS INDEX: 31.61 KG/M2 | OXYGEN SATURATION: 90 % | TEMPERATURE: 97.8 F | DIASTOLIC BLOOD PRESSURE: 68 MMHG

## 2022-07-26 DIAGNOSIS — M79.18 CHRONIC MYOFASCIAL PAIN: Primary | ICD-10-CM

## 2022-07-26 DIAGNOSIS — G89.29 CHRONIC MYOFASCIAL PAIN: Primary | ICD-10-CM

## 2022-07-26 PROCEDURE — 20553 NJX 1/MLT TRIGGER POINTS 3/>: CPT | Performed by: ANESTHESIOLOGY

## 2022-07-26 RX ORDER — BUPIVACAINE HYDROCHLORIDE 2.5 MG/ML
9 INJECTION, SOLUTION EPIDURAL; INFILTRATION; INTRACAUDAL ONCE
Status: COMPLETED | OUTPATIENT
Start: 2022-07-26 | End: 2022-07-26

## 2022-07-26 RX ADMIN — BUPIVACAINE HYDROCHLORIDE 22.5 MG: 2.5 INJECTION, SOLUTION EPIDURAL; INFILTRATION; INTRACAUDAL at 14:30

## 2022-07-26 NOTE — PROGRESS NOTES
2022    Patient: Aria Johnson  :  1975  Age:  41 y.o. Sex:  female     PRE-PROCEDURE DIAGNOSIS: Myofascial pain. POST-PROCEDURE DIAGNOSIS: Same. PROCEDURE:  Right Cervical paraspinal , trapezius, rhomboid trigger point injections. SURGEON:   Melissa Alfaro MD    ANESTHESIA: Local    ESTIMATED BLOOD LOSS:  None.  ______________________________________________________________________    BRIEF HISTORY: Aria Johnson comes in today for  trigger point steroid injection. After discussing the potential risks and benefits of the procedure with the patient. Ramiro Tirado did request that we proceed. A complete History & Physical was reviewed and it is unchanged. DESCRIPTION OF PROCEDURE:   Each trigger points were marked with patient input and prepped with chloraprep and draped, a #25-gauge, 1.5-inch needle was passed into the area of greatest tenderness. A total of 6 trigger point injections were performed. Each trigger point  received 1.5 cc of 0.25% Marcaine after negative aspiration of blood, air or paresthesia. The needle was removed intact and Band-Aid was applied. Disposition the patient tolerated the procedure well and there were no complications . Ramiro Tirado will follow up in our [facility] as scheduled. She was encouraged to call with questions, concerns or if worsening of symptoms occurs. IF PAIN PERSISTS, CONSIDER CERVICAL FACET INTERVENTIONS IN FUTURE.     Melissa Alfaro MD

## 2022-07-26 NOTE — PROGRESS NOTES
Do you currently have any of the following:    Fever: No  Headache:  No  Cough: No  Shortness of breath: No  Exposed to anyone with these symptoms: No                                                                                                                Vania Rodriguez presents to the [address] [address] on 7/26/2022. Pati Lobato is complaining of pain in her entire back and right shoulder and neck. The pain is constant. The pain is described as aching. Sharp burning stiffeningPain is rated on her best day at a 3, on her worst day at a 10, and on average at a 4 on the VAS scale. She took her last dose of Motrin this am. Pati Lobato does have issues with constipation. Any procedures since your last visit: No,     She is  on NSAIDS and  is not on anticoagulation medications to include none and     Pacemaker or defibrillator: No     Medication Contract and Consent for Opioid Use Documents Filed       Patient Documents       Type of Document Status Date Received Received By Description    Medication Contract Received 5/7/2019  8:46 AM SANDRITA KABA Pain [facility] Pt Agreement 5/7/2019                       /68   Pulse 64   Temp 97.8 °F (36.6 °C)   Resp 20   Ht 5' (1.524 m)   Wt 161 lb (73 kg)   SpO2 90%   BMI 31.44 kg/m²      No LMP recorded. Patient has had an ablation.

## 2022-08-15 ENCOUNTER — TELEPHONE (OUTPATIENT)
Dept: ADMINISTRATIVE | Age: 47
End: 2022-08-15

## 2022-08-15 NOTE — TELEPHONE ENCOUNTER
Patient re-injured Left knee over weekend and would like to see Dr. Trevor Herrera. LOV 2016 for knee.  Please advise if okay to schedule with out knew referral

## 2022-08-31 ENCOUNTER — PREP FOR PROCEDURE (OUTPATIENT)
Dept: PAIN MANAGEMENT | Age: 47
End: 2022-08-31

## 2022-08-31 ENCOUNTER — OFFICE VISIT (OUTPATIENT)
Dept: PAIN MANAGEMENT | Age: 47
End: 2022-08-31
Payer: COMMERCIAL

## 2022-08-31 VITALS
TEMPERATURE: 98.4 F | WEIGHT: 161 LBS | DIASTOLIC BLOOD PRESSURE: 60 MMHG | SYSTOLIC BLOOD PRESSURE: 102 MMHG | HEIGHT: 60 IN | BODY MASS INDEX: 31.61 KG/M2 | RESPIRATION RATE: 20 BRPM | HEART RATE: 70 BPM

## 2022-08-31 DIAGNOSIS — M47.812 CERVICAL SPONDYLOSIS: Primary | ICD-10-CM

## 2022-08-31 DIAGNOSIS — M51.36 DDD (DEGENERATIVE DISC DISEASE), LUMBAR: ICD-10-CM

## 2022-08-31 DIAGNOSIS — M50.30 DDD (DEGENERATIVE DISC DISEASE), CERVICAL: ICD-10-CM

## 2022-08-31 DIAGNOSIS — M47.817 LUMBOSACRAL SPONDYLOSIS WITHOUT MYELOPATHY: ICD-10-CM

## 2022-08-31 PROCEDURE — 99213 OFFICE O/P EST LOW 20 MIN: CPT | Performed by: ANESTHESIOLOGY

## 2022-08-31 RX ORDER — SODIUM CHLORIDE 9 MG/ML
INJECTION, SOLUTION INTRAVENOUS PRN
Status: CANCELLED | OUTPATIENT
Start: 2022-08-31

## 2022-08-31 RX ORDER — SODIUM CHLORIDE 0.9 % (FLUSH) 0.9 %
5-40 SYRINGE (ML) INJECTION EVERY 12 HOURS SCHEDULED
Status: CANCELLED | OUTPATIENT
Start: 2022-08-31

## 2022-08-31 RX ORDER — SODIUM CHLORIDE 0.9 % (FLUSH) 0.9 %
5-40 SYRINGE (ML) INJECTION PRN
Status: CANCELLED | OUTPATIENT
Start: 2022-08-31

## 2022-08-31 NOTE — H&P (VIEW-ONLY)
Pt    223 [facility], [address] [place] [address] Adebayo  [phone]    Follow up Note      Niki Lora     Date of Visit:  8/31/2022    CC:  Patient presents for follow up   Chief Complaint   Patient presents with    Follow-up     Neck and back       HPI:    H/o Neck pain and upper extremity pain radiating to the left UE. Failed conservative treatment with NSAID's, muscle relaxants, oral steroids and HEP. S/P Cervical BELEM on 12/8/2020 with excellent pain relief of more than 90%. She also has H/o chronic low back and B/l LE pain. S/P BELEM in 2019 with excellent pain relief. Does not have significant LE pain. Pain is mainly in the low back. Low back pain is currently stable. Continues HEP    No new weakness or numbness or bowel or bladder symptoms. Alleviating factors include: heat, medications. She follows up with rheumatologist for h/o sjogren's/ rheumatoid / SLE. Nursing notes and details of the pain history reviewed. Please see intake notes for details. Previous treatments:   Physical Therapy   Chiropractic treatment  Ongoing Core exercises for the last year. TENS  and medications- NSAID's, oral steroids, gabapentin etc,. Does not tolerate meds well   Conservative RX has not provided good long term relief. Continues HEP    She has not been on anticoagulation medications to include none and has not been on herbal supplements. She is not diabetic. Imaging:    MRI of C spine: 11/3/2020:     FINDINGS:    BONES/ALIGNMENT: There is straightening of the cervical spine with loss of    the normal lordotic curvature. The bone marrow signal intensity is low in    the T1 weighted imaging, similar to the previous exam, likely related to red    marrow versus anemia. Endplate degenerative changes are noted at C6-C7. No    areas of marrow edema to suggest an acute fracture.          SPINAL CORD: The cervical spinal cord demonstrates normal signal intensity    without evidence of an expansile mass lesion. SOFT TISSUES: No paraspinal masses or edema. C2-C3: There is a 2 mm central disc protrusion indenting the anterior thecal    sac. Mild central spinal canal narrowing. The neural foramen are patent. C3-C4: There is a central disc protrusion measuring 2 mm indenting the    anterior thecal sac. Minimal central spinal canal narrowing. The neural    foramen are patent. C4-C5: Asymmetric left-sided uncovertebral spurring and facet arthropathy    have increased with moderate left foraminal narrowing, increased in severity. No right foraminal narrowing, disc herniation, or central spinal canal    stenosis. C5-C6: There is a broad-based central/left paracentral disc protrusion    measuring 3 mm, increased in size. Mild central spinal canal narrowing has    slightly increased. Asymmetric left-sided uncovertebral spurring with mild    left foraminal narrowing, increased. No right foraminal narrowing. C6-C7: There is a broad-based left paracentral central disc protrusion    measuring 3 mm. Ligamentum flavum thickening. Moderate central spinal canal    narrowing has increased. Asymmetric left-sided uncovertebral spurring with    moderate left foraminal narrowing and mild right foraminal narrowing,    increased in severity. C7-T1: There is no significant disc protrusion, spinal canal stenosis or    neural foraminal narrowing. Impression    1. Mild central spinal canal narrowing at C5-C6, and moderate central spinal    canal narrowing at C6-C7 have slightly increased. 2. Multilevel foraminal narrowing, as above, greatest on the left at C4-C5,    and left at C6-C7. 3. No evidence of an acute fracture. X ray C spine: 10/26/2020:    Impression    Degenerative disc changes at C6-7        MRI of LS spine: 6/27/2020:  FINDINGS:    BONES/ALIGNMENT: There is mild degenerative disc disease at L4-L5, and L5-S1. There are Modic type 1 degenerative endplate changes at W5-C9. SPINAL CORD: The conus terminates normally. SOFT TISSUES: No paraspinal mass identified. L1-L2:  The thecal sac and neural foramina are intact. L2-L3:  The thecal sac and neural foramina are intact. L3-L4:  The thecal sac and neural foramina are intact. L4-L5: There is a minimum disc bulge measuring 2 mm. There is mild facet    and ligamentum flavum hypertrophy. The thecal sac is not stenotic. Disc    and/or osteophytes cause minimal narrowing of the neural foramina bilaterally. L5-S1: There is a disc protrusion with annular fissure centrally at L5-S1    measuring approximately 4.5 mm. This effaces the anterior aspect of the    thecal sac. The thecal sac is not significantly stenotic. The S1 nerve    roots are intact. There is mild narrowing of the neural foramina bilaterally. There may be slight increase in size of the disc protrusion compared to the    prior study. Impression    Disc protrusion with annular fissure at L5-S1. No significant stenosis of    the thecal sac. The S1 nerve roots are intact. There is mild narrowing of    the neural foramina bilaterally at L5-S1. Mild narrowing of the neural foramina at L4-L5 as discussed above.           Potential Aberrant Drug-Related Behavior:  No    Urine Drug Screening:No    OARRS report[de-identified]  8/31/22  yes- not on chronic narcotics    Past Medical History:   Diagnosis Date    BRCA1 gene mutation positive     Cousin Pos. per mmmo hx sheet    COVID 11/2021    pt states moderate; twice Nov 2021, May 2022    H/O rheumatoid arthritis     Joint pain     Low back pain     Lupus ([address]     Menometrorrhagia 6/30/2022    Neck pain     RA (rheumatoid arthritis) (HCC)     RH    Radiculopathy     Sjogren's disease ([address]     mucuc membrane   dry lips  always [age]       Past Surgical History: Procedure Laterality Date    ANESTHESIA NERVE BLOCK Bilateral 2019    BILATERAL LUMBAR TRANSFORAMINAL EPIDURAL STERIOD INJECTION AT L5-S1 UNDER FLUOROSCOPY performed by Ruthann Mooney MD at [address] Left 2022    LEFT ELBOW CUBITAL TUNNEL RELEASE performed by Giuseppe Marrufo DO at [address] Bilateral      SECTION      x2    CHOLECYSTECTOMY, LAPAROSCOPIC N/A 2021    LAPAROSCOPIC CHOLECYSTECTOMY performed by Harman Fields MD at [address]      x2    DILATION AND CURETTAGE OF UTERUS N/A 2022    DILATATION AND CURETTAGE HYSTEROSCOPY performed by Kala Soriano MD at [address] [facility] Left     cubital nerve    ENDOSCOPY, COLON, DIAGNOSTIC      EPIDURAL STEROID INJECTION Left 2019    LUMBAR EPIDURAL STEROID INJECTION L5-S1 (LEFT PARAMEDIAN APPROACH) UNDER FLUOROSCOPIC GUIDANCE) performed by Ruthann Mooney MD at [address] N/A 2021    LAPAROSCOPIC HIATAL HERNIA RTEPAIR WITH MESH [facility] Left 2016    medial menisectomy, debridement, acl synovectomy, chondroplasty    NERVE BLOCK Left 2019    lumbar    NERVE BLOCK Bilateral 2019    NERVE BLOCK Left 2020    Cervical epidural steroid injection under fouroscopic guidance c7-t1 left paramedian    PAIN MANAGEMENT PROCEDURE Left 2020    CERVICAL EPIDURAL STEROID INJECTION UNDER FLUOROSCOPIC GUIDANCE AT C7-T1 LEFT PARAMEDIAN (CPT 91020) performed by Ruthann Mooney MD at [address] [facility] Right 2021    CERVICAL EPIDURAL STEROID INJECTION UNDER FLUOROSCOPIC GUIDANCE AT C7-T1 RIGHT PARAMEDIAN performed by Ruthann Mooney MD at [place] [address]         Prior to Admission medications    Medication Sig Start Date End Date Taking?  Authorizing Provider   ibuprofen (ADVIL;MOTRIN) 600 MG tablet Take 1 tablet by mouth 4 times daily as needed for Pain 7/1/22  Yes Gena Keller MD   celecoxib (CELEBREX) 200 MG capsule TAKE 1 CAPSULE BY MOUTH DAILY 5/31/22 9/28/22 Yes Steven Huddleston DO   medroxyPROGESTERone (DEPO-PROVERA) 150 MG/ML injection Inject 150 mg into the muscle every 3 months   Yes Historical Provider, MD   Cholecalciferol (VITAMIN D) 50 MCG (2000 UT) CAPS capsule Take by mouth daily    Yes Historical Provider, MD   pantoprazole (PROTONIX) 40 MG tablet Take 40 mg by mouth as needed  1/15/21  Yes Historical Provider, MD       Allergies   Allergen Reactions    Methotrexate Derivatives Nausea And Vomiting and Other (See Comments)     Mouth sores       Social History     Socioeconomic History    Marital status:      Spouse name: Not on file    Number of children: Not on file    Years of education: Not on file    Highest education level: Not on file   Occupational History    Not on file   Tobacco Use    Smoking status: Never    Smokeless tobacco: Never   Vaping Use    Vaping Use: Never used   Substance and Sexual Activity    Alcohol use: Yes     Comment: wine occ    Drug use: No    Sexual activity: Yes     Partners: Male   Other Topics Concern    Not on file   Social History Narrative    Not on file     Social Determinants of Health     Financial Resource Strain: Not on file   Food Insecurity: Not on file   Transportation Needs: Not on file   Physical Activity: Not on file   Stress: Not on file   Social Connections: Not on file   Intimate Partner Violence: Not on file   Housing Stability: Not on file       Family History   Problem Relation Age of Onset    Heart Failure Mother     Thyroid Disease Mother     Cancer Father     Prostate Cancer Father         Per mammo  hx sheet    Thyroid Disease Sister     Thyroid Disease Maternal Grandmother     Thyroid Disease Sister     Thyroid Disease Sister     Breast Cancer Paternal Grandmother     Breast Cancer Maternal Aunt     Colon Cancer Maternal Aunt     Breast Cancer Maternal Cousin        REVIEW OF SYSTEMS:     Clarisse Zacarias denies fever/chills, chest pain, shortness of breath, new bowel or bladder complaints. All other review of systems was negative. PHYSICAL EXAMINATION:      /60   Pulse 70   Temp 98.4 °F (36.9 °C)   Resp 20   Ht 5' (1.524 m)   Wt 161 lb (73 kg)   BMI 31.44 kg/m²   General:       General appearance:   pleasant and well-hydrated. , in no distress and A & O x3  Build:Obese  Function:Moves about room without difficulty. HEENT:     Head:normocephalic, atraumatic  Pupils:regular, round, equal  Sclera: icterus absent     Lungs:     Breathing:normal breathing pattern      CVS :   RRR     Abdomen:     Shape:non-distended and normal  Tenderness:none  Guarding:none     Cervical spine:     Inspection:normal  Palpation:tenderness paravertebral muscles, tenderness trapezium, left, right and negative  Range of motion:abnormal mildly flexion, extension rotation bilateral and is painful. Thoracic spine:     Spine inspection:normal   Palpation:non-tender midline and paraspinals, left and right. Range of motion:normal in flexion, extension rotation bilateral and is not painful. Lumbar spine:     Spine inspection:normal   Palpation:tenderness paravertebral muscles, left, right and positive. Range of motion: Reduced, Pain on Lateral bending, flexion, extension rotation bilateral and is Painful. B/l Lumbar paraspinal tenderness +  B/l Lumbar facet loading +  SIJ tenderness + bilateral,   Sensory and motor in b/l LE normal  DTRs' b/l LE equal     Musculoskeletal:     Trigger points +     Extremities:     Tremors:None bilaterally upper and lower  Edema:no  Knee:     Knee ROM normal- no pain     Neurological:        Sensory:normal to light touch - except for sensory changes over the left UE.      Motor:   Right Grip5/5              Left Grip5/5 Right Bicep5/5           Left Bicep5/5              Right Triceps5/5       Left Triceps5/5          Right Deltoid5/5     Left Deltoid5/5                  Right Quadriceps5/5          Left Quadriceps5/5           Right Gastrocnemius5/5    Left Gastrocnemius5/5  Right Ant Tibialis5/5  Left Ant Tibialis5/5     Gait:normal     Dermatology:     Skin: redness over the face    Assessment/Plan:   Diagnosis Orders   1. Cervical spondylosis        2. DDD (degenerative disc disease), cervical          41 y.o.  lady with neck pain and left UE pain, Low back/ LE pain. Failed conservative treatment. Prior C-spine and LS spine interventions had helped. Relafen for prn use. Methocarbamol for prn use for muscle spasm. Has been evaluated by Dr. Welsh Body not recommend surgery. Consider Lumbar Facet injection in future if low back pain worsens. Neck pain significant- features of facet pain. Plan:   B/l Cervical facet MBNB C2,3,4,5 under fluoroscopy. Moderate sedation due to anxiety of needles. If short term relief, will do RFA    Will address the low back issue after the Cervical interventions.     Counseling reg : regular HEP and weight watching, appropriate medication use     Adriana Ma MD    CC:  Scott Arteaga MD

## 2022-08-31 NOTE — PROGRESS NOTES
Do you currently have any of the following:    Fever: No  Headache:  No  Cough: No  Shortness of breath: No  Exposed to anyone with these symptoms: No                                                                                                                Alize Shaw presents to the [facility] on 8/31/2022. Ramona Cortez is complaining of pain in her neck and entire back and radiates down legs to toes and occasionally down bilateral arms to fingers. The pain is constant. The pain is described as aching, throbbing, shooting, and burning. Pain is rated on her best day at a 3, on her worst day at a 10, and on average at a 4 on the VAS scale. She took her last dose of Motrin Monday, celebrex yesterday. Ramona Cortez does have issues with constipation. Any procedures since your last visit: No,     She is  on NSAIDS and  is not on anticoagulation medications to include none   Pacemaker or defibrillator: No     Medication Contract and Consent for Opioid Use Documents Filed       Patient Documents       Type of Document Status Date Received Received By Description    Medication Contract Received 5/7/2019  8:46 AM SANDRITA KABA Pain [facility] Pt Agreement 5/7/2019                       /60   Pulse 70   Temp 98.4 °F (36.9 °C)   Resp 20   Ht 5' (1.524 m)   Wt 161 lb (73 kg)   BMI 31.44 kg/m²      No LMP recorded. Patient has had an ablation.

## 2022-08-31 NOTE — PROGRESS NOTES
Pt    [address] [facility], [address] [place] [address] Adebayo  [phone]    Follow up Note      Nuria Reid     Date of Visit:  8/31/2022    CC:  Patient presents for follow up   Chief Complaint   Patient presents with    Follow-up     Neck and back       HPI:    H/o Neck pain and upper extremity pain radiating to the left UE. Failed conservative treatment with NSAID's, muscle relaxants, oral steroids and HEP. S/P Cervical BELEM on 12/8/2020 with excellent pain relief of more than 90%. She also has H/o chronic low back and B/l LE pain. S/P BELEM in 2019 with excellent pain relief. Does not have significant LE pain. Pain is mainly in the low back. Low back pain is currently stable. Continues HEP    No new weakness or numbness or bowel or bladder symptoms. Alleviating factors include: heat, medications. She follows up with rheumatologist for h/o sjogren's/ rheumatoid / SLE. Nursing notes and details of the pain history reviewed. Please see intake notes for details. Previous treatments:   Physical Therapy   Chiropractic treatment  Ongoing Core exercises for the last year. TENS  and medications- NSAID's, oral steroids, gabapentin etc,. Does not tolerate meds well   Conservative RX has not provided good long term relief. Continues HEP    She has not been on anticoagulation medications to include none and has not been on herbal supplements. She is not diabetic. Imaging:    MRI of C spine: 11/3/2020:     FINDINGS:    BONES/ALIGNMENT: There is straightening of the cervical spine with loss of    the normal lordotic curvature. The bone marrow signal intensity is low in    the T1 weighted imaging, similar to the previous exam, likely related to red    marrow versus anemia. Endplate degenerative changes are noted at C6-C7. No    areas of marrow edema to suggest an acute fracture.          SPINAL CORD: The cervical spinal cord demonstrates normal disc disease at L4-L5, and L5-S1. There are Modic type 1 degenerative endplate changes at B1-Z6. SPINAL CORD: The conus terminates normally. SOFT TISSUES: No paraspinal mass identified. L1-L2:  The thecal sac and neural foramina are intact. L2-L3:  The thecal sac and neural foramina are intact. L3-L4:  The thecal sac and neural foramina are intact. L4-L5: There is a minimum disc bulge measuring 2 mm. There is mild facet    and ligamentum flavum hypertrophy. The thecal sac is not stenotic. Disc    and/or osteophytes cause minimal narrowing of the neural foramina bilaterally. L5-S1: There is a disc protrusion with annular fissure centrally at L5-S1    measuring approximately 4.5 mm. This effaces the anterior aspect of the    thecal sac. The thecal sac is not significantly stenotic. The S1 nerve    roots are intact. There is mild narrowing of the neural foramina bilaterally. There may be slight increase in size of the disc protrusion compared to the    prior study. Impression    Disc protrusion with annular fissure at L5-S1. No significant stenosis of    the thecal sac. The S1 nerve roots are intact. There is mild narrowing of    the neural foramina bilaterally at L5-S1. Mild narrowing of the neural foramina at L4-L5 as discussed above.           Potential Aberrant Drug-Related Behavior:  No    Urine Drug Screening:No    OARRS report[de-identified]  8/31/22  yes- not on chronic narcotics    Past Medical History:   Diagnosis Date    BRCA1 gene mutation positive     Cousin Pos. per mmmo hx sheet    COVID 11/2021    pt states moderate; twice Nov 2021, May 2022    H/O rheumatoid arthritis     Joint pain     Low back pain     Lupus ([address]     Menometrorrhagia 6/30/2022    Neck pain     RA (rheumatoid arthritis) (HCC)     RH    Radiculopathy     Sjogren's disease ([address]     mucuc membrane   dry lips  always [age]       Past Surgical History: Cancer Paternal Grandmother     Breast Cancer Maternal Aunt     Colon Cancer Maternal Aunt     Breast Cancer Maternal Cousin        REVIEW OF SYSTEMS:     Erika Zimmerman denies fever/chills, chest pain, shortness of breath, new bowel or bladder complaints. All other review of systems was negative. PHYSICAL EXAMINATION:      /60   Pulse 70   Temp 98.4 °F (36.9 °C)   Resp 20   Ht 5' (1.524 m)   Wt 161 lb (73 kg)   BMI 31.44 kg/m²   General:       General appearance:   pleasant and well-hydrated. , in no distress and A & O x3  Build:Obese  Function:Moves about room without difficulty. HEENT:     Head:normocephalic, atraumatic  Pupils:regular, round, equal  Sclera: icterus absent     Lungs:     Breathing:normal breathing pattern      CVS :   RRR     Abdomen:     Shape:non-distended and normal  Tenderness:none  Guarding:none     Cervical spine:     Inspection:normal  Palpation:tenderness paravertebral muscles, tenderness trapezium, left, right and negative  Range of motion:abnormal mildly flexion, extension rotation bilateral and is painful. Thoracic spine:     Spine inspection:normal   Palpation:non-tender midline and paraspinals, left and right. Range of motion:normal in flexion, extension rotation bilateral and is not painful. Lumbar spine:     Spine inspection:normal   Palpation:tenderness paravertebral muscles, left, right and positive. Range of motion: Reduced, Pain on Lateral bending, flexion, extension rotation bilateral and is Painful. B/l Lumbar paraspinal tenderness +  B/l Lumbar facet loading +  SIJ tenderness + bilateral,   Sensory and motor in b/l LE normal  DTRs' b/l LE equal     Musculoskeletal:     Trigger points +     Extremities:     Tremors:None bilaterally upper and lower  Edema:no  Knee:     Knee ROM normal- no pain     Neurological:        Sensory:normal to light touch - except for sensory changes over the left UE.      Motor:   Right Grip5/5              Left Grip5/5 Right Bicep5/5           Left Bicep5/5              Right Triceps5/5       Left Triceps5/5          Right Deltoid5/5     Left Deltoid5/5                  Right Quadriceps5/5          Left Quadriceps5/5           Right Gastrocnemius5/5    Left Gastrocnemius5/5  Right Ant Tibialis5/5  Left Ant Tibialis5/5     Gait:normal     Dermatology:     Skin: redness over the face    Assessment/Plan:   Diagnosis Orders   1. Cervical spondylosis        2. DDD (degenerative disc disease), cervical          41 y.o.  lady with neck pain and left UE pain, Low back/ LE pain. Failed conservative treatment. Prior C-spine and LS spine interventions had helped. Relafen for prn use. Methocarbamol for prn use for muscle spasm. Has been evaluated by Dr. Karthik Andre not recommend surgery. Consider Lumbar Facet injection in future if low back pain worsens. Neck pain significant- features of facet pain. Plan:   B/l Cervical facet MBNB C2,3,4,5 under fluoroscopy. Moderate sedation due to anxiety of needles. If short term relief, will do RFA    Will address the low back issue after the Cervical interventions.     Counseling reg : regular HEP and weight watching, appropriate medication use     Ania Pinto MD    CC:  Mitesh Russell MD

## 2022-09-07 ENCOUNTER — TELEPHONE (OUTPATIENT)
Dept: PAIN MANAGEMENT | Age: 47
End: 2022-09-07

## 2022-09-07 NOTE — TELEPHONE ENCOUNTER
Call to Romy Baez that procedure was approved for 9/13/2022 and that the surgery center should call her a few days before for the pre op call and after 3:00 PM the business day before with the arrival time. Instructed Dannielle to hold ibuprofen for 24 hours, naprosyn for 4 days, celebrex for 6 days and any aspirin containing products or fish oil for 7 days. Instructed to call office back if any questions. Radha Pierce verbalized understanding.      Lisa Pugh RN  Pain Management

## 2022-09-09 DIAGNOSIS — M25.562 ACUTE PAIN OF LEFT KNEE: Primary | ICD-10-CM

## 2022-09-10 ENCOUNTER — ANESTHESIA EVENT (OUTPATIENT)
Dept: OPERATING ROOM | Age: 47
End: 2022-09-10
Payer: COMMERCIAL

## 2022-09-10 ASSESSMENT — LIFESTYLE VARIABLES: SMOKING_STATUS: 0

## 2022-09-10 NOTE — ANESTHESIA PRE PROCEDURE
Department of Anesthesiology  Preprocedure Note       Name:  Kady Talbot   Age:  41 y.o.  :  1975                                          MRN:  [id number]         Date:  9/10/2022      Surgeon: Pb Shaw):  Cordelia Fields MD    Procedure: Procedure(s):  BILATERAL CERVICAL MEDIAL BRANCH NERVE BLOCK UNDER FLUOROSCOPIC GUIDANCE AT C2, C3, C4 AND C5 (CPT 57424)    Medications prior to admission:   Prior to Admission medications    Medication Sig Start Date End Date Taking?  Authorizing Provider   ibuprofen (ADVIL;MOTRIN) 600 MG tablet Take 1 tablet by mouth 4 times daily as needed for Pain  Patient taking differently: Take 600 mg by mouth 4 times daily as needed for Pain On hold pre-op 22   Gena Keller MD   celecoxib (CELEBREX) 200 MG capsule TAKE 1 CAPSULE BY MOUTH DAILY  Patient taking differently: Take 200 mg by mouth daily On hold pre-op LD 22  Marlene Rivera DO   medroxyPROGESTERone (DEPO-PROVERA) 150 MG/ML injection Inject 150 mg into the muscle every 3 months Next dose 10-22    Historical Provider, MD   Cholecalciferol (VITAMIN D) 50 MCG ( UT) CAPS capsule Take by mouth daily     Historical Provider, MD   pantoprazole (PROTONIX) 40 MG tablet Take 40 mg by mouth as needed  1/15/21   Historical Provider, MD       Current medications:    Current Outpatient Medications   Medication Sig Dispense Refill    ibuprofen (ADVIL;MOTRIN) 600 MG tablet Take 1 tablet by mouth 4 times daily as needed for Pain (Patient taking differently: Take 600 mg by mouth 4 times daily as needed for Pain On hold pre-op) 120 tablet 0    celecoxib (CELEBREX) 200 MG capsule TAKE 1 CAPSULE BY MOUTH DAILY (Patient taking differently: Take 200 mg by mouth daily On hold pre-op LD 22) 30 capsule 3    medroxyPROGESTERone (DEPO-PROVERA) 150 MG/ML injection Inject 150 mg into the muscle every 3 months Next dose 10-22      Cholecalciferol (VITAMIN D) 50 MCG (2000 UT) CAPS capsule Take by mouth daily       pantoprazole (PROTONIX) 40 MG tablet Take 40 mg by mouth as needed        No current facility-administered medications for this visit. 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    Sensory peripheral neuropathy G60.8    Cold feet R20.9    Insomnia G47.00    Lupus ([address] M32.9    Tear, knee, medial meniscus S83.249A    ACL tear S83.519A    Post-traumatic osteoarthritis of left knee M17.32    Synovitis M65.9    Lumbar radiculopathy M54.16    DDD (degenerative disc disease), lumbar M51.36    Lumbosacral spondylosis without myelopathy M47.817    Sacroiliac dysfunction M53.3    Cervical spondylosis M47.812    Cervical radiculopathy M54.12    Cervical spondylolysis M43.02    Hiatal hernia L62.0    Biliary colic R53.41    Myofascial muscle pain M79.18    Cubital tunnel syndrome on left G56.22    Lateral epicondylitis of left elbow M77.12    Achilles tendinitis of left lower extremity M76.62    Menometrorrhagia N92.1    Chronic pain syndrome G89.4       Past Medical History:        Diagnosis Date    BRCA1 gene mutation positive     Cousin Pos. per mmmo hx sheet    COVID 11/2021    pt states moderate; twice Nov 2021, May 2022-sinus infection only 4 days only    H/O rheumatoid arthritis     Joint pain     Low back pain     Lupus ([address]     Menometrorrhagia 06/30/2022    Neck pain     RA [facility]rheumatoid arthritis) ([facility])     RH    Radiculopathy     Sjogren's disease ([facility])     mucuc membrane   dry lips  always thirty       Past Surgical History:        Procedure Laterality Date    ANESTHESIA NERVE BLOCK Bilateral 07/09/2019    BILATERAL LUMBAR TRANSFORAMINAL EPIDURAL STERIOD INJECTION AT L5-S1 UNDER FLUOROSCOPY performed by Dipesh Martinez MD at [address] [facility] 2022    LEFT ELBOW CUBITAL TUNNEL RELEASE performed by Mary Diana DO at [address] Bilateral      SECTION      x2    CHOLECYSTECTOMY, LAPAROSCOPIC N/A 2021    LAPAROSCOPIC CHOLECYSTECTOMY performed by Angelina Miranda MD at [facility]      x2    DILATION AND CURETTAGE OF UTERUS N/A 2022    DILATATION AND CURETTAGE HYSTEROSCOPY performed by Jaison Gomez MD at [facility] Left     cubital nerve    ENDOSCOPY, COLON, DIAGNOSTIC      EPIDURAL STEROID INJECTION Left 2019    LUMBAR EPIDURAL STEROID INJECTION L5-S1 (LEFT PARAMEDIAN APPROACH) UNDER FLUOROSCOPIC GUIDANCE) performed by Doss Schilder[facility] N/A 2021    LAPAROSCOPIC HIATAL HERNIA RTEPAIR WITH MESH performed by Angelina Miranda MD at [place] [address] ARTHROSCOPY Left 2016    medial menisectomy, debridement, acl synovectomy, chondroplasty    NERVE BLOCK Left 2019    lumbar    NERVE BLOCK Bilateral 2019    NERVE BLOCK Left 2020    Cervical epidural steroid injection under fouroscopic guidance c7-t1 left paramedian    PAIN MANAGEMENT PROCEDURE Left 2020    CERVICAL EPIDURAL STEROID INJECTION UNDER FLUOROSCOPIC GUIDANCE AT C7-T1 LEFT PARAMEDIAN (CPT 60295) performed by Doss Schilder, MD at [address] [place] [address] Right 2021    CERVICAL EPIDURAL STEROID INJECTION UNDER FLUOROSCOPIC GUIDANCE AT C7-T1 RIGHT PARAMEDIAN performed by Doss Schilder, MD at [place] [address].      TUBAL LIGATION         Social History:    Social History     Tobacco Use    Smoking status: Never    Smokeless tobacco: Never   Substance Use Topics    Alcohol use: Yes     Comment: wine occ- 4 drinks weekend only                                Counseling given: Not Answered      Vital Signs (Current): There were no vitals filed for this visit. BP Readings from Last 3 Encounters:   08/31/22 102/60   07/26/22 112/68   07/25/22 126/78       NPO Status:                                                                                 BMI:   Wt Readings from Last 3 Encounters:   08/31/22 161 lb (73 kg)   07/26/22 161 lb (73 kg)   07/25/22 158 lb (71.7 kg)     There is no height or weight on file to calculate BMI.    CBC:   Lab Results   Component Value Date/Time    WBC 4.8 06/23/2022 12:40 PM    RBC 4.98 06/23/2022 12:40 PM    HGB 12.0 06/23/2022 12:40 PM    HCT 39.4 06/23/2022 12:40 PM    MCV 79.1 06/23/2022 12:40 PM    RDW 14.3 06/23/2022 12:40 PM     06/23/2022 12:40 PM       CMP:   Lab Results   Component Value Date/Time     12/20/2021 11:20 AM    K 3.7 12/20/2021 11:20 AM     12/20/2021 11:20 AM    CO2 23 12/20/2021 11:20 AM    BUN 10 12/20/2021 11:20 AM    CREATININE 0.9 12/20/2021 11:20 AM    GFRAA >60 12/20/2021 11:20 AM    LABGLOM >60 12/20/2021 11:20 AM    GLUCOSE 95 12/20/2021 11:20 AM    GLUCOSE 97 08/30/2011 10:00 AM    PROT 7.2 12/20/2021 11:20 AM    CALCIUM 9.2 12/20/2021 11:20 AM    BILITOT 0.4 12/20/2021 11:20 AM    ALKPHOS 75 12/20/2021 11:20 AM    AST 22 12/20/2021 11:20 AM    ALT 31 12/20/2021 11:20 AM       POC Tests: No results for input(s): POCGLU, POCNA, POCK, POCCL, POCBUN, POCHEMO, POCHCT in the last 72 hours.     Coags: No results found for: PROTIME, INR, APTT    HCG (If Applicable):   Lab Results   Component Value Date    PREGTESTUR NEGATIVE 07/01/2022        ABGs: No results found for: PHART, PO2ART, MXE4WZR, OIE9VYU, BEART, J4RZZUNQ     Type & Screen (If Applicable):  No results found for: LABABO, LABRH    Drug/Infectious Status (If Applicable):  No results found for: HIV, HEPCAB    COVID-19 Screening (If Applicable):   Lab Results   Component Value Date/Time    COVID19 Not Detected 07/29/2021 11:19 AM    COVID19 Not Detected 04/12/2021 10:40 AM           Anesthesia Evaluation  Patient summary reviewed no history of anesthetic complications:   Airway: Mallampati: III  TM distance: >3 FB   Neck ROM: limited  Comment: Neck extension limited by pain. Mouth opening: > = 3 FB   Dental:      Comment: Dentition intact, denies any loose teeth. Pulmonary:Negative Pulmonary ROS breath sounds clear to auscultation      (-) not a current smoker                           Cardiovascular:Negative CV ROS  Exercise tolerance: good (>4 METS),   (+) hyperlipidemia        Rhythm: regular  Rate: normal                    Neuro/Psych:   (+) neuromuscular disease (Cervical radiculopathy, Cervical spondylolysis ):,              ROS comment: Sensory peripheral neuropathy GI/Hepatic/Renal:   (+) hiatal hernia (Much improved with hiatal hernia repair),          ROS comment: IBS. Endo/Other:    (+) : arthritis ( Lupus): OA and rheumatoid. , .                  ROS comment: LUPUS Abdominal:   (+) obese,           Vascular: negative vascular ROS. Other Findings:             Anesthesia Plan      general     ASA 2       Induction: intravenous. Anesthetic plan and risks discussed with patient. Plan discussed with CRNA.                     Ailin Keyes MD   [birth year]

## 2022-09-12 ENCOUNTER — OFFICE VISIT (OUTPATIENT)
Dept: ORTHOPEDIC SURGERY | Age: 47
End: 2022-09-12
Payer: COMMERCIAL

## 2022-09-12 VITALS — HEIGHT: 60 IN | TEMPERATURE: 98 F | WEIGHT: 161 LBS | BODY MASS INDEX: 31.61 KG/M2

## 2022-09-12 DIAGNOSIS — S83.512A RUPTURE OF ANTERIOR CRUCIATE LIGAMENT OF LEFT KNEE, INITIAL ENCOUNTER: Primary | ICD-10-CM

## 2022-09-12 PROCEDURE — 99213 OFFICE O/P EST LOW 20 MIN: CPT | Performed by: ORTHOPAEDIC SURGERY

## 2022-09-12 NOTE — PROGRESS NOTES
Chief Complaint   Patient presents with    Knee Pain     Left Knee, x 1 months, while walking felt her knee shifting/slipping and had swelling. Has been using NSAID's, Ice/Heat with no relief. Unable to cross her legs and or put any pressure on her knee. Subjective:     Patient ID: Umesh Serra is a 41 y.o..  female    Knee Pain  Patient complains of left knee pain. This is evaluated as a personal injury. There was a history of injury. She fell in a hole. The pain began 1 month ago. The pain is located anterior. She describes  Her symptoms as aching and sharp. She has experienced popping, clicking, locking, and giving way in the affected knee. The patient has had pain with kneeling, squating, and climbing stairs. Symptoms improve with rest. The symptoms are worse with activity, stair climbing, kneeling. The knee has given out or felt unstable. The patient can bend and straighten the knee fully. The patient is active in none. Treatment to date has been ice, heat, Tylenol, NSAID's, knee sleeve/brace, therapy, arthroscopy, without significant relief.    Past Medical History:   Diagnosis Date    BRCA1 gene mutation positive     Cousin Pos. per mmmo hx sheet    COVID 11/2021    pt states moderate; twice Nov 2021, May 2022-sinus infection only 4 days only    H/O rheumatoid arthritis     Joint pain     Low back pain     Lupus ([address]     Menometrorrhagia 06/30/2022    Neck pain     RA (rheumatoid arthritis) ([facility])     RH    Radiculopathy     Sjogren's disease ([facility]     mucuc membrane   dry lips  always thirty     Past Surgical History:   Procedure Laterality Date    ANESTHESIA NERVE BLOCK Bilateral 07/09/2019    BILATERAL LUMBAR TRANSFORAMINAL EPIDURAL STERIOD INJECTION AT L5-S1 UNDER FLUOROSCOPY performed by Lizbeth Meeks MD at [address] Left 05/13/2022    LEFT ELBOW CUBITAL TUNNEL RELEASE performed by Niranjan Walton DO at [address] Bilateral      SECTION      x2    CHOLECYSTECTOMY, LAPAROSCOPIC N/A 2021    LAPAROSCOPIC CHOLECYSTECTOMY performed by Adele Sapp MD at [address] [facility]      x2    DILATION AND CURETTAGE OF UTERUS N/A 2022    DILATATION AND CURETTAGE HYSTEROSCOPY performed by Cullen Davis MD at [facility] Left     cubital nerve    ENDOSCOPY, COLON, DIAGNOSTIC      EPIDURAL STEROID INJECTION Left 2019    LUMBAR EPIDURAL STEROID INJECTION L5-S1 (LEFT PARAMEDIAN APPROACH) UNDER FLUOROSCOPIC GUIDANCE) performed by Dipesh Martinez MD at [address] [place] N/A 2021    LAPAROSCOPIC HIATAL HERNIA RTEPAIR WITH MESH performed by Adele Sapp MD at [address] [facility] ARTHROSCOPY Left 2016    medial menisectomy, debridement, acl synovectomy, chondroplasty    NERVE BLOCK Left 2019    lumbar    NERVE BLOCK Bilateral 2019    NERVE BLOCK Left 2020    Cervical epidural steroid injection under fouroscopic guidance c7-t1 left paramedian    PAIN MANAGEMENT PROCEDURE Left 2020    CERVICAL EPIDURAL STEROID INJECTION UNDER FLUOROSCOPIC GUIDANCE AT C7-T1 LEFT PARAMEDIAN (CPT 48833) performed by Dipesh Martinez MD at [facility] Right 2021    CERVICAL EPIDURAL STEROID INJECTION UNDER FLUOROSCOPIC GUIDANCE AT C7-T1 RIGHT PARAMEDIAN performed by Dipesh Martinez MD at [place] [address]         Current Outpatient Medications:     ibuprofen (ADVIL;MOTRIN) 600 MG tablet, Take 1 tablet by mouth 4 times daily as needed for Pain (Patient taking differently: Take 600 mg by mouth 4 times daily as needed for Pain On hold pre-op), Disp: 120 tablet, Rfl: 0    celecoxib (CELEBREX) 200 MG capsule, TAKE 1 CAPSULE BY MOUTH DAILY (Patient taking differently: Take 200 mg by mouth daily On hold pre-op LD 22), Disp: 30 capsule, Rfl: 3    medroxyPROGESTERone (DEPO-PROVERA) 150 MG/ML injection, Inject 150 mg into the muscle every 3 months Next dose 10-22, Disp: , Rfl:     Cholecalciferol (VITAMIN D) 50 MCG (2000 UT) CAPS capsule, Take by mouth daily , Disp: , Rfl:     pantoprazole (PROTONIX) 40 MG tablet, Take 40 mg by mouth as needed , Disp: , Rfl:   Allergies   Allergen Reactions    Methotrexate Derivatives Nausea And Vomiting and Other (See Comments)     Mouth sores     Social History     Socioeconomic History    Marital status:      Spouse name: Not on file    Number of children: Not on file    Years of education: Not on file    Highest education level: Not on file   Occupational History    Not on file   Tobacco Use    Smoking status: Never    Smokeless tobacco: Never   Vaping Use    Vaping Use: Never used   Substance and Sexual Activity    Alcohol use: Yes     Comment: wine occ- 4 drinks weekend only    Drug use: No    Sexual activity: Yes     Partners: Male   Other Topics Concern    Not on file   Social History Narrative    Not on file     Social Determinants of Health     Financial Resource Strain: Not on file   Food Insecurity: Not on file   Transportation Needs: Not on file   Physical Activity: Not on file   Stress: Not on file   Social Connections: Not on file   Intimate Partner Violence: Not on file   Housing Stability: Not on file     Family History   Problem Relation Age of Onset    Heart Failure Mother     Thyroid Disease Mother     Cancer Father     Prostate Cancer Father         Per mammo  hx sheet    Thyroid Disease Sister     Thyroid Disease Maternal Grandmother     Thyroid Disease Sister     Thyroid Disease Sister     Breast Cancer Paternal Grandmother     Breast Cancer Maternal Aunt     Colon Cancer Maternal Aunt     Breast Cancer Maternal Cousin          REVIEW OF SYSTEMS:     General/Constitution:  (-)weight loss, (-)fever, (-)chills, (-)weakness.    Skin: (-) rash,(-) psoriasis,(-) eczema, (-)skin cancer. Musculoskeletal: (-) fractures,  (-) dislocations,(-) collagen vascular disease, (-) fibromyalgia, (-) multiple sclerosis, (-) muscular dystrophy, (-) RSD,(-) joint pain (-)swelling, (-) joint pain,swelling. Neurologic: (-) epilepsy, (-)seizures,(-) brain tumor,(-) TIA, (-)stroke, (-)headaches, (-)Parkinson disease,(-) memory loss, (-) LOC. Cardiovascular: (-) Chest pain, (-) swelling in legs/feet, (-) SOB, (-) cramping in legs/feet with walking. Respiratory: (-) SOB, (-) Coughing, (-) night sweats. GI: (-) nausea, (-) vomiting, (-) diarrhea, (-) blood in stool, (-) gastric ulcer. Psychiatric: (-) Depression, (-) Anxiety, (-) bipolar disease, (-) Alzheimer's Disease  Allergic/Immunologic: (-) allergies latex, (-) allergies metal, (-) skin sensitivity. Hematlogic: (-) anemia, (-) blood transfusion, (-) DVT/PE, (-) Clotting disorders    Subjective:    Constitution:  Temp 98 °F (36.7 °C)   Ht 5' (1.524 m)   Wt 161 lb (73 kg)   BMI 31.44 kg/m²     Psycihatric:  The patient is alert and oriented x 3, appears to be stated age and in no distress. Respiratory:  Respiratory effort is not labored. Patient is not gasping. Palpation of the chest reveals no tactile fremitus. Skin:  Upon inspection: the skin appears warm, dry and intact. There is  a previous scar over the affected area. There is any cellulitis, lymphedema or cutaneous lesions noted in the lower extremities. Upon palpation there is no induration noted. Neurologic:  Gait: antalgic; Motor exam of the lower extremities show ; quadriceps, hamstrings, foot dorsi and plantar flexors intact R.  5/5 and L. 5/5. Deep tendon reflexes are 2/4 at the knees and 2/4 at the ankles with strong extensor hallicus longus motor strength bilaterally. Sensory to both feet is intact to all sensory roots. Cardiovascular: The vascular exam is normal and is well perfused to distal extremities. Distal pulses DP/PT: R. 2+; L. 2+.   There is cap refill noted less than two seconds in all digits. There is not edema of the bilateral lower extremities. There is not varicosities noted in the distal extremities. Lymph:  Upon palpation,  there is no lymphadenopathy noted in bilateral lower extremities. Musculoskeletal:  Gait: antalgic; examination of the nails and digits reveal no cyanosis or clubbing. Lumbar exam:  On visual inspection, there is not deformity of the spine. full range of motion, no tenderness, palpable spasm or pain on motion. Special tests: Straight Leg Raise negative, Brandi test negative. Hip exam:   Upon inspection, there is not deformity noted. Upon palpation there is not tenderness. ROM: is  full and symmetrical.   Strength: Hip Flexors 5/5; Hip Abductors 5/5; Hip Adduction 5/5. Knee exam:  Left knee exam shows;  range of motion of R. Knee is 0 to 120, and L. Knee is 0 to 120. The patient does have  pain on motion, effusion is mild, there is tenderness over the  anterior region, there are not any masses, there is not ligamentous instability, there is not  deformity noted. Knee exam: neither positive for moderate crepitations, some mild tenderness laxity is not noted with  stress. There is not a popliteal cyst.    R. Knee:  Lachman's negative, Anterior Drawer negative, Posterior Drawer negative  Miki's negative, Thallasy  positive,   PF grind test negative, Apprehension test positive, Patellar J sign  negative  L. Knee:  Lachman's positive, Anterior Drawer positive, Posterior Drawer negative  Miki's positive, Thallasy  positive,   PF grind test negative, Apprehension test negative,  Patellar J sign  negative    Xray Exam:  No fracture or dislocation  Radiographic findings reviewed with patient    Assessment:  Encounter Diagnoses   Name Primary? Rupture of anterior cruciate ligament of left knee, initial encounter Yes       Plan:  Natural history and expected course discussed. Questions answered.   Educational materials distributed. Rest, ice, compression, and elevation (RICE) therapy. Reduction in offending activity.   Mri left  knee

## 2022-09-13 ENCOUNTER — HOSPITAL ENCOUNTER (OUTPATIENT)
Age: 47
Setting detail: OUTPATIENT SURGERY
Discharge: HOME | End: 2022-09-13
Attending: ANESTHESIOLOGY | Admitting: ANESTHESIOLOGY
Payer: COMMERCIAL

## 2022-09-13 ENCOUNTER — HOSPITAL ENCOUNTER (OUTPATIENT)
Dept: OPERATING ROOM | Age: 47
Setting detail: OUTPATIENT SURGERY
Discharge: HOME | End: 2022-09-13
Attending: ANESTHESIOLOGY
Payer: COMMERCIAL

## 2022-09-13 ENCOUNTER — ANESTHESIA (OUTPATIENT)
Dept: OPERATING ROOM | Age: 47
End: 2022-09-13
Payer: COMMERCIAL

## 2022-09-13 VITALS
HEART RATE: 78 BPM | WEIGHT: 165 LBS | SYSTOLIC BLOOD PRESSURE: 124 MMHG | HEIGHT: 60 IN | BODY MASS INDEX: 32.39 KG/M2 | OXYGEN SATURATION: 97 % | TEMPERATURE: 98 F | RESPIRATION RATE: 16 BRPM | DIASTOLIC BLOOD PRESSURE: 75 MMHG

## 2022-09-13 DIAGNOSIS — M47.892 OTHER OSTEOARTHRITIS OF SPINE, CERVICAL REGION: ICD-10-CM

## 2022-09-13 LAB
HCG, URINE, POC: NEGATIVE
Lab: NORMAL
NEGATIVE QC PASS/FAIL: NORMAL
POSITIVE QC PASS/FAIL: NORMAL

## 2022-09-13 PROCEDURE — 64490 INJ PARAVERT F JNT C/T 1 LEV: CPT | Performed by: ANESTHESIOLOGY

## 2022-09-13 PROCEDURE — 2580000003 HC RX 258: Performed by: ANESTHESIOLOGY

## 2022-09-13 PROCEDURE — 7100000010 HC PHASE II RECOVERY - FIRST 15 MIN: Performed by: ANESTHESIOLOGY

## 2022-09-13 PROCEDURE — 64491 INJ PARAVERT F JNT C/T 2 LEV: CPT | Performed by: ANESTHESIOLOGY

## 2022-09-13 PROCEDURE — 3209999900 FLUORO FOR SURGICAL PROCEDURES

## 2022-09-13 PROCEDURE — 81025 URINE PREGNANCY TEST: CPT | Performed by: ANESTHESIOLOGY

## 2022-09-13 PROCEDURE — 6360000002 HC RX W HCPCS: Performed by: NURSE ANESTHETIST, CERTIFIED REGISTERED

## 2022-09-13 PROCEDURE — 3700000001 HC ADD 15 MINUTES (ANESTHESIA): Performed by: ANESTHESIOLOGY

## 2022-09-13 PROCEDURE — 7100000011 HC PHASE II RECOVERY - ADDTL 15 MIN: Performed by: ANESTHESIOLOGY

## 2022-09-13 PROCEDURE — 3700000000 HC ANESTHESIA ATTENDED CARE: Performed by: ANESTHESIOLOGY

## 2022-09-13 PROCEDURE — 3600000005 HC SURGERY LEVEL 5 BASE: Performed by: ANESTHESIOLOGY

## 2022-09-13 PROCEDURE — 3600000015 HC SURGERY LEVEL 5 ADDTL 15MIN: Performed by: ANESTHESIOLOGY

## 2022-09-13 PROCEDURE — 6360000002 HC RX W HCPCS: Performed by: ANESTHESIOLOGY

## 2022-09-13 PROCEDURE — 2709999900 HC NON-CHARGEABLE SUPPLY: Performed by: ANESTHESIOLOGY

## 2022-09-13 PROCEDURE — 64492 INJ PARAVERT F JNT C/T 3 LEV: CPT | Performed by: ANESTHESIOLOGY

## 2022-09-13 PROCEDURE — 2500000003 HC RX 250 WO HCPCS: Performed by: ANESTHESIOLOGY

## 2022-09-13 RX ORDER — BUPIVACAINE HYDROCHLORIDE 2.5 MG/ML
INJECTION, SOLUTION EPIDURAL; INFILTRATION; INTRACAUDAL PRN
Status: DISCONTINUED | OUTPATIENT
Start: 2022-09-13 | End: 2022-09-13 | Stop reason: ALTCHOICE

## 2022-09-13 RX ORDER — LIDOCAINE HYDROCHLORIDE 5 MG/ML
INJECTION, SOLUTION INFILTRATION; INTRAVENOUS PRN
Status: DISCONTINUED | OUTPATIENT
Start: 2022-09-13 | End: 2022-09-13 | Stop reason: ALTCHOICE

## 2022-09-13 RX ORDER — SODIUM CHLORIDE 9 MG/ML
INJECTION, SOLUTION INTRAVENOUS PRN
Status: DISCONTINUED | OUTPATIENT
Start: 2022-09-13 | End: 2022-09-13 | Stop reason: HOSPADM

## 2022-09-13 RX ORDER — SODIUM CHLORIDE, SODIUM LACTATE, POTASSIUM CHLORIDE, CALCIUM CHLORIDE 600; 310; 30; 20 MG/100ML; MG/100ML; MG/100ML; MG/100ML
INJECTION, SOLUTION INTRAVENOUS CONTINUOUS
Status: DISCONTINUED | OUTPATIENT
Start: 2022-09-13 | End: 2022-09-13 | Stop reason: HOSPADM

## 2022-09-13 RX ORDER — FENTANYL CITRATE 50 UG/ML
INJECTION, SOLUTION INTRAMUSCULAR; INTRAVENOUS PRN
Status: DISCONTINUED | OUTPATIENT
Start: 2022-09-13 | End: 2022-09-13 | Stop reason: SDUPTHER

## 2022-09-13 RX ORDER — DEXAMETHASONE SODIUM PHOSPHATE 10 MG/ML
INJECTION, SOLUTION INTRAMUSCULAR; INTRAVENOUS PRN
Status: DISCONTINUED | OUTPATIENT
Start: 2022-09-13 | End: 2022-09-13 | Stop reason: ALTCHOICE

## 2022-09-13 RX ORDER — MIDAZOLAM HYDROCHLORIDE 1 MG/ML
INJECTION INTRAMUSCULAR; INTRAVENOUS PRN
Status: DISCONTINUED | OUTPATIENT
Start: 2022-09-13 | End: 2022-09-13 | Stop reason: SDUPTHER

## 2022-09-13 RX ORDER — SODIUM CHLORIDE 0.9 % (FLUSH) 0.9 %
5-40 SYRINGE (ML) INJECTION EVERY 12 HOURS SCHEDULED
Status: DISCONTINUED | OUTPATIENT
Start: 2022-09-13 | End: 2022-09-13 | Stop reason: HOSPADM

## 2022-09-13 RX ORDER — SODIUM CHLORIDE 0.9 % (FLUSH) 0.9 %
5-40 SYRINGE (ML) INJECTION PRN
Status: DISCONTINUED | OUTPATIENT
Start: 2022-09-13 | End: 2022-09-13 | Stop reason: HOSPADM

## 2022-09-13 RX ADMIN — FENTANYL CITRATE 50 MCG: 50 INJECTION INTRAMUSCULAR; INTRAVENOUS at 09:32

## 2022-09-13 RX ADMIN — FENTANYL CITRATE 50 MCG: 50 INJECTION INTRAMUSCULAR; INTRAVENOUS at 09:31

## 2022-09-13 RX ADMIN — MIDAZOLAM 2 MG: 1 INJECTION INTRAMUSCULAR; INTRAVENOUS at 09:29

## 2022-09-13 RX ADMIN — SODIUM CHLORIDE, POTASSIUM CHLORIDE, SODIUM LACTATE AND CALCIUM CHLORIDE: 600; 310; 30; 20 INJECTION, SOLUTION INTRAVENOUS at 09:08

## 2022-09-13 ASSESSMENT — PAIN DESCRIPTION - DESCRIPTORS: DESCRIPTORS: ACHING;BURNING

## 2022-09-13 ASSESSMENT — PAIN - FUNCTIONAL ASSESSMENT
PAIN_FUNCTIONAL_ASSESSMENT: PREVENTS OR INTERFERES SOME ACTIVE ACTIVITIES AND ADLS
PAIN_FUNCTIONAL_ASSESSMENT: 0-10

## 2022-09-13 NOTE — ANESTHESIA POSTPROCEDURE EVALUATION
Department of Anesthesiology  Postprocedure Note    Patient: Nuria Reid  MRN: [id number]  YOB: 1975  Date of evaluation: 9/13/2022      Procedure Summary     Date: 09/13/22 Room / Location: [address] [place] [address] 04 / [facility]    Anesthesia Start: 4834 Anesthesia Stop: 4246    Procedure: BILATERAL CERVICAL MEDIAL BRANCH NERVE BLOCK UNDER FLUOROSCOPIC GUIDANCE AT C2, C3, C4 AND C5 (CPT 67089) (Bilateral: Neck) Diagnosis:       Cervical spondylosis without myelopathy      (Cervical spondylosis without myelopathy [H03.320])    Surgeons: Raudel Simon MD Responsible Provider: Chel Byrne MD    Anesthesia Type: MAC ASA Status: 2          Anesthesia Type: MAC    Fredi Phase I: Fredi Score: 10    Fredi Phase II: Fredi Score: 10      Anesthesia Post Evaluation    Patient location during evaluation: bedside  Patient participation: complete - patient participated  Level of consciousness: awake  Pain score: 0  Airway patency: patent  Nausea & Vomiting: no nausea and no vomiting  Complications: no  Cardiovascular status: hemodynamically stable  Respiratory status: acceptable  Hydration status: euvolemic

## 2022-09-13 NOTE — INTERVAL H&P NOTE
Update History & Physical    The patient's History and Physical of August 31, 2022 was reviewed with the patient and I examined the patient. There was no change. The surgical site was confirmed by the patient and me. Plan:   Cervical facet MBNB # 1. The risks, benefits, expected outcome, and alternative to the recommended procedure have been discussed with the patient. Patient understands and wants to proceed with the procedure.      Electronically signed by Lanette Simmons MD on 9/13/2022

## 2022-09-13 NOTE — OP NOTE
Operative Note      Patient: Vania Rodriguez  YOB: 1975  MRN: [id number]    Date of Procedure: 2022    Pre-Op Diagnosis: Cervical spondylosis without myelopathy [M47.812]    Post-Op Diagnosis: Same       Procedure(s):  BILATERAL CERVICAL MEDIAL BRANCH NERVE BLOCK UNDER FLUOROSCOPIC GUIDANCE AT C2, C3, C4 AND C5    Surgeon(s):  Floresita Mccain MD    Assistant:   * No surgical staff found *    Anesthesia: Monitor Anesthesia Care    Estimated Blood Loss (mL): Minimal    Complications: None    Specimens:   * No specimens in log *    Implants:  * No implants in log *      Drains: * No LDAs found *    Findings: good needle placement    Detailed Description of Procedure:   2022    Patient: Vania Rodriguez  :  1975  Age:  41 y.o. Sex:  female     PRE-PROCEDURE DIAGNOSIS: Bilateral  Cervical facet syndrome, cervical spondylosis. POST-PROCEDURE DIAGNOSIS: Same. PROCEDURE: # 1  Bilateral Cervical medial branch blocks at  Levels C2, C3, C4, and C5    SURGEON: Floresita Mccain MD    ANESTHESIA: MAC    ESTIMATED BLOOD LOSS:  None.  ______________________________________________________________________  BRIEF HISTORY:  Vania Rodriguez comes in today for Bilateral cervical facet medial branch block at levels C2, C3, C4, and C5 . The potential complications of this procedure were discussed with her again today. She has elected to undergo the aforementioned procedureDavi Palomino complete History & Physical examination were reviewed in depth, a copy of which is in the chart. DESCRIPTION OF PROCEDURE:    After confirming written and informed consent, a time-out was performed and Candelario name and date of birth, the procedure to be performed as well as the plan for the location of the needle insertion were confirmed. The patient was brought into the procedure room and placed in the prone position on the fluoroscopy table.  Standard monitors were placed and vital signs were observed throughout the procedure. The area of the cervical spine was prepped with chloraprep and draped in the usual sterile manner. AP fluoroscopy views were used to identify and willian the mid lateral aspect of the articular pillars of the targeted levels. The # 25 G 3.5 inch spinal needle was directed until bone was contacted at each level. Once bone was contacted, the needle was walked off laterally. Lateral fluoroscopy was then utilized during final needle positioning in placing the tip of the needle in the middle of the articular pillar. After negative aspiration was confirmed, 8 cc's of marcaine 0.25%  and 10 gm of Dexamethasone was injected equally at the levels- 1 cc at each level. The needles were removed and the patient body part was cleaned and bandage was placed over the needle insertion. Disposition the patient tolerated the procedure well and there were no complications. Vital signs remained stable throughout the procedure. The patient was escorted to the recovery area where they remained until discharge and written discharge instructions for the procedure were given. Plan: Saima Alva will return to our pain management center as scheduled.      Randy Burton MD

## 2022-09-13 NOTE — DISCHARGE INSTRUCTIONS
[facility]) Pain Management Department  [phone]   Post-Pain Block/ Radiofrequency Home Going Instructions    1-Go home, rest for the remainder of the day  2-Please do not lift over 20 pounds the day of the injection  3-If you received sedation No: alcohol, driving, operating lawn mowers, plows, tractors or other dangerous equipment until next morning. Do not make important decisions or sign legal documents for 24 hours. You may experience light headedness, dizziness, nausea or sleepiness after sedation. Do not stay alone. A responsible adult must be with you for 24 hours. You could be nauseated from the medications you have received. Your IV site may be sore and bruised. 4-No dietary restrictions     5-Resume all medications the same day, blood thinners to be resumed 24 hours after injection    6-Keep the surgical site clean and dry, you may shower the next morning and remove the      dressing. 7- No sitz baths, tub baths or hot tubs/swimming for 24 hours. 8- If you have any pain at the injection site(s), application of an ice pack to the area should be       helpful, 20 minutes on/20 minutes off for next 48 hours. 9- [facility] if you develop. Fever greater than 100.4 F  Have bleeding or drainage from the puncture site  Have progressive Leg/arm numbness and or weakness  Loss of control of bowel and or bladder (wet/soil yourself)  Severe headache with inability to lift head  10-You may return to work the next day            Infection After Surgery: Care Instructions  Overview  After surgery, an infection is always possible. It doesn't mean that thesurgery didn't go well. Because an infection can be serious, your doctor has taken steps to manage it. Your doctor checked the infection and cleaned it if necessary. Your doctor may have made an opening in the area so that the pus can drain out.  You may have gauze in the cut so that the area will stay open and keep swelling, warmth, or redness in the area. Red streaks leading from the area. Pus draining from the wound. A new or higher fever. Watch closely for changes in your health, and be sure to contact your doctor ifyou have any problems. Where can you learn more? Go to [email or web address] org and sign in to your [employer] account. Enter C340 in the [employer] box to learn more about \"Infection After Surgery: Care Instructions. \"     If you do not have an account, please click on the \"Sign Up Now\" link. Current as of: January 20, 2022               Content Version: 13.3  © 2006-2022 [employer]. Care instructions adapted under license by [facility] ([facility]). If you have questions about a medical condition or this instruction, always ask your healthcare professional. [place] [address] any warranty or liability for your use of this information. Nausea and Vomiting After Surgery: Care Instructions  Your Care Instructions     After you've had surgery, you may feel sick to your stomach (nauseated) or you may vomit. Sometimes anesthesia can make you feel sick. It's a common side effect and often doesn't last long. Pain also can make you feel sick or vomit. After the anesthesia wears off, you may feel pain from the incision (cut). That pain could then upset your stomach. Taking pain medicine can also make you feelsick to your stomach. Whatever the cause, you may get medicine that can help. There are also somethings you can do at home to prevent nausea and feel better. The doctor has checked you carefully, but problems can develop later. If you notice any problems or new symptoms, get medical treatment right away. Follow-up care is a key part of your treatment and safety. Be sure to make and go to all appointments, and call your doctor if you are having problems. It's also a good idea to know your test results and keep alist of the medicines you take.   How can you care for yourself at home? Be safe with medicines. Read and follow all instructions on the label. If the doctor gave you a prescription medicine for pain, take it as prescribed. If you are not taking a prescription pain medicine, ask your doctor if you can take an over-the-counter medicine. Take your pain medicine as soon as you have pain. It works better if you take it before the pain gets bad. Call your doctor if you have any problems with your medicine. Rest in bed until you feel better. To prevent dehydration, drink plenty of fluids. Choose water and other clear liquids until you feel better. If you have kidney, heart, or liver disease and have to limit fluids, talk with your doctor before you increase the amount of fluids you drink. When you are able to eat, try clear soups, mild foods, and liquids until all symptoms are gone for 12 to 48 hours. Other good choices include dry toast, crackers, cooked cereal, and gelatin dessert, such as Jell-O. Do not smoke. Smoking and being around smoke can make nausea worse. If you need help quitting, talk to your doctor about stop-smoking programs and medicines. These can increase your chances of quitting for good. When should you call for help? Call 911  anytime you think you may need emergency care. For example, call if:    You passed out (lost consciousness). Call your doctor now or seek immediate medical care if:    You have new or worse nausea or vomiting. You are too sick to your stomach to drink any fluids. You cannot keep down fluids. You have symptoms of dehydration, such as:  Dry eyes and a dry mouth. Passing only a little urine. Feeling thirstier than usual.     Your pain medicine is not helping. You are dizzy or lightheaded, or you feel like you may faint. Watch closely for changes in your health, and be sure to contact your doctor if:    You do not get better as expected. Where can you learn more?   Go to https://chpepiceweb.[email or web address] org and sign in to your [employer] account. Enter M536 in the [employer] box to learn more about \"Nausea and Vomiting After Surgery: Care Instructions. \"     If you do not have an account, please click on the \"Sign Up Now\" link. Current as of: March 9, 2022               Content Version: 13.3  © 2006-2022 [employer]. Care instructions adapted under license by [facility] ([facility]). If you have questions about a medical condition or this instruction, always ask your healthcare professional. [place] [address] any warranty or liability for your use of this information.

## 2022-09-15 ENCOUNTER — TELEPHONE (OUTPATIENT)
Dept: PAIN MANAGEMENT | Age: 47
End: 2022-09-15

## 2022-09-15 NOTE — TELEPHONE ENCOUNTER
Patient had a cervical MBB on 9/13, she is calling in today stating that her ears feel clogged and cannot hear well since the procedure. Should she be concerned?

## 2022-09-23 ENCOUNTER — OFFICE VISIT (OUTPATIENT)
Dept: PAIN MANAGEMENT | Age: 47
End: 2022-09-23
Payer: COMMERCIAL

## 2022-09-23 VITALS
WEIGHT: 165 LBS | OXYGEN SATURATION: 98 % | HEIGHT: 60 IN | HEART RATE: 65 BPM | DIASTOLIC BLOOD PRESSURE: 70 MMHG | BODY MASS INDEX: 32.39 KG/M2 | RESPIRATION RATE: 16 BRPM | SYSTOLIC BLOOD PRESSURE: 123 MMHG | TEMPERATURE: 97.3 F

## 2022-09-23 DIAGNOSIS — M47.817 LUMBOSACRAL SPONDYLOSIS WITHOUT MYELOPATHY: ICD-10-CM

## 2022-09-23 DIAGNOSIS — M51.36 DDD (DEGENERATIVE DISC DISEASE), LUMBAR: ICD-10-CM

## 2022-09-23 DIAGNOSIS — M50.30 DDD (DEGENERATIVE DISC DISEASE), CERVICAL: ICD-10-CM

## 2022-09-23 DIAGNOSIS — M47.812 CERVICAL SPONDYLOSIS: Primary | ICD-10-CM

## 2022-09-23 PROCEDURE — 99213 OFFICE O/P EST LOW 20 MIN: CPT | Performed by: ANESTHESIOLOGY

## 2022-09-23 NOTE — PROGRESS NOTES
Do you currently have any of the following:    Fever: No  Headache:  No  Cough: No  Shortness of breath: No  Exposed to anyone with these symptoms: No                                                                                                                Albert Pollen presents to the [facility] on 9/23/2022. Zay Rocha is complaining of pain neck pain . The pain is constant. The pain is described as aching, throbbing, and shooting. Pain is rated on her best day at a 3, on her worst day at a 10, and on average at a 4 on the VAS scale. She took her last dose of Motrin na. Zay Rocha does not have issues with constipation. Any procedures since your last visit: Yes, with 80 % relief. She is  on NSAIDS and  is not on anticoagulation medications to include none    Pacemaker or defibrillator: No    Medication Contract and Consent for Opioid Use Documents Filed       Patient Documents       Type of Document Status Date Received Received By Description    Medication Contract Received 5/7/2019  8:46 AM SENDY, [address] [place] [address] Pain Management Pt Agreement 5/7/2019                       There were no vitals taken for this visit. No LMP recorded. Patient has had an ablation.

## 2022-09-23 NOTE — PROGRESS NOTES
Pt    [address]  [address] [facility], [address] [place] [address] Adebayo  [phone]    Follow up Note      Bhavik Banegas     Date of Visit:  9/23/2022    CC:  Patient presents for follow up   Chief Complaint   Patient presents with    Follow Up After Procedure     BILATERAL CERVICAL MEDIAL BRANCH NERVE BLOCK UNDER FLUOROSCOPIC GUIDANCE AT C2, C3, C4 AND C5        HPI:    H/o Neck pain and upper extremity pain. S/P Cervical BELEM on 12/8/2020 with excellent pain relief of more than 90%. Current pain in the neck - predominantly axial in nature. H/o chronic low back and B/l LE pain. S/P BELEM in 2019 with excellent pain relief. Low back pain is currently stable. Continues HEP    No new weakness or numbness or bowel or bladder symptoms. Alleviating factors include: heat, medications. She follows up with rheumatologist for h/o sjogren's/ rheumatoid / SLE. Nursing notes and details of the pain history reviewed. Please see intake notes for details. Previous treatments:   Failed conservative treatment with NSAID's, muscle relaxants, oral steroids and HEP. Physical Therapy   Chiropractic treatment  Ongoing Core exercises for the last year. TENS  and medications- NSAID's, oral steroids, gabapentin etc,. Does not tolerate meds well      She has not been on anticoagulation medications. Imaging:    MRI of C spine: 11/3/2020:     FINDINGS:    BONES/ALIGNMENT: There is straightening of the cervical spine with loss of    the normal lordotic curvature. The bone marrow signal intensity is low in    the T1 weighted imaging, similar to the previous exam, likely related to red    marrow versus anemia. Endplate degenerative changes are noted at C6-C7. No    areas of marrow edema to suggest an acute fracture. SPINAL CORD: The cervical spinal cord demonstrates normal signal intensity    without evidence of an expansile mass lesion.          SOFT TISSUES: No paraspinal masses or edema. C2-C3: There is a 2 mm central disc protrusion indenting the anterior thecal    sac. Mild central spinal canal narrowing. The neural foramen are patent. C3-C4: There is a central disc protrusion measuring 2 mm indenting the    anterior thecal sac. Minimal central spinal canal narrowing. The neural    foramen are patent. C4-C5: Asymmetric left-sided uncovertebral spurring and facet arthropathy    have increased with moderate left foraminal narrowing, increased in severity. No right foraminal narrowing, disc herniation, or central spinal canal    stenosis. C5-C6: There is a broad-based central/left paracentral disc protrusion    measuring 3 mm, increased in size. Mild central spinal canal narrowing has    slightly increased. Asymmetric left-sided uncovertebral spurring with mild    left foraminal narrowing, increased. No right foraminal narrowing. C6-C7: There is a broad-based left paracentral central disc protrusion    measuring 3 mm. Ligamentum flavum thickening. Moderate central spinal canal    narrowing has increased. Asymmetric left-sided uncovertebral spurring with    moderate left foraminal narrowing and mild right foraminal narrowing,    increased in severity. C7-T1: There is no significant disc protrusion, spinal canal stenosis or    neural foraminal narrowing. Impression    1. Mild central spinal canal narrowing at C5-C6, and moderate central spinal    canal narrowing at C6-C7 have slightly increased. 2. Multilevel foraminal narrowing, as above, greatest on the left at C4-C5,    and left at C6-C7. 3. No evidence of an acute fracture. X ray C spine: 10/26/2020: Impression    Degenerative disc changes at C6-7        MRI of LS spine: 6/27/2020:  FINDINGS:    BONES/ALIGNMENT: There is mild degenerative disc disease at L4-L5, and L5-S1. There are Modic type 1 degenerative endplate changes at X6-W7. SPINAL CORD: The conus terminates normally. SOFT TISSUES: No paraspinal mass identified. L1-L2:  The thecal sac and neural foramina are intact. L2-L3:  The thecal sac and neural foramina are intact. L3-L4:  The thecal sac and neural foramina are intact. L4-L5: There is a minimum disc bulge measuring 2 mm. There is mild facet    and ligamentum flavum hypertrophy. The thecal sac is not stenotic. Disc    and/or osteophytes cause minimal narrowing of the neural foramina bilaterally. L5-S1: There is a disc protrusion with annular fissure centrally at L5-S1    measuring approximately 4.5 mm. This effaces the anterior aspect of the    thecal sac. The thecal sac is not significantly stenotic. The S1 nerve    roots are intact. There is mild narrowing of the neural foramina bilaterally. There may be slight increase in size of the disc protrusion compared to the    prior study. Impression    Disc protrusion with annular fissure at L5-S1. No significant stenosis of    the thecal sac. The S1 nerve roots are intact. There is mild narrowing of    the neural foramina bilaterally at L5-S1. Mild narrowing of the neural foramina at L4-L5 as discussed above.           Potential Aberrant Drug-Related Behavior:  No    Urine Drug Screening:No    OARRS report[de-identified]  9/23/22  yes- not on chronic narcotics    Past Medical History:   Diagnosis Date    BRCA1 gene mutation positive     Cousin Pos. per mmmo hx sheet    COVID 11/2021    pt states moderate; twice Nov 2021, May 2022-sinus infection only 4 days only    H/O rheumatoid arthritis     Joint pain     Low back pain     Lupus ([address]     Menometrorrhagia 06/30/2022    Neck pain     RA (rheumatoid arthritis) ([facility])     RH    Radiculopathy     Sjogren's disease ([address]     mucuc membrane   dry lips  always [age]       Past Surgical History:   Procedure Laterality Date    ANESTHESIA NERVE BLOCK Bilateral 2019    BILATERAL LUMBAR TRANSFORAMINAL EPIDURAL STERIOD INJECTION AT L5-S1 UNDER FLUOROSCOPY performed by Nando Davies MD at [address] Left 2022    LEFT ELBOW CUBITAL TUNNEL RELEASE performed by Antonio Mcgill DO at [address] Bilateral      SECTION      x2    CHOLECYSTECTOMY, LAPAROSCOPIC N/A 2021    LAPAROSCOPIC CHOLECYSTECTOMY performed by Alexandra Sagastume MD at [address]      x2    DILATION AND CURETTAGE OF UTERUS N/A 2022    DILATATION AND CURETTAGE HYSTEROSCOPY performed [facility]     cub[facility] nerve    ENDOSCOPY, COLON, DIAGNOSTIC      EPIDURAL STEROID INJECTION Left 2019    LUMBAR EPIDURAL STEROID INJECTION L5-S1 (LEFT PARAMEDIAN APPROACH) UNDER FLUOROSCOPIC GUIDANCE) performed by Nando Davies MD at [facility] N/A 2021    LAPAROSCOPIC HIATAL HERNIA RTEPAIR WITH MESH performed by Alexandra Sagastume MD at [facility] ARTHROSCOPY Left 2016    medial menisectomy, debridement, acl synovectomy, chondroplasty    NERVE BLOCK Left 2019    lumbar    NERVE BLOCK Bilateral 2019    NERVE BLOCK Left 2020    Cervical epidural steroid injection under fouroscopic guidance c7-t1 left paramedian    NERVE BLOCK Bilateral 2022    BILATERAL CERVICAL MEDIAL BRANCH NERVE BLOCK UNDER FLUOROSCOPIC GUIDANCE AT C2, C3, C4 AND C5 (CPT 68710) performed by Nando Davies MD at [address] Left 2020    CERVICAL EPIDURAL STEROID INJECTION UNDER FLUOROSCOPIC GUIDANCE AT C7-T1 LEFT PARAMEDIAN (CPT 66614) performed by Nando Davies MD at [address] Right 2021    CERVICAL EPIDURAL STEROID INJECTION UNDER FLUOROSCOPIC GUIDANCE AT C7-T1 RIGHT PARAMEDIAN performed by Sherry Camacho Azeb Mackenzie MD at [facility]         Prior to Admission medications    Medication Sig Start Date End Date Taking?  Authorizing Provider   ibuprofen (ADVIL;MOTRIN) 600 MG tablet Take 1 tablet by mouth 4 times daily as needed for Pain  Patient taking differently: Take 600 mg by mouth 4 times daily as needed for Pain On hold pre-op 7/1/22  Yes Gloria Mac MD   medroxyPROGESTERone (DEPO-PROVERA) 150 MG/ML injection Inject 150 mg into the muscle every 3 months Next dose 10-22   Yes Historical Provider, MD   Cholecalciferol (VITAMIN D) 50 MCG (2000 UT) CAPS capsule Take by mouth daily    Yes Historical Provider, MD   pantoprazole (PROTONIX) 40 MG tablet Take 40 mg by mouth as needed  1/15/21  Yes Historical Provider, MD   celecoxib (CELEBREX) 200 MG capsule TAKE 1 CAPSULE BY MOUTH DAILY  Patient not taking: Reported on 9/23/2022 5/31/22 9/28/22  Ely Alas DO       Allergies   Allergen Reactions    Methotrexate Derivatives Nausea And Vomiting and Other (See Comments)     Mouth sores       Social History     Socioeconomic History    Marital status:      Spouse name: Not on file    Number of children: Not on file    Years of education: Not on file    Highest education level: Not on file   Occupational History    Not on file   Tobacco Use    Smoking status: Never    Smokeless tobacco: Never   Vaping Use    Vaping Use: Never used   Substance and Sexual Activity    Alcohol use: Yes     Comment: wine occ- 4 drinks weekend only    Drug use: No    Sexual activity: Yes     Partners: Male   Other Topics Concern    Not on file   Social History Narrative    Not on file     Social Determinants of Health     Financial Resource Strain: Not on file   Food Insecurity: Not on file   Transportation Needs: Not on file   Physical Activity: Not on file   Stress: Not on file   Social Connections: Not on file   Intimate Partner Violence: Not on file   Housing Stability: Not

## 2022-10-04 ENCOUNTER — OFFICE VISIT (OUTPATIENT)
Dept: ORTHOPEDIC SURGERY | Age: 47
End: 2022-10-04
Payer: COMMERCIAL

## 2022-10-04 VITALS — WEIGHT: 168 LBS | BODY MASS INDEX: 32.98 KG/M2 | HEIGHT: 60 IN | TEMPERATURE: 98 F

## 2022-10-04 DIAGNOSIS — S83.512A RUPTURE OF ANTERIOR CRUCIATE LIGAMENT OF LEFT KNEE, INITIAL ENCOUNTER: Primary | ICD-10-CM

## 2022-10-04 PROCEDURE — 99214 OFFICE O/P EST MOD 30 MIN: CPT | Performed by: ORTHOPAEDIC SURGERY

## 2022-10-04 NOTE — PROGRESS NOTES
Chief Complaint   Patient presents with    Knee Pain     Lt knee pain has stayed the same. 3/10  Here for MRI results. Subjective:     Patient ID: Keyonna Bauman is a 41 y.o..  female    Knee Pain  Patient complains of left knee pain. This is evaluated as a personal injury. There was a history of injury. She fell in a hole. The pain began 1 month ago. The pain is located anterior. She describes  Her symptoms as aching and sharp. She has experienced popping, clicking, locking, and giving way in the affected knee. The patient has had pain with kneeling, squating, and climbing stairs. Symptoms improve with rest. The symptoms are worse with activity, stair climbing, kneeling. The knee has given out or felt unstable. The patient can bend and straighten the knee fully. The patient is active in none. Treatment to date has been ice, heat, Tylenol, NSAID's, knee sleeve/brace, therapy, arthroscopy, without significant relief. She is here for mri results.   Past Medical History:   Diagnosis Date    BRCA1 gene mutation positive     Cousin Pos. per mmmo hx sheet    COVID 2021    pt states moderate; twice 2021, May 2022-sinus infection only 4 days only    H/O rheumatoid arthritis     Joint pain     Low back pain     Lupus ([address]     Menometrorrhagia 2022    Neck pain     RA (rheumatoid arthritis) ([facility])     RH    Radiculopathy     Sjogren's disease ([facility]     mucuc membrane   dry lips  always thirty     Past Surgical History:   Procedure Laterality Date    ANESTHESIA NERVE BLOCK Bilateral 2019    BILATERAL LUMBAR TRANSFORAMINAL EPIDURAL STERIOD INJECTION AT L5-S1 UNDER FLUOROSCOPY performed by Negin Young MD at [address] Left 2022    LEFT ELBOW CUBITAL TUNNEL RELEASE performed by John Glasgow DO at [address] Bilateral      SECTION      x2    CHOLECYSTECTOMY, LAPAROSCOPIC N/A 2021    LAPAROSCOPIC CHOLECYSTECTOMY performed by Hernandez Real MD at [address] [place]      x2    DILATION AND CURETTAGE OF UTERUS N/A 07/01/2022    DILATATION AND CURETTAGE HYSTEROSCOPY performed [facility]     cubital nerve    ENDOSCOPY, COLON, DIAGNOSTIC      EPIDURAL STEROID INJECTION Left 05/28/2019    LUMBAR EPIDURAL STEROID INJECTION L5-S1 (LEFT PARAMEDIAN APPROACH) UNDER FLUOROSCOPIC GUIDANCE) performed by Tierra Gatica MD at [address] [facility] N/A 02/16/2021    LAPAROSCOPIC HIATAL HERNIA RTEPAIR WITH MESH performed by Hernandez Real MD at [address] ARTHROSCOPY Left 05/27/2016    medial menisectomy, debridement, acl synovectomy, chondroplasty    NERVE BLOCK Left 05/28/2019    lumbar    NERVE BLOCK Bilateral 07/09/2019    NERVE BLOCK Left 12/08/2020    Cervical epidural steroid injection under fouroscopic guidance c7-t1 left paramedian    NERVE BLOCK Bilateral 9/13/2022    BILATERAL CERVICAL MEDIAL BRANCH NERVE BLOCK UNDER FLUOROSCOPIC GUIDANCE AT C2, C3, C4 AND C5 (CPT 68166) performed by Tierra Gatica MD at [address] [facility] 12/08/2020    CERVICAL EPIDURAL STEROID INJECTION UNDER FLUOROSCOPIC GUIDANCE AT C7-T1 LEFT PARAMEDIAN (CPT 59542) performed by Tierra Gatica MD at [address] [place] [address] Right 07/29/2021    CERVICAL EPIDURAL STEROID INJECTION UNDER FLUOROSCOPIC GUIDANCE AT C7-T1 RIGHT PARAMEDIAN performed by Tierra Gatica MD at [place] [address]         Current Outpatient Medications:     ibuprofen (ADVIL;MOTRIN) 600 MG tablet, Take 1 tablet by mouth 4 times daily as needed for Pain (Patient taking differently: Take 600 mg by mouth 4 times daily as needed for Pain On hold pre-op), Disp: 120 tablet, Rfl: 0    medroxyPROGESTERone (DEPO-PROVERA) 150 MG/ML injection, Inject 150 mg into the muscle every 3 months Next dose 10-22, Disp: , Rfl:     Cholecalciferol (VITAMIN D) 50 MCG (2000 UT) CAPS capsule, Take by mouth daily , Disp: , Rfl:     pantoprazole (PROTONIX) 40 MG tablet, Take 40 mg by mouth as needed , Disp: , Rfl:     celecoxib (CELEBREX) 200 MG capsule, TAKE 1 CAPSULE BY MOUTH DAILY (Patient not taking: Reported on 9/23/2022), Disp: 30 capsule, Rfl: 3  Allergies   Allergen Reactions    Methotrexate Derivatives Nausea And Vomiting and Other (See Comments)     Mouth sores     Social History     Socioeconomic History    Marital status:      Spouse name: Not on file    Number of children: Not on file    Years of education: Not on file    Highest education level: Not on file   Occupational History    Not on file   Tobacco Use    Smoking status: Never    Smokeless tobacco: Never   Vaping Use    Vaping Use: Never used   Substance and Sexual Activity    Alcohol use: Yes     Comment: wine occ- 4 drinks weekend only    Drug use: No    Sexual activity: Yes     Partners: Male   Other Topics Concern    Not on file   Social History Narrative    Not on file     Social Determinants of Health     Financial Resource Strain: Not on file   Food Insecurity: Not on file   Transportation Needs: Not on file   Physical Activity: Not on file   Stress: Not on file   Social Connections: Not on file   Intimate Partner Violence: Not on file   Housing Stability: Not on file     Family History   Problem Relation Age of Onset    Heart Failure Mother     Thyroid Disease Mother     Cancer Father     Prostate Cancer Father         Per mammo  hx sheet    Thyroid Disease Sister     Thyroid Disease Maternal Grandmother     Thyroid Disease Sister     Thyroid Disease Sister     Breast Cancer Paternal Grandmother     Breast Cancer Maternal Aunt     Colon Cancer Maternal Aunt     Breast Cancer Maternal Cousin          REVIEW OF SYSTEMS:     General/Constitution:  (-)weight loss, (-)fever, (-)chills, (-)weakness. Skin: (-) rash,(-) psoriasis,(-) eczema, (-)skin cancer. Musculoskeletal: (-) fractures,  (-) dislocations,(-) collagen vascular disease, (-) fibromyalgia, (-) multiple sclerosis, (-) muscular dystrophy, (-) RSD,(-) joint pain (-)swelling, (-) joint pain,swelling. Neurologic: (-) epilepsy, (-)seizures,(-) brain tumor,(-) TIA, (-)stroke, (-)headaches, (-)Parkinson disease,(-) memory loss, (-) LOC. Cardiovascular: (-) Chest pain, (-) swelling in legs/feet, (-) SOB, (-) cramping in legs/feet with walking. Respiratory: (-) SOB, (-) Coughing, (-) night sweats. GI: (-) nausea, (-) vomiting, (-) diarrhea, (-) blood in stool, (-) gastric ulcer. Psychiatric: (-) Depression, (-) Anxiety, (-) bipolar disease, (-) Alzheimer's Disease  Allergic/Immunologic: (-) allergies latex, (-) allergies metal, (-) skin sensitivity. Hematlogic: (-) anemia, (-) blood transfusion, (-) DVT/PE, (-) Clotting disorders    Subjective:    Constitution:  Temp 98 °F (36.7 °C)   Ht 5' (1.524 m)   Wt 168 lb (76.2 kg)   BMI 32.81 kg/m²     Psycihatric:  The patient is alert and oriented x 3, appears to be stated age and in no distress. Respiratory:  Respiratory effort is not labored. Patient is not gasping. Palpation of the chest reveals no tactile fremitus. Skin:  Upon inspection: the skin appears warm, dry and intact. There is  a previous scar over the affected area. There is any cellulitis, lymphedema or cutaneous lesions noted in the lower extremities. Upon palpation there is no induration noted. Neurologic:  Gait: antalgic; Motor exam of the lower extremities show ; quadriceps, hamstrings, foot dorsi and plantar flexors intact R.  5/5 and L. 5/5. Deep tendon reflexes are 2/4 at the knees and 2/4 at the ankles with strong extensor hallicus longus motor strength bilaterally. Sensory to both feet is intact to all sensory roots. Cardiovascular: The vascular exam is normal and is well perfused to distal extremities. Distal pulses DP/PT: R. 2+; L. 2+. There is cap refill noted less than two seconds in all digits. There is not edema of the bilateral lower extremities. There is not varicosities noted in the distal extremities. Lymph:  Upon palpation,  there is no lymphadenopathy noted in bilateral lower extremities. Musculoskeletal:  Gait: antalgic; examination of the nails and digits reveal no cyanosis or clubbing. Lumbar exam:  On visual inspection, there is not deformity of the spine. full range of motion, no tenderness, palpable spasm or pain on motion. Special tests: Straight Leg Raise negative, Brandi test negative. Hip exam:   Upon inspection, there is not deformity noted. Upon palpation there is not tenderness. ROM: is  full and symmetrical.   Strength: Hip Flexors 5/5; Hip Abductors 5/5; Hip Adduction 5/5. Knee exam:  Left knee exam shows;  range of motion of R. Knee is 0 to 120, and L. Knee is 0 to 120. The patient does have  pain on motion, effusion is mild, there is tenderness over the  anterior region, there are not any masses, there is not ligamentous instability, there is not  deformity noted. Knee exam: neither positive for moderate crepitations, some mild tenderness laxity is not noted with  stress. There is not a popliteal cyst.    R. Knee:  Lachman's negative, Anterior Drawer negative, Posterior Drawer negative  Miki's negative, Thallasy  positive,   PF grind test negative, Apprehension test positive, Patellar J sign  negative  L. Knee:  Lachman's positive, Anterior Drawer positive, Posterior Drawer negative  Miki's positive, Thallasy  positive,   PF grind test negative, Apprehension test negative,  Patellar J sign  negative    Xray Exam:  No fracture or dislocation    MRI:  Impression   Chronic rupture of the ACL redemonstrated.        Likely small tear exiting inferiorly involving periphery of posterior horn of   medial meniscus, similar to prior exam.       Mild degenerative changes to the medial compartment of the knee, new. Small to moderately sized Baker's cyst, increased in size. Radiographic findings reviewed with patient    Assessment:  Encounter Diagnoses   Name Primary? Rupture of anterior cruciate ligament of left knee, initial encounter Yes       Plan:  Natural history and expected course discussed. Questions answered. Educational materials distributed. Rest, ice, compression, and elevation (RICE) therapy. Reduction in offending activity. I had a lengthy discussion with the patient regarding their diagnosis. I explained treatment options including surgical vs non surgical treatment. I reviewed in detail the risks and benefits and outlined the procedure in detail with expected outcomes and possible complications. I also discussed non surgical treatment such as injections (CSI and visco supplementation), physical therapy, topical creams and NSAID's. They have elected for conservative management at this time. Brace  Hep  Fu prn  At least 30 minutes was spent discussing the diagnosis and treatment options with the patient with at least 50% of the time was spent with decision making and counseling the patient.

## 2022-10-06 RX ORDER — SODIUM CHLORIDE, SODIUM LACTATE, POTASSIUM CHLORIDE, CALCIUM CHLORIDE 600; 310; 30; 20 MG/100ML; MG/100ML; MG/100ML; MG/100ML
INJECTION, SOLUTION INTRAVENOUS CONTINUOUS
Status: CANCELLED | OUTPATIENT
Start: 2022-10-06

## 2022-10-10 ENCOUNTER — HOSPITAL ENCOUNTER (OUTPATIENT)
Dept: PREADMISSION TESTING | Age: 47
Discharge: HOME | End: 2022-10-10
Payer: COMMERCIAL

## 2022-10-10 VITALS
OXYGEN SATURATION: 100 % | BODY MASS INDEX: 33.57 KG/M2 | SYSTOLIC BLOOD PRESSURE: 119 MMHG | RESPIRATION RATE: 14 BRPM | TEMPERATURE: 97.8 F | WEIGHT: 171 LBS | HEIGHT: 60 IN | DIASTOLIC BLOOD PRESSURE: 80 MMHG | HEART RATE: 64 BPM

## 2022-10-10 DIAGNOSIS — Z01.818 PRE-OP TESTING: Primary | ICD-10-CM

## 2022-10-10 LAB
ABO/RH: NORMAL
ANTIBODY SCREEN: NORMAL
HCT VFR BLD CALC: 40.1 % (ref 34–48)
HEMOGLOBIN: 12.4 G/DL (ref 11.5–15.5)
MCH RBC QN AUTO: 24.8 PG (ref 26–35)
MCHC RBC AUTO-ENTMCNC: 30.9 % (ref 32–34.5)
MCV RBC AUTO: 80.2 FL (ref 80–99.9)
PDW BLD-RTO: 14.1 FL (ref 11.5–15)
PLATELET # BLD: 209 E9/L (ref 130–450)
PMV BLD AUTO: 11.6 FL (ref 7–12)
RBC # BLD: 5 E12/L (ref 3.5–5.5)
WBC # BLD: 5.5 E9/L (ref 4.5–11.5)

## 2022-10-10 PROCEDURE — 86850 RBC ANTIBODY SCREEN: CPT

## 2022-10-10 PROCEDURE — 86901 BLOOD TYPING SEROLOGIC RH(D): CPT

## 2022-10-10 PROCEDURE — 86900 BLOOD TYPING SEROLOGIC ABO: CPT

## 2022-10-10 PROCEDURE — 85027 COMPLETE CBC AUTOMATED: CPT

## 2022-10-10 PROCEDURE — 36415 COLL VENOUS BLD VENIPUNCTURE: CPT

## 2022-10-10 NOTE — PROGRESS NOTES
[address]                                                                                                                    PRE OP INSTRUCTIONS FOR  Tiesha Ziegler        Date: 10/10/2022    Date of surgery: 10/14/22   Arrival Time: Hospital will call you between 5pm and 7pm with your final arrival time for surgery    Do not eat or drink anything after midnight prior to surgery. This includes no water, chewing gum, mints or ice chips. Take the following medications with a small sip of water on the morning of Surgery: pantoprazole       Aspirin, Ibuprofen, Advil, Naproxen, Vitamin E and other Anti-inflammatory products should be stopped  before surgery  as directed by your physician. Take Tylenol only unless instructed otherwise by your surgeon. Do not smoke,use illicit drugs and do not drink any alcoholic beverages 24 hours prior to surgery. You may brush your teeth the morning of surgery. DO NOT SWALLOW WATER    You MUST make arrangements for a responsible adult to take you home after your surgery. You will not be allowed to leave alone or drive yourself home. It is strongly suggested someone stay with you the first 24 hrs. Your surgery will be cancelled if you do not have a ride home. Please wear simple, loose fitting clothing to the hospital.  Pattie Ariaser not bring valuables (money, credit cards, checkbooks, etc.) Do not wear any makeup (including no eye makeup) or nail polish on your fingers or toes. DO NOT wear any jewelry or piercings on day of surgery. All body piercing jewelry must be removed. Shower the night before surgery with _x__Antibacterial soap /COLT WIPES________    HYSTERECTOMY PATIENTS ONLY---Remember to bring Blood Bank bracelet to the hospital on the day of surgery. If you have a Living Will and Durable Power of  for Healthcare, please bring in a copy. If appropriate bring crutches, inspirex, WALKER, CANE etc...     Notify your Surgeon if you develop any illness between now and surgery time, cough, cold, fever, sore throat, nausea, vomiting, etc.  Please notify your surgeon if you experience dizziness, shortness of breath or blurred vision between now & the time of your surgery. If you have ___dentures, they will be removed before going to the OR; we will provide you a container. If you wear ___contact lenses or ___glasses, they will be removed; please bring a case for them. To provide excellent care visitors will be limited to 1 in the room at any given time. Please bring picture ID and insurance card. Sleep apnea patients need to bring CPAP AND SETTINGS to hospital on day of surgery. During flu season no children under the age of 15 are permitted in the hospital for the safety of all patients. Other On day of surgery please come to the main lobby and stop at the information desk. Please call AMBULATORY CARE if you have any further questions.    [facility]     [address]

## 2022-10-14 ENCOUNTER — HOSPITAL ENCOUNTER (OUTPATIENT)
Age: 47
Setting detail: OUTPATIENT SURGERY
Discharge: HOME | End: 2022-10-14
Attending: OBSTETRICS & GYNECOLOGY | Admitting: OBSTETRICS & GYNECOLOGY
Payer: COMMERCIAL

## 2022-10-14 ENCOUNTER — ANESTHESIA EVENT (OUTPATIENT)
Dept: OPERATING ROOM | Age: 47
End: 2022-10-14
Payer: COMMERCIAL

## 2022-10-14 ENCOUNTER — ANESTHESIA (OUTPATIENT)
Dept: OPERATING ROOM | Age: 47
End: 2022-10-14
Payer: COMMERCIAL

## 2022-10-14 VITALS
RESPIRATION RATE: 15 BRPM | OXYGEN SATURATION: 99 % | SYSTOLIC BLOOD PRESSURE: 122 MMHG | BODY MASS INDEX: 34.36 KG/M2 | HEART RATE: 72 BPM | DIASTOLIC BLOOD PRESSURE: 74 MMHG | WEIGHT: 175 LBS | TEMPERATURE: 98 F | HEIGHT: 60 IN

## 2022-10-14 DIAGNOSIS — Z90.710 STATUS POST LAPAROSCOPIC HYSTERECTOMY: ICD-10-CM

## 2022-10-14 DIAGNOSIS — N92.1 MENOMETRORRHAGIA: Primary | ICD-10-CM

## 2022-10-14 PROBLEM — N80.30 ENDOMETRIOSIS OF PELVIC PERITONEUM: Status: ACTIVE | Noted: 2022-10-14

## 2022-10-14 LAB
HCG, URINE, POC: NEGATIVE
Lab: NORMAL
NEGATIVE QC PASS/FAIL: NORMAL
POSITIVE QC PASS/FAIL: NORMAL

## 2022-10-14 PROCEDURE — 3600000009 HC SURGERY ROBOT BASE: Performed by: OBSTETRICS & GYNECOLOGY

## 2022-10-14 PROCEDURE — 3700000001 HC ADD 15 MINUTES (ANESTHESIA): Performed by: OBSTETRICS & GYNECOLOGY

## 2022-10-14 PROCEDURE — 2720000010 HC SURG SUPPLY STERILE: Performed by: OBSTETRICS & GYNECOLOGY

## 2022-10-14 PROCEDURE — 2500000003 HC RX 250 WO HCPCS: Performed by: NURSE ANESTHETIST, CERTIFIED REGISTERED

## 2022-10-14 PROCEDURE — 3700000000 HC ANESTHESIA ATTENDED CARE: Performed by: OBSTETRICS & GYNECOLOGY

## 2022-10-14 PROCEDURE — 88307 TISSUE EXAM BY PATHOLOGIST: CPT

## 2022-10-14 PROCEDURE — 7100000001 HC PACU RECOVERY - ADDTL 15 MIN: Performed by: OBSTETRICS & GYNECOLOGY

## 2022-10-14 PROCEDURE — 6360000002 HC RX W HCPCS: Performed by: ANESTHESIOLOGY

## 2022-10-14 PROCEDURE — 2580000003 HC RX 258: Performed by: ANESTHESIOLOGY

## 2022-10-14 PROCEDURE — 6360000002 HC RX W HCPCS: Performed by: NURSE ANESTHETIST, CERTIFIED REGISTERED

## 2022-10-14 PROCEDURE — 7100000000 HC PACU RECOVERY - FIRST 15 MIN: Performed by: OBSTETRICS & GYNECOLOGY

## 2022-10-14 PROCEDURE — 6360000002 HC RX W HCPCS: Performed by: OBSTETRICS & GYNECOLOGY

## 2022-10-14 PROCEDURE — S2900 ROBOTIC SURGICAL SYSTEM: HCPCS | Performed by: OBSTETRICS & GYNECOLOGY

## 2022-10-14 PROCEDURE — 6360000002 HC RX W HCPCS

## 2022-10-14 PROCEDURE — 2709999900 HC NON-CHARGEABLE SUPPLY: Performed by: OBSTETRICS & GYNECOLOGY

## 2022-10-14 PROCEDURE — 2580000003 HC RX 258: Performed by: OBSTETRICS & GYNECOLOGY

## 2022-10-14 PROCEDURE — 2580000003 HC RX 258: Performed by: NURSE ANESTHETIST, CERTIFIED REGISTERED

## 2022-10-14 PROCEDURE — 7100000011 HC PHASE II RECOVERY - ADDTL 15 MIN: Performed by: OBSTETRICS & GYNECOLOGY

## 2022-10-14 PROCEDURE — 7100000010 HC PHASE II RECOVERY - FIRST 15 MIN: Performed by: OBSTETRICS & GYNECOLOGY

## 2022-10-14 PROCEDURE — 3600000019 HC SURGERY ROBOT ADDTL 15MIN: Performed by: OBSTETRICS & GYNECOLOGY

## 2022-10-14 RX ORDER — NEOSTIGMINE METHYLSULFATE 1 MG/ML
INJECTION, SOLUTION INTRAVENOUS PRN
Status: DISCONTINUED | OUTPATIENT
Start: 2022-10-14 | End: 2022-10-14 | Stop reason: SDUPTHER

## 2022-10-14 RX ORDER — LIDOCAINE HYDROCHLORIDE 20 MG/ML
INJECTION, SOLUTION INTRAVENOUS PRN
Status: DISCONTINUED | OUTPATIENT
Start: 2022-10-14 | End: 2022-10-14 | Stop reason: SDUPTHER

## 2022-10-14 RX ORDER — HYDRALAZINE HYDROCHLORIDE 20 MG/ML
10 INJECTION INTRAMUSCULAR; INTRAVENOUS
Status: DISCONTINUED | OUTPATIENT
Start: 2022-10-14 | End: 2022-10-14 | Stop reason: HOSPADM

## 2022-10-14 RX ORDER — KETOROLAC TROMETHAMINE 30 MG/ML
INJECTION, SOLUTION INTRAMUSCULAR; INTRAVENOUS PRN
Status: DISCONTINUED | OUTPATIENT
Start: 2022-10-14 | End: 2022-10-14 | Stop reason: SDUPTHER

## 2022-10-14 RX ORDER — LABETALOL HYDROCHLORIDE 5 MG/ML
10 INJECTION, SOLUTION INTRAVENOUS
Status: DISCONTINUED | OUTPATIENT
Start: 2022-10-14 | End: 2022-10-14 | Stop reason: HOSPADM

## 2022-10-14 RX ORDER — OXYCODONE HYDROCHLORIDE AND ACETAMINOPHEN 5; 325 MG/1; MG/1
1 TABLET ORAL EVERY 4 HOURS PRN
Status: DISCONTINUED | OUTPATIENT
Start: 2022-10-14 | End: 2022-10-14 | Stop reason: HOSPADM

## 2022-10-14 RX ORDER — GLYCOPYRROLATE 0.2 MG/ML
INJECTION INTRAMUSCULAR; INTRAVENOUS PRN
Status: DISCONTINUED | OUTPATIENT
Start: 2022-10-14 | End: 2022-10-14 | Stop reason: SDUPTHER

## 2022-10-14 RX ORDER — HYDROCODONE BITARTRATE AND ACETAMINOPHEN 5; 325 MG/1; MG/1
1 TABLET ORAL EVERY 6 HOURS PRN
Qty: 20 TABLET | Refills: 0 | Status: SHIPPED | OUTPATIENT
Start: 2022-10-14 | End: 2022-10-19

## 2022-10-14 RX ORDER — SODIUM CHLORIDE, SODIUM LACTATE, POTASSIUM CHLORIDE, CALCIUM CHLORIDE 600; 310; 30; 20 MG/100ML; MG/100ML; MG/100ML; MG/100ML
INJECTION, SOLUTION INTRAVENOUS CONTINUOUS PRN
Status: DISCONTINUED | OUTPATIENT
Start: 2022-10-14 | End: 2022-10-14 | Stop reason: SDUPTHER

## 2022-10-14 RX ORDER — SODIUM CHLORIDE, SODIUM LACTATE, POTASSIUM CHLORIDE, CALCIUM CHLORIDE 600; 310; 30; 20 MG/100ML; MG/100ML; MG/100ML; MG/100ML
INJECTION, SOLUTION INTRAVENOUS CONTINUOUS
Status: DISCONTINUED | OUTPATIENT
Start: 2022-10-14 | End: 2022-10-14 | Stop reason: HOSPADM

## 2022-10-14 RX ORDER — DEXAMETHASONE SODIUM PHOSPHATE 10 MG/ML
INJECTION, SOLUTION INTRAMUSCULAR; INTRAVENOUS PRN
Status: DISCONTINUED | OUTPATIENT
Start: 2022-10-14 | End: 2022-10-14 | Stop reason: SDUPTHER

## 2022-10-14 RX ORDER — SODIUM CHLORIDE 9 MG/ML
INJECTION, SOLUTION INTRAVENOUS PRN
Status: DISCONTINUED | OUTPATIENT
Start: 2022-10-14 | End: 2022-10-14 | Stop reason: HOSPADM

## 2022-10-14 RX ORDER — ONDANSETRON 2 MG/ML
4 INJECTION INTRAMUSCULAR; INTRAVENOUS
Status: DISCONTINUED | OUTPATIENT
Start: 2022-10-14 | End: 2022-10-14 | Stop reason: HOSPADM

## 2022-10-14 RX ORDER — MEPERIDINE HYDROCHLORIDE 25 MG/ML
12.5 INJECTION INTRAMUSCULAR; INTRAVENOUS; SUBCUTANEOUS EVERY 5 MIN PRN
Status: COMPLETED | OUTPATIENT
Start: 2022-10-14 | End: 2022-10-14

## 2022-10-14 RX ORDER — MIDAZOLAM HYDROCHLORIDE 1 MG/ML
2 INJECTION INTRAMUSCULAR; INTRAVENOUS
Status: DISCONTINUED | OUTPATIENT
Start: 2022-10-14 | End: 2022-10-14 | Stop reason: HOSPADM

## 2022-10-14 RX ORDER — FENTANYL CITRATE 50 UG/ML
INJECTION, SOLUTION INTRAMUSCULAR; INTRAVENOUS PRN
Status: DISCONTINUED | OUTPATIENT
Start: 2022-10-14 | End: 2022-10-14 | Stop reason: SDUPTHER

## 2022-10-14 RX ORDER — SODIUM CHLORIDE 0.9 % (FLUSH) 0.9 %
5-40 SYRINGE (ML) INJECTION EVERY 12 HOURS SCHEDULED
Status: DISCONTINUED | OUTPATIENT
Start: 2022-10-14 | End: 2022-10-14 | Stop reason: HOSPADM

## 2022-10-14 RX ORDER — SODIUM CHLORIDE 0.9 % (FLUSH) 0.9 %
5-40 SYRINGE (ML) INJECTION PRN
Status: DISCONTINUED | OUTPATIENT
Start: 2022-10-14 | End: 2022-10-14 | Stop reason: HOSPADM

## 2022-10-14 RX ORDER — ROCURONIUM BROMIDE 10 MG/ML
INJECTION, SOLUTION INTRAVENOUS PRN
Status: DISCONTINUED | OUTPATIENT
Start: 2022-10-14 | End: 2022-10-14 | Stop reason: SDUPTHER

## 2022-10-14 RX ORDER — KETOROLAC TROMETHAMINE 30 MG/ML
30 INJECTION, SOLUTION INTRAMUSCULAR; INTRAVENOUS EVERY 6 HOURS
Status: DISCONTINUED | OUTPATIENT
Start: 2022-10-14 | End: 2022-10-14 | Stop reason: HOSPADM

## 2022-10-14 RX ORDER — IBUPROFEN 600 MG/1
600 TABLET ORAL EVERY 6 HOURS PRN
Qty: 120 TABLET | Refills: 0 | Status: SHIPPED | OUTPATIENT
Start: 2022-10-14

## 2022-10-14 RX ORDER — MEPERIDINE HYDROCHLORIDE 25 MG/ML
INJECTION INTRAMUSCULAR; INTRAVENOUS; SUBCUTANEOUS
Status: COMPLETED
Start: 2022-10-14 | End: 2022-10-14

## 2022-10-14 RX ORDER — IBUPROFEN 600 MG/1
600 TABLET ORAL EVERY 6 HOURS PRN
Status: DISCONTINUED | OUTPATIENT
Start: 2022-10-14 | End: 2022-10-14 | Stop reason: HOSPADM

## 2022-10-14 RX ORDER — IPRATROPIUM BROMIDE AND ALBUTEROL SULFATE 2.5; .5 MG/3ML; MG/3ML
1 SOLUTION RESPIRATORY (INHALATION)
Status: DISCONTINUED | OUTPATIENT
Start: 2022-10-14 | End: 2022-10-14 | Stop reason: HOSPADM

## 2022-10-14 RX ORDER — PROPOFOL 10 MG/ML
INJECTION, EMULSION INTRAVENOUS PRN
Status: DISCONTINUED | OUTPATIENT
Start: 2022-10-14 | End: 2022-10-14 | Stop reason: SDUPTHER

## 2022-10-14 RX ORDER — MIDAZOLAM HYDROCHLORIDE 1 MG/ML
INJECTION INTRAMUSCULAR; INTRAVENOUS PRN
Status: DISCONTINUED | OUTPATIENT
Start: 2022-10-14 | End: 2022-10-14 | Stop reason: SDUPTHER

## 2022-10-14 RX ORDER — ACETAMINOPHEN 325 MG/1
650 TABLET ORAL EVERY 4 HOURS PRN
Status: DISCONTINUED | OUTPATIENT
Start: 2022-10-14 | End: 2022-10-14 | Stop reason: HOSPADM

## 2022-10-14 RX ORDER — OXYCODONE HYDROCHLORIDE AND ACETAMINOPHEN 5; 325 MG/1; MG/1
2 TABLET ORAL EVERY 4 HOURS PRN
Status: DISCONTINUED | OUTPATIENT
Start: 2022-10-14 | End: 2022-10-14 | Stop reason: HOSPADM

## 2022-10-14 RX ORDER — ONDANSETRON 2 MG/ML
INJECTION INTRAMUSCULAR; INTRAVENOUS PRN
Status: DISCONTINUED | OUTPATIENT
Start: 2022-10-14 | End: 2022-10-14 | Stop reason: SDUPTHER

## 2022-10-14 RX ORDER — SODIUM CHLORIDE 9 MG/ML
25 INJECTION, SOLUTION INTRAVENOUS PRN
Status: DISCONTINUED | OUTPATIENT
Start: 2022-10-14 | End: 2022-10-14 | Stop reason: HOSPADM

## 2022-10-14 RX ADMIN — MEPERIDINE HYDROCHLORIDE 12.5 MG: 25 INJECTION, SOLUTION INTRAMUSCULAR; INTRAVENOUS; SUBCUTANEOUS at 10:18

## 2022-10-14 RX ADMIN — SODIUM CHLORIDE, POTASSIUM CHLORIDE, SODIUM LACTATE AND CALCIUM CHLORIDE: 600; 310; 30; 20 INJECTION, SOLUTION INTRAVENOUS at 09:53

## 2022-10-14 RX ADMIN — FENTANYL CITRATE 50 MCG: 50 INJECTION, SOLUTION INTRAMUSCULAR; INTRAVENOUS at 09:41

## 2022-10-14 RX ADMIN — ONDANSETRON 4 MG: 2 INJECTION INTRAMUSCULAR; INTRAVENOUS at 09:42

## 2022-10-14 RX ADMIN — MIDAZOLAM 2 MG: 1 INJECTION INTRAMUSCULAR; INTRAVENOUS at 08:45

## 2022-10-14 RX ADMIN — CEFOXITIN 2000 MG: 2 INJECTION, POWDER, FOR SOLUTION INTRAVENOUS at 08:45

## 2022-10-14 RX ADMIN — HYDROMORPHONE HYDROCHLORIDE 0.25 MG: 1 INJECTION, SOLUTION INTRAMUSCULAR; INTRAVENOUS; SUBCUTANEOUS at 11:02

## 2022-10-14 RX ADMIN — SODIUM CHLORIDE, POTASSIUM CHLORIDE, SODIUM LACTATE AND CALCIUM CHLORIDE: 600; 310; 30; 20 INJECTION, SOLUTION INTRAVENOUS at 07:11

## 2022-10-14 RX ADMIN — MEPERIDINE HYDROCHLORIDE 12.5 MG: 25 INJECTION, SOLUTION INTRAMUSCULAR; INTRAVENOUS; SUBCUTANEOUS at 10:31

## 2022-10-14 RX ADMIN — LIDOCAINE HYDROCHLORIDE 100 MG: 20 INJECTION, SOLUTION INTRAVENOUS at 08:48

## 2022-10-14 RX ADMIN — PROPOFOL 180 MG: 10 INJECTION, EMULSION INTRAVENOUS at 08:48

## 2022-10-14 RX ADMIN — FENTANYL CITRATE 50 MCG: 50 INJECTION, SOLUTION INTRAMUSCULAR; INTRAVENOUS at 09:06

## 2022-10-14 RX ADMIN — SODIUM CHLORIDE, POTASSIUM CHLORIDE, SODIUM LACTATE AND CALCIUM CHLORIDE: 600; 310; 30; 20 INJECTION, SOLUTION INTRAVENOUS at 08:42

## 2022-10-14 RX ADMIN — GLYCOPYRROLATE 0.6 MG: 0.2 INJECTION, SOLUTION INTRAMUSCULAR; INTRAVENOUS at 09:53

## 2022-10-14 RX ADMIN — DEXAMETHASONE SODIUM PHOSPHATE 10 MG: 10 INJECTION INTRAMUSCULAR; INTRAVENOUS at 08:57

## 2022-10-14 RX ADMIN — Medication 0.25 MG: at 11:02

## 2022-10-14 RX ADMIN — Medication 3 MG: at 09:53

## 2022-10-14 RX ADMIN — FENTANYL CITRATE 100 MCG: 50 INJECTION, SOLUTION INTRAMUSCULAR; INTRAVENOUS at 08:48

## 2022-10-14 RX ADMIN — FENTANYL CITRATE 50 MCG: 50 INJECTION, SOLUTION INTRAMUSCULAR; INTRAVENOUS at 10:13

## 2022-10-14 RX ADMIN — ROCURONIUM BROMIDE 40 MG: 10 INJECTION, SOLUTION INTRAVENOUS at 08:48

## 2022-10-14 RX ADMIN — KETOROLAC TROMETHAMINE 30 MG: 30 INJECTION, SOLUTION INTRAMUSCULAR; INTRAVENOUS at 09:46

## 2022-10-14 ASSESSMENT — PAIN DESCRIPTION - DESCRIPTORS: DESCRIPTORS: SHARP;ACHING

## 2022-10-14 ASSESSMENT — PAIN DESCRIPTION - LOCATION: LOCATION: ABDOMEN

## 2022-10-14 ASSESSMENT — PAIN SCALES - GENERAL
PAINLEVEL_OUTOF10: 6
PAINLEVEL_OUTOF10: 3

## 2022-10-14 ASSESSMENT — LIFESTYLE VARIABLES: SMOKING_STATUS: 0

## 2022-10-14 ASSESSMENT — PAIN - FUNCTIONAL ASSESSMENT: PAIN_FUNCTIONAL_ASSESSMENT: NONE - DENIES PAIN

## 2022-10-14 NOTE — PROGRESS NOTES
CLINICAL PHARMACY NOTE: MEDS TO BEDS    Total # of Prescriptions Filled: 1   The following medications were delivered to the patient:  [address] [facility],[address] Floor 5    Additional Documentation:

## 2022-10-14 NOTE — ANESTHESIA POSTPROCEDURE EVALUATION
Department of Anesthesiology  Postprocedure Note    Patient: Kit Salas  MRN: [id number]  YOB: 1975  Date of evaluation: 10/14/2022      Procedure Summary     Date: 10/14/22 Room / Location: [address] [place] [address] 06 / [address]    Anesthesia Start: 8078 Anesthesia Stop: 5656    Procedure: HYSTERECTOMY ABDOMINAL LAPAROSCOPIC ROBOTIC XI WITH BILATERAL SALPINGO OOPHORECTOMY (Bilateral: Abdomen) Diagnosis:       Menometrorrhagia      (Menometrorrhagia [N92.1])    Surgeons: Weston Bailey MD Responsible Provider: Mario Otero MD    Anesthesia Type: general ASA Status: 2          Anesthesia Type: No value filed.     Fredi Phase I: Fredi Score: 10    Fredi Phase II: Fredi Score: 10      Anesthesia Post Evaluation    Patient location during evaluation: PACU  Patient participation: complete - patient participated  Level of consciousness: awake  Airway patency: patent  Nausea & Vomiting: no nausea and no vomiting  Complications: no  Cardiovascular status: hemodynamically stable  Respiratory status: acceptable  Hydration status: stable

## 2022-10-14 NOTE — PROGRESS NOTES
CLINICAL PHARMACY NOTE: MEDS TO BEDS    Total # of Prescriptions Filled: 1   The following medications were delivered to the patient:  Ibuprofen 600 mg    Additional Documentation:

## 2022-10-14 NOTE — H&P
Department of Gynecology  H&P Note          CHIEF COMPLAINT:   Menometrorrhagia    History obtained from patient    HISTORY OF PRESENT ILLNESS:     The patient is a 41 y.o. female with significant past medical history of heavy and irregular menstrual cycles with continuous bleeding since 2022. Patient underwent a D&C and has tried different kinds of hormonal therapy to no avail.     Past Medical History:        Diagnosis Date    BRCA1 gene mutation positive     Cousin Pos. per mmmo hx sheet    COVID 2021    pt states moderate; twice 2021, May 2022-sinus infection only 4 days only    H/O rheumatoid arthritis     Joint pain     Low back pain     degenerative disk disease    Lupus ([address]     Menometrorrhagia 2022    Neck pain     RA (rheumatoid arthritis) ([address]     RH    Radiculopathy     Sjogren's disease ([address]     mucuc membrane   dry lips  always [age]     Past Surgical History:        Procedure Laterality Date    ANESTHESIA NERVE BLOCK Bilateral 2019    BILATERAL LUMBAR TRANSFORAMINAL EPIDURAL STERIOD INJECTION AT L5-S1 UNDER FLUOROSCOPY performed by Brea Harry MD at [address] Left 2022    LEFT ELBOW CUBITAL TUNNEL RELEASE performed by Sivakumar Bedoya DO at [address] Bilateral      SECTION      x2    CHOLECYSTECTOMY, LAPAROSCOPIC N/A 2021    LAPAROSCOPIC CHOLECYSTECTOMY performed by Tarun Galdamez MD at [address] [place]      x2    DILATION AND CURETTAGE OF UTERUS N/A 2022    DILATATION AND CURETTAGE HYSTEROSCOPY performed by Tanya Jacobo MD at [address] [facility] Left     cubital nerve    ENDOSCOPY, COLON, DIAGNOSTIC      EPIDURAL STEROID INJECTION Left 2019    LUMBAR EPIDURAL STEROID INJECTION L5-S1 (LEFT PARAMEDIAN APPROACH) UNDER FLUOROSCOPIC GUIDANCE) performed by Brea Harry MD at [address] N/A 02/16/2021    LAPAROSCOPIC HIATAL HERNIA RTEPAIR WITH MESH performed by Arjun Agrawal MD at [address] [employer] ARTHROSCOPY Left 05/27/2016    medial menisectomy, debridement, acl synovectomy, chondroplasty    NERVE BLOCK Left 05/28/2019    lumbar    NERVE BLOCK Bilateral 07/09/2019    NERVE BLOCK Left 12/08/2020    Cervical epidural steroid injection under fouroscopic guidance c7-t1 left paramedian    NERVE BLOCK Bilateral 9/13/2022    BILATERAL CERVICAL MEDIAL BRANCH NERVE BLOCK UNDER FLUOROSCOPIC GUIDANCE AT C2, C3, C4 AND C5 (CPT 98667) performed by Reji Kingston MD at [address] Left 12/08/2020    CERVICAL EPIDURAL STEROID INJECTION UNDER FLUOROSCOPIC GUIDANCE AT C7-T1 LEFT PARAMEDIAN (CPT 20147) performed by Reji Kingston MD at [address] Right 07/29/2021    CERVICAL EPIDURAL STEROID INJECTION UNDER FLUOROSCOPIC GUIDANCE AT C7-T1 RIGHT PARAMEDIAN performed by Reji Kingston MD at [place] [address]         Past Gynecological History:    1. Last menstrual period: April 2022  2. Menses: Almost continuous since LMP  3. Contraception: Depo-Provera injections  4. Sexually transmitted disease history: none        A.  Number of sexual partners in the last 6 months: 1    meds:  Current Facility-Administered Medications:     lactated ringers infusion, , IntraVENous, Continuous, Gena Keller MD    sodium chloride flush 0.9 % injection 5-40 mL, 5-40 mL, IntraVENous, 2 times per day, Gena Keller MD    sodium chloride flush 0.9 % injection 5-40 mL, 5-40 mL, IntraVENous, PRN, Gena Keller MD    0.9 % sodium chloride infusion, , IntraVENous, PRN, Gena Keller MD    cefOXitin (MEFOXIN) 2,000 mg in sodium chloride 0.9 % 50 mL IVPB ([id number]), 2,000 mg, IntraVENous, On Call to OR, Belkis Hines MD    lactated ringers infusion, , IntraVENous, Continuous, [facility] Rob Guzman MD       Allergies:  Methotrexate derivatives     Social History:  TOBACCO:   reports that she has never smoked. She has never used smokeless tobacco.  ETOH:   reports current alcohol use. DRUGS:   reports no history of drug use. Family History:       Problem Relation Age of Onset    Heart Failure Mother     Thyroid Disease Mother     Cancer Father     Prostate Cancer Father         Per mammo  hx sheet    Thyroid Disease Sister     Thyroid Disease Maternal Grandmother     Thyroid Disease Sister     Thyroid Disease Sister     Breast Cancer Paternal Grandmother     Breast Cancer Maternal Aunt     Colon Cancer Maternal Aunt     Breast Cancer Maternal Cousin        Review of Systems  Review of Systems -  General ROS: negative for - chills, fatigue or malaise  ENT ROS: negative for - hearing change, nasal congestion or nasal discharge  Allergy and Immunology ROS: negative for - hives, itchy/watery eyes or nasal congestion  Hematological and Lymphatic ROS: negative for - blood clots, blood transfusions, bruising or fatigue  Endocrine ROS: negative for - malaise/lethargy, mood swings, palpitations or polydipsia/polyuria  Breast ROS: negative for - new or changing breast lumps or nipple changes  Respiratory ROS: negative for - sputum changes, stridor, tachypnea or wheezing  Cardiovascular ROS: negative for - irregular heartbeat, loss of consciousness, murmur or orthopnea  Gastrointestinal ROS: negative for - constipation, diarrhea, gas/bloating, heartburn or hematemesis  Genito-Urinary ROS: negative for -  genital discharge, genital ulcers or hematuria  Musculoskeletal ROS: negative for - gait disturbance, muscle pain or muscular weakness       PHYSICAL EXAM:    Vitals: There were no vitals taken for this visit.     General appearance:  NAD  Pyscho/social: neg for tremors and hallucinations  Head: NCAT, PERRLA, EOMI, red conjunctiva  Neck: supple, no masses  Lungs: CTAB, equal chest rise bilateral  Heart: Reg rate  Abdomen: soft, moderately tender in RUQ, nondistended  Skin; no lesions  Gu: no cva tenderness  Extremities: extremities normal, atraumatic, no cyanosis or edema    External Genitalia: General appearance; normal, Hair distribution; normal, Lesions absent  Urethral Meatus: Size normal, Location normal, Lesions absent, Prolapse absent  Vagina: General appearance normal, Estrogen effect normal, Discharge absent, Lesions absent, Pelvic support normal  Cervix: General appearance normal, Lesions absent, Discharge absent, Tenderness absent, Enlargement absent, Nodularity absent  Uterus:  Size normal, Contour normal, Position normal  [place]: Masses absent, Tenderness absent    DATA:  Labs:  CBC:   Lab Results   Component Value Date/Time    WBC 5.5 10/10/2022 12:48 PM    RBC 5.00 10/10/2022 12:48 PM    HGB 12.4 10/10/2022 12:48 PM    HCT 40.1 10/10/2022 12:48 PM    MCV 80.2 10/10/2022 12:48 PM    RDW 14.1 10/10/2022 12:48 PM     10/10/2022 12:48 PM       IMPRESSION/RECOMMENDATIONS:      Principal Problem:    Menometrorrhagia  Plan: Robotic assisted total laparoscopic hysterectomy with bilateral salpingo-oophorectomy      The patient was counseled at length about the risks of ric Covid-19 during their perioperative period and any recovery window from their procedure. The patient was made aware that ric Covid-19  may worsen their prognosis for recovering from their procedure  and lend to a higher morbidity and/or mortality risk. All material risks, benefits, and reasonable alternatives including postponing the procedure were discussed. The patient does wish to proceed with the procedure at this time.

## 2022-10-14 NOTE — ANESTHESIA PRE PROCEDURE
Department of Anesthesiology  Preprocedure Note       Name:  Lukasz Flores   Age:  41 y.o.  :  1975                                          MRN:  [id number]         Date:  10/14/2022      Surgeon: Kevin Ramirez):  Keyonna Hutson MD    Procedure: Procedure(s): HYSTERECTOMY ABDOMINAL LAPAROSCOPIC ROBOTIC XI WITH BILATERAL SALPINGO OOPHORECTOMY    Medications prior to admission:   Prior to Admission medications    Medication Sig Start Date End Date Taking? Authorizing Provider   ibuprofen (ADVIL;MOTRIN) 600 MG tablet Take 1 tablet by mouth 4 times daily as needed for Pain  Patient taking differently: Take 600 mg by mouth 4 times daily as needed for Pain On hold pre-op 22   Gena Keller MD   medroxyPROGESTERone (DEPO-PROVERA) 150 MG/ML injection Inject 150 mg into the muscle every 3 months Next dose 10-22    Historical Provider, MD   Cholecalciferol (VITAMIN D) 50 MCG (2000) CAPS capsule Take by mouth daily     Historical Provider, MD   pantoprazole (PROTONIX) 40 MG tablet Take 40 mg by mouth as needed  1/15/21   Historical Provider, MD       Current medications:    No current facility-administered medications for this visit. No current outpatient medications on file. 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    Sensory peripheral neuropathy G60.8    Cold feet R20.9    Insomnia G47.00    Lupus ([facility] [address] M32.9    Tear, knee, medial meniscus S83.249A    ACL tear S83.519A    Post-traumatic osteoarthritis of left knee M17.32    Synovitis M65.9    Lumbar radiculopathy M54.16    DDD (degenerative disc disease), lumbar M51.36    Lumbosacral spondylosis without myelopathy M47.817    Sacroiliac dysfunction M53.3    Cervical spondylosis M47.812    Cervical radiculopathy M54.12    Cervical spondylolysis M43.02    Hiatal hernia G96.6    Biliary colic I16.28    Myofascial muscle pain M79.18    Cubital tunnel syndrome on left G56.22    Lateral epicondylitis of left elbow M77.12    Achilles tendinitis of left lower extremity M76.62    Menometrorrhagia N92.1    Chronic pain syndrome G89.4       Past Medical History:        Diagnosis Date    BRCA1 gene mutation positive     Cousin Pos. per mmmo hx sheet    COVID 2021    pt states moderate; twice 2021, May 2022-sinus infection only 4 days only    H/O rheumatoid arthritis     Joint pain     Low back pain     degenerative disk disease    Lupus ([facility] [address]     Menometrorrhagia 2022    Neck pain     RA (rheumatoid arthritis) (HCC)     RH    Radiculopathy     Sjogren's disease (HCC)     mucuc membrane   dry lips  always thirty       Past Surgical History:        Procedure Laterality Date    ANESTHESIA NERVE BLOCK Bilateral 2019    BILATERAL LUMBAR TRANSFORAMINAL EPIDURAL STERIOD INJECTION AT L5-S1 UNDER FLUOROSCOPY performed by Alyce Gomez MD at [address] [facility] Left 2022    LEFT ELBOW CUBITAL TUNNEL RELEASE performed by Fidel Sawant DO at [address] [facility] Bilateral      SECTION      x2    CHOLECYSTECTOMY, LAPAROSCOPIC N/A 2021    LAPAROSCOPIC CHOLECYSTECTOMY performed by Harry Hussein MD at [address] [facility]      x2    DILATION AND CURETTAGE OF UTERUS N/A 2022    DILATATION AND CURETTAGE HYSTEROSCOPY performed by Mitchell Vega MD at [address] Left     cubital nerve    ENDOSCOPY, COLON, DIAGNOSTIC      EPIDURAL STEROID INJECTION Left 2019    LUMBAR EPIDURAL STEROID INJECTION L5-S1 (LEFT PARAMEDIAN APPROACH) UNDER FLUOROSCOPIC GUIDANCE) performed by Xin Salazar MD at [address] [place] [address] [place] N/A 02/16/2021    LAPAROSCOPIC HIATAL HERNIA RTEPAIR WITH MESH performed by Buck Perez MD at [place] [address] ARTHROSCOPY Left 05/27/2016    medial menisectomy, debridement, acl synovectomy, chondroplasty    NERVE BLOCK Left 05/28/2019    lumbar    NERVE BLOCK Bilateral 07/09/2019    NERVE BLOCK Left 12/08/2020    Cervical epidural steroid injection under fouroscopic guidance c7-t1 left paramedian    NERVE BLOCK Bilateral 9/13/2022    BILATERAL CERVICAL MEDIAL BRANCH NERVE BLOCK UNDER FLUOROSCOPIC GUIDANCE AT C2, C3, C4 AND C5 (CPT 05746) performed by Xin Salazar MD at [address] [place] [address] Left 12/08/2020    CERVICAL EPIDURAL STEROID INJECTION UNDER FLUOROSCOPIC GUIDANCE AT C7-T1 LEFT PARAMEDIAN (CPT 95007) performed by Xin Salazar MD at [address] [place] [address] Right 07/29/2021    CERVICAL EPIDURAL STEROID INJECTION UNDER FLUOROSCOPIC GUIDANCE AT C7-T1 RIGHT PARAMEDIAN performed by Xin Salazar MD at [place] [address].      TUBAL LIGATION         Social History:    Social History     Tobacco Use    Smoking status: Never    Smokeless tobacco: Never   Substance Use Topics    Alcohol use: Yes     Comment: wine occ- 4 drinks weekend only                                Counseling given: Not Answered      Vital Signs (Current): There were no vitals filed for this visit.                                            BP Readings from Last 3 Encounters:   10/10/22 119/80   09/23/22 123/70   09/13/22 124/75       NPO Status:                                                                                 BMI:   Wt Readings from Last 3 Encounters:   10/10/22 171 lb (77.6 kg)   10/04/22 168 lb (76.2 kg)   09/23/22 165 lb (74.8 kg)     There is no height or weight on file to calculate BMI.    CBC:   Lab Results   Component Value Date/Time    WBC 5.5 10/10/2022 12:48 PM    RBC 5.00 10/10/2022 12:48 PM    HGB 12.4 10/10/2022 12:48 PM    HCT 40.1 10/10/2022 12:48 PM    MCV 80.2 10/10/2022 12:48 PM    RDW 14.1 10/10/2022 12:48 PM     10/10/2022 12:48 PM       CMP:   Lab Results   Component Value Date/Time     12/20/2021 11:20 AM    K 3.7 12/20/2021 11:20 AM     12/20/2021 11:20 AM    CO2 23 12/20/2021 11:20 AM    BUN 10 12/20/2021 11:20 AM    CREATININE 0.9 12/20/2021 11:20 AM    GFRAA >60 12/20/2021 11:20 AM    LABGLOM >60 12/20/2021 11:20 AM    GLUCOSE 95 12/20/2021 11:20 AM    GLUCOSE 97 08/30/2011 10:00 AM    PROT 7.2 12/20/2021 11:20 AM    CALCIUM 9.2 12/20/2021 11:20 AM    BILITOT 0.4 12/20/2021 11:20 AM    ALKPHOS 75 12/20/2021 11:20 AM    AST 22 12/20/2021 11:20 AM    ALT 31 12/20/2021 11:20 AM       POC Tests: No results for input(s): POCGLU, POCNA, POCK, POCCL, POCBUN, POCHEMO, POCHCT in the last 72 hours. Coags: No results found for: PROTIME, INR, APTT    HCG (If Applicable):   Lab Results   Component Value Date    PREGTESTUR NEGATIVE 07/01/2022        ABGs: No results found for: PHART, PO2ART, TYY0HRJ, ESY8JRX, BEART, G3GLRYKK     Type & Screen (If Applicable):  No results found for: LABABO, LABRH    Drug/Infectious Status (If Applicable):  No results found for: HIV, HEPCAB    COVID-19 Screening (If Applicable):   Lab Results   Component Value Date/Time    COVID19 Not Detected 07/29/2021 11:19 AM    COVID19 Not Detected 04/12/2021 10:40 AM           Anesthesia Evaluation  Patient summary reviewed no history of anesthetic complications:   Airway: Mallampati: III  TM distance: >3 FB   Neck ROM: limited  Comment: Neck extension limited by pain. Mouth opening: > = 3 FB   Dental:      Comment: Dentition intact, denies any loose teeth.     Pulmonary:Negative Pulmonary ROS breath sounds clear to auscultation      (-) not a current smoker                           Cardiovascular:Negative CV ROS  Exercise tolerance: good (>4 METS),   (+) hyperlipidemia        Rhythm: regular  Rate: normal                    Neuro/Psych:   (+) neuromuscular disease (Cervical radiculopathy, Cervical spondylolysis ):,              ROS comment: Sensory peripheral neuropathy GI/Hepatic/Renal:   (+) hiatal hernia (Much improved with hiatal hernia repair),          ROS comment: IBS. Endo/Other:    (+) : arthritis ( Lupus): OA and rheumatoid. , .                  ROS comment: Menometrorrhagia (N92.1)  LUPUS Abdominal:   (+) obese,           Vascular: negative vascular ROS. Other Findings:             Anesthesia Plan      general     ASA 2       Induction: intravenous. MIPS: Postoperative opioids intended and Prophylactic antiemetics administered. Anesthetic plan and risks discussed with patient. Use of blood products discussed with patient whom consented to blood products. Plan discussed with CRNA.                     Carmine Stahl MD   9-13-22    DONATO Chery - LUCIE

## 2022-10-14 NOTE — OP NOTE
SURGEON:     PATRICIA Murphy:     PATIENT NAME:   Maxim Mejias    DATE:     10/14/2022    PREOPERATIVE DIAGNOSIS:  Menometrorrhagia    POSTOPERATIVE DIAGNOSIS:  Same plus pelvic endometriosis    OPERATION:     Robotic-assisted total laparoscopic hysterectomy + bilateral  Salpingo oophorectomy    ANESTHESIA:    General ETT. ESTIMATED BLOOD LOSS:   Less than 100 mL. COMPLICATIONS:    None noted. SPECIMENS:     Uterus, cervix and bilateral tubes and ovaries    FINDINGS:     8 to 10 weeks size uterus, left posterior broad ligament endometriosis, normal looking small ovaries, absent midportion of the right tube, normal left tube       PROCEDURE: The patient was brought into the operating room, and general   anesthesia with endotracheal intubation was then performed. The patient was   then placed in the lower lithotomy position with Vik stirrups. The patient   was prepped and draped in the usual sterile fashion. The left arm was   tucked. The right arm was left out on the arm board. Attention then was brought to the pelvis. A Howard was then placed and   a JOEL 2nd generation uterine manipulator was then placed. Uterus was   sounded. A medium size KOH cup was then applied, and the uterine manipulator was then introduced. Attention was then brought to the umbilicus and a Veress needle was   introduced. Intraabdominal inflation was proceeded with and deemed adequate. A 8-mm trocar was then introduced about 15 cm above the level of the uterine fundus fully pushed in. Laparoscope was then inserted into port 2 controlled by arm 2 of the robot and was used to confirm   correct placement. The accessory trocars were then placed. Three robotic trocars were then placed at the same level as the camera port. These were 8 mm. The first 2 were placed   approximately 10 cm lateral to the midline at the same level, and the 3rd was   placed approximately 5 cm medial to the right trocar.  A 12 mm trocar was then placed about 5 cm lateral to the camera port at the same level. The [facility] patient cart   was then prepared, brought in at a left-side docking, and the robotic arms   were then docked, and then the camera was then docked. At this time, the robotic instruments were then placed. The 1-arm had the ProGrasp. The 3-arm had the vessel sealer , and the 4-arm had the Endo Danie . The surgeon then broke scrub and moved to the surgeon's cart, and a robotic   time-out was then performed. The operation was then begun. Using the vessel sealer, the right round ligament was then coapted and divided, and with the Endo Danie, the round ligament was then divided and the posterior peritoneum was taken down to the pelvic side wall. The retroperitoneum was then developed, and the right ureter was then identified directly in its retroperitoneal space. The right infundibulo pelvic  ligament and remainder of the fallopian tube   were then coapted and divided with vessel sealer. The posterior peritoneum was then taken down to the level of the   uterine artery with the Endo Danie. Similar procedure was then performed   on the opposite side. Then on the patient's left a bladder flap was then begun. The anterior peritoneum was then mobilized, and the bladder well mobilized using the Endo Danie. All 3 arms were then used throughout the procedure, and the bladder was well mobilized off the cervix and down approximately 2-3 cm past the cup, and the vagina was well visualized. The   posterior peritoneum was then visualized for both uterine arteries. Both   uterine arteries were then skeletonized. With cephalad traction on the   manipulator, both uterine arteries were then skeletonized, coapted with the   Vessel sealer, and then divided with the Endo Danie. The anterior colpotomy was then performed along with a posterior colpotomy at 3 and 9 o'clock.  The remaining portion was then coapted with bipolar energy. The specimen was then freed and then removed through the vagina. Once the specimen was then freed and removed from the vagina, occlusion was then continued with a bulb syringe. and then the cuff was then closed in a running fashion using the   V-Loc. Copious irrigation was placed in the pelvis. The pelvis was   irrigated free. Both ureters were reinspected and found to be normal and   Peristalsing. Maria Teresa powder was applied to all operative sites. Then the patient cart was then undocked, and then the trocars were then   removed under direct visualization. CO2 gas was removed. Fascia at the   midline was closed with 0 Vicryl. Skin was then closed with subcuticular   stitch of 4-0 Vicryl, and Steri strips were then placed on the sites. The   patient was then awoken from general anesthesia, and endotracheal intubation   was then removed. The patient was then transferred to the recovery room in   stable condition. The patient's operative course was unremarkable.     Scott Ch MD

## 2022-11-03 ENCOUNTER — HOSPITAL ENCOUNTER (OUTPATIENT)
Dept: ULTRASOUND IMAGING | Age: 47
Discharge: HOME | End: 2022-11-03
Payer: COMMERCIAL

## 2022-11-03 ENCOUNTER — HOSPITAL ENCOUNTER (OUTPATIENT)
Age: 47
Discharge: HOME | End: 2022-11-03
Payer: COMMERCIAL

## 2022-11-03 DIAGNOSIS — K76.0 FATTY LIVER: ICD-10-CM

## 2022-11-03 DIAGNOSIS — M48.00 SPINAL STENOSIS, UNSPECIFIED SPINAL REGION: ICD-10-CM

## 2022-11-03 DIAGNOSIS — L93.0 LUPUS ERYTHEMATOSUS, UNSPECIFIED FORM: ICD-10-CM

## 2022-11-03 LAB
ALBUMIN SERPL-MCNC: 4.8 G/DL (ref 3.5–5.2)
ALP BLD-CCNC: 75 U/L (ref 35–104)
ALT SERPL-CCNC: 27 U/L (ref 0–32)
AST SERPL-CCNC: 19 U/L (ref 0–31)
BILIRUB SERPL-MCNC: 0.4 MG/DL (ref 0–1.2)
BILIRUBIN DIRECT: <0.2 MG/DL (ref 0–0.3)
BILIRUBIN, INDIRECT: ABNORMAL MG/DL (ref 0–1)
TOTAL PROTEIN: 8.4 G/DL (ref 6.4–8.3)

## 2022-11-03 PROCEDURE — 76705 ECHO EXAM OF ABDOMEN: CPT

## 2022-11-03 PROCEDURE — 80076 HEPATIC FUNCTION PANEL: CPT

## 2022-11-03 PROCEDURE — 36415 COLL VENOUS BLD VENIPUNCTURE: CPT

## 2022-11-18 ENCOUNTER — HOSPITAL ENCOUNTER (OUTPATIENT)
Dept: GENERAL RADIOLOGY | Age: 47
Discharge: HOME | End: 2022-11-20
Payer: COMMERCIAL

## 2022-11-18 ENCOUNTER — OFFICE VISIT (OUTPATIENT)
Dept: PAIN MANAGEMENT | Age: 47
End: 2022-11-18
Payer: COMMERCIAL

## 2022-11-18 ENCOUNTER — HOSPITAL ENCOUNTER (OUTPATIENT)
Age: 47
Discharge: HOME | End: 2022-11-20
Payer: COMMERCIAL

## 2022-11-18 VITALS
DIASTOLIC BLOOD PRESSURE: 80 MMHG | WEIGHT: 175 LBS | OXYGEN SATURATION: 99 % | TEMPERATURE: 97.1 F | SYSTOLIC BLOOD PRESSURE: 122 MMHG | HEART RATE: 92 BPM | HEIGHT: 60 IN | BODY MASS INDEX: 34.36 KG/M2 | RESPIRATION RATE: 16 BRPM

## 2022-11-18 DIAGNOSIS — R07.81 RIB PAIN ON RIGHT SIDE: ICD-10-CM

## 2022-11-18 DIAGNOSIS — M47.817 LUMBOSACRAL SPONDYLOSIS WITHOUT MYELOPATHY: ICD-10-CM

## 2022-11-18 DIAGNOSIS — M47.812 CERVICAL SPONDYLOSIS: Primary | ICD-10-CM

## 2022-11-18 DIAGNOSIS — M47.814 THORACIC SPONDYLOSIS: ICD-10-CM

## 2022-11-18 DIAGNOSIS — M50.30 DDD (DEGENERATIVE DISC DISEASE), CERVICAL: ICD-10-CM

## 2022-11-18 DIAGNOSIS — M51.36 DDD (DEGENERATIVE DISC DISEASE), LUMBAR: ICD-10-CM

## 2022-11-18 DIAGNOSIS — M51.34 THORACIC DEGENERATIVE DISC DISEASE: ICD-10-CM

## 2022-11-18 PROCEDURE — 99213 OFFICE O/P EST LOW 20 MIN: CPT | Performed by: ANESTHESIOLOGY

## 2022-11-18 PROCEDURE — 71100 X-RAY EXAM RIBS UNI 2 VIEWS: CPT

## 2022-11-18 PROCEDURE — 99214 OFFICE O/P EST MOD 30 MIN: CPT | Performed by: ANESTHESIOLOGY

## 2022-11-18 NOTE — PROGRESS NOTES
Pt    [address] [facility], [address] [place] [address] Adebayo  [phone]    Follow up Note      Krista Goodman     Date of Visit:  11/18/2022    CC:  Patient presents for follow up   Chief Complaint   Patient presents with    Follow-up     PCP would like a MRI of thoracic spine because she is having a lot of pain with her liver. HPI:    H/o Neck pain and upper extremity pain. S/P Cervical BELEM/ Cervical facet interventions - had helped. H/o chronic low back and B/l LE pain. S/P BELEM in 2019 with excellent pain relief. Low back pain is currently stable. Having thoracic back pain and right sided chest wall pain. Nursing notes and details of the pain history reviewed. Please see intake notes for details. She follows up with rheumatologist for h/o sjogren's/ rheumatoid / SLE. Previous treatments:   Failed conservative treatment with NSAID's, muscle relaxants, oral steroids and HEP. Physical Therapy   Chiropractic treatment  Ongoing Core exercises for the last year. TENS  and medications- NSAID's, oral steroids, gabapentin etc,. Does not tolerate meds well      She has not been on anticoagulation medications. Imaging:    US liver: 11/3/2022:  Impression   1. Diffusely increased echogenicity of the liver is suggestive of an   underlying infiltrative pathology the most common of which is hepatic   steatosis. Please consider correlation with patient's liver function test.       2.  Patient has undergone previous cholecystectomy. MRI of C spine: 11/3/2020:     FINDINGS:    BONES/ALIGNMENT: There is straightening of the cervical spine with loss of    the normal lordotic curvature. The bone marrow signal intensity is low in    the T1 weighted imaging, similar to the previous exam, likely related to red    marrow versus anemia. Endplate degenerative changes are noted at C6-C7. No    areas of marrow edema to suggest an acute fracture.          SPINAL CORD: The cervical spinal cord demonstrates normal signal intensity    without evidence of an expansile mass lesion. SOFT TISSUES: No paraspinal masses or edema. C2-C3: There is a 2 mm central disc protrusion indenting the anterior thecal    sac. Mild central spinal canal narrowing. The neural foramen are patent. C3-C4: There is a central disc protrusion measuring 2 mm indenting the    anterior thecal sac. Minimal central spinal canal narrowing. The neural    foramen are patent. C4-C5: Asymmetric left-sided uncovertebral spurring and facet arthropathy    have increased with moderate left foraminal narrowing, increased in severity. No right foraminal narrowing, disc herniation, or central spinal canal    stenosis. C5-C6: There is a broad-based central/left paracentral disc protrusion    measuring 3 mm, increased in size. Mild central spinal canal narrowing has    slightly increased. Asymmetric left-sided uncovertebral spurring with mild    left foraminal narrowing, increased. No right foraminal narrowing. C6-C7: There is a broad-based left paracentral central disc protrusion    measuring 3 mm. Ligamentum flavum thickening. Moderate central spinal canal    narrowing has increased. Asymmetric left-sided uncovertebral spurring with    moderate left foraminal narrowing and mild right foraminal narrowing,    increased in severity. C7-T1: There is no significant disc protrusion, spinal canal stenosis or    neural foraminal narrowing. Impression    1. Mild central spinal canal narrowing at C5-C6, and moderate central spinal    canal narrowing at C6-C7 have slightly increased. 2. Multilevel foraminal narrowing, as above, greatest on the left at C4-C5,    and left at C6-C7. 3. No evidence of an acute fracture. X ray C spine: 10/26/2020:    Impression    Degenerative disc changes at C6-7        MRI of LS spine: 6/27/2020:  FINDINGS:    BONES/ALIGNMENT: There is mild degenerative disc disease at L4-L5, and L5-S1. There are Modic type 1 degenerative endplate changes at H6-T3. SPINAL CORD: The conus terminates normally. SOFT TISSUES: No paraspinal mass identified. L1-L2:  The thecal sac and neural foramina are intact. L2-L3:  The thecal sac and neural foramina are intact. L3-L4:  The thecal sac and neural foramina are intact. L4-L5: There is a minimum disc bulge measuring 2 mm. There is mild facet    and ligamentum flavum hypertrophy. The thecal sac is not stenotic. Disc    and/or osteophytes cause minimal narrowing of the neural foramina bilaterally. L5-S1: There is a disc protrusion with annular fissure centrally at L5-S1    measuring approximately 4.5 mm. This effaces the anterior aspect of the    thecal sac. The thecal sac is not significantly stenotic. The S1 nerve    roots are intact. There is mild narrowing of the neural foramina bilaterally. There may be slight increase in size of the disc protrusion compared to the    prior study. Impression    Disc protrusion with annular fissure at L5-S1. No significant stenosis of    the thecal sac. The S1 nerve roots are intact. There is mild narrowing of    the neural foramina bilaterally at L5-S1. Mild narrowing of the neural foramina at L4-L5 as discussed above.           Potential Aberrant Drug-Related Behavior:  No    Urine Drug Screening:No    OARRS report[de-identified]  11/18/22  yes- not on chronic narcotics    Past Medical History:   Diagnosis Date    BRCA1 gene mutation positive     Cousin Pos. per mmmo hx sheet    COVID 11/2021    pt states moderate; twice Nov 2021, May 2022-sinus infection only 4 days only    Endometriosis of pelvic peritoneum 10/14/2022    H/O rheumatoid arthritis     Joint pain     Low back pain     degenerative disk disease    Lupus ([address]     Menometrorrhagia 2022    Neck pain     RA (rheumatoid arthritis) (HCC)     RH    Radiculopathy     Sjogren's disease (HCC)     mucuc membrane   dry lips  always [age]    Status post laparoscopic hysterectomy 10/14/2022       Past Surgical History:   Procedure Laterality Date    ANESTHESIA NERVE BLOCK Bilateral 2019    BILATERAL LUMBAR TRANSFORAMINAL EPIDURAL STERIOD INJECTION AT L5-S1 UNDER FLUOROSCOPY performed by Sony Braxton MD at [address] Left 2022    LEFT ELBOW CUBITAL TUNNEL RELEASE performed by Salma Roy DO at [address] Bilateral      SECTION      x2    CHOLECYSTECTOMY, LAPAROSCOPIC N/A 2021    LAPAROSCOPIC CHOLECYSTECTOMY performed by Catracho Perez MD at [address] [place]      x2    DILATION AND CURETTAGE OF UTERUS N/A 2022    DILATATION AND CURETTAGE HYSTEROSCOPY performed by Trevor Porter MD at [facility] Left     cubital nerve    ENDOSCOPY, COLON, DIAGNOSTIC      EPIDURAL STEROID INJECTION Left 2019    LUMBAR EPIDURAL STEROID INJECTION L5-S1 (LEFT PARAMEDIAN APPROACH) UNDER FLUOROSCOPIC GUIDANCE) performed by Sony Braxton MD at [facility] N/A 2021    LAPAROSCOPIC HIATAL HERNIA RTEPAIR WITH MESH performed by Catracho Perez MD at [address] ([address] [facility]) Bilateral 10/14/2022    HYSTERECTOMY ABDOMINAL LAPAROSCOPIC ROBOTIC XI WITH BILATERAL SALPINGO OOPHORECTOMY performed by Trevor Porter MD at [address] [facility] ARTHROSCOPY Left 2016    medial menisectomy, debridement, acl synovectomy, chondroplasty    NERVE BLOCK Left 2019    lumbar    NERVE BLOCK Bilateral 2019    NERVE BLOCK Left 2020    Cervical epidural steroid injection under fouroscopic guidance c7-t1 left paramedian    NERVE BLOCK Bilateral 2022    BILATERAL CERVICAL MEDIAL BRANCH NERVE BLOCK UNDER FLUOROSCOPIC GUIDANCE AT C2, C3, C4 AND C5 (CPT 13799) performed by Ny Chavez MD at [address] Left 12/08/2020    CERVICAL EPIDURAL STEROID INJECTION UNDER FLUOROSCOPIC GUIDANCE AT C7-T1 LEFT PARAMEDIAN (CPT 67627) performed by Ny Chavez MD at [address] [facility] Right 07/29/2021    CERVICAL EPIDURAL STEROID INJECTION UNDER FLUOROSCOPIC GUIDANCE AT C7-T1 RIGHT PARAMEDIAN performed by Ny Chavez MD at [facility]         Prior to Admission medications    Medication Sig Start Date End Date Taking?  Authorizing Provider   ADVANCED EVENING PRIMROSE OIL PO Take by mouth   Yes Historical Provider, MD   ibuprofen (ADVIL;MOTRIN) 600 MG tablet Take 1 tablet by mouth 4 times daily as needed for Pain 7/1/22  Yes Barry Voss MD   Cholecalciferol (VITAMIN D) 50 MCG (2000 UT) CAPS capsule Take by mouth daily    Yes Historical Provider, MD   pantoprazole (PROTONIX) 40 MG tablet Take 40 mg by mouth as needed  1/15/21  Yes Historical Provider, MD       Allergies   Allergen Reactions    Methotrexate Derivatives Nausea And Vomiting and Other (See Comments)     Mouth sores       Social History     Socioeconomic History    Marital status:      Spouse name: Not on file    Number of children: Not on file    Years of education: Not on file    Highest education level: Not on file   Occupational History    Not on file   Tobacco Use    Smoking status: Never    Smokeless tobacco: Never   Vaping Use    Vaping Use: Never used   Substance and Sexual Activity    Alcohol use: Yes     Comment: wine occ- 4 drinks weekend only    Drug use: No    Sexual activity: Yes     Partners: Male   Other Topics Concern    Not on file   Social History Narrative    Not on file     Social Determinants of Health     Financial Resource Strain: Not on file   Food Insecurity: Not on file Transportation Needs: Not on file   Physical Activity: Not on file   Stress: Not on file   Social Connections: Not on file   Intimate Partner Violence: Not on file   Housing Stability: Not on file       Family History   Problem Relation Age of Onset    Heart Failure Mother     Thyroid Disease Mother     Cancer Father     Prostate Cancer Father         Per mammo  hx sheet    Thyroid Disease Sister     Thyroid Disease Maternal Grandmother     Thyroid Disease Sister     Thyroid Disease Sister     Breast Cancer Paternal Grandmother     Breast Cancer Maternal Aunt     Colon Cancer Maternal Aunt     Breast Cancer Maternal Cousin        REVIEW OF SYSTEMS:     Aden Soler denies fever/chills, chest pain, shortness of breath, new bowel or bladder complaints. All other review of systems was negative. PHYSICAL EXAMINATION:      /80   Pulse 92   Temp 97.1 °F (36.2 °C) (Infrared)   Resp 16   Ht 5' (1.524 m)   Wt 175 lb (79.4 kg)   SpO2 99%   BMI 34.18 kg/m²   General:       General appearance:   pleasant and well-hydrated. , in no distress and A & O x3  Build:Obese  Function:Moves about room without difficulty. HEENT:     Head:normocephalic, atraumatic    Lungs:     Breathing:normal breathing pattern      CVS :   RRR     Abdomen:     Shape:non-distended and normal     Cervical spine:     Inspection:normal  Palpation:tenderness paravertebral muscles, tenderness trapezium, left, right and negative  Range of motion:abnormal mildly flexion, extension rotation bilateral and is painful. Facet tenderness +     Thoracic spine:     Spine inspection:normal   Palpation:-tender paraspinals, right. Range of motion:normal in flexion, extension rotation bilateral and is not painful. Lumbar spine:     Spine inspection:normal   Palpation:tenderness paravertebral muscles, left, right and positive. Range of motion: Reduced, Pain on Lateral bending, flexion, extension rotation bilateral and is Painful.   B/l Lumbar paraspinal tenderness +  B/l Lumbar facet loading +  SIJ tenderness + bilateral,   Sensory and motor in b/l LE normal  DTRs' b/l LE equal     Musculoskeletal:     Trigger points Right lower rib tenderness +     Extremities:     Tremors:None bilaterally upper and lower  Edema:no  Knee:     Knee ROM normal- no pain     Neurological:     Sensory:normal to light touch - except for sensory changes over the left UE. Motor:   Right Grip5/5              Left Grip5/5               Right Bicep5/5           Left Bicep5/5              Right Triceps5/5       Left Triceps5/5          Right Deltoid5/5     Left Deltoid5/5                  Right Quadriceps5/5          Left Quadriceps5/5           Right Gastrocnemius5/5    Left Gastrocnemius5/5  Right Ant Tibialis5/5  Left Ant Tibialis5/5     Gait:normal     Assessment/Plan:   Diagnosis Orders   1. Cervical spondylosis        2. DDD (degenerative disc disease), cervical        3. Thoracic degenerative disc disease        4. Thoracic spondylosis        5. DDD (degenerative disc disease), lumbar        6. Lumbosacral spondylosis without myelopathy          41 y.o.  lady with neck pain low back pain. Failed conservative treatment. Prior C-spine and LS spine interventions had helped. Has been evaluated by Dr. Gonzalez [place] not recommend surgery. Consider Lumbar Facet injection in future if low back pain worsens. Neck pain significant- features of facet pain. S/P # 1 cervical facet MBNB > 80% relief. Doing HEP    Can repeat Facet MBNB if significant neck pain recurs. Having thoracic back pain and right sided rib pain. Note; Has undergone hysterectomy on 10/14/2022- recovering well. Recent US of liver reviewed. Recommend MRI of thoracic spine    X-ray ribs. Recommend fu with rheumatology.     Counseling reg : regular HEP and weight watching, appropriate medication use     Latoya Nance MD    CC:  Hernan Isbell MD

## 2022-11-18 NOTE — PROGRESS NOTES
Do you currently have any of the following:    Fever: No  Headache:  No  Cough: No  Shortness of breath: No  Exposed to anyone with these symptoms: No                                                                                                                Josh Rubi presents to the [address] [id number] on 11/18/2022. Elizabeth Dewitt is complaining of pain in her neck, dowsn her spine to lower back and bilateral legs. . The pain is constant. The pain is described as aching and throbbing. Pain is rated on her best day at a 3, on her worst day at a 10, and on average at a 6 on the VAS scale. She took her last dose of Motrin . Elizabeth Dewitt does not have issues with constipation. Any procedures since your last visit: Yes,  hysterectomy 5 weeks ago. She is  on NSAIDS and  is not on anticoagulation medications to include none and is managed by NA. Pacemaker or defibrillator: No Physician managing device is NA. Medication Contract and Consent for Opioid Use Documents Filed       Patient Documents       Type of Document Status Date Received Received By Description    Medication Contract Received 5/7/2019  8:46 AM SANDRITA KABA [facility] Pt Agreement 5/7/2019                       /80   Pulse 92   Temp 97.1 °F (36.2 °C) (Infrared)   Resp 16   Ht 5' (1.524 m)   Wt 175 lb (79.4 kg)   SpO2 99%   BMI 34.18 kg/m²      No LMP recorded.

## 2022-12-09 ENCOUNTER — OFFICE VISIT (OUTPATIENT)
Dept: PAIN MANAGEMENT | Age: 47
End: 2022-12-09
Payer: COMMERCIAL

## 2022-12-09 ENCOUNTER — PREP FOR PROCEDURE (OUTPATIENT)
Dept: PAIN MANAGEMENT | Age: 47
End: 2022-12-09

## 2022-12-09 VITALS
BODY MASS INDEX: 34.36 KG/M2 | DIASTOLIC BLOOD PRESSURE: 80 MMHG | WEIGHT: 175 LBS | HEIGHT: 60 IN | OXYGEN SATURATION: 99 % | TEMPERATURE: 97.1 F | HEART RATE: 73 BPM | SYSTOLIC BLOOD PRESSURE: 114 MMHG

## 2022-12-09 DIAGNOSIS — M51.34 THORACIC DEGENERATIVE DISC DISEASE: ICD-10-CM

## 2022-12-09 DIAGNOSIS — M51.36 DDD (DEGENERATIVE DISC DISEASE), LUMBAR: Primary | ICD-10-CM

## 2022-12-09 DIAGNOSIS — M54.16 LUMBAR RADICULAR PAIN: Primary | ICD-10-CM

## 2022-12-09 DIAGNOSIS — M54.16 LUMBAR RADICULAR PAIN: ICD-10-CM

## 2022-12-09 DIAGNOSIS — M51.36 DDD (DEGENERATIVE DISC DISEASE), LUMBAR: ICD-10-CM

## 2022-12-09 DIAGNOSIS — M47.812 CERVICAL SPONDYLOSIS: ICD-10-CM

## 2022-12-09 DIAGNOSIS — M50.30 DDD (DEGENERATIVE DISC DISEASE), CERVICAL: ICD-10-CM

## 2022-12-09 PROCEDURE — 99214 OFFICE O/P EST MOD 30 MIN: CPT | Performed by: ANESTHESIOLOGY

## 2022-12-09 RX ORDER — SODIUM CHLORIDE 0.9 % (FLUSH) 0.9 %
5-40 SYRINGE (ML) INJECTION PRN
OUTPATIENT
Start: 2022-12-09

## 2022-12-09 RX ORDER — SODIUM CHLORIDE 0.9 % (FLUSH) 0.9 %
5-40 SYRINGE (ML) INJECTION EVERY 12 HOURS SCHEDULED
OUTPATIENT
Start: 2022-12-09

## 2022-12-09 RX ORDER — SODIUM CHLORIDE 9 MG/ML
INJECTION, SOLUTION INTRAVENOUS PRN
OUTPATIENT
Start: 2022-12-09

## 2022-12-09 NOTE — PROGRESS NOTES
Pt    [address] [facility], [address] [place] [address] Adebayo  [phone]    Follow up Note      Maxim Mejias     Date of Visit:  12/9/2022    CC:  Patient presents for follow up   Chief Complaint   Patient presents with    Back Pain       HPI:    H/o Neck pain and upper extremity pain. S/P Cervical BELEM/ Cervical facet interventions - had helped. H/o chronic low back and B/l LE pain. S/P BELEM in 2019 with excellent pain relief. Low back pain is currently stable. Thoracic back pain and right sided chest wall pain. Nursing notes and details of the pain history reviewed. Please see intake notes for details. She follows up with rheumatologist for h/o sjogren's/ rheumatoid / SLE. Previous treatments:   Failed conservative treatment with NSAID's, muscle relaxants, oral steroids and HEP. Physical Therapy   Chiropractic treatment  Ongoing Core exercises for the last year. TENS  and medications- NSAID's, oral steroids, gabapentin etc,. Does not tolerate meds well      She has not been on anticoagulation medications. Imaging:    US liver: 11/3/2022:  Impression   1. Diffusely increased echogenicity of the liver is suggestive of an   underlying infiltrative pathology the most common of which is hepatic   steatosis. Please consider correlation with patient's liver function test.       2.  Patient has undergone previous cholecystectomy. MRI of C spine: 11/3/2020:     FINDINGS:    BONES/ALIGNMENT: There is straightening of the cervical spine with loss of    the normal lordotic curvature. The bone marrow signal intensity is low in    the T1 weighted imaging, similar to the previous exam, likely related to red    marrow versus anemia. Endplate degenerative changes are noted at C6-C7. No    areas of marrow edema to suggest an acute fracture.          SPINAL CORD: The cervical spinal cord demonstrates normal signal intensity    without evidence of an expansile mass lesion. SOFT TISSUES: No paraspinal masses or edema. C2-C3: There is a 2 mm central disc protrusion indenting the anterior thecal    sac. Mild central spinal canal narrowing. The neural foramen are patent. C3-C4: There is a central disc protrusion measuring 2 mm indenting the    anterior thecal sac. Minimal central spinal canal narrowing. The neural    foramen are patent. C4-C5: Asymmetric left-sided uncovertebral spurring and facet arthropathy    have increased with moderate left foraminal narrowing, increased in severity. No right foraminal narrowing, disc herniation, or central spinal canal    stenosis. C5-C6: There is a broad-based central/left paracentral disc protrusion    measuring 3 mm, increased in size. Mild central spinal canal narrowing has    slightly increased. Asymmetric left-sided uncovertebral spurring with mild    left foraminal narrowing, increased. No right foraminal narrowing. C6-C7: There is a broad-based left paracentral central disc protrusion    measuring 3 mm. Ligamentum flavum thickening. Moderate central spinal canal    narrowing has increased. Asymmetric left-sided uncovertebral spurring with    moderate left foraminal narrowing and mild right foraminal narrowing,    increased in severity. C7-T1: There is no significant disc protrusion, spinal canal stenosis or    neural foraminal narrowing. Impression    1. Mild central spinal canal narrowing at C5-C6, and moderate central spinal    canal narrowing at C6-C7 have slightly increased. 2. Multilevel foraminal narrowing, as above, greatest on the left at C4-C5,    and left at C6-C7. 3. No evidence of an acute fracture. X ray C spine: 10/26/2020: Impression    Degenerative disc changes at C6-7        MRI of LS spine: 6/27/2020:  FINDINGS:    BONES/ALIGNMENT: There is mild degenerative disc disease at L4-L5, and L5-S1.     There are Modic type 1 degenerative endplate changes at X2-I1. SPINAL CORD: The conus terminates normally. SOFT TISSUES: No paraspinal mass identified. L1-L2:  The thecal sac and neural foramina are intact. L2-L3:  The thecal sac and neural foramina are intact. L3-L4:  The thecal sac and neural foramina are intact. L4-L5: There is a minimum disc bulge measuring 2 mm. There is mild facet    and ligamentum flavum hypertrophy. The thecal sac is not stenotic. Disc    and/or osteophytes cause minimal narrowing of the neural foramina bilaterally. L5-S1: There is a disc protrusion with annular fissure centrally at L5-S1    measuring approximately 4.5 mm. This effaces the anterior aspect of the    thecal sac. The thecal sac is not significantly stenotic. The S1 nerve    roots are intact. There is mild narrowing of the neural foramina bilaterally. There may be slight increase in size of the disc protrusion compared to the    prior study. Impression    Disc protrusion with annular fissure at L5-S1. No significant stenosis of    the thecal sac. The S1 nerve roots are intact. There is mild narrowing of    the neural foramina bilaterally at L5-S1. Mild narrowing of the neural foramina at L4-L5 as discussed above.           Potential Aberrant Drug-Related Behavior:  No    Urine Drug Screening:No    OARRS report[de-identified]  12/9/22  yes- not on chronic narcotics    Past Medical History:   Diagnosis Date    BRCA1 gene mutation positive     Cousin Pos. per mmmo hx sheet    COVID 11/2021    pt states moderate; twice Nov 2021, May 2022-sinus infection only 4 days only    Endometriosis of pelvic peritoneum 10/14/2022    H/O rheumatoid arthritis     Joint pain     Low back pain     degenerative disk disease    Lupus ([address]     Menometrorrhagia 06/30/2022    Neck pain     RA (rheumatoid arthritis) ([address]     RH    Radiculopathy     Sjogren's disease ([address] mucuc membrane   dry lips  always [age]    Status post laparoscopic hysterectomy 10/14/2022       Past Surgical History:   Procedure Laterality Date    ANESTHESIA NERVE BLOCK Bilateral 2019    BILATERAL LUMBAR TRANSFORAMINAL EPIDURAL STERIOD INJECTION AT L5-S1 UNDER FLUOROSCOPY performed by Julienne Wikles MD at [address] Left 2022    LEFT ELBOW CUBITAL TUNNEL RELEASE performed by Yung Coughlin DO at [address] Bilateral      SECTION      x2    CHOLECYSTECTOMY, LAPAROSCOPIC N/A 2021    LAPAROSCOPIC CHOLECYSTECTOMY performed by Jeff Limon MD at [address]      x2    DILATION AND CURETTAGE OF UTERUS N/A 2022    DILATATION AND CURETTAGE HYSTEROSCOPY performed by Alma Delia Mesa MD at [facility] [place]note 2] Left     cubital nerve    ENDOSCOPY, COLON, DIAGNOSTIC      EPIDURAL STEROID INJECTION Left 2019    LUMBAR EPIDURAL STEROID INJECTION L5-S1 (LEFT PARAMEDIAN APPROACH) UNDER FLUOROSCOPIC GUIDANCE) performed by Julienne Wilkes MD at [facility] N/A 2021    LAPAROSCOPIC HIATAL HERNIA RTEPAIR WITH MESH performed by Jeff Limon MD at [address] ([facility]) Bilateral 10/14/2022    HYSTERECTOMY ABDOMINAL LAPAROSCOPIC ROBOTIC XI WITH BILATERAL SALPINGO OOPHORECTOMY performed by Alma Delia Mesa MD at [facility] ARTHROSCOPY Left 2016    medial menisectomy, debridement, acl synovectomy, chondroplasty    NERVE BLOCK Left 2019    lumbar    NERVE BLOCK Bilateral 2019    NERVE BLOCK Left 2020    Cervical epidural steroid injection under fouroscopic guidance c7-t1 left paramedian    NERVE BLOCK Bilateral 2022    BILATERAL CERVICAL MEDIAL BRANCH NERVE BLOCK UNDER FLUOROSCOPIC GUIDANCE AT C2, C3, C4 AND C5 (CPT 73717) performed by Julienne Wilkes MD at [facility] L[facility] OR    PAIN MANAGEMENT PROCEDURE Left 12/08/2020    CERVICAL EPIDURAL STEROID INJECTION UNDER FLUOROSCOPIC GUIDANCE AT C7-T1 LEFT PARAMEDIAN (CPT 95371) performed by Xin Salazar MD at [address] [place] [address] Right 07/29/2021    CERVICAL EPIDURAL STEROID INJECTION UNDER FLUOROSCOPIC GUIDANCE AT C7-T1 RIGHT PARAMEDIAN performed by Xin Salazar MD at [facility]         Prior to Admission medications    Medication Sig Start Date End Date Taking?  Authorizing Provider   ADVANCED EVENING PRIMROSE OIL PO Take by mouth   Yes Historical Provider, MD   ibuprofen (ADVIL;MOTRIN) 600 MG tablet Take 1 tablet by mouth 4 times daily as needed for Pain 7/1/22  Yes Val Banegas MD   Cholecalciferol (VITAMIN D) 50 MCG (2000 UT) CAPS capsule Take by mouth daily    Yes Historical Provider, MD   pantoprazole (PROTONIX) 40 MG tablet Take 40 mg by mouth as needed  1/15/21  Yes Historical Provider, MD       Allergies   Allergen Reactions    Methotrexate Derivatives Nausea And Vomiting and Other (See Comments)     Mouth sores       Social History     Socioeconomic History    Marital status:      Spouse name: Not on file    Number of children: Not on file    Years of education: Not on file    Highest education level: Not on file   Occupational History    Not on file   Tobacco Use    Smoking status: Never    Smokeless tobacco: Never   Vaping Use    Vaping Use: Never used   Substance and Sexual Activity    Alcohol use: Yes     Comment: wine occ- 4 drinks weekend only    Drug use: No    Sexual activity: Yes     Partners: Male   Other Topics Concern    Not on file   Social History Narrative    Not on file     Social Determinants of Health     Financial Resource Strain: Not on file   Food Insecurity: Not on file   Transportation Needs: Not on file   Physical Activity: Not on file   Stress: Not on file   Social Connections: Not on file Intimate Partner Violence: Not on file   Housing Stability: Not on file       Family History   Problem Relation Age of Onset    Heart Failure Mother     Thyroid Disease Mother     Cancer Father     Prostate Cancer Father         Per mammo  hx sheet    Thyroid Disease Sister     Thyroid Disease Maternal Grandmother     Thyroid Disease Sister     Thyroid Disease Sister     Breast Cancer Paternal Grandmother     Breast Cancer Maternal Aunt     Colon Cancer Maternal Aunt     Breast Cancer Maternal Cousin        REVIEW OF SYSTEMS:     Yris Wolfe denies fever/chills, chest pain, shortness of breath, new bowel or bladder complaints. All other review of systems was negative. PHYSICAL EXAMINATION:      /80   Pulse 73   Temp 97.1 °F (36.2 °C) (Infrared)   Ht 5' (1.524 m)   Wt 175 lb (79.4 kg)   SpO2 99%   BMI 34.18 kg/m²   General:       General appearance:   pleasant and well-hydrated. , in no distress and A & O x3  Build:Obese  Function:Moves about room without difficulty. HEENT:     Head:normocephalic, atraumatic    Lungs:     Breathing:normal breathing pattern      CVS :   RRR     Abdomen:     Shape:non-distended and normal     Cervical spine:     Inspection:normal  Palpation:tenderness paravertebral muscles, tenderness trapezium, left, right and negative  Range of motion:abnormal mildly flexion, extension rotation bilateral and is painful. Facet tenderness +     Thoracic spine:     Spine inspection:normal   Palpation:-tender paraspinals, right. Range of motion:normal in flexion, extension rotation bilateral and is not painful. Lumbar spine:     Spine inspection:normal   Palpation:tenderness paravertebral muscles, left, right and positive. Range of motion: Reduced, Pain on Lateral bending, flexion, extension rotation bilateral and is Painful.   B/l Lumbar paraspinal tenderness +  B/l Lumbar facet loading +  SIJ tenderness + bilateral,   Sensory and motor in b/l LE normal  DTRs' b/l LE equal Musculoskeletal:     Trigger points +     Extremities:     Tremors:None bilaterally upper and lower  Edema:no  Knee:     Knee ROM normal- no pain     Neurological:     Sensory:normal to light touch - except for sensory changes over the left UE. Motor:   Right Grip5/5              Left Grip5/5               Right Bicep5/5           Left Bicep5/5              Right Triceps5/5       Left Triceps5/5          Right Deltoid5/5     Left Deltoid5/5                  Right Quadriceps5/5          Left Quadriceps5/5           Right Gastrocnemius5/5    Left Gastrocnemius5/5  Right Ant Tibialis5/5  Left Ant Tibialis5/5     Gait:normal     Assessment/Plan:   Diagnosis Orders   1. DDD (degenerative disc disease), lumbar        2. Lumbar radicular pain        3. DDD (degenerative disc disease), cervical        4. Cervical spondylosis        5. Thoracic degenerative disc disease            41 y.o.  with H/o neck pain low back pain. Failed conservative treatment. Prior C-spine and LS spine interventions had helped. Has been evaluated by Dr. Maryann Marrufo not recommend surgery. Neck pain significant- features of facet pain. S/P # 1 cervical facet MBNB on 9/13/2022 > 80% relief. Doing HEP. Neck pain is stable. Can repeat Facet MBNB if significant neck pain recurs. Thoracic back pain and right sided rib pain. Xray ribs- no acute process. Recent US of liver reviewed. Recommend f/u with her PCP. MRI of thoracic spine- pending. Note; Has undergone hysterectomy on 10/14/2022- recovering well. Recommend fu with rheumatology. Current issues: Low back and left LE pain. Prior TFESI in 2019 had provided excellent pain relief. Now has low back and left LE pain  Prior MRI reviewed. Will set up for left lumbar TFESI L5 & S1 under fluoroscopy . RBA discussed. She agreed to proceed.     Counseling reg : regular HEP and weight watching, appropriate medication use     Brea Harry MD    CC:  Karsten Ceron, MD

## 2022-12-09 NOTE — PROGRESS NOTES
Do you currently have any of the following:    Fever: No  Headache:  No  Cough: No  Shortness of breath: No  Exposed to anyone with these symptoms: No                                                                                                                Carrillo Peter presents to the [facility] on 12/9/2022. Renetta Woody is complaining of pain in back. The pain is constant. The pain is described as shooting and sharp. Pain is rated on her best day at a 3, on her worst day at a 10, and on average at a 4 on the VAS scale. She took her last dose of Motrin yesterday. Renetta Woody does not have issues with constipation. Any procedures since your last visit: No.    She is  on NSAIDS and  is not on anticoagulation medications to include none and is managed by none. Pacemaker or defibrillator: No.    Medication Contract and Consent for Opioid Use Documents Filed       Patient Documents       Type of Document Status Date Received Received By Description    Medication Contract Received 5/7/2019  8:46 AM SANDRITA KABA [facility] Pt Agreement 5/7/2019                       Temp 97.1 °F (36.2 °C) (Infrared)   Ht 5' (1.524 m)   Wt 175 lb (79.4 kg)   BMI 34.18 kg/m²      No LMP recorded.

## 2022-12-09 NOTE — H&P (VIEW-ONLY)
Pt    [address] [facility], [address] [place] [address] Adebayo  [phone]    Follow up Note      Jen Gatica     Date of Visit:  12/9/2022    CC:  Patient presents for follow up   Chief Complaint   Patient presents with    Back Pain       HPI:    H/o Neck pain and upper extremity pain. S/P Cervical BELEM/ Cervical facet interventions - had helped. H/o chronic low back and B/l LE pain. S/P BELEM in 2019 with excellent pain relief. Low back pain is currently stable. Thoracic back pain and right sided chest wall pain. Nursing notes and details of the pain history reviewed. Please see intake notes for details. She follows up with rheumatologist for h/o sjogren's/ rheumatoid / SLE. Previous treatments:   Failed conservative treatment with NSAID's, muscle relaxants, oral steroids and HEP. Physical Therapy   Chiropractic treatment  Ongoing Core exercises for the last year. TENS  and medications- NSAID's, oral steroids, gabapentin etc,. Does not tolerate meds well      She has not been on anticoagulation medications. Imaging:    US liver: 11/3/2022:  Impression   1. Diffusely increased echogenicity of the liver is suggestive of an   underlying infiltrative pathology the most common of which is hepatic   steatosis. Please consider correlation with patient's liver function test.       2.  Patient has undergone previous cholecystectomy. MRI of C spine: 11/3/2020:     FINDINGS:    BONES/ALIGNMENT: There is straightening of the cervical spine with loss of    the normal lordotic curvature. The bone marrow signal intensity is low in    the T1 weighted imaging, similar to the previous exam, likely related to red    marrow versus anemia. Endplate degenerative changes are noted at C6-C7. No    areas of marrow edema to suggest an acute fracture.          SPINAL CORD: The cervical spinal cord demonstrates normal signal intensity    without evidence of an expansile mass lesion. SOFT TISSUES: No paraspinal masses or edema. C2-C3: There is a 2 mm central disc protrusion indenting the anterior thecal    sac. Mild central spinal canal narrowing. The neural foramen are patent. C3-C4: There is a central disc protrusion measuring 2 mm indenting the    anterior thecal sac. Minimal central spinal canal narrowing. The neural    foramen are patent. C4-C5: Asymmetric left-sided uncovertebral spurring and facet arthropathy    have increased with moderate left foraminal narrowing, increased in severity. No right foraminal narrowing, disc herniation, or central spinal canal    stenosis. C5-C6: There is a broad-based central/left paracentral disc protrusion    measuring 3 mm, increased in size. Mild central spinal canal narrowing has    slightly increased. Asymmetric left-sided uncovertebral spurring with mild    left foraminal narrowing, increased. No right foraminal narrowing. C6-C7: There is a broad-based left paracentral central disc protrusion    measuring 3 mm. Ligamentum flavum thickening. Moderate central spinal canal    narrowing has increased. Asymmetric left-sided uncovertebral spurring with    moderate left foraminal narrowing and mild right foraminal narrowing,    increased in severity. C7-T1: There is no significant disc protrusion, spinal canal stenosis or    neural foraminal narrowing. Impression    1. Mild central spinal canal narrowing at C5-C6, and moderate central spinal    canal narrowing at C6-C7 have slightly increased. 2. Multilevel foraminal narrowing, as above, greatest on the left at C4-C5,    and left at C6-C7. 3. No evidence of an acute fracture. X ray C spine: 10/26/2020: Impression    Degenerative disc changes at C6-7        MRI of LS spine: 6/27/2020:  FINDINGS:    BONES/ALIGNMENT: There is mild degenerative disc disease at L4-L5, and L5-S1.     There are Modic type 1 degenerative endplate changes at V2-W2. SPINAL CORD: The conus terminates normally. SOFT TISSUES: No paraspinal mass identified. L1-L2:  The thecal sac and neural foramina are intact. L2-L3:  The thecal sac and neural foramina are intact. L3-L4:  The thecal sac and neural foramina are intact. L4-L5: There is a minimum disc bulge measuring 2 mm. There is mild facet    and ligamentum flavum hypertrophy. The thecal sac is not stenotic. Disc    and/or osteophytes cause minimal narrowing of the neural foramina bilaterally. L5-S1: There is a disc protrusion with annular fissure centrally at L5-S1    measuring approximately 4.5 mm. This effaces the anterior aspect of the    thecal sac. The thecal sac is not significantly stenotic. The S1 nerve    roots are intact. There is mild narrowing of the neural foramina bilaterally. There may be slight increase in size of the disc protrusion compared to the    prior study. Impression    Disc protrusion with annular fissure at L5-S1. No significant stenosis of    the thecal sac. The S1 nerve roots are intact. There is mild narrowing of    the neural foramina bilaterally at L5-S1. Mild narrowing of the neural foramina at L4-L5 as discussed above.           Potential Aberrant Drug-Related Behavior:  No    Urine Drug Screening:No    OARRS report[de-identified]  12/9/22  yes- not on chronic narcotics    Past Medical History:   Diagnosis Date    BRCA1 gene mutation positive     Cousin Pos. per mmmo hx sheet    COVID 11/2021    pt states moderate; twice Nov 2021, May 2022-sinus infection only 4 days only    Endometriosis of pelvic peritoneum 10/14/2022    H/O rheumatoid arthritis     Joint pain     Low back pain     degenerative disk disease    Lupus ([address]     Menometrorrhagia 06/30/2022    Neck pain     RA (rheumatoid arthritis) ([address]     RH    Radiculopathy     Sjogren's disease ([address] mucuc membrane   dry lips  always [age]    Status post laparoscopic hysterectomy 10/14/2022       Past Surgical History:   Procedure Laterality Date    ANESTHESIA NERVE BLOCK Bilateral 2019    BILATERAL LUMBAR TRANSFORAMINAL EPIDURAL STERIOD INJECTION AT L5-S1 UNDER FLUOROSCOPY performed by Quentin Mixon MD at [address] Left 2022    LEFT ELBOW CUBITAL TUNNEL RELEASE performed by Ross Valencia DO at [address] Bilateral      SECTION      x2    CHOLECYSTECTOMY, LAPAROSCOPIC N/A 2021    LAPAROSCOPIC CHOLECYSTECTOMY performed by Patti Terrazas MD at [address]      x2    DILATION AND CURETTAGE OF UTERUS N/A 2022    DILATATION AND CURETTAGE HYSTEROSCOPY [facility]     cub[facility] nerve    ENDOSCOPY, COLON, DIAGNOSTIC      EPIDURAL STEROID INJECTION Left 2019    LUMBAR EPIDURAL STEROID INJECTION L5-S1 (LEFT PARAMEDIAN APPROACH) UNDER FLUOROSCOPIC GUIDANCE) performed by Quentin Mixon MD at [facility] N/A 2021    LAPAROSCOPIC HIATAL HERNIA RTEPAIR WITH MESH performed by Patti Terrazas MD at [place] [address] ([facility]) Bilateral 10/14/2022    HYSTERECTOMY ABDOMINAL LAPAROSCOPIC ROBOTIC XI WITH BILATERAL SALPINGO OOPHORECTOMY performed by Shirley Kaplan MD at [facility] ARTHROSCOPY Left 2016    medial menisectomy, debridement, acl synovectomy, chondroplasty    NERVE BLOCK Left 2019    lumbar    NERVE BLOCK Bilateral 2019    NERVE BLOCK Left 2020    Cervical epidural steroid injection under fouroscopic guidance c7-t1 left paramedian    NERVE BLOCK Bilateral 2022    BILATERAL CERVICAL MEDIAL BRANCH NERVE BLOCK UNDER FLUOROSCOPIC GUIDANCE AT C2, C3, C4 AND C5 (CPT 11004) performed by Quentin Mixon MD at [facility] LOU OR    PAIN MANAGEMENT PROCEDURE Left 12/08/2020    CERVICAL EPIDURAL STEROID INJECTION UNDER FLUOROSCOPIC GUIDANCE AT C7-T1 LEFT PARAMEDIAN (CPT 23096) performed by Julienne Wilkes MD at [address] [place] [address] Right 07/29/2021    CERVICAL EPIDURAL STEROID INJECTION UNDER FLUOROSCOPIC GUIDANCE AT C7-T1 RIGHT PARAMEDIAN performed by Julienne Wilkes MD at [facility]         Prior to Admission medications    Medication Sig Start Date End Date Taking?  Authorizing Provider   ADVANCED EVENING PRIMROSE OIL PO Take by mouth   Yes Historical Provider, MD   ibuprofen (ADVIL;MOTRIN) 600 MG tablet Take 1 tablet by mouth 4 times daily as needed for Pain 7/1/22  Yes Alma Delia Mesa MD   Cholecalciferol (VITAMIN D) 50 MCG (2000 UT) CAPS capsule Take by mouth daily    Yes Historical Provider, MD   pantoprazole (PROTONIX) 40 MG tablet Take 40 mg by mouth as needed  1/15/21  Yes Historical Provider, MD       Allergies   Allergen Reactions    Methotrexate Derivatives Nausea And Vomiting and Other (See Comments)     Mouth sores       Social History     Socioeconomic History    Marital status:      Spouse name: Not on file    Number of children: Not on file    Years of education: Not on file    Highest education level: Not on file   Occupational History    Not on file   Tobacco Use    Smoking status: Never    Smokeless tobacco: Never   Vaping Use    Vaping Use: Never used   Substance and Sexual Activity    Alcohol use: Yes     Comment: wine occ- 4 drinks weekend only    Drug use: No    Sexual activity: Yes     Partners: Male   Other Topics Concern    Not on file   Social History Narrative    Not on file     Social Determinants of Health     Financial Resource Strain: Not on file   Food Insecurity: Not on file   Transportation Needs: Not on file   Physical Activity: Not on file   Stress: Not on file   Social Connections: Not on file Intimate Partner Violence: Not on file   Housing Stability: Not on file       Family History   Problem Relation Age of Onset    Heart Failure Mother     Thyroid Disease Mother     Cancer Father     Prostate Cancer Father         Per mammo  hx sheet    Thyroid Disease Sister     Thyroid Disease Maternal Grandmother     Thyroid Disease Sister     Thyroid Disease Sister     Breast Cancer Paternal Grandmother     Breast Cancer Maternal Aunt     Colon Cancer Maternal Aunt     Breast Cancer Maternal Cousin        REVIEW OF SYSTEMS:     Raheem Isaac denies fever/chills, chest pain, shortness of breath, new bowel or bladder complaints. All other review of systems was negative. PHYSICAL EXAMINATION:      /80   Pulse 73   Temp 97.1 °F (36.2 °C) (Infrared)   Ht 5' (1.524 m)   Wt 175 lb (79.4 kg)   SpO2 99%   BMI 34.18 kg/m²   General:       General appearance:   pleasant and well-hydrated. , in no distress and A & O x3  Build:Obese  Function:Moves about room without difficulty. HEENT:     Head:normocephalic, atraumatic    Lungs:     Breathing:normal breathing pattern      CVS :   RRR     Abdomen:     Shape:non-distended and normal     Cervical spine:     Inspection:normal  Palpation:tenderness paravertebral muscles, tenderness trapezium, left, right and negative  Range of motion:abnormal mildly flexion, extension rotation bilateral and is painful. Facet tenderness +     Thoracic spine:     Spine inspection:normal   Palpation:-tender paraspinals, right. Range of motion:normal in flexion, extension rotation bilateral and is not painful. Lumbar spine:     Spine inspection:normal   Palpation:tenderness paravertebral muscles, left, right and positive. Range of motion: Reduced, Pain on Lateral bending, flexion, extension rotation bilateral and is Painful.   B/l Lumbar paraspinal tenderness +  B/l Lumbar facet loading +  SIJ tenderness + bilateral,   Sensory and motor in b/l LE normal  DTRs' b/l LE equal Musculoskeletal:     Trigger points +     Extremities:     Tremors:None bilaterally upper and lower  Edema:no  Knee:     Knee ROM normal- no pain     Neurological:     Sensory:normal to light touch - except for sensory changes over the left UE. Motor:   Right Grip5/5              Left Grip5/5               Right Bicep5/5           Left Bicep5/5              Right Triceps5/5       Left Triceps5/5          Right Deltoid5/5     Left Deltoid5/5                  Right Quadriceps5/5          Left Quadriceps5/5           Right Gastrocnemius5/5    Left Gastrocnemius5/5  Right Ant Tibialis5/5  Left Ant Tibialis5/5     Gait:normal     Assessment/Plan:   Diagnosis Orders   1. DDD (degenerative disc disease), lumbar        2. Lumbar radicular pain        3. DDD (degenerative disc disease), cervical        4. Cervical spondylosis        5. Thoracic degenerative disc disease            41 y.o.  with H/o neck pain low back pain. Failed conservative treatment. Prior C-spine and LS spine interventions had helped. Has been evaluated by Dr. Jeffry Galarza not recommend surgery. Neck pain significant- features of facet pain. S/P # 1 cervical facet MBNB on 9/13/2022 > 80% relief. Doing HEP. Neck pain is stable. Can repeat Facet MBNB if significant neck pain recurs. Thoracic back pain and right sided rib pain. Xray ribs- no acute process. Recent US of liver reviewed. Recommend f/u with her PCP. MRI of thoracic spine- pending. Note; Has undergone hysterectomy on 10/14/2022- recovering well. Recommend fu with rheumatology. Current issues: Low back and left LE pain. Prior TFESI in 2019 had provided excellent pain relief. Now has low back and left LE pain  Prior MRI reviewed. Will set up for left lumbar TFESI L5 & S1 under fluoroscopy . RBA discussed. She agreed to proceed.     Counseling reg : regular HEP and weight watching, appropriate medication use     King Ike MD    CC:  Judi Cerna, MD

## 2022-12-15 ENCOUNTER — TELEPHONE (OUTPATIENT)
Dept: PAIN MANAGEMENT | Age: 47
End: 2022-12-15

## 2022-12-15 NOTE — TELEPHONE ENCOUNTER
Call to Marilee Ivette that procedure was approved for 12/20/2022 and that [facility] should call her a few days before for the pre op call and after 3:00 PM the business day before with the arrival time. Instructed Dannielle to hold ibuprofen for 24 hours, naprosyn for 4 days and any aspirin containing products or fish oil for 7 days. Instructed to call office back if any questions. Gina Freitas verbalized understanding.     Electronically signed by Mendel Downing RN on 12/15/2022 at 3:16 PM

## 2022-12-20 ENCOUNTER — HOSPITAL ENCOUNTER (OUTPATIENT)
Age: 47
Setting detail: OUTPATIENT SURGERY
Discharge: HOME | End: 2022-12-20
Attending: ANESTHESIOLOGY | Admitting: ANESTHESIOLOGY
Payer: COMMERCIAL

## 2022-12-20 ENCOUNTER — HOSPITAL ENCOUNTER (OUTPATIENT)
Dept: OPERATING ROOM | Age: 47
Setting detail: OUTPATIENT SURGERY
Discharge: HOME | End: 2022-12-20
Attending: ANESTHESIOLOGY
Payer: COMMERCIAL

## 2022-12-20 VITALS
RESPIRATION RATE: 14 BRPM | DIASTOLIC BLOOD PRESSURE: 87 MMHG | HEART RATE: 78 BPM | BODY MASS INDEX: 32.39 KG/M2 | WEIGHT: 165 LBS | OXYGEN SATURATION: 100 % | HEIGHT: 60 IN | SYSTOLIC BLOOD PRESSURE: 128 MMHG

## 2022-12-20 DIAGNOSIS — M48.061 NEUROFORAMINAL STENOSIS OF LUMBAR SPINE: ICD-10-CM

## 2022-12-20 PROCEDURE — 2500000003 HC RX 250 WO HCPCS: Performed by: ANESTHESIOLOGY

## 2022-12-20 PROCEDURE — 3600000005 HC SURGERY LEVEL 5 BASE: Performed by: ANESTHESIOLOGY

## 2022-12-20 PROCEDURE — 6360000004 HC RX CONTRAST MEDICATION: Performed by: ANESTHESIOLOGY

## 2022-12-20 PROCEDURE — 7100000010 HC PHASE II RECOVERY - FIRST 15 MIN: Performed by: ANESTHESIOLOGY

## 2022-12-20 PROCEDURE — 6360000002 HC RX W HCPCS: Performed by: ANESTHESIOLOGY

## 2022-12-20 PROCEDURE — 64483 NJX AA&/STRD TFRM EPI L/S 1: CPT | Performed by: ANESTHESIOLOGY

## 2022-12-20 PROCEDURE — 64484 NJX AA&/STRD TFRM EPI L/S EA: CPT | Performed by: ANESTHESIOLOGY

## 2022-12-20 PROCEDURE — 2709999900 HC NON-CHARGEABLE SUPPLY: Performed by: ANESTHESIOLOGY

## 2022-12-20 PROCEDURE — 7100000011 HC PHASE II RECOVERY - ADDTL 15 MIN: Performed by: ANESTHESIOLOGY

## 2022-12-20 PROCEDURE — 3209999900 FLUORO FOR SURGICAL PROCEDURES

## 2022-12-20 RX ORDER — SODIUM CHLORIDE 9 MG/ML
INJECTION, SOLUTION INTRAVENOUS PRN
Status: DISCONTINUED | OUTPATIENT
Start: 2022-12-20 | End: 2022-12-20 | Stop reason: HOSPADM

## 2022-12-20 RX ORDER — SODIUM CHLORIDE 0.9 % (FLUSH) 0.9 %
5-40 SYRINGE (ML) INJECTION EVERY 12 HOURS SCHEDULED
Status: DISCONTINUED | OUTPATIENT
Start: 2022-12-20 | End: 2022-12-20 | Stop reason: HOSPADM

## 2022-12-20 RX ORDER — LIDOCAINE HYDROCHLORIDE 5 MG/ML
INJECTION, SOLUTION INFILTRATION; INTRAVENOUS PRN
Status: DISCONTINUED | OUTPATIENT
Start: 2022-12-20 | End: 2022-12-20 | Stop reason: ALTCHOICE

## 2022-12-20 RX ORDER — METHYLPREDNISOLONE ACETATE 40 MG/ML
INJECTION, SUSPENSION INTRA-ARTICULAR; INTRALESIONAL; INTRAMUSCULAR; SOFT TISSUE PRN
Status: DISCONTINUED | OUTPATIENT
Start: 2022-12-20 | End: 2022-12-20 | Stop reason: ALTCHOICE

## 2022-12-20 RX ORDER — SODIUM CHLORIDE 0.9 % (FLUSH) 0.9 %
5-40 SYRINGE (ML) INJECTION PRN
Status: DISCONTINUED | OUTPATIENT
Start: 2022-12-20 | End: 2022-12-20 | Stop reason: HOSPADM

## 2022-12-20 ASSESSMENT — PAIN DESCRIPTION - DESCRIPTORS: DESCRIPTORS: ACHING

## 2022-12-20 ASSESSMENT — PAIN SCALES - GENERAL
PAINLEVEL_OUTOF10: 0
PAINLEVEL_OUTOF10: 0

## 2022-12-20 ASSESSMENT — PAIN - FUNCTIONAL ASSESSMENT: PAIN_FUNCTIONAL_ASSESSMENT: 0-10

## 2022-12-20 NOTE — DISCHARGE INSTRUCTIONS
[facility]) Pain Management Department  [phone]   Post-Pain Block  Home Going Instructions    1-Go home, rest for the remainder of the day  2-Please do not lift over 20 pounds the day of the injection  3-If you received sedation No: alcohol, driving, operating lawn mowers, plows, tractors or other dangerous equipment until next morning. Do not make important decisions or sign legal documents for 24 hours. You may experience light headedness, dizziness, nausea or sleepiness after sedation. Do not stay alone. A responsible adult must be with you for 24 hours. You could be nauseated from the medications you have received. Your IV site may be sore and bruised. 4-No dietary restrictions     5-Resume all medications the same day, blood thinners to be resumed 24 hours after injection    6-Keep the surgical site clean and dry, you may shower the next morning and remove the      dressing. 7- No sitz baths, tub baths or hot tubs/swimming for 24 hours. 8- If you have any pain at the injection site(s), application of an ice pack to the area should be       helpful, 20 minutes on/20 minutes off for next 48 hours. 9- [facility] if you develop.   Fever greater than 100.4 F  Have bleeding or drainage from the puncture site  Have progressive Leg/arm numbness and or weakness  Loss of control of bowel and or bladder (wet/soil yourself)  Severe headache with inability to lift head  10-You may return to work the next day

## 2022-12-20 NOTE — OP NOTE
Operative Note      Patient: Jen Gatica  YOB: 1975  MRN: [id number]    Date of Procedure: 2022    Pre-Op Diagnosis: Lumbar radiculopathy [M54.16]    Post-Op Diagnosis: Same       Procedure(s):  LUMBAR TRANSFORAMINAL EPIDURAL STEROID INJECTION LEFT L5 AND S1 UNDER FLUOROSCOPIC GUIDANCE    Surgeon(s):  Alyce Gomez MD    Assistant:   * No surgical staff found *    Anesthesia: Local    Estimated Blood Loss (mL): Minimal    Complications: None    Specimens:   * No specimens in log *    Implants:  * No implants in log *      Drains: * No LDAs found *    Findings: good needle placement    Detailed Description of Procedure:   2022    Patient: Jen Gatica  :  1975  Age:  41 y.o. Sex:  female     PRE-OPERATIVE DIAGNOSIS: Lumbar disc displacement, lumbar radiculopathy. POST-OPERATIVE DIAGNOSIS: Same. PROCEDURE: Left Transforaminal epidural steroid injection under fluoroscopic guidance at foraminal level L5 & S1.    SURGEON: Alyce Gomez MD    ANESTHESIA: Local    ESTIMATED BLOOD LOSS: None.  ______________________________________________________________________  BRIEF HISTORY: Jen Gatica comes in today for the  Left transforaminal epidural steroid injection under fluoroscopic guidance at foraminal level L5 & S1 . The potential complications of this procedure were discussed with her again today. She has elected to undergo the aforementioned procedure. Candelario complete History & Physical examination were reviewed in depth, a copy of which is in the chart. DESCRIPTION OF PROCEDURE:    After confirming written and informed consent, a time-out was performed and Candelario name and date of birth, the procedure to be performed as well as the plan for the location of the needle insertion were confirmed. The patient was brought into the procedure room and placed in the prone position on the fluoroscopy table.  Standard monitors were placed and vital signs were observed throughout the procedure. The area of the lumbar spine was prepped with chloraprep and draped in a sterile manner. The vertebral body was identified with AP fluoroscopy. An oblique view was obtained to better visualize the inferior junction of the pedicle and transverse process . The 6 o'clock position of the pedicle was marked and identified. The skin and subcutaneous tissue were anesthetized with 0.5% lidocaine. TWO # 22 gauge 3.5 inch pencil point needle was directed toward the targeted point under fluoroscopy until bone was contacted. The needle was then walked inferiorly until the neural foramen was entered . A lateral fluoroscopic view was then used to place the needle tip in the middle of the foramen. Negative aspiration was confirmed for blood and CSF and 0.5-1 cc of isovue M 300 contrast was injected at each level under live fluoroscopy. Appropriate neurograms were observed under AP fluoroscopy. Then after negative aspiration, a solution of the 3 cc of 0.5% lidocaine and 30 mg DepoMedrol was easily injected at each level. The needles were removed with constant aspiration technique. The patient back was cleaned and a bandage was placed over the needle insertion points    Disposition the patient tolerated the procedure well and there were no complications . Vital signs remained stable throughout the procedure. The patient was escorted to the recovery area where they remained until discharge and written discharge instructions for the procedure were given. Plan: Varsha Bender will return to our pain management center as scheduled.      Jose Elias Forrest MD

## 2022-12-21 ENCOUNTER — HOSPITAL ENCOUNTER (OUTPATIENT)
Dept: MRI IMAGING | Age: 47
Discharge: HOME | End: 2022-12-23
Payer: COMMERCIAL

## 2022-12-21 ENCOUNTER — HOSPITAL ENCOUNTER (OUTPATIENT)
Dept: MAMMOGRAPHY | Age: 47
Discharge: HOME | End: 2022-12-23
Payer: COMMERCIAL

## 2022-12-21 VITALS — WEIGHT: 170 LBS | BODY MASS INDEX: 33.38 KG/M2 | HEIGHT: 60 IN

## 2022-12-21 DIAGNOSIS — M47.814 THORACIC SPONDYLOSIS: ICD-10-CM

## 2022-12-21 DIAGNOSIS — M51.34 THORACIC DEGENERATIVE DISC DISEASE: ICD-10-CM

## 2022-12-21 DIAGNOSIS — Z12.31 ENCOUNTER FOR SCREENING MAMMOGRAM FOR MALIGNANT NEOPLASM OF BREAST: ICD-10-CM

## 2022-12-21 PROCEDURE — 77067 SCR MAMMO BI INCL CAD: CPT

## 2022-12-21 PROCEDURE — 72146 MRI CHEST SPINE W/O DYE: CPT

## 2023-01-18 ENCOUNTER — OFFICE VISIT (OUTPATIENT)
Dept: PAIN MANAGEMENT | Age: 48
End: 2023-01-18
Payer: COMMERCIAL

## 2023-01-18 VITALS
HEART RATE: 64 BPM | SYSTOLIC BLOOD PRESSURE: 118 MMHG | WEIGHT: 170 LBS | HEIGHT: 60 IN | DIASTOLIC BLOOD PRESSURE: 80 MMHG | TEMPERATURE: 97.1 F | BODY MASS INDEX: 33.38 KG/M2 | OXYGEN SATURATION: 98 %

## 2023-01-18 DIAGNOSIS — M47.812 CERVICAL SPONDYLOSIS: Primary | ICD-10-CM

## 2023-01-18 DIAGNOSIS — M47.817 LUMBOSACRAL SPONDYLOSIS WITHOUT MYELOPATHY: ICD-10-CM

## 2023-01-18 DIAGNOSIS — M50.30 DDD (DEGENERATIVE DISC DISEASE), CERVICAL: ICD-10-CM

## 2023-01-18 DIAGNOSIS — M51.36 DDD (DEGENERATIVE DISC DISEASE), LUMBAR: ICD-10-CM

## 2023-01-18 PROCEDURE — 99213 OFFICE O/P EST LOW 20 MIN: CPT | Performed by: ANESTHESIOLOGY

## 2023-01-18 NOTE — PROGRESS NOTES
[address] [facility], [address] [place] [address] Adebayo  [phone]    Follow up Note      Flaco Pickard     Date of Visit:  1/18/2023    CC:  Patient presents for follow up   Chief Complaint   Patient presents with    Follow Up After Procedure     LUMBAR TRANSFORAMINAL EPIDURAL STEROID INJECTION LEFT L5 AND S1 UNDER FLUOROSCOPIC GUIDANCE       HPI:    H/o Neck pain and upper extremity pain. S/P Cervical BELEM/ Cervical facet interventions - had helped. H/o chronic low back and B/l LE pain. S/P BELEM  on 12/20/2022 with excellent pain relief. Nursing notes and details of the pain history reviewed. Please see intake notes for details. She follows up with rheumatologist for h/o sjogren's/ rheumatoid / SLE. Previous treatments:   Failed conservative treatment with NSAID's, muscle relaxants, oral steroids and HEP. Physical Therapy   Chiropractic treatment  Ongoing Core exercises for the last year. TENS  and medications- NSAID's, oral steroids, gabapentin etc,. Does not tolerate meds well      She has not been on anticoagulation medications. Imaging:    MRI of thoracic spine: 12/21/2022:  Impression   1. No fracture or bony destructive lesion. 2. Small disc protrusions at multiple levels. 3.  No significant central canal or neural foraminal stenosis. US liver: 11/3/2022:  Impression   1. Diffusely increased echogenicity of the liver is suggestive of an   underlying infiltrative pathology the most common of which is hepatic   steatosis. Please consider correlation with patient's liver function test.       2.  Patient has undergone previous cholecystectomy. MRI of C spine: 11/3/2020:     FINDINGS:    BONES/ALIGNMENT: There is straightening of the cervical spine with loss of    the normal lordotic curvature.   The bone marrow signal intensity is low in    the T1 weighted imaging, similar to the previous exam, likely related to red    marrow versus anemia.  Endplate degenerative changes are noted at C6-C7.  No    areas of marrow edema to suggest an acute fracture.         SPINAL CORD: The cervical spinal cord demonstrates normal signal intensity    without evidence of an expansile mass lesion.         SOFT TISSUES: No paraspinal masses or edema.         C2-C3: There is a 2 mm central disc protrusion indenting the anterior thecal    sac.  Mild central spinal canal narrowing.  The neural foramen are patent.         C3-C4: There is a central disc protrusion measuring 2 mm indenting the    anterior thecal sac.  Minimal central spinal canal narrowing.  The neural    foramen are patent.         C4-C5: Asymmetric left-sided uncovertebral spurring and facet arthropathy    have increased with moderate left foraminal narrowing, increased in severity.    No right foraminal narrowing, disc herniation, or central spinal canal    stenosis.         C5-C6: There is a broad-based central/left paracentral disc protrusion    measuring 3 mm, increased in size.  Mild central spinal canal narrowing has    slightly increased.  Asymmetric left-sided uncovertebral spurring with mild    left foraminal narrowing, increased.  No right foraminal narrowing.         C6-C7: There is a broad-based left paracentral central disc protrusion    measuring 3 mm.  Ligamentum flavum thickening.  Moderate central spinal canal    narrowing has increased.  Asymmetric left-sided uncovertebral spurring with    moderate left foraminal narrowing and mild right foraminal narrowing,    increased in severity.         C7-T1: There is no significant disc protrusion, spinal canal stenosis or    neural foraminal narrowing.              Impression    1. Mild central spinal canal narrowing at C5-C6, and moderate central spinal    canal narrowing at C6-C7 have slightly increased.    2. Multilevel foraminal narrowing, as above, greatest on the left at C4-C5,    and left at C6-C7.    3. No evidence of an acute  fracture. X ray C spine: 10/26/2020: Impression    Degenerative disc changes at C6-7        MRI of LS spine: 6/27/2020:  FINDINGS:    BONES/ALIGNMENT: There is mild degenerative disc disease at L4-L5, and L5-S1. There are Modic type 1 degenerative endplate changes at U2-K1. SPINAL CORD: The conus terminates normally. SOFT TISSUES: No paraspinal mass identified. L1-L2:  The thecal sac and neural foramina are intact. L2-L3:  The thecal sac and neural foramina are intact. L3-L4:  The thecal sac and neural foramina are intact. L4-L5: There is a minimum disc bulge measuring 2 mm. There is mild facet    and ligamentum flavum hypertrophy. The thecal sac is not stenotic. Disc    and/or osteophytes cause minimal narrowing of the neural foramina bilaterally. L5-S1: There is a disc protrusion with annular fissure centrally at L5-S1    measuring approximately 4.5 mm. This effaces the anterior aspect of the    thecal sac. The thecal sac is not significantly stenotic. The S1 nerve    roots are intact. There is mild narrowing of the neural foramina bilaterally. There may be slight increase in size of the disc protrusion compared to the    prior study. Impression    Disc protrusion with annular fissure at L5-S1. No significant stenosis of    the thecal sac. The S1 nerve roots are intact. There is mild narrowing of    the neural foramina bilaterally at L5-S1. Mild narrowing of the neural foramina at L4-L5 as discussed above.           Potential Aberrant Drug-Related Behavior:  No    Urine Drug Screening:No    OARRS report[de-identified]  1/18/23  yes- not on chronic narcotics    Past Medical History:   Diagnosis Date    BRCA1 gene mutation positive     Cousin Pos. per mmmo hx sheet    COVID 11/2021    pt states moderate; twice Nov 2021, May 2022-sinus infection only 4 days only    Endometriosis of pelvic peritoneum 10/14/2022    H/O rheumatoid arthritis     Joint pain     Low back pain     degenerative disk disease    Lupus ([facility]     Menometrorrhagia 2022    Neck pain     RA (rheumatoid arthritis) (HCC)     RH    Radiculopathy     Sjogren's disease ([facility] [address]     mucuc membrane   dry lips  always thirty    Status post laparoscopic hysterectomy 10/14/2022       Past Surgical History:   Procedure Laterality Date    ANESTHESIA NERVE BLOCK Bilateral 2019    BILATERAL LUMBAR TRANSFORAMINAL EPIDURAL STERIOD INJECTION AT L5-S1 UNDER FLUOROSCOPY performed by Mariam Dela Cruz MD at [address] Left 2022    LEFT ELBOW CUBITAL TUNNEL RELEASE performed by Alex Chicas DO at [address] Bilateral      SECTION      x2    CHOLECYSTECTOMY, LAPAROSCOPIC N/A 2021    LAPAROSCOPIC CHOLECYSTECTOMY performed by Katie Arellano MD at [address]      x2    DILATION AND CURETTAGE OF UTERUS N/A 2022    DILATATION AND CURETTAGE HYSTEROSCOPY performed by Steven Adan MD at [facility] Left     cubital nerve    ENDOSCOPY, COLON, DIAGNOSTIC      EPIDURAL STEROID INJECTION Left 2019    LUMBAR EPIDURAL STEROID INJECTION L5-S1 (LEFT PARAMEDIAN APPROACH) UNDER FLUOROSCOPIC GUIDANCE) performed by Mariam Dela Cruz MD at [address] [facility] N/A 2021    LAPAROSCOPIC HIATAL HERNIA RTEPAIR WITH MESH performed by Katie Arellano MD at [address] ([address] [facility]) Bilateral 10/14/2022    HYSTERECTOMY ABDOMINAL LAPAROSCOPIC ROBOTIC XI WITH BILATERAL SALPINGO OOPHORECTOMY performed by Steven Adan MD at [facility] ARTHROSCOPY Left 2016    medial menisectomy, debridement, acl synovectomy, chondroplasty    NERVE BLOCK Left 2019    lumbar    NERVE BLOCK Bilateral 2019    NERVE BLOCK Left 2020    Cervical epidural steroid injection under fouroscopic guidance c7-t1 left paramedian    NERVE BLOCK Bilateral 9/13/2022    BILATERAL CERVICAL MEDIAL BRANCH NERVE BLOCK UNDER FLUOROSCOPIC GUIDANCE AT C2, C3, C4 AND C5 (CPT 54574) performed by Ish Owens MD at [address] Left 12/08/2020    CERVICAL EPIDURAL STEROID INJECTION UNDER FLUOROSCOPIC GUIDANCE AT C7-T1 LEFT PARAMEDIAN (CPT 17560) performed by Ish Owens MD at [address] Right 07/29/2021    CERVICAL EPIDURAL STEROID INJECTION UNDER FLUOROSCOPIC GUIDANCE AT C7-T1 RIGHT PARAMEDIAN performed by Ish Owens MD at [address] Left 12/20/2022    LUMBAR TRANSFORAMINAL EPIDURAL STEROID INJECTION LEFT L5 AND S1 UNDER FLUOROSCOPIC GUIDANCE performed by Ish Owens MD at [address] [place] [address]         Prior to Admission medications    Medication Sig Start Date End Date Taking?  Authorizing Provider   ADVANCED EVENING PRIMROSE OIL PO Take by mouth   Yes Historical Provider, MD   ibuprofen (ADVIL;MOTRIN) 600 MG tablet Take 1 tablet by mouth 4 times daily as needed for Pain 7/1/22  Yes Ayaan Vasquez MD   Cholecalciferol (VITAMIN D) 50 MCG (2000 UT) CAPS capsule Take by mouth daily    Yes Historical Provider, MD   pantoprazole (PROTONIX) 40 MG tablet Take 40 mg by mouth as needed  1/15/21  Yes Historical Provider, MD       Allergies   Allergen Reactions    Methotrexate Derivatives Nausea And Vomiting and Other (See Comments)     Mouth sores       Social History     Socioeconomic History    Marital status:      Spouse name: Not on file    Number of children: Not on file    Years of education: Not on file    Highest education level: Not on file   Occupational History    Not on file   Tobacco Use    Smoking status: Never    Smokeless tobacco: Never   Vaping Use    Vaping Use: Never used   Substance and Sexual Activity Alcohol use: Yes     Comment: wine occ- 4 drinks weekend only    Drug use: No    Sexual activity: Yes     Partners: Male   Other Topics Concern    Not on file   Social History Narrative    Not on file     Social Determinants of Health     Financial Resource Strain: Not on file   Food Insecurity: Not on file   Transportation Needs: Not on file   Physical Activity: Not on file   Stress: Not on file   Social Connections: Not on file   Intimate Partner Violence: Not on file   Housing Stability: Not on file       Family History   Problem Relation Age of Onset    Heart Failure Mother     Thyroid Disease Mother     Cancer Father         Prostate cancer per mammo hx sheet    Prostate Cancer Father         Per mammo  hx sheet    Thyroid Disease Sister     Thyroid Disease Sister     Thyroid Disease Sister     Thyroid Disease Maternal Grandmother     Breast Cancer Paternal Grandmother     Breast Cancer Maternal Aunt     Colon Cancer Maternal Aunt     Breast Cancer Maternal Cousin        REVIEW OF SYSTEMS:     Samantha Jerauld denies fever/chills, chest pain, shortness of breath, new bowel or bladder complaints. All other review of systems was negative. PHYSICAL EXAMINATION:      /80   Pulse 64   Temp 97.1 °F (36.2 °C) (Infrared)   Ht 5' (1.524 m)   Wt 170 lb (77.1 kg)   SpO2 98%   BMI 33.20 kg/m²   General:       General appearance:   pleasant and well-hydrated. , in no distress and A & O x3  Build:Obese  Function:Moves about room without difficulty. HEENT:     Head:normocephalic, atraumatic    Lungs:     Breathing:normal breathing pattern      CVS :   RRR     Abdomen:     Shape:non-distended and normal     Cervical spine:     Inspection:normal  Palpation:tenderness paravertebral muscles, tenderness trapezium, left, right and negative  Range of motion:abnormal mildly flexion, extension rotation bilateral and is painful.   Facet tenderness +     Thoracic spine:     Spine inspection:normal   Palpation:-tender paraspinals, right. Range of motion:normal in flexion, extension rotation bilateral and is not painful. Lumbar spine:     Spine inspection:normal   Palpation:tenderness paravertebral muscles, left, right and positive. Range of motion: Reduced, Pain on Lateral bending, flexion, extension rotation bilateral and is Painful. B/l Lumbar paraspinal tenderness +  B/l Lumbar facet loading +  SIJ tenderness + bilateral,   Sensory and motor in b/l LE normal  DTRs' b/l LE equal     Musculoskeletal:     Trigger points +     Extremities:     Tremors:None bilaterally upper and lower  Edema:no  Knee:     Knee ROM normal- no pain     Neurological:     Sensory:normal to light touch - except for sensory changes over the left UE. Motor:   No new changes     Gait:normal     Assessment/Plan:   Diagnosis Orders   1. Cervical spondylosis        2. DDD (degenerative disc disease), cervical        3. DDD (degenerative disc disease), lumbar        4. Lumbosacral spondylosis without myelopathy          41 y.o.  with H/o neck pain & low back pain. Failed conservative treatment. Prior C-spine and LS spine interventions had helped. Has been evaluated by Dr. Allison Hardy not recommend surgery. Neck pain significant- features of facet pain. S/P # 1 cervical facet MBNB on 9/13/2022 > 80% relief. Doing HEP. Neck pain is stable. Can repeat Facet MBNB if significant neck pain recurs. Thoracic back pain. MRI of thoracic spine- mild changes. Recommend fu with rheumatology. Low back and left LE pain. S/P TFESI on 12/20/2022- excellent pain relief of > 80%. Over all doing well. F/u in 5-6 months.     Counseling reg : regular HEP and weight watching, appropriate medication use     Amado Avendano MD    CC:  Colby Garcia MD

## 2023-01-18 NOTE — PROGRESS NOTES
Do you currently have any of the following:    Fever: No  Headache:  No  Cough: No  Shortness of breath: No  Exposed to anyone with these symptoms: No                                                                                                                Recardo Blade presents to the [facility] on 1/18/2023. Oma Mars is complaining of pain in back. The pain is constant. The pain is described as stabbing. Pain is rated on her best day at a 3, on her worst day at a 10, and on average at a 3 on the VAS scale. She took her last dose of Motrin last night. Oma Mars does not have issues with constipation. Any procedures since your last visit: Yes, with 80 % relief. She is  on NSAIDS and  is not on anticoagulation medications to include none and is managed by none. Pacemaker or defibrillator: No.    Medication Contract and Consent for Opioid Use Documents Filed       Patient Documents       Type of Document Status Date Received Received By Description    Medication Contract Received 5/7/2019  8:46 AM SENDY, [facility] [address] [place] [address] Pain Management Pt Agreement 5/7/2019                       There were no vitals taken for this visit. No LMP recorded.

## 2023-02-02 ENCOUNTER — HOSPITAL ENCOUNTER (OUTPATIENT)
Age: 48
Discharge: HOME | End: 2023-02-02
Payer: COMMERCIAL

## 2023-02-02 LAB
ALBUMIN SERPL-MCNC: 4.4 G/DL (ref 3.5–5.2)
ALP BLD-CCNC: 75 U/L (ref 35–104)
ALT SERPL-CCNC: 33 U/L (ref 0–32)
ANION GAP SERPL CALCULATED.3IONS-SCNC: 10 MMOL/L (ref 7–16)
AST SERPL-CCNC: 28 U/L (ref 0–31)
BASOPHILS ABSOLUTE: 0.03 E9/L (ref 0–0.2)
BASOPHILS RELATIVE PERCENT: 0.6 % (ref 0–2)
BILIRUB SERPL-MCNC: 0.3 MG/DL (ref 0–1.2)
BILIRUBIN URINE: NEGATIVE
BLOOD, URINE: NEGATIVE
BUN BLDV-MCNC: 14 MG/DL (ref 6–20)
CALCIUM SERPL-MCNC: 9.2 MG/DL (ref 8.6–10.2)
CHLORIDE BLD-SCNC: 107 MMOL/L (ref 98–107)
CHOLESTEROL, FASTING: 181 MG/DL (ref 0–199)
CLARITY: CLEAR
CO2: 25 MMOL/L (ref 22–29)
COLOR: YELLOW
CREAT SERPL-MCNC: 0.8 MG/DL (ref 0.5–1)
EOSINOPHILS ABSOLUTE: 0.08 E9/L (ref 0.05–0.5)
EOSINOPHILS RELATIVE PERCENT: 1.7 % (ref 0–6)
GFR SERPL CREATININE-BSD FRML MDRD: >60 ML/MIN/1.73
GLUCOSE FASTING: 96 MG/DL (ref 74–99)
GLUCOSE URINE: NEGATIVE MG/DL
HCT VFR BLD CALC: 41.2 % (ref 34–48)
HDLC SERPL-MCNC: 60 MG/DL
HEMOGLOBIN: 12.4 G/DL (ref 11.5–15.5)
IMMATURE GRANULOCYTES #: 0.01 E9/L
IMMATURE GRANULOCYTES %: 0.2 % (ref 0–5)
KETONES, URINE: NEGATIVE MG/DL
LDL CHOLESTEROL CALCULATED: 100 MG/DL (ref 0–99)
LEUKOCYTE ESTERASE, URINE: NEGATIVE
LYMPHOCYTES ABSOLUTE: 1.42 E9/L (ref 1.5–4)
LYMPHOCYTES RELATIVE PERCENT: 30.1 % (ref 20–42)
MCH RBC QN AUTO: 23.7 PG (ref 26–35)
MCHC RBC AUTO-ENTMCNC: 30.1 % (ref 32–34.5)
MCV RBC AUTO: 78.8 FL (ref 80–99.9)
MONOCYTES ABSOLUTE: 0.43 E9/L (ref 0.1–0.95)
MONOCYTES RELATIVE PERCENT: 9.1 % (ref 2–12)
NEUTROPHILS ABSOLUTE: 2.74 E9/L (ref 1.8–7.3)
NEUTROPHILS RELATIVE PERCENT: 58.3 % (ref 43–80)
NITRITE, URINE: NEGATIVE
PDW BLD-RTO: 13.6 FL (ref 11.5–15)
PH UA: 5.5 (ref 5–9)
PLATELET # BLD: 188 E9/L (ref 130–450)
PMV BLD AUTO: 11.5 FL (ref 7–12)
POTASSIUM SERPL-SCNC: 4.1 MMOL/L (ref 3.5–5)
PROTEIN UA: NEGATIVE MG/DL
RBC # BLD: 5.23 E12/L (ref 3.5–5.5)
SODIUM BLD-SCNC: 142 MMOL/L (ref 132–146)
SPECIFIC GRAVITY UA: 1.02 (ref 1–1.03)
TOTAL PROTEIN: 7.2 G/DL (ref 6.4–8.3)
TRIGLYCERIDE, FASTING: 104 MG/DL (ref 0–149)
TSH SERPL DL<=0.05 MIU/L-ACNC: 2.45 UIU/ML (ref 0.27–4.2)
UROBILINOGEN, URINE: 0.2 E.U./DL
VITAMIN D 25-HYDROXY: 28 NG/ML (ref 30–100)
VLDLC SERPL CALC-MCNC: 21 MG/DL
WBC # BLD: 4.7 E9/L (ref 4.5–11.5)

## 2023-02-02 PROCEDURE — 84443 ASSAY THYROID STIM HORMONE: CPT

## 2023-02-02 PROCEDURE — 82306 VITAMIN D 25 HYDROXY: CPT

## 2023-02-02 PROCEDURE — 85025 COMPLETE CBC W/AUTO DIFF WBC: CPT

## 2023-02-02 PROCEDURE — 81003 URINALYSIS AUTO W/O SCOPE: CPT

## 2023-02-02 PROCEDURE — 80061 LIPID PANEL: CPT

## 2023-02-02 PROCEDURE — 36415 COLL VENOUS BLD VENIPUNCTURE: CPT

## 2023-02-02 PROCEDURE — 80053 COMPREHEN METABOLIC PANEL: CPT

## 2023-02-07 ENCOUNTER — HOSPITAL ENCOUNTER (OUTPATIENT)
Dept: GENERAL RADIOLOGY | Age: 48
Discharge: HOME | End: 2023-02-09
Payer: COMMERCIAL

## 2023-02-07 ENCOUNTER — HOSPITAL ENCOUNTER (OUTPATIENT)
Age: 48
Discharge: HOME | End: 2023-02-09
Payer: COMMERCIAL

## 2023-02-07 DIAGNOSIS — R09.81 SINUS CONGESTION: ICD-10-CM

## 2023-02-07 DIAGNOSIS — M89.9: ICD-10-CM

## 2023-02-07 PROCEDURE — 70250 X-RAY EXAM OF SKULL: CPT

## 2023-02-07 PROCEDURE — 70220 X-RAY EXAM OF SINUSES: CPT

## 2023-03-03 ENCOUNTER — PREP FOR PROCEDURE (OUTPATIENT)
Dept: PAIN MANAGEMENT | Age: 48
End: 2023-03-03

## 2023-03-03 ENCOUNTER — OFFICE VISIT (OUTPATIENT)
Dept: PAIN MANAGEMENT | Age: 48
End: 2023-03-03
Payer: COMMERCIAL

## 2023-03-03 VITALS
WEIGHT: 173 LBS | HEART RATE: 81 BPM | BODY MASS INDEX: 33.96 KG/M2 | RESPIRATION RATE: 18 BRPM | TEMPERATURE: 97.3 F | DIASTOLIC BLOOD PRESSURE: 78 MMHG | HEIGHT: 60 IN | SYSTOLIC BLOOD PRESSURE: 112 MMHG | OXYGEN SATURATION: 98 %

## 2023-03-03 DIAGNOSIS — M53.3 SACROILIAC DYSFUNCTION: ICD-10-CM

## 2023-03-03 DIAGNOSIS — M50.30 DDD (DEGENERATIVE DISC DISEASE), CERVICAL: ICD-10-CM

## 2023-03-03 DIAGNOSIS — M50.30 DDD (DEGENERATIVE DISC DISEASE), CERVICAL: Primary | ICD-10-CM

## 2023-03-03 DIAGNOSIS — M51.36 DDD (DEGENERATIVE DISC DISEASE), LUMBAR: ICD-10-CM

## 2023-03-03 DIAGNOSIS — M47.812 CERVICAL SPONDYLOSIS: ICD-10-CM

## 2023-03-03 DIAGNOSIS — M47.817 LUMBOSACRAL SPONDYLOSIS WITHOUT MYELOPATHY: ICD-10-CM

## 2023-03-03 DIAGNOSIS — M54.12 CERVICAL RADICULOPATHY: Primary | ICD-10-CM

## 2023-03-03 DIAGNOSIS — M54.12 CERVICAL RADICULAR PAIN: ICD-10-CM

## 2023-03-03 PROCEDURE — 99213 OFFICE O/P EST LOW 20 MIN: CPT | Performed by: ANESTHESIOLOGY

## 2023-03-03 PROCEDURE — 99214 OFFICE O/P EST MOD 30 MIN: CPT | Performed by: ANESTHESIOLOGY

## 2023-03-03 RX ORDER — SODIUM CHLORIDE 0.9 % (FLUSH) 0.9 %
5-40 SYRINGE (ML) INJECTION PRN
Status: CANCELLED | OUTPATIENT
Start: 2023-03-03

## 2023-03-03 RX ORDER — SODIUM CHLORIDE 0.9 % (FLUSH) 0.9 %
5-40 SYRINGE (ML) INJECTION EVERY 12 HOURS SCHEDULED
Status: CANCELLED | OUTPATIENT
Start: 2023-03-03

## 2023-03-03 RX ORDER — VIT C/B6/B5/MAGNESIUM/HERB 173 50-5-6-5MG
CAPSULE ORAL DAILY
COMMUNITY

## 2023-03-03 RX ORDER — SODIUM CHLORIDE 9 MG/ML
INJECTION, SOLUTION INTRAVENOUS PRN
Status: CANCELLED | OUTPATIENT
Start: 2023-03-03

## 2023-03-03 NOTE — H&P (VIEW-ONLY)
above.          Potential Aberrant Drug-Related Behavior:  No    Urine Drug Screening:No    OARRS report[de-identified]  3/3/23  yes- reviewed    Past Medical History:   Diagnosis Date    BRCA1 gene mutation positive     Cousin Pos. per mmmo hx sheet    COVID 2021    pt states moderate; twice 2021, May 2022-sinus infection only 4 days only    Endometriosis of pelvic peritoneum 10/14/2022    H/O rheumatoid arthritis     Joint pain     Low back pain     degenerative disk disease    Lupus ([address]     Menometrorrhagia 2022    Neck pain     RA (rheumatoid arthritis) ([address]     RH    Radiculopathy     Sjogren's disease ([address]     mucuc membrane   dry lips  always thirty    Status post laparoscopic hysterectomy 10/14/2022       Past Surgical History:   Procedure Laterality Date    ANESTHESIA NERVE BLOCK Bilateral 2019    BILATERAL LUMBAR TRANSFORAMINAL EPIDURAL STERIOD INJECTION AT L5-S1 UNDER FLUOROSCOPY performed by William Johnson MD at [address] Left 2022    LEFT ELBOW CUBITAL TUNNEL RELEASE performed by Nawaf Torres DO at [address] Bilateral      SECTION      x2    CHOLECYSTECTOMY, LAPAROSCOPIC N/A 2021    LAPAROSCOPIC CHOLECYSTECTOMY performed by Andrew Rico MD at [address] [place] [place]      x2    [facility] N/A 2022    DILATATION AND CURETTAGE HYSTEROSCOPY performed by Diana Sherman MD at [address] [facility] Left     cubital nerve    ENDOSCOPY, COLON, DIAGNOSTIC      EPIDURAL STEROID INJECTION Left 2019    LUMBAR EPIDURAL STEROID INJECTION L5-S1 (LEFT PARAMEDIAN APPROACH) UNDER FLUOROSCOPIC GUIDANCE) performed by William Johnson MD at [address] [facility] [address] N/A 2021    LAPAROSCOPIC HIATAL HERNIA RTEPAIR WITH MESH performed by Andrew Rico MD at [address] ([address] [facility]) Bilateral

## 2023-03-03 NOTE — PROGRESS NOTES
Do you currently have any of the following:    Fever: No  Headache:  No  Cough: No  Shortness of breath: No  Exposed to anyone with these symptoms: No                                                                                                                Kane Boards presents to the [facility] on 3/3/2023. Artemio Cox is complaining of pain primarily in her neck that radiates to bilateral upper extremities (complaints of lower back pain as well). The pain is constant. The pain is described as burning and heavy with weakness. Pain is rated on her best day at a 3, on her worst day at a 10, and on average at a 3 on the VAS scale. She took her last dose of Aleve and Motrin 2 days ago. Artemio Cox does have issues with constipation. Any procedures since your last visit: No,      She is  on NSAIDS and  is not on anticoagulation medications to include none and is managed by NA. Pacemaker or defibrillator: No Physician managing device is NA. Medication Contract and Consent for Opioid Use Documents Filed       Patient Documents       Type of Document Status Date Received Received By Description    Medication Contract Received 5/7/2019  8:46 AM SANDRITA KABA Pain Management Pt Agreement 5/7/2019                       Temp 97.3 °F (36.3 °C) (Infrared)   Resp 18   Ht 5' (1.524 m)   Wt 173 lb (78.5 kg)   BMI 33.79 kg/m²      No LMP recorded. Patient has had a hysterectomy.

## 2023-03-03 NOTE — PROGRESS NOTES
[facility], [address] [place] [address] Adebayo  [phone]    Follow up Note      Isaac Capps     Date of Visit:  3/3/2023    CC:  Patient presents for follow up   Chief Complaint   Patient presents with    Neck Pain    Back Pain       HPI:    H/o Neck pain and upper extremity pain. S/P Cervical BELEM/ Cervical facet interventions - had helped. H/o chronic low back and B/l LE pain. S/P BELEM  on 12/20/2022 with excellent pain relief. Nursing notes and details of the pain history reviewed. Please see intake notes for details. She follows up with rheumatologist for h/o Sjogren's/ rheumatoid / SLE. Previous treatments:   Failed conservative treatment with NSAID's, muscle relaxants, oral steroids and HEP. Physical Therapy   Chiropractic treatment  Ongoing Core exercises for the last year. TENS  and medications- NSAID's, oral steroids, gabapentin etc,. Does not tolerate meds well      She has not been on anticoagulation medications. Imaging:    MRI of thoracic spine: 12/21/2022:  Impression   1. No fracture or bony destructive lesion. 2. Small disc protrusions at multiple levels. 3.  No significant central canal or neural foraminal stenosis. MRI of C spine: 11/3/2020: Impression    1. Mild central spinal canal narrowing at C5-C6, and moderate central spinal    canal narrowing at C6-C7 have slightly increased. 2. Multilevel foraminal narrowing, as above, greatest on the left at C4-C5,    and left at C6-C7. 3. No evidence of an acute fracture. X ray C spine: 10/26/2020: Impression    Degenerative disc changes at C6-7        MRI of LS spine: 6/27/2020: Impression    Disc protrusion with annular fissure at L5-S1. No significant stenosis of    the thecal sac. The S1 nerve roots are intact. There is mild narrowing of    the neural foramina bilaterally at L5-S1.          Mild narrowing of the neural foramina at L4-L5 as discussed above.          Potential Aberrant Drug-Related Behavior:  No    Urine Drug Screening:No    OARRS report[de-identified]  3/3/23  yes- reviewed    Past Medical History:   Diagnosis Date    BRCA1 gene mutation positive     Cousin Pos. per mmmo hx sheet    COVID 2021    pt states moderate; twice 2021, May 2022-sinus infection only 4 days only    Endometriosis of pelvic peritoneum 10/14/2022    H/O rheumatoid arthritis     Joint pain     Low back pain     degenerative disk disease    Lupus ([address]     Menometrorrhagia 2022    Neck pain     RA (rheumatoid arthritis) ([address]     RH    Radiculopathy     Sjogren's disease ([address]     mucuc membrane   dry lips  always [age]    Status post laparoscopic hysterectomy 10/14/2022       Past Surgical History:   Procedure Laterality Date    ANESTHESIA NERVE BLOCK Bilateral 2019    BILATERAL LUMBAR TRANSFORAMINAL EPIDURAL STERIOD INJECTION AT L5-S1 UNDER FLUOROSCOPY performed by Tiffany Boone MD at [address] [facility] Left 2022    LEFT ELBOW CUBITAL TUNNEL RELEASE performed by Giselle Collins DO at [address] Bilateral      SECTION      x2    CHOLECYSTECTOMY, LAPAROSCOPIC N/A 2021    LAPAROSCOPIC CHOLECYSTECTOMY performed by Thomas Gonzalez MD at [address]      x2    [facility] N/A 2022    DILATATION AND CURETTAGE HYSTEROSCOPY performed by Olivia Clifton MD at [address] [facility] Left     cubital nerve    ENDOSCOPY, COLON, DIAGNOSTIC      EPIDURAL STEROID INJECTION Left 2019    LUMBAR EPIDURAL STEROID INJECTION L5-S1 (LEFT PARAMEDIAN APPROACH) UNDER FLUOROSCOPIC GUIDANCE) performed by Tiffany Boone MD at [address] [facility] [address] N/A 2021    LAPAROSCOPIC HIATAL HERNIA RTEPAIR WITH MESH performed by Thomas Gonzalez MD at [address] ([facility]) Bilateral 10/14/2022    HYSTERECTOMY ABDOMINAL LAPAROSCOPIC ROBOTIC XI WITH BILATERAL SALPINGO OOPHORECTOMY performed by Niranjan Noel MD at [facility] ARTHROSCOPY Left 05/27/2016    medial menisectomy, debridement, acl synovectomy, chondroplasty    NERVE BLOCK Left 05/28/2019    lumbar    NERVE BLOCK Bilateral 07/09/2019    NERVE BLOCK Left 12/08/2020    Cervical epidural steroid injection under fouroscopic guidance c7-t1 left paramedian    NERVE BLOCK Bilateral 9/13/2022    BILATERAL CERVICAL MEDIAL BRANCH NERVE BLOCK UNDER FLUOROSCOPIC GUIDANCE AT C2, C3, C4 AND C5 (CPT 53644) performed by Didier Powell MD at [address] [place] [address] Left 12/08/2020    CERVICAL EPIDURAL STEROID INJECTION UNDER FLUOROSCOPIC GUIDANCE AT C7-T1 LEFT PARAMEDIAN (CPT 55225) performed by Didier Powell MD at [facility] Right 07/29/2021    CERVICAL EPIDURAL STEROID INJECTION UNDER FLUOROSCOPIC GUIDANCE AT C7-T1 RIGHT PARAMEDIAN performed by Didier Powell MD at [address] Left 12/20/2022    LUMBAR TRANSFORAMINAL EPIDURAL STEROID INJECTION LEFT L5 AND S1 UNDER FLUOROSCOPIC GUIDANCE performed by Didier Powell MD at [address] [place] [address]         Prior to Admission medications    Medication Sig Start Date End Date Taking?  Authorizing Provider   turmeric 500 MG CAPS Take by mouth daily   Yes Historical Provider, MD   ADVANCED EVENING PRIMROSE OIL PO Take by mouth   Yes Historical Provider, MD   ibuprofen (ADVIL;MOTRIN) 600 MG tablet Take 1 tablet by mouth 4 times daily as needed for Pain 7/1/22  Yes Niranjan Noel MD   Cholecalciferol (VITAMIN D) 50 MCG (2000 UT) CAPS capsule Take by mouth daily    Yes Historical Provider, MD   pantoprazole (PROTONIX) 40 MG tablet Take 40 mg by mouth as needed  1/15/21  Yes Historical Provider, MD       Allergies   Allergen Reactions    Methotrexate Derivatives Nausea And Vomiting and Other (See Comments)     Mouth sores       Social History     Socioeconomic History    Marital status:      Spouse name: Not on file    Number of children: Not on file    Years of education: Not on file    Highest education level: Not on file   Occupational History    Not on file   Tobacco Use    Smoking status: Never    Smokeless tobacco: Never   Vaping Use    Vaping Use: Never used   Substance and Sexual Activity    Alcohol use: Yes     Comment: wine occ- 4 drinks weekend only    Drug use: No    Sexual activity: Yes     Partners: Male   Other Topics Concern    Not on file   Social History Narrative    Not on file     Social Determinants of Health     Financial Resource Strain: Not on file   Food Insecurity: Not on file   Transportation Needs: Not on file   Physical Activity: Not on file   Stress: Not on file   Social Connections: Not on file   Intimate Partner Violence: Not on file   Housing Stability: Not on file       Family History   Problem Relation Age of Onset    Heart Failure Mother     Thyroid Disease Mother     Cancer Father         Prostate cancer per mammo hx sheet    Prostate Cancer Father         Per mammo  hx sheet    Thyroid Disease Sister     Thyroid Disease Sister     Thyroid Disease Sister     Thyroid Disease Maternal Grandmother     Breast Cancer Paternal Grandmother     Breast Cancer Maternal Aunt     Colon Cancer Maternal Aunt     Breast Cancer Maternal Cousin        REVIEW OF SYSTEMS:     Jesse Eastman denies fever/chills, chest pain, shortness of breath, new bowel or bladder complaints. All other review of systems was negative. PHYSICAL EXAMINATION:      /78   Pulse 81   Temp 97.3 °F (36.3 °C) (Infrared)   Resp 18   Ht 5' (1.524 m)   Wt 173 lb (78.5 kg)   SpO2 98%   BMI 33.79 kg/m²   General:       General appearance:   pleasant and well-hydrated. , in no distress and A & O x3  Build:Obese  Function:Moves about room without difficulty. HEENT:     Head:normocephalic, atraumatic    Lungs:     Breathing:normal breathing pattern      CVS :   RRR     Abdomen:     Shape:non-distended and normal     Cervical spine:     Inspection:normal  Palpation:tenderness paravertebral muscles, tenderness trapezium, left, right and negative  Range of motion:abnormal mildly flexion, extension rotation bilateral and is painful. Facet tenderness +     Thoracic spine:     Spine inspection:normal   Palpation:tender paraspinals,  Range of motion:normal in flexion, extension rotation bilateral and is not painful. Lumbar spine:     Spine inspection:normal   Palpation:tenderness paravertebral muscles, left, right and positive. Range of motion: Reduced, Pain on Lateral bending, flexion, extension rotation bilateral and is Painful. B/l Lumbar paraspinal tenderness +  B/l Lumbar facet loading +  SIJ tenderness +  Sensory and motor in b/l LE normal  DTRs' b/l LE equal     Musculoskeletal:     Trigger points +     Extremities:     Tremors:None bilaterally upper and lower  Edema:no  Knee:     Knee ROM normal- no pain     Neurological:     Sensory:normal to light touch - except for sensory changes over the left UE. Motor:   No new changes     Gait:normal     Assessment/Plan:   Diagnosis Orders   1. DDD (degenerative disc disease), cervical        2. Cervical spondylosis        3. Cervical radicular pain        4. DDD (degenerative disc disease), lumbar        5. Lumbosacral spondylosis without myelopathy        6. Sacroiliac dysfunction          41 y.o.  with H/o neck pain & low back pain. Failed conservative treatment. Prior C-spine and LS spine interventions had helped. Has been evaluated by Dr. Luci Boyd not recommend surgery. Thoracic back pain- stable. MRI of thoracic spine- mild changes. S/P # 1 cervical facet MBNB on 9/13/2022 > 80% relief. Doing HEP. S/P TFESI on 12/20/2022- excellent pain relief of > 80%. Low back pain is stable.     Has ongoing Neck pain & UE pain. Cervical BELEM under fluoroscopy at C7-T1. RBA discussed. Patient agreed. If axial neck pain bothers, consider Repeat Cervical MBNB. Recommend f/u with rheumatology.     Counseling reg : regular HEP and weight watching, appropriate medication use     Edwin Velasco MD    CC:  Popeye Powers MD

## 2023-03-15 ENCOUNTER — TELEPHONE (OUTPATIENT)
Dept: PAIN MANAGEMENT | Age: 48
End: 2023-03-15

## 2023-03-15 RX ORDER — UBIDECARENONE 75 MG
50 CAPSULE ORAL DAILY
COMMUNITY

## 2023-03-15 NOTE — PROGRESS NOTES
[facility]  INSTRUCTIONS  . .......................................................................................................................................... [x] Parking the day of Surgery is located in the [facility].   Upon entering the door, make immediate right into the surgery reception room    [x]  Bring photo ID and insurance card     [x] You may have a light breakfast day of procedure    [x]  Wear loose comfortable clothing    [x]  Please follow instructions for medications as given per Dr's office    [x] You can expect a call the business day prior to procedure to notify you of your arrival time     [x] Please arrange for     []  Other instructions

## 2023-03-15 NOTE — TELEPHONE ENCOUNTER
Call to Francisco Cortez that procedure was approved for 3/20/2023 and that Jama should call her a few days before for the pre op call and between 2:00 PM and 4:00 PM  the business day before with the arrival time. Instructed Dannielle to hold ibuprofen for 24 hours, naprosyn for 4 days and any aspirin containing products or fish oil for 7 days. Instructed to call office back if any questions. Sami Christian verbalized understanding.     Electronically signed by Kain Sim RN on 3/15/2023 at 9:40 AM

## 2023-03-20 ENCOUNTER — HOSPITAL ENCOUNTER (OUTPATIENT)
Dept: GENERAL RADIOLOGY | Age: 48
Discharge: HOME | End: 2023-03-22
Attending: ANESTHESIOLOGY
Payer: COMMERCIAL

## 2023-03-20 ENCOUNTER — HOSPITAL ENCOUNTER (OUTPATIENT)
Age: 48
Setting detail: OUTPATIENT SURGERY
Discharge: HOME | End: 2023-03-20
Attending: ANESTHESIOLOGY | Admitting: ANESTHESIOLOGY
Payer: COMMERCIAL

## 2023-03-20 VITALS
HEIGHT: 60 IN | DIASTOLIC BLOOD PRESSURE: 78 MMHG | SYSTOLIC BLOOD PRESSURE: 111 MMHG | HEART RATE: 72 BPM | OXYGEN SATURATION: 98 % | TEMPERATURE: 97.8 F | RESPIRATION RATE: 18 BRPM | WEIGHT: 175 LBS | BODY MASS INDEX: 34.36 KG/M2

## 2023-03-20 DIAGNOSIS — R52 PAIN MANAGEMENT: ICD-10-CM

## 2023-03-20 PROCEDURE — 62321 NJX INTERLAMINAR CRV/THRC: CPT | Performed by: ANESTHESIOLOGY

## 2023-03-20 PROCEDURE — 7100000010 HC PHASE II RECOVERY - FIRST 15 MIN: Performed by: ANESTHESIOLOGY

## 2023-03-20 PROCEDURE — 2580000003 HC RX 258: Performed by: ANESTHESIOLOGY

## 2023-03-20 PROCEDURE — A4216 STERILE WATER/SALINE, 10 ML: HCPCS | Performed by: ANESTHESIOLOGY

## 2023-03-20 PROCEDURE — 3209999900 FLUORO FOR SURGICAL PROCEDURES

## 2023-03-20 PROCEDURE — 6360000002 HC RX W HCPCS: Performed by: ANESTHESIOLOGY

## 2023-03-20 PROCEDURE — 7100000011 HC PHASE II RECOVERY - ADDTL 15 MIN: Performed by: ANESTHESIOLOGY

## 2023-03-20 PROCEDURE — 3600000002 HC SURGERY LEVEL 2 BASE: Performed by: ANESTHESIOLOGY

## 2023-03-20 PROCEDURE — 2500000003 HC RX 250 WO HCPCS: Performed by: ANESTHESIOLOGY

## 2023-03-20 PROCEDURE — 2709999900 HC NON-CHARGEABLE SUPPLY: Performed by: ANESTHESIOLOGY

## 2023-03-20 PROCEDURE — 6360000004 HC RX CONTRAST MEDICATION: Performed by: ANESTHESIOLOGY

## 2023-03-20 RX ORDER — SODIUM CHLORIDE 9 MG/ML
INJECTION INTRAVENOUS PRN
Status: DISCONTINUED | OUTPATIENT
Start: 2023-03-20 | End: 2023-03-20 | Stop reason: ALTCHOICE

## 2023-03-20 RX ORDER — LIDOCAINE HYDROCHLORIDE 5 MG/ML
INJECTION, SOLUTION INFILTRATION; INTRAVENOUS PRN
Status: DISCONTINUED | OUTPATIENT
Start: 2023-03-20 | End: 2023-03-20 | Stop reason: ALTCHOICE

## 2023-03-20 RX ORDER — SODIUM CHLORIDE 0.9 % (FLUSH) 0.9 %
5-40 SYRINGE (ML) INJECTION PRN
Status: DISCONTINUED | OUTPATIENT
Start: 2023-03-20 | End: 2023-03-20 | Stop reason: HOSPADM

## 2023-03-20 RX ORDER — METHYLPREDNISOLONE ACETATE 40 MG/ML
INJECTION, SUSPENSION INTRA-ARTICULAR; INTRALESIONAL; INTRAMUSCULAR; SOFT TISSUE PRN
Status: DISCONTINUED | OUTPATIENT
Start: 2023-03-20 | End: 2023-03-20 | Stop reason: ALTCHOICE

## 2023-03-20 RX ORDER — SODIUM CHLORIDE 9 MG/ML
INJECTION, SOLUTION INTRAVENOUS PRN
Status: DISCONTINUED | OUTPATIENT
Start: 2023-03-20 | End: 2023-03-20 | Stop reason: HOSPADM

## 2023-03-20 RX ORDER — SODIUM CHLORIDE 0.9 % (FLUSH) 0.9 %
5-40 SYRINGE (ML) INJECTION EVERY 12 HOURS SCHEDULED
Status: DISCONTINUED | OUTPATIENT
Start: 2023-03-20 | End: 2023-03-20 | Stop reason: HOSPADM

## 2023-03-20 ASSESSMENT — PAIN DESCRIPTION - DESCRIPTORS: DESCRIPTORS: DISCOMFORT

## 2023-03-20 ASSESSMENT — PAIN - FUNCTIONAL ASSESSMENT: PAIN_FUNCTIONAL_ASSESSMENT: 0-10

## 2023-03-20 NOTE — OP NOTE
cervical interlaminar space. Standard monitors were placed, and vital signs were observed throughout the procedure. The area was prepped with chloraprep and the C7-T1 interspace was marked under fluoroscopy. The skin and subcutaneous tissues at the above level were anesthestized with 0.5% lidocaine. An # 18 gauge 3 1/2 tuohy epidural needle was inserted and advanced toward the inferior lamina until bony contact was made. The needle was then advanced superiorly toward epidural space . From this point on loss of resistance technique with 5 cc glass syringe was used to confirm entrance of the needle into the epidural space under intermittent lateral fluoroscopy. Once in the epidural space , negative aspiration for blood and CSF was confirmed. Needle tip placement was confirmed by visualizing epidural spread of 1 ml of Isovue M 300 visualized in both AP and lateral live fluoroscopic views. Then after negative aspiration, a solution of 0.9 % Saline 3 ml and 40 mg DepoMedrol was easily injected. The needle was gently removed intact. The patient neck was cleaned and a Band-Aid was placed over the needle insertion point. Disposition the patient tolerated the procedure well and there were no complications . Vital signs remained stable throughout the procedure. The patient was escorted to the recovery area where they remained until discharge and written discharge instructions for the procedure were given. Plan: Mayra Rodrigues will return to our pain management center as scheduled.      Waqas Gomez MD

## 2023-03-20 NOTE — INTERVAL H&P NOTE
Update History & Physical    The patient's History and Physical of March 3, 2023 was reviewed with the patient and I examined the patient. There was no change. The surgical site was confirmed by the patient and me. Plan: The risks, benefits, expected outcome, and alternative to the recommended procedure have been discussed with the patient. Patient understands and wants to proceed with the procedure.      Electronically signed by Waqas Gomez MD on 3/20/2023

## 2023-03-22 ENCOUNTER — HOSPITAL ENCOUNTER (OUTPATIENT)
Dept: MRI IMAGING | Age: 48
Discharge: HOME | End: 2023-03-24
Payer: COMMERCIAL

## 2023-03-22 DIAGNOSIS — G44.89 SLUDER'S NEURALGIA: ICD-10-CM

## 2023-03-22 PROCEDURE — 6360000004 HC RX CONTRAST MEDICATION: Performed by: RADIOLOGY

## 2023-03-22 PROCEDURE — 70553 MRI BRAIN STEM W/O & W/DYE: CPT

## 2023-03-22 PROCEDURE — A9577 INJ MULTIHANCE: HCPCS | Performed by: RADIOLOGY

## 2023-03-22 RX ADMIN — GADOBENATE DIMEGLUMINE 16 ML: 529 INJECTION, SOLUTION INTRAVENOUS at 10:25

## 2023-04-21 ENCOUNTER — HOSPITAL ENCOUNTER (OUTPATIENT)
Dept: CT IMAGING | Age: 48
Discharge: HOME | End: 2023-04-21
Payer: COMMERCIAL

## 2023-04-21 DIAGNOSIS — R51.0 ORTHOSTATIC HEADACHE: ICD-10-CM

## 2023-04-21 PROCEDURE — 70486 CT MAXILLOFACIAL W/O DYE: CPT

## 2023-05-02 ENCOUNTER — OFFICE VISIT (OUTPATIENT)
Dept: PAIN MANAGEMENT | Age: 48
End: 2023-05-02
Payer: COMMERCIAL

## 2023-05-02 ENCOUNTER — PREP FOR PROCEDURE (OUTPATIENT)
Dept: PAIN MANAGEMENT | Age: 48
End: 2023-05-02

## 2023-05-02 VITALS
WEIGHT: 175 LBS | HEART RATE: 60 BPM | TEMPERATURE: 96.9 F | HEIGHT: 60 IN | BODY MASS INDEX: 34.36 KG/M2 | OXYGEN SATURATION: 97 % | SYSTOLIC BLOOD PRESSURE: 114 MMHG | DIASTOLIC BLOOD PRESSURE: 92 MMHG

## 2023-05-02 DIAGNOSIS — M47.812 CERVICAL SPONDYLOSIS: ICD-10-CM

## 2023-05-02 DIAGNOSIS — M50.30 DDD (DEGENERATIVE DISC DISEASE), CERVICAL: Primary | ICD-10-CM

## 2023-05-02 DIAGNOSIS — M47.817 LUMBOSACRAL SPONDYLOSIS WITHOUT MYELOPATHY: ICD-10-CM

## 2023-05-02 DIAGNOSIS — M47.812 CERVICAL SPONDYLOSIS: Primary | ICD-10-CM

## 2023-05-02 DIAGNOSIS — M51.36 DDD (DEGENERATIVE DISC DISEASE), LUMBAR: ICD-10-CM

## 2023-05-02 PROCEDURE — 99213 OFFICE O/P EST LOW 20 MIN: CPT | Performed by: ANESTHESIOLOGY

## 2023-05-02 RX ORDER — SODIUM CHLORIDE 9 MG/ML
INJECTION, SOLUTION INTRAVENOUS PRN
Status: CANCELLED | OUTPATIENT
Start: 2023-05-02

## 2023-05-02 RX ORDER — DULOXETIN HYDROCHLORIDE 30 MG/1
CAPSULE, DELAYED RELEASE ORAL
COMMUNITY
Start: 2023-04-27

## 2023-05-02 RX ORDER — SODIUM CHLORIDE 0.9 % (FLUSH) 0.9 %
5-40 SYRINGE (ML) INJECTION PRN
Status: CANCELLED | OUTPATIENT
Start: 2023-05-02

## 2023-05-02 RX ORDER — SODIUM CHLORIDE 0.9 % (FLUSH) 0.9 %
5-40 SYRINGE (ML) INJECTION EVERY 12 HOURS SCHEDULED
Status: CANCELLED | OUTPATIENT
Start: 2023-05-02

## 2023-05-02 NOTE — PROGRESS NOTES
Ema Chase presents to the [address] on 5/2/2023. Júnior Nicholas is complaining of pain in neck and back. The pain is constant. The pain is described as aching. Pain is rated on her best day at a 3, on her worst day at a 8, and on average at a 4 on the VAS scale. She took her last dose of  Cymbalta  today. Júnior Nicholas does not have issues with constipation. Any procedures since your last visit: Yes, with 75 % relief. She is  on NSAIDS and  is not on anticoagulation medications to include none and is managed by none. Pacemaker or defibrillator: No.    Medication Contract and Consent for Opioid Use Documents Filed       Patient Documents       Type of Document Status Date Received Received By Description    Medication Contract Received 5/7/2019  8:46 AM SANDRITA KABA Pain Management Pt Agreement 5/7/2019                       Temp 96.9 °F (36.1 °C) (Infrared)   Ht 5' (1.524 m)   Wt 175 lb (79.4 kg)   LMP 11/01/2021   BMI 34.18 kg/m²      Patient's last menstrual period was 11/01/2021.

## 2023-05-02 NOTE — PROGRESS NOTES
[address] [facility], [address] [place] [address] Adebayo  [phone]    Follow up Note      Celeste Pace     Date of Visit:  5/2/2023    CC:  Patient presents for follow up   Chief Complaint   Patient presents with    Follow Up After Procedure     CERVICAL  EPIDURAL STEROID INJECTION UNDER FLUOROSCOPIC GUIDANCE AT C7-T1 RIGHT PARAMEDIAN        HPI:    H/o Neck pain and upper extremity pain. S/P Cervical BELEM/ Cervical facet interventions - had helped significantly. H/o chronic low back and B/l LE pain. S/P BELEM with excellent pain relief. Nursing notes and details of the pain history reviewed. Please see intake notes for details. She follows up with rheumatologist for h/o Sjogren's/ rheumatoid / SLE. Previous treatments:   Failed conservative treatment with NSAID's, muscle relaxants, oral steroids and HEP. Physical Therapy   Chiropractic treatment  Ongoing Core exercises for the last year. TENS  and medications- NSAID's, oral steroids, gabapentin etc,. Does not tolerate meds well      She has not been on anticoagulation medications. Imaging:    MRI of thoracic spine: 12/21/2022:  Impression   1. No fracture or bony destructive lesion. 2. Small disc protrusions at multiple levels. 3.  No significant central canal or neural foraminal stenosis. MRI of C spine: 11/3/2020: Impression    1. Mild central spinal canal narrowing at C5-C6, and moderate central spinal    canal narrowing at C6-C7 have slightly increased. 2. Multilevel foraminal narrowing, as above, greatest on the left at C4-C5,    and left at C6-C7. 3. No evidence of an acute fracture. X ray C spine: 10/26/2020: Impression    Degenerative disc changes at C6-7        MRI of LS spine: 6/27/2020: Impression    Disc protrusion with annular fissure at L5-S1. No significant stenosis of    the thecal sac. The S1 nerve roots are intact.   There is mild narrowing of    the neural

## 2023-05-17 ENCOUNTER — TELEPHONE (OUTPATIENT)
Dept: PAIN MANAGEMENT | Age: 48
End: 2023-05-17

## 2023-05-17 RX ORDER — AMOXICILLIN 500 MG/1
500 CAPSULE ORAL 3 TIMES DAILY
COMMUNITY

## 2023-05-17 NOTE — TELEPHONE ENCOUNTER
Call to Chrissy Black that procedure was approved for 5/23/2023 and that [facility] should call her a few days before for the pre op call and after 3:00 PM the business day before with the arrival time. Instructed Dannielle to hold ibuprofen for 24 hours, naprosyn for 4 days and any aspirin containing products or fish oil for 7 days. Instructed to call office back if any questions. Kyara Sage verbalized understanding.     Electronically signed by Martin Sampson RN on 5/17/2023 at 1:35 PM

## 2023-05-22 ENCOUNTER — ANESTHESIA EVENT (OUTPATIENT)
Dept: OPERATING ROOM | Age: 48
End: 2023-05-22
Payer: COMMERCIAL

## 2023-05-22 NOTE — ANESTHESIA PRE PROCEDURE
Department of Anesthesiology  Preprocedure Note       Name:  Patrick Lincoln   Age:  41 y.o.  :  1975                                          MRN:  [id number]         Date:  2023      Surgeon: Yue Rodríguez):  Chano Mckenna MD    Procedure: Procedure(s):  BILATERAL CERVICAL MEDIAL BRANCH NERVE BLOCK UNDER FLUOROSCOPIC GUIDANCE AT C2, C3, C4, and C5    Medications prior to admission:   Prior to Admission medications    Medication Sig Start Date End Date Taking? Authorizing Provider   amoxicillin (AMOXIL) 500 MG capsule Take 1 capsule by mouth 3 times daily Started taking  10 day cycle   Yes Historical Provider, MD   DULoxetine (CYMBALTA) 30 MG extended release capsule TAKE 1 CAPSULE BY MOUTH EVERY DAY FOR 14 DAYS 23   Historical Provider, MD   vitamin B-12 (CYANOCOBALAMIN) 100 MCG tablet Take 0.5 tablets by mouth daily    Historical Provider, MD   turmeric 500 MG CAPS Take by mouth daily    Historical Provider, MD   Cholecalciferol (VITAMIN D) 50 MCG (2000 UT) CAPS capsule Take by mouth daily     Historical Provider, MD   pantoprazole (PROTONIX) 40 MG tablet Take 1 tablet by mouth as needed 1/15/21   Historical Provider, MD       Current medications:    No current facility-administered medications for this encounter. Current Outpatient Medications   Medication Sig Dispense Refill    amoxicillin (AMOXIL) 500 MG capsule Take 1 capsule by mouth 3 times daily Started taking  10 day cycle      DULoxetine (CYMBALTA) 30 MG extended release capsule TAKE 1 CAPSULE BY MOUTH EVERY DAY FOR 14 DAYS      vitamin B-12 (CYANOCOBALAMIN) 100 MCG tablet Take 0.5 tablets by mouth daily      turmeric 500 MG CAPS Take by mouth daily      Cholecalciferol (VITAMIN D) 50 MCG (2000 UT) CAPS capsule Take by mouth daily       pantoprazole (PROTONIX) 40 MG tablet Take 1 tablet by mouth as needed         Allergies:     Allergies   Allergen Reactions    Methotrexate Derivatives Nausea And Vomiting and Other

## 2023-05-23 ENCOUNTER — HOSPITAL ENCOUNTER (OUTPATIENT)
Dept: OPERATING ROOM | Age: 48
Setting detail: OUTPATIENT SURGERY
Discharge: HOME | End: 2023-05-23
Attending: ANESTHESIOLOGY
Payer: COMMERCIAL

## 2023-05-23 ENCOUNTER — ANESTHESIA (OUTPATIENT)
Dept: OPERATING ROOM | Age: 48
End: 2023-05-23
Payer: COMMERCIAL

## 2023-05-23 ENCOUNTER — HOSPITAL ENCOUNTER (OUTPATIENT)
Age: 48
Setting detail: OUTPATIENT SURGERY
Discharge: HOME | End: 2023-05-23
Attending: ANESTHESIOLOGY | Admitting: ANESTHESIOLOGY
Payer: COMMERCIAL

## 2023-05-23 VITALS
BODY MASS INDEX: 33.96 KG/M2 | DIASTOLIC BLOOD PRESSURE: 79 MMHG | HEART RATE: 66 BPM | SYSTOLIC BLOOD PRESSURE: 120 MMHG | HEIGHT: 60 IN | WEIGHT: 173 LBS | RESPIRATION RATE: 16 BRPM | OXYGEN SATURATION: 97 %

## 2023-05-23 DIAGNOSIS — M47.892 OTHER OSTEOARTHRITIS OF SPINE, CERVICAL REGION: ICD-10-CM

## 2023-05-23 PROCEDURE — 2580000003 HC RX 258: Performed by: ANESTHESIOLOGY

## 2023-05-23 PROCEDURE — 64490 INJ PARAVERT F JNT C/T 1 LEV: CPT | Performed by: ANESTHESIOLOGY

## 2023-05-23 PROCEDURE — 7100000011 HC PHASE II RECOVERY - ADDTL 15 MIN: Performed by: ANESTHESIOLOGY

## 2023-05-23 PROCEDURE — 2500000003 HC RX 250 WO HCPCS: Performed by: ANESTHESIOLOGY

## 2023-05-23 PROCEDURE — 6360000002 HC RX W HCPCS: Performed by: NURSE ANESTHETIST, CERTIFIED REGISTERED

## 2023-05-23 PROCEDURE — 3600000015 HC SURGERY LEVEL 5 ADDTL 15MIN: Performed by: ANESTHESIOLOGY

## 2023-05-23 PROCEDURE — 3600000005 HC SURGERY LEVEL 5 BASE: Performed by: ANESTHESIOLOGY

## 2023-05-23 PROCEDURE — 3209999900 FLUORO FOR SURGICAL PROCEDURES

## 2023-05-23 PROCEDURE — 6360000002 HC RX W HCPCS: Performed by: ANESTHESIOLOGY

## 2023-05-23 PROCEDURE — 64492 INJ PARAVERT F JNT C/T 3 LEV: CPT | Performed by: ANESTHESIOLOGY

## 2023-05-23 PROCEDURE — 7100000010 HC PHASE II RECOVERY - FIRST 15 MIN: Performed by: ANESTHESIOLOGY

## 2023-05-23 PROCEDURE — 64491 INJ PARAVERT F JNT C/T 2 LEV: CPT | Performed by: ANESTHESIOLOGY

## 2023-05-23 PROCEDURE — 3700000001 HC ADD 15 MINUTES (ANESTHESIA): Performed by: ANESTHESIOLOGY

## 2023-05-23 PROCEDURE — 2709999900 HC NON-CHARGEABLE SUPPLY: Performed by: ANESTHESIOLOGY

## 2023-05-23 PROCEDURE — 3700000000 HC ANESTHESIA ATTENDED CARE: Performed by: ANESTHESIOLOGY

## 2023-05-23 RX ORDER — BUPIVACAINE HYDROCHLORIDE 5 MG/ML
INJECTION, SOLUTION PERINEURAL PRN
Status: DISCONTINUED | OUTPATIENT
Start: 2023-05-23 | End: 2023-05-23 | Stop reason: ALTCHOICE

## 2023-05-23 RX ORDER — MIDAZOLAM HYDROCHLORIDE 1 MG/ML
INJECTION INTRAMUSCULAR; INTRAVENOUS PRN
Status: DISCONTINUED | OUTPATIENT
Start: 2023-05-23 | End: 2023-05-23 | Stop reason: SDUPTHER

## 2023-05-23 RX ORDER — SODIUM CHLORIDE 0.9 % (FLUSH) 0.9 %
5-40 SYRINGE (ML) INJECTION PRN
Status: DISCONTINUED | OUTPATIENT
Start: 2023-05-23 | End: 2023-05-23 | Stop reason: HOSPADM

## 2023-05-23 RX ORDER — SODIUM CHLORIDE 9 MG/ML
INJECTION, SOLUTION INTRAVENOUS PRN
Status: DISCONTINUED | OUTPATIENT
Start: 2023-05-23 | End: 2023-05-23 | Stop reason: HOSPADM

## 2023-05-23 RX ORDER — LIDOCAINE HYDROCHLORIDE 5 MG/ML
INJECTION, SOLUTION INFILTRATION; INTRAVENOUS PRN
Status: DISCONTINUED | OUTPATIENT
Start: 2023-05-23 | End: 2023-05-23 | Stop reason: ALTCHOICE

## 2023-05-23 RX ORDER — FENTANYL CITRATE 50 UG/ML
INJECTION, SOLUTION INTRAMUSCULAR; INTRAVENOUS PRN
Status: DISCONTINUED | OUTPATIENT
Start: 2023-05-23 | End: 2023-05-23 | Stop reason: SDUPTHER

## 2023-05-23 RX ORDER — SODIUM CHLORIDE, SODIUM LACTATE, POTASSIUM CHLORIDE, CALCIUM CHLORIDE 600; 310; 30; 20 MG/100ML; MG/100ML; MG/100ML; MG/100ML
INJECTION, SOLUTION INTRAVENOUS CONTINUOUS
Status: DISCONTINUED | OUTPATIENT
Start: 2023-05-23 | End: 2023-05-23 | Stop reason: HOSPADM

## 2023-05-23 RX ORDER — SODIUM CHLORIDE 0.9 % (FLUSH) 0.9 %
5-40 SYRINGE (ML) INJECTION EVERY 12 HOURS SCHEDULED
Status: DISCONTINUED | OUTPATIENT
Start: 2023-05-23 | End: 2023-05-23 | Stop reason: HOSPADM

## 2023-05-23 RX ORDER — DEXAMETHASONE SODIUM PHOSPHATE 10 MG/ML
INJECTION, SOLUTION INTRAMUSCULAR; INTRAVENOUS PRN
Status: DISCONTINUED | OUTPATIENT
Start: 2023-05-23 | End: 2023-05-23 | Stop reason: ALTCHOICE

## 2023-05-23 RX ADMIN — SODIUM CHLORIDE, POTASSIUM CHLORIDE, SODIUM LACTATE AND CALCIUM CHLORIDE: 600; 310; 30; 20 INJECTION, SOLUTION INTRAVENOUS at 10:34

## 2023-05-23 RX ADMIN — FENTANYL CITRATE 50 MCG: 50 INJECTION INTRAMUSCULAR; INTRAVENOUS at 11:13

## 2023-05-23 RX ADMIN — MIDAZOLAM 2 MG: 1 INJECTION INTRAMUSCULAR; INTRAVENOUS at 11:09

## 2023-05-23 RX ADMIN — FENTANYL CITRATE 50 MCG: 50 INJECTION INTRAMUSCULAR; INTRAVENOUS at 11:10

## 2023-05-23 ASSESSMENT — PAIN - FUNCTIONAL ASSESSMENT: PAIN_FUNCTIONAL_ASSESSMENT: 0-10

## 2023-05-23 NOTE — ANESTHESIA POSTPROCEDURE EVALUATION
Department of Anesthesiology  Postprocedure Note    Patient: Gill Villafana  MRN: [id number]  YOB: 1975  Date of evaluation: 5/23/2023      Procedure Summary     Date: 05/23/23 Room / Location: [address] [place] [address] 04 / [facility]    Anesthesia Start: 1109 Anesthesia Stop: 1128    Procedure: BILATERAL CERVICAL MEDIAL BRANCH NERVE BLOCK UNDER FLUOROSCOPIC GUIDANCE AT C2, C3, C4, and C5 (Bilateral: Neck) Diagnosis:       Cervical spondylosis      (Cervical spondylosis [X13.998])    Surgeons: Franchesca Alanis MD Responsible Provider: Angela Osei MD    Anesthesia Type: MAC ASA Status: 2          Anesthesia Type: MAC    Fredi Phase I: Fredi Score: 10    Fredi Phase II:        Anesthesia Post Evaluation    Patient location during evaluation: PACU  Patient participation: complete - patient participated  Level of consciousness: awake  Pain score: 3  Airway patency: patent  Nausea & Vomiting: no nausea  Complications: no  Cardiovascular status: blood pressure returned to baseline  Respiratory status: acceptable  Hydration status: euvolemic

## 2023-05-23 NOTE — OP NOTE
observed throughout the procedure. The area of the cervical spine was prepped with chloraprep and draped in the usual sterile manner. AP fluoroscopy views were used to identify and willian the mid lateral aspect of the articular pillars of the targeted levels. The # 25 G 3.5 inch spinal needles was directed until bone was contacted at each level. Once bone was contacted, the needle was walked off laterally. Lateral fluoroscopy was then utilized during final needle positioning in placing the tip of the needle in the middle of the articular pillar. After negative aspiration was confirmed, 1 cc's of marcaine 0.5%  + 1 mg of Dexamethasone was injected  at each level. The needles were removed and the patient body part was cleaned and bandage was placed over the needle insertion. Disposition the patient tolerated the procedure well and there were no complications . Vital signs remained stable throughout the procedure. The patient was escorted to the recovery area where they remained until discharge and written discharge instructions for the procedure were given. Noted excellent pain relief after the procedure. Plan: Violette David will return to our pain management center as scheduled.      Mary Kay Nair MD

## 2023-05-23 NOTE — INTERVAL H&P NOTE
Update History & Physical    The patient's History and Physical of May 2, 2023 was reviewed with the patient and I examined the patient. There was no change. The surgical site was confirmed by the patient and me. Plan: The risks, benefits, expected outcome, and alternative to the recommended procedure have been discussed with the patient. Patient understands and wants to proceed with the procedure.      Electronically signed by Antolin Glez MD on 5/23/2023

## 2023-05-23 NOTE — DISCHARGE INSTRUCTIONS
[facility]) Pain Management Department  [phone]   Post-Pain Block/ Radiofrequency Home Going Instructions    1-Go home, rest for the remainder of the day  2-Please do not lift over 20 pounds the day of the injection  3-If you received sedation No: alcohol, driving, operating lawn mowers, plows, tractors or other dangerous equipment until next morning. Do not make important decisions or sign legal documents for 24 hours. You may experience light headedness, dizziness, nausea or sleepiness after sedation. Do not stay alone. A responsible adult must be with you for 24 hours. You could be nauseated from the medications you have received. Your IV site may be sore and bruised. 4-No dietary restrictions     5-Resume all medications the same day, blood thinners to be resumed 24 hours after injection    6-Keep the surgical site clean and dry, you may shower the next morning and remove the      dressing. 7- No sitz baths, tub baths or hot tubs/swimming for 24 hours. 8- If you have any pain at the injection site(s), application of an ice pack to the area should be       helpful, 20 minutes on/20 minutes off for next 48 hours. 9- [facility] if you develop.   Fever greater than 100.4 F  Have bleeding or drainage from the puncture site  Have progressive Leg/arm numbness and or weakness  Loss of control of bowel and or bladder (wet/soil yourself)  Severe headache with inability to lift head  10-You may return to work the next day

## 2023-06-07 ENCOUNTER — OFFICE VISIT (OUTPATIENT)
Dept: PAIN MANAGEMENT | Age: 48
End: 2023-06-07
Payer: COMMERCIAL

## 2023-06-07 ENCOUNTER — PREP FOR PROCEDURE (OUTPATIENT)
Dept: PAIN MANAGEMENT | Age: 48
End: 2023-06-07

## 2023-06-07 VITALS
OXYGEN SATURATION: 99 % | DIASTOLIC BLOOD PRESSURE: 70 MMHG | BODY MASS INDEX: 33.96 KG/M2 | RESPIRATION RATE: 18 BRPM | HEIGHT: 60 IN | TEMPERATURE: 97.3 F | HEART RATE: 81 BPM | SYSTOLIC BLOOD PRESSURE: 116 MMHG | WEIGHT: 173 LBS

## 2023-06-07 DIAGNOSIS — M47.812 CERVICAL SPONDYLOSIS: Primary | ICD-10-CM

## 2023-06-07 DIAGNOSIS — M50.30 DDD (DEGENERATIVE DISC DISEASE), CERVICAL: ICD-10-CM

## 2023-06-07 DIAGNOSIS — M47.817 LUMBOSACRAL SPONDYLOSIS WITHOUT MYELOPATHY: ICD-10-CM

## 2023-06-07 DIAGNOSIS — M51.36 DDD (DEGENERATIVE DISC DISEASE), LUMBAR: ICD-10-CM

## 2023-06-07 PROCEDURE — 99213 OFFICE O/P EST LOW 20 MIN: CPT | Performed by: ANESTHESIOLOGY

## 2023-06-07 RX ORDER — SODIUM CHLORIDE 0.9 % (FLUSH) 0.9 %
5-40 SYRINGE (ML) INJECTION EVERY 12 HOURS SCHEDULED
Status: CANCELLED | OUTPATIENT
Start: 2023-06-07

## 2023-06-07 RX ORDER — SODIUM CHLORIDE 9 MG/ML
INJECTION, SOLUTION INTRAVENOUS PRN
Status: CANCELLED | OUTPATIENT
Start: 2023-06-07

## 2023-06-07 RX ORDER — SODIUM CHLORIDE 0.9 % (FLUSH) 0.9 %
5-40 SYRINGE (ML) INJECTION PRN
Status: CANCELLED | OUTPATIENT
Start: 2023-06-07

## 2023-06-07 RX ORDER — IPRATROPIUM BROMIDE 42 UG/1
SPRAY, METERED NASAL
COMMUNITY
Start: 2023-05-12

## 2023-06-07 RX ORDER — DULOXETIN HYDROCHLORIDE 60 MG/1
CAPSULE, DELAYED RELEASE ORAL DAILY
COMMUNITY
Start: 2023-05-24

## 2023-06-07 NOTE — PROGRESS NOTES
Shawna Mckeon presents to the [facility] on 6/7/2023. Colleen Horvath is complaining of pain cervical. The pain is constant. The pain is described as aching, throbbing, stabbing, sharp, burning, penetrating, and miserable. Pain is rated on her best day at a 4, on her worst day at a 10, and on average at a 7 on the VAS scale. She took her last dose of Tylenol and Cymbalta  today. Colleen Horvath does not have issues with constipation. Any procedures since your last visit: No,     She is not on NSAIDS and  is not on anticoagulation medications      Pacemaker or defibrillator: No   Medication Contract and Consent for Opioid Use Documents Filed       Patient Documents       Type of Document Status Date Received Received By Description    Medication Contract Received 5/7/2019  8:46 AM SANDRITA KABA Pain Management Pt Agreement 5/7/2019                       Temp 97.3 °F (36.3 °C) (Infrared)   Resp 18   Ht 5' (1.524 m)   Wt 173 lb (78.5 kg)   LMP 11/01/2021   BMI 33.79 kg/m²      Patient's last menstrual period was 11/01/2021.
Breast Cancer Maternal Aunt     Colon Cancer Maternal Aunt     Breast Cancer Maternal Cousin        REVIEW OF SYSTEMS:     Kari Kay denies fever/chills, chest pain, shortness of breath, new bowel or bladder complaints. All other review of systems was negative. PHYSICAL EXAMINATION:      /70   Pulse 81   Temp 97.3 °F (36.3 °C) (Infrared)   Resp 18   Ht 5' (1.524 m)   Wt 173 lb (78.5 kg)   LMP 11/01/2021   SpO2 99%   BMI 33.79 kg/m²   General:       General appearance:   pleasant and well-hydrated. , in no distress and A & O x3  Build:Obese  Function:Moves about room without difficulty. HEENT:     Head:normocephalic, atraumatic    Lungs:     Breathing:normal breathing pattern      CVS :   RRR     Abdomen:     Shape:non-distended and normal     Cervical spine:     Inspection:normal  Palpation:tenderness paravertebral muscles, tenderness trapezium, left, right and negative  Range of motion:abnormal mildly flexion, extension rotation bilateral and is painful. Facet tenderness +     Thoracic spine:     Spine inspection:normal   Palpation:tender paraspinals,  Range of motion:normal in flexion, extension rotation bilateral and is not painful. Lumbar spine:     Spine inspection:normal   Palpation:tenderness paravertebral muscles, left, right and positive. Range of motion: Reduced, Pain on Lateral bending, flexion, extension rotation bilateral and is Painful. B/l Lumbar paraspinal tenderness +  B/l Lumbar facet loading +  SIJ tenderness +  Sensory and motor in b/l LE normal  DTRs' b/l LE equal     Musculoskeletal:     Trigger points +     Extremities:     Tremors:None bilaterally upper and lower  Edema:no  Knee:     Knee ROM normal- no pain     Neurological:     Sensory:normal to light touch - except for sensory changes over the left UE. Motor:   No new changes     Gait:normal     Assessment/Plan:   Diagnosis Orders   1. Cervical spondylosis        2.  DDD (degenerative disc disease),

## 2023-07-03 ENCOUNTER — ANESTHESIA EVENT (OUTPATIENT)
Dept: OPERATING ROOM | Age: 48
End: 2023-07-03
Payer: COMMERCIAL

## 2023-07-03 RX ORDER — MONTELUKAST SODIUM 10 MG/1
10 TABLET ORAL NIGHTLY
COMMUNITY

## 2023-07-03 ASSESSMENT — LIFESTYLE VARIABLES: SMOKING_STATUS: 0

## 2023-07-11 ENCOUNTER — ANESTHESIA (OUTPATIENT)
Dept: OPERATING ROOM | Age: 48
End: 2023-07-11
Payer: COMMERCIAL

## 2023-07-11 ENCOUNTER — HOSPITAL ENCOUNTER (OUTPATIENT)
Age: 48
Setting detail: OUTPATIENT SURGERY
Discharge: HOME | End: 2023-07-11
Attending: ANESTHESIOLOGY | Admitting: ANESTHESIOLOGY
Payer: COMMERCIAL

## 2023-07-11 ENCOUNTER — HOSPITAL ENCOUNTER (OUTPATIENT)
Dept: OPERATING ROOM | Age: 48
Setting detail: OUTPATIENT SURGERY
Discharge: HOME | End: 2023-07-11
Attending: ANESTHESIOLOGY
Payer: COMMERCIAL

## 2023-07-11 VITALS
TEMPERATURE: 96.8 F | HEIGHT: 60 IN | RESPIRATION RATE: 16 BRPM | WEIGHT: 165 LBS | SYSTOLIC BLOOD PRESSURE: 127 MMHG | OXYGEN SATURATION: 97 % | HEART RATE: 72 BPM | BODY MASS INDEX: 32.39 KG/M2 | DIASTOLIC BLOOD PRESSURE: 81 MMHG

## 2023-07-11 DIAGNOSIS — M47.812 OSTEOARTHRITIS OF CERVICAL SPINE, UNSPECIFIED SPINAL OSTEOARTHRITIS COMPLICATION STATUS: ICD-10-CM

## 2023-07-11 PROCEDURE — 3600000005 HC SURGERY LEVEL 5 BASE: Performed by: ANESTHESIOLOGY

## 2023-07-11 PROCEDURE — 2580000003 HC RX 258: Performed by: ANESTHESIOLOGY

## 2023-07-11 PROCEDURE — 3700000001 HC ADD 15 MINUTES (ANESTHESIA): Performed by: ANESTHESIOLOGY

## 2023-07-11 PROCEDURE — 7100000010 HC PHASE II RECOVERY - FIRST 15 MIN: Performed by: ANESTHESIOLOGY

## 2023-07-11 PROCEDURE — 3600000015 HC SURGERY LEVEL 5 ADDTL 15MIN: Performed by: ANESTHESIOLOGY

## 2023-07-11 PROCEDURE — 64633 DESTROY CERV/THOR FACET JNT: CPT | Performed by: ANESTHESIOLOGY

## 2023-07-11 PROCEDURE — 2709999900 HC NON-CHARGEABLE SUPPLY: Performed by: ANESTHESIOLOGY

## 2023-07-11 PROCEDURE — 6360000002 HC RX W HCPCS: Performed by: ANESTHESIOLOGY

## 2023-07-11 PROCEDURE — 7100000011 HC PHASE II RECOVERY - ADDTL 15 MIN: Performed by: ANESTHESIOLOGY

## 2023-07-11 PROCEDURE — 6360000002 HC RX W HCPCS: Performed by: NURSE ANESTHETIST, CERTIFIED REGISTERED

## 2023-07-11 PROCEDURE — 64634 DESTROY C/TH FACET JNT ADDL: CPT | Performed by: ANESTHESIOLOGY

## 2023-07-11 PROCEDURE — 2500000003 HC RX 250 WO HCPCS: Performed by: ANESTHESIOLOGY

## 2023-07-11 PROCEDURE — 3700000000 HC ANESTHESIA ATTENDED CARE: Performed by: ANESTHESIOLOGY

## 2023-07-11 RX ORDER — FENTANYL CITRATE 50 UG/ML
INJECTION, SOLUTION INTRAMUSCULAR; INTRAVENOUS PRN
Status: DISCONTINUED | OUTPATIENT
Start: 2023-07-11 | End: 2023-07-11 | Stop reason: SDUPTHER

## 2023-07-11 RX ORDER — MORPHINE SULFATE 2 MG/ML
1 INJECTION, SOLUTION INTRAMUSCULAR; INTRAVENOUS EVERY 5 MIN PRN
Status: DISCONTINUED | OUTPATIENT
Start: 2023-07-11 | End: 2023-07-11 | Stop reason: HOSPADM

## 2023-07-11 RX ORDER — SODIUM CHLORIDE 0.9 % (FLUSH) 0.9 %
5-40 SYRINGE (ML) INJECTION EVERY 12 HOURS SCHEDULED
Status: DISCONTINUED | OUTPATIENT
Start: 2023-07-11 | End: 2023-07-11 | Stop reason: HOSPADM

## 2023-07-11 RX ORDER — LIDOCAINE HYDROCHLORIDE 5 MG/ML
INJECTION, SOLUTION INFILTRATION; INTRAVENOUS PRN
Status: DISCONTINUED | OUTPATIENT
Start: 2023-07-11 | End: 2023-07-11 | Stop reason: ALTCHOICE

## 2023-07-11 RX ORDER — SODIUM CHLORIDE 0.9 % (FLUSH) 0.9 %
5-40 SYRINGE (ML) INJECTION PRN
Status: DISCONTINUED | OUTPATIENT
Start: 2023-07-11 | End: 2023-07-11 | Stop reason: HOSPADM

## 2023-07-11 RX ORDER — BUPIVACAINE HYDROCHLORIDE 2.5 MG/ML
INJECTION, SOLUTION EPIDURAL; INFILTRATION; INTRACAUDAL PRN
Status: DISCONTINUED | OUTPATIENT
Start: 2023-07-11 | End: 2023-07-11 | Stop reason: ALTCHOICE

## 2023-07-11 RX ORDER — FENTANYL CITRATE 0.05 MG/ML
50 INJECTION, SOLUTION INTRAMUSCULAR; INTRAVENOUS EVERY 5 MIN PRN
Status: DISCONTINUED | OUTPATIENT
Start: 2023-07-11 | End: 2023-07-11 | Stop reason: HOSPADM

## 2023-07-11 RX ORDER — DIPHENHYDRAMINE HYDROCHLORIDE 50 MG/ML
12.5 INJECTION INTRAMUSCULAR; INTRAVENOUS
Status: DISCONTINUED | OUTPATIENT
Start: 2023-07-11 | End: 2023-07-11 | Stop reason: HOSPADM

## 2023-07-11 RX ORDER — SODIUM CHLORIDE 9 MG/ML
INJECTION, SOLUTION INTRAVENOUS PRN
Status: DISCONTINUED | OUTPATIENT
Start: 2023-07-11 | End: 2023-07-11 | Stop reason: HOSPADM

## 2023-07-11 RX ORDER — DROPERIDOL 2.5 MG/ML
1.25 INJECTION, SOLUTION INTRAMUSCULAR; INTRAVENOUS ONCE
Status: DISCONTINUED | OUTPATIENT
Start: 2023-07-11 | End: 2023-07-11 | Stop reason: HOSPADM

## 2023-07-11 RX ORDER — DROPERIDOL 2.5 MG/ML
0.62 INJECTION, SOLUTION INTRAMUSCULAR; INTRAVENOUS
Status: DISCONTINUED | OUTPATIENT
Start: 2023-07-11 | End: 2023-07-11 | Stop reason: HOSPADM

## 2023-07-11 RX ORDER — MEPERIDINE HYDROCHLORIDE 25 MG/ML
12.5 INJECTION INTRAMUSCULAR; INTRAVENOUS; SUBCUTANEOUS ONCE
Status: DISCONTINUED | OUTPATIENT
Start: 2023-07-11 | End: 2023-07-11 | Stop reason: HOSPADM

## 2023-07-11 RX ORDER — LIDOCAINE HYDROCHLORIDE 10 MG/ML
INJECTION, SOLUTION EPIDURAL; INFILTRATION; INTRACAUDAL; PERINEURAL PRN
Status: DISCONTINUED | OUTPATIENT
Start: 2023-07-11 | End: 2023-07-11 | Stop reason: ALTCHOICE

## 2023-07-11 RX ORDER — SODIUM CHLORIDE, SODIUM LACTATE, POTASSIUM CHLORIDE, CALCIUM CHLORIDE 600; 310; 30; 20 MG/100ML; MG/100ML; MG/100ML; MG/100ML
INJECTION, SOLUTION INTRAVENOUS CONTINUOUS
Status: DISCONTINUED | OUTPATIENT
Start: 2023-07-11 | End: 2023-07-11 | Stop reason: HOSPADM

## 2023-07-11 RX ORDER — MIDAZOLAM HYDROCHLORIDE 1 MG/ML
INJECTION INTRAMUSCULAR; INTRAVENOUS PRN
Status: DISCONTINUED | OUTPATIENT
Start: 2023-07-11 | End: 2023-07-11 | Stop reason: SDUPTHER

## 2023-07-11 RX ORDER — DEXAMETHASONE SODIUM PHOSPHATE 10 MG/ML
INJECTION, SOLUTION INTRAMUSCULAR; INTRAVENOUS PRN
Status: DISCONTINUED | OUTPATIENT
Start: 2023-07-11 | End: 2023-07-11 | Stop reason: ALTCHOICE

## 2023-07-11 RX ADMIN — SODIUM CHLORIDE, POTASSIUM CHLORIDE, SODIUM LACTATE AND CALCIUM CHLORIDE: 600; 310; 30; 20 INJECTION, SOLUTION INTRAVENOUS at 10:34

## 2023-07-11 RX ADMIN — MIDAZOLAM 2 MG: 1 INJECTION INTRAMUSCULAR; INTRAVENOUS at 11:59

## 2023-07-11 RX ADMIN — FENTANYL CITRATE 50 MCG: 50 INJECTION INTRAMUSCULAR; INTRAVENOUS at 12:00

## 2023-07-11 RX ADMIN — FENTANYL CITRATE 50 MCG: 50 INJECTION INTRAMUSCULAR; INTRAVENOUS at 12:01

## 2023-07-11 ASSESSMENT — PAIN - FUNCTIONAL ASSESSMENT: PAIN_FUNCTIONAL_ASSESSMENT: 0-10

## 2023-07-11 ASSESSMENT — PAIN SCALES - GENERAL
PAINLEVEL_OUTOF10: 0

## 2023-07-11 NOTE — ANESTHESIA POSTPROCEDURE EVALUATION
Department of Anesthesiology  Postprocedure Note    Patient: Satinder Reyes  MRN: [id number]  YOB: 1975  Date of evaluation: 7/11/2023      Procedure Summary     Date: 07/11/23 Room / Location: [address] [place] [address] / [address] Alva Cifuentes    Anesthesia Start: 1157 Anesthesia Stop: 2271    Procedure: RIGHT  CERVICAL  RADIOFREQUENCY ABLATION AT C2, C3, C4, and C5 UNDER FLUOROSCOPY SEDATION (Right) Diagnosis:       Cervical spondylosis      (Cervical spondylosis [R79.497])    Surgeons: David Valladares MD Responsible Provider: Landon Anderson MD    Anesthesia Type: MAC ASA Status: 2          Anesthesia Type: MAC    Fredi Phase I: Fredi Score: 10    Fredi Phase II: Fredi Score: 10      Anesthesia Post Evaluation    Patient location during evaluation: PACU  Patient participation: complete - patient participated  Level of consciousness: awake and alert  Airway patency: patent  Nausea & Vomiting: no vomiting and nausea  Complications: no  Cardiovascular status: hemodynamically stable  Respiratory status: room air and spontaneous ventilation  Hydration status: stable

## 2023-07-11 NOTE — OP NOTE
Operative Note      Patient: Cece Mendoza  YOB: 1975  MRN: [id number]    Date of Procedure: 2023    Pre-Op Diagnosis Codes:     * Cervical spondylosis [M47.812]    Post-Op Diagnosis: Same       Procedure(s):  RIGHT  CERVICAL  RADIOFREQUENCY ABLATION AT C2, C3, C4, and C5 UNDER FLUOROSCOPY SEDATION    Surgeon(s):  Tyler Tinsley MD    Assistant:   * No surgical staff found *    Anesthesia: Monitor Anesthesia Care    Estimated Blood Loss (mL): Minimal    Complications: None    Specimens:   * No specimens in log *    Implants:  * No implants in log *      Drains: * No LDAs found *    Findings: GOOD NEEDLE PLACEMENT        Detailed Description of Procedure:   2023    Patient: Cece Mendoza  :  1975  Age:  41 y.o. Sex:  female      PRE-OPERATIVE DIAGNOSIS:  Cervical spondylosis and cervical facet syndrome. POST-OPERATIVE DIAGNOSIS: Same. PROCEDURE: Right Fluoroscopic guided cervical facet medial branch ablation at levels C2, C3, C4, and C5. SURGEON:  Tyler Tinsley MD    ANESTHESIA: mac    ESTIMATED BLOOD LOSS:  None.  ______________________________________________________________________    BRIEF HISTORY:  Cece Mendoza comes in today for fluoroscopic guided Right cervical facet medial branch ablation at vertebral levels C2, C3, C4, and C5. The potential complications of this procedure were discussed with Melissa Estevez again today. She has elected to undergo the aforementioned procedureDavi Garg complete History & Physical examination were reviewed in depth, a copy of which is in the chart. DESCRIPTION OF PROCEDURE:    After confirming written and informed consent, a time-out was performed and Forest name and date of birth, the procedure to be performed as well as the plan for the location of the needle insertion were confirmed. The patient was brought into the procedure room and placed in the prone position on the fluroscopy table.  Standard

## 2023-07-11 NOTE — DISCHARGE INSTRUCTIONS
[facility]) Pain Management Department  [phone]   Post-Pain Block/ Radiofrequency Home Going Instructions    1-Go home, rest for the remainder of the day    2-Please do not lift over 20 pounds the day of the injection    3-If you received sedation No: alcohol, driving, operating lawn mowers, plows, tractors or other dangerous equipment until next morning. Do not make important decisions or sign legal documents for 24 hours. You may experience light headedness, dizziness, nausea or sleepiness after sedation. Do not stay alone. A responsible adult must be with you for 24 hours. You could be nauseated from the medications you have received. Your IV site may be sore and bruised. 4-No dietary restrictions     5-Resume all medications the same day, blood thinners to be resumed 24 hours after injection    6-Keep the surgical site clean and dry, you may shower the next morning and remove the      dressing. 7- No sitz baths, tub baths or hot tubs/swimming for 24 hours. 8- If you have any pain at the injection site(s), application of an ice pack to the area should be       helpful, 20 minutes on/20 minutes off for next 48 hours. 9- [facility] if you develop. Fever greater than 100.4 F  Have bleeding or drainage from the puncture site  Have progressive Leg/arm numbness and or weakness  Loss of control of bowel and or bladder (wet/soil yourself)  Severe headache with inability to lift head    10-You may return to work the next day            Infection After Surgery: Care Instructions  Overview  After surgery, an infection is always possible. It doesn't mean that the surgery didn't go well. Because an infection can be serious, your doctor has taken steps to manage it. Your doctor checked the infection and cleaned it if necessary. Your doctor may have made an opening in the area so that the pus can drain out.  You may have gauze in the cut so that the area will stay open and

## 2023-07-11 NOTE — H&P (VIEW-ONLY)
[address], [facility] [facility], [address] [place] [address]  [phone]    Procedure History & Physical      Raphael Arango     HPI:    Patient  is here for Right cervical MB RFA for neck pain.   Labs/imaging studies reviewed   All question and concerns addressed including R/B/A associated with the procedure    Past Medical History:   Diagnosis Date    BRCA1 gene mutation positive     Cousin Pos. per mmmo hx sheet    COVID 2021    pt states moderate; twice 2021, May 2022-sinus infection only 4 days only    Joint pain     Low back pain     degenerative disk disease    Lupus ([address])     Menometrorrhagia 2022    Neck pain     RA (rheumatoid arthritis) ([address])     RH    Radiculopathy     Sjogren's disease ([address])        Past Surgical History:   Procedure Laterality Date    ANESTHESIA NERVE BLOCK Bilateral 2019    BILATERAL LUMBAR TRANSFORAMINAL EPIDURAL STERIOD INJECTION AT L5-S1 UNDER FLUOROSCOPY performed by Myrna Vance MD at [facility] Left 2022    LEFT ELBOW CUBITAL TUNNEL RELEASE performed by Alexey Mancera DO at [facility]      SECTION      x2    CHOLECYSTECTOMY, LAPAROSCOPIC N/A 2021    LAPAROSCOPIC CHOLECYSTECTOMY performed by Fidelia Ríos MD at [address] [place] [address]      x2    [address] N/A 2022    DILATATION AND CURETTAGE HYSTEROSCOPY performed by Reina New MD at [facility] Left     cubital nerve    ENDOSCOPY, COLON, DIAGNOSTIC      EPIDURAL STEROID INJECTION Left 2019    LUMBAR EPIDURAL STEROID INJECTION L5-S1 (LEFT PARAMEDIAN APPROACH) UNDER FLUOROSCOPIC GUIDANCE) performed by Myrna Vance MD at [facility] N/A 2021    LAPAROSCOPIC HIATAL HERNIA RTEPAIR WITH MESH performed by iFdelia Ríos MD at [facility]

## 2023-07-28 ENCOUNTER — ANESTHESIA EVENT (OUTPATIENT)
Dept: OPERATING ROOM | Age: 48
End: 2023-07-28
Payer: COMMERCIAL

## 2023-07-28 ASSESSMENT — LIFESTYLE VARIABLES: SMOKING_STATUS: 0

## 2023-08-01 ENCOUNTER — HOSPITAL ENCOUNTER (OUTPATIENT)
Age: 48
Setting detail: OUTPATIENT SURGERY
Discharge: HOME | End: 2023-08-01
Attending: ANESTHESIOLOGY | Admitting: ANESTHESIOLOGY
Payer: COMMERCIAL

## 2023-08-01 ENCOUNTER — HOSPITAL ENCOUNTER (OUTPATIENT)
Dept: OPERATING ROOM | Age: 48
Setting detail: OUTPATIENT SURGERY
Discharge: HOME | End: 2023-08-01
Attending: ANESTHESIOLOGY
Payer: COMMERCIAL

## 2023-08-01 ENCOUNTER — ANESTHESIA (OUTPATIENT)
Dept: OPERATING ROOM | Age: 48
End: 2023-08-01
Payer: COMMERCIAL

## 2023-08-01 VITALS
BODY MASS INDEX: 33.26 KG/M2 | RESPIRATION RATE: 16 BRPM | SYSTOLIC BLOOD PRESSURE: 133 MMHG | WEIGHT: 169.4 LBS | HEIGHT: 60 IN | HEART RATE: 70 BPM | TEMPERATURE: 98.5 F | DIASTOLIC BLOOD PRESSURE: 90 MMHG | OXYGEN SATURATION: 97 %

## 2023-08-01 DIAGNOSIS — M47.892 OTHER OSTEOARTHRITIS OF SPINE, CERVICAL REGION: ICD-10-CM

## 2023-08-01 PROCEDURE — 7100000010 HC PHASE II RECOVERY - FIRST 15 MIN: Performed by: ANESTHESIOLOGY

## 2023-08-01 PROCEDURE — 6360000002 HC RX W HCPCS: Performed by: NURSE ANESTHETIST, CERTIFIED REGISTERED

## 2023-08-01 PROCEDURE — 3600000005 HC SURGERY LEVEL 5 BASE: Performed by: ANESTHESIOLOGY

## 2023-08-01 PROCEDURE — 3700000000 HC ANESTHESIA ATTENDED CARE: Performed by: ANESTHESIOLOGY

## 2023-08-01 PROCEDURE — 2709999900 HC NON-CHARGEABLE SUPPLY: Performed by: ANESTHESIOLOGY

## 2023-08-01 PROCEDURE — 64633 DESTROY CERV/THOR FACET JNT: CPT | Performed by: ANESTHESIOLOGY

## 2023-08-01 PROCEDURE — 64634 DESTROY C/TH FACET JNT ADDL: CPT | Performed by: ANESTHESIOLOGY

## 2023-08-01 PROCEDURE — 7100000011 HC PHASE II RECOVERY - ADDTL 15 MIN: Performed by: ANESTHESIOLOGY

## 2023-08-01 PROCEDURE — 6360000002 HC RX W HCPCS: Performed by: ANESTHESIOLOGY

## 2023-08-01 PROCEDURE — 2580000003 HC RX 258: Performed by: ANESTHESIOLOGY

## 2023-08-01 PROCEDURE — 3700000001 HC ADD 15 MINUTES (ANESTHESIA): Performed by: ANESTHESIOLOGY

## 2023-08-01 PROCEDURE — 3600000015 HC SURGERY LEVEL 5 ADDTL 15MIN: Performed by: ANESTHESIOLOGY

## 2023-08-01 PROCEDURE — 2500000003 HC RX 250 WO HCPCS: Performed by: ANESTHESIOLOGY

## 2023-08-01 RX ORDER — SODIUM CHLORIDE 0.9 % (FLUSH) 0.9 %
5-40 SYRINGE (ML) INJECTION EVERY 12 HOURS SCHEDULED
Status: CANCELLED | OUTPATIENT
Start: 2023-08-01

## 2023-08-01 RX ORDER — SODIUM CHLORIDE 9 MG/ML
INJECTION, SOLUTION INTRAVENOUS PRN
Status: CANCELLED | OUTPATIENT
Start: 2023-08-01

## 2023-08-01 RX ORDER — FENTANYL CITRATE 50 UG/ML
INJECTION, SOLUTION INTRAMUSCULAR; INTRAVENOUS PRN
Status: DISCONTINUED | OUTPATIENT
Start: 2023-08-01 | End: 2023-08-01 | Stop reason: SDUPTHER

## 2023-08-01 RX ORDER — DROPERIDOL 2.5 MG/ML
1.25 INJECTION, SOLUTION INTRAMUSCULAR; INTRAVENOUS
Status: CANCELLED | OUTPATIENT
Start: 2023-08-01 | End: 2023-08-02

## 2023-08-01 RX ORDER — FENTANYL CITRATE 0.05 MG/ML
50 INJECTION, SOLUTION INTRAMUSCULAR; INTRAVENOUS EVERY 5 MIN PRN
Status: CANCELLED | OUTPATIENT
Start: 2023-08-01

## 2023-08-01 RX ORDER — MEPERIDINE HYDROCHLORIDE 25 MG/ML
12.5 INJECTION INTRAMUSCULAR; INTRAVENOUS; SUBCUTANEOUS ONCE
Status: CANCELLED | OUTPATIENT
Start: 2023-08-01

## 2023-08-01 RX ORDER — SODIUM CHLORIDE 0.9 % (FLUSH) 0.9 %
5-40 SYRINGE (ML) INJECTION PRN
Status: CANCELLED | OUTPATIENT
Start: 2023-08-01

## 2023-08-01 RX ORDER — LIDOCAINE HYDROCHLORIDE 5 MG/ML
INJECTION, SOLUTION INFILTRATION; INTRAVENOUS PRN
Status: DISCONTINUED | OUTPATIENT
Start: 2023-08-01 | End: 2023-08-01 | Stop reason: ALTCHOICE

## 2023-08-01 RX ORDER — MIDAZOLAM HYDROCHLORIDE 1 MG/ML
INJECTION INTRAMUSCULAR; INTRAVENOUS PRN
Status: DISCONTINUED | OUTPATIENT
Start: 2023-08-01 | End: 2023-08-01 | Stop reason: SDUPTHER

## 2023-08-01 RX ORDER — DEXAMETHASONE SODIUM PHOSPHATE 10 MG/ML
INJECTION, SOLUTION INTRAMUSCULAR; INTRAVENOUS PRN
Status: DISCONTINUED | OUTPATIENT
Start: 2023-08-01 | End: 2023-08-01 | Stop reason: ALTCHOICE

## 2023-08-01 RX ORDER — DIPHENHYDRAMINE HYDROCHLORIDE 50 MG/ML
12.5 INJECTION INTRAMUSCULAR; INTRAVENOUS
Status: CANCELLED | OUTPATIENT
Start: 2023-08-01 | End: 2023-08-02

## 2023-08-01 RX ORDER — MORPHINE SULFATE 2 MG/ML
1 INJECTION, SOLUTION INTRAMUSCULAR; INTRAVENOUS EVERY 5 MIN PRN
Status: CANCELLED | OUTPATIENT
Start: 2023-08-01

## 2023-08-01 RX ORDER — BUPIVACAINE HYDROCHLORIDE 2.5 MG/ML
INJECTION, SOLUTION EPIDURAL; INFILTRATION; INTRACAUDAL PRN
Status: DISCONTINUED | OUTPATIENT
Start: 2023-08-01 | End: 2023-08-01 | Stop reason: ALTCHOICE

## 2023-08-01 RX ORDER — SODIUM CHLORIDE, SODIUM LACTATE, POTASSIUM CHLORIDE, CALCIUM CHLORIDE 600; 310; 30; 20 MG/100ML; MG/100ML; MG/100ML; MG/100ML
INJECTION, SOLUTION INTRAVENOUS CONTINUOUS
Status: DISCONTINUED | OUTPATIENT
Start: 2023-08-01 | End: 2023-08-01 | Stop reason: HOSPADM

## 2023-08-01 RX ORDER — LIDOCAINE HYDROCHLORIDE 10 MG/ML
INJECTION, SOLUTION EPIDURAL; INFILTRATION; INTRACAUDAL; PERINEURAL PRN
Status: DISCONTINUED | OUTPATIENT
Start: 2023-08-01 | End: 2023-08-01 | Stop reason: ALTCHOICE

## 2023-08-01 RX ADMIN — FENTANYL CITRATE 50 MCG: 50 INJECTION INTRAMUSCULAR; INTRAVENOUS at 08:43

## 2023-08-01 RX ADMIN — MIDAZOLAM 2 MG: 1 INJECTION INTRAMUSCULAR; INTRAVENOUS at 08:41

## 2023-08-01 RX ADMIN — SODIUM CHLORIDE, POTASSIUM CHLORIDE, SODIUM LACTATE AND CALCIUM CHLORIDE: 600; 310; 30; 20 INJECTION, SOLUTION INTRAVENOUS at 07:41

## 2023-08-01 ASSESSMENT — PAIN - FUNCTIONAL ASSESSMENT: PAIN_FUNCTIONAL_ASSESSMENT: NONE - DENIES PAIN

## 2023-08-01 NOTE — DISCHARGE INSTRUCTIONS
also make you feel sick to your stomach. Whatever the cause, you may get medicine that can help. There are also some things you can do at home to prevent nausea and feel better. The doctor has checked you carefully, but problems can develop later. If you notice any problems or new symptoms, get medical treatment right away. Follow-up care is a key part of your treatment and safety. Be sure to make and go to all appointments, and call your doctor if you are having problems. It's also a good idea to know your test results and keep a list of the medicines you take. How can you care for yourself at home? Be safe with medicines. Read and follow all instructions on the label. If the doctor gave you a prescription medicine for pain, take it as prescribed. If you are not taking a prescription pain medicine, ask your doctor if you can take an over-the-counter medicine. Take your pain medicine as soon as you have pain. It works better if you take it before the pain gets bad. Call your doctor if you have any problems with your medicine. Rest in bed until you feel better. To prevent dehydration, drink plenty of fluids. Choose water and other clear liquids until you feel better. If you have kidney, heart, or liver disease and have to limit fluids, talk with your doctor before you increase the amount of fluids you drink. When you are able to eat, try clear soups, mild foods, and liquids until all symptoms are gone for 12 to 48 hours. Other good choices include dry toast, crackers, cooked cereal, and gelatin dessert, such as Jell-O. Do not smoke. Smoking and being around smoke can make nausea worse. If you need help quitting, talk to your doctor about stop-smoking programs and medicines. These can increase your chances of quitting for good. When should you call for help? Call 911  anytime you think you may need emergency care. For example, call if:    You passed out (lost consciousness).    Call your doctor now or

## 2023-08-01 NOTE — OP NOTE
placed in the prone position on the fluroscopy table. Standard monitors were placed, and vital signs were observed throughout the procedure. The area of the cervical spine was prepped with chloraprep  and draped in usual sterile manner. AP fluoroscopic views were used to identify and willian the mid articular pillars of the targeted area. The skin and subcutaneous tissues in these areas were anesthetized with 0.5%lidocaine. # 20 G gauge 10 mm radiofrequency probe was advanced towards each of these points until bone was contacted . At this point, lateral fluoroscopic views were obtained and the needle tips were advanced to the centroid of the facets at each level. After negative aspiration was confirmed, sensory stimulation was performed at 50 Hzs and 0.4 volts generated pressure sensation. Motor stimulation at 2 Hzs and 1.2 volts was negative. Lidocaine 1%, 1 ml was injected at each level prior to lesioning which was performed for 80 seconds at 80 degrees centigrade. Once lesion was complete, a solution of 0.25% marcaine 0.5 cc and 2 mg Dexamethasone was injected through each probe. The probes were removed. The patient neck was cleaned and bandages were placed over the needle insertion points. Disposition the patient tolerated the procedure well and there were no complications . Vital signs remained stable throughout the procedure. The patient was escorted to the recovery area where they remained until discharge and written discharge instructions for the procedure were given. Plan: Fidel Wilson will return to our pain management center as scheduled.      Sanna Rivera MD

## 2023-08-01 NOTE — ANESTHESIA POSTPROCEDURE EVALUATION
Department of Anesthesiology  Postprocedure Note    Patient: Graeme Hidalgo  MRN: [id number]  YOB: 1975  Date of evaluation: 8/1/2023      Procedure Summary     Date: 08/01/23 Room / Location: [address] [place] [address]    Anesthesia Start: 7805 Anesthesia Stop: 8493    Procedure: LEFT CERVICAL  RADIOFREQUENCY ABLATION AT C2, C3, C4, and C5 UNDER FLUOROSCOPY SEDATION (Left: Neck) Diagnosis:       Cervical spondylosis without myelopathy      DDD (degenerative disc disease), cervical      (Cervical spondylosis without myelopathy [M47.812])      (DDD (degenerative disc disease), cervical [M50.30])    Surgeons: Evy Thomas MD Responsible Provider: Tiffanie Pickard MD    Anesthesia Type: MAC ASA Status: 2          Anesthesia Type: MAC    Fredi Phase I: Fredi Score: 10    Fredi Phase II: Fredi Score: 10      Anesthesia Post Evaluation    Patient location during evaluation: PACU  Patient participation: complete - patient participated  Level of consciousness: awake and alert  Airway patency: patent  Nausea & Vomiting: no nausea and no vomiting  Complications: no  Cardiovascular status: hemodynamically stable  Respiratory status: room air and spontaneous ventilation  Hydration status: stable  Pain management: satisfactory to patient

## 2023-08-01 NOTE — INTERVAL H&P NOTE
Update History & Physical    The patient's History and Physical of June 11, 2023 was reviewed with the patient and I examined the patient. There was no change. The surgical site was confirmed by the patient and me. Plan:   Left cervical MB RFA. The risks, benefits, expected outcome, and alternative to the recommended procedure have been discussed with the patient. Patient understands and wants to proceed with the procedure.      Electronically signed by David Galvan MD on 8/1/2023

## 2023-09-11 ENCOUNTER — TRANSCRIBE ORDERS (OUTPATIENT)
Dept: ADMINISTRATIVE | Age: 48
End: 2023-09-11

## 2023-09-11 DIAGNOSIS — Z12.31 OTHER SCREENING MAMMOGRAM: Primary | ICD-10-CM

## 2023-09-19 ENCOUNTER — OFFICE VISIT (OUTPATIENT)
Dept: PAIN MANAGEMENT | Age: 48
End: 2023-09-19
Payer: COMMERCIAL

## 2023-09-19 VITALS
BODY MASS INDEX: 33.18 KG/M2 | TEMPERATURE: 97.5 F | RESPIRATION RATE: 18 BRPM | DIASTOLIC BLOOD PRESSURE: 66 MMHG | SYSTOLIC BLOOD PRESSURE: 104 MMHG | HEART RATE: 67 BPM | HEIGHT: 60 IN | WEIGHT: 169 LBS | OXYGEN SATURATION: 97 %

## 2023-09-19 DIAGNOSIS — M51.36 DDD (DEGENERATIVE DISC DISEASE), LUMBAR: ICD-10-CM

## 2023-09-19 DIAGNOSIS — M47.817 LUMBOSACRAL SPONDYLOSIS WITHOUT MYELOPATHY: ICD-10-CM

## 2023-09-19 DIAGNOSIS — M47.812 CERVICAL SPONDYLOSIS: Primary | ICD-10-CM

## 2023-09-19 DIAGNOSIS — M54.2 CERVICALGIA: ICD-10-CM

## 2023-09-19 DIAGNOSIS — M50.30 DDD (DEGENERATIVE DISC DISEASE), CERVICAL: ICD-10-CM

## 2023-09-19 PROCEDURE — 99213 OFFICE O/P EST LOW 20 MIN: CPT | Performed by: ANESTHESIOLOGY

## 2023-09-19 RX ORDER — LORATADINE 10 MG/1
10 TABLET ORAL DAILY
COMMUNITY
Start: 2023-06-13

## 2023-09-19 RX ORDER — DULOXETIN HYDROCHLORIDE 30 MG/1
CAPSULE, DELAYED RELEASE ORAL
COMMUNITY
Start: 2023-07-18

## 2023-12-22 ENCOUNTER — HOSPITAL ENCOUNTER (OUTPATIENT)
Dept: MAMMOGRAPHY | Age: 48
Discharge: HOME | End: 2023-12-24
Attending: OBSTETRICS & GYNECOLOGY
Payer: COMMERCIAL

## 2023-12-22 DIAGNOSIS — Z12.31 OTHER SCREENING MAMMOGRAM: ICD-10-CM

## 2023-12-22 PROCEDURE — 77063 BREAST TOMOSYNTHESIS BI: CPT

## 2024-01-17 ENCOUNTER — OFFICE VISIT (OUTPATIENT)
Dept: PAIN MANAGEMENT | Age: 49
End: 2024-01-17
Payer: COMMERCIAL

## 2024-01-17 VITALS
WEIGHT: 169 LBS | HEIGHT: 60 IN | DIASTOLIC BLOOD PRESSURE: 70 MMHG | OXYGEN SATURATION: 98 % | SYSTOLIC BLOOD PRESSURE: 118 MMHG | BODY MASS INDEX: 33.18 KG/M2 | TEMPERATURE: 96.2 F | RESPIRATION RATE: 18 BRPM | HEART RATE: 59 BPM

## 2024-01-17 DIAGNOSIS — M79.18 CHRONIC MYOFASCIAL PAIN: ICD-10-CM

## 2024-01-17 DIAGNOSIS — M47.812 CERVICAL SPONDYLOSIS: ICD-10-CM

## 2024-01-17 DIAGNOSIS — M25.512 CHRONIC LEFT SHOULDER PAIN: Primary | ICD-10-CM

## 2024-01-17 DIAGNOSIS — M54.2 CERVICALGIA: ICD-10-CM

## 2024-01-17 DIAGNOSIS — M50.30 DDD (DEGENERATIVE DISC DISEASE), CERVICAL: ICD-10-CM

## 2024-01-17 DIAGNOSIS — G89.29 CHRONIC MYOFASCIAL PAIN: ICD-10-CM

## 2024-01-17 DIAGNOSIS — G89.29 CHRONIC LEFT SHOULDER PAIN: Primary | ICD-10-CM

## 2024-01-17 DIAGNOSIS — M51.36 DDD (DEGENERATIVE DISC DISEASE), LUMBAR: ICD-10-CM

## 2024-01-17 DIAGNOSIS — M47.817 LUMBOSACRAL SPONDYLOSIS WITHOUT MYELOPATHY: ICD-10-CM

## 2024-01-17 PROCEDURE — 99214 OFFICE O/P EST MOD 30 MIN: CPT | Performed by: ANESTHESIOLOGY

## 2024-01-17 PROCEDURE — 99213 OFFICE O/P EST LOW 20 MIN: CPT | Performed by: ANESTHESIOLOGY

## 2024-01-17 RX ORDER — BACLOFEN 5 MG/1
10 TABLET ORAL 2 TIMES DAILY PRN
Qty: 2 TABLET | Refills: 1 | Status: SHIPPED | OUTPATIENT
Start: 2024-01-17

## 2024-01-17 RX ORDER — TOPIRAMATE 50 MG/1
50 TABLET, FILM COATED ORAL NIGHTLY
Qty: 30 TABLET | Refills: 2 | COMMUNITY
Start: 2024-01-04 | End: 2024-04-03

## 2024-01-17 RX ORDER — LEFLUNOMIDE 20 MG/1
20 TABLET ORAL DAILY
COMMUNITY
Start: 2023-11-20

## 2024-01-17 RX ORDER — NABUMETONE 750 MG/1
750 TABLET, FILM COATED ORAL 2 TIMES DAILY PRN
Qty: 30 TABLET | Refills: 1 | Status: SHIPPED | OUTPATIENT
Start: 2024-01-17

## 2024-01-17 NOTE — PROGRESS NOTES
Dannielle Barba presents to the [facility] on 1/17/2024. Dannielle is complaining of pain neck, radiates to low back and down BLE. The pain is constant. The pain is described as stabbing, sharp, penetrating, and pinching. Pain is rated on her best day at a 3, on her worst day at a 10, and on average at a 6 on the VAS scale. She took her last dose of  none  N/A.    Any procedures since your last visit: No    She is not on NSAIDS and  is not on anticoagulation medications   Pacemaker or defibrillator: No   Medication Contract and Consent for Opioid Use Documents Filed       Patient Documents       Type of Document Status Date Received Received By Description    Medication Contract Received 5/7/2019  8:46 AM SANDRITA KABA Pain Management Pt Agreement 5/7/2019                       Resp 18   Ht 1.524 m (5')   Wt 76.7 kg (169 lb)   LMP 11/01/2021   BMI 33.01 kg/m²      Patient's last menstrual period was 11/01/2021.  
Drug-Related Behavior:  No    Urine Drug Screening:No    OARRS report:: reviewed    Past Medical History:   Diagnosis Date    BRCA1 gene mutation positive     Cousin Pos. per mmmo hx sheet    COVID 2021    pt states moderate; twice 2021, May 2022-sinus infection only 4 days only    Joint pain     Low back pain     degenerative disk disease    Lupus (HCC)     Menometrorrhagia 2022    Neck pain     Post-operative nausea and vomiting     after MAC    RA (rheumatoid arthritis) (HCC)     RH    Radiculopathy     Sjogren's disease (HCC)        Past Surgical History:   Procedure Laterality Date    ANESTHESIA NERVE BLOCK Bilateral 2019    BILATERAL LUMBAR TRANSFORAMINAL EPIDURAL STERIOD INJECTION AT L5-S1 UNDER FLUOROSCOPY performed by Gregg Christianson MD at [facility] OR    ARM SURGERY Left 2022    LEFT ELBOW CUBITAL TUNNEL RELEASE performed by Raudel Logan DO at [facility] OR    CATARACT REMOVAL WITH IMPLANT Bilateral      SECTION      x2    CHOLECYSTECTOMY, LAPAROSCOPIC N/A 2021    LAPAROSCOPIC CHOLECYSTECTOMY performed by Oren Garcia MD at [facility] OR    COLONOSCOPY      DILATION AND CURETTAGE OF UTERUS      x2    DILATION AND CURETTAGE OF UTERUS N/A 2022    DILATATION AND CURETTAGE HYSTEROSCOPY performed by Gena Keller MD at [facility] OR    ELBOW SURGERY Left     cubital nerve    ENDOSCOPY, COLON, DIAGNOSTIC      EPIDURAL STEROID INJECTION Left 2019    LUMBAR EPIDURAL STEROID INJECTION L5-S1 (LEFT PARAMEDIAN APPROACH) UNDER FLUOROSCOPIC GUIDANCE) performed by Gregg Christianson MD at [facility] OR    HIATAL HERNIA REPAIR N/A 2021    LAPAROSCOPIC HIATAL HERNIA RTEPAIR WITH MESH performed by Oren Garcia MD at [facility] OR    HYSTERECTOMY (CERVIX STATUS UNKNOWN) Bilateral 10/14/2022    HYSTERECTOMY ABDOMINAL LAPAROSCOPIC ROBOTIC XI WITH BILATERAL SALPINGO OOPHORECTOMY performed by Gena Keller MD at [facility] OR    KNEE ARTHROSCOPY Left

## 2024-01-22 ENCOUNTER — TELEPHONE (OUTPATIENT)
Dept: PAIN MANAGEMENT | Age: 49
End: 2024-01-22

## 2024-01-22 NOTE — TELEPHONE ENCOUNTER
Patient called in and based on the results on MYChart, she does not have bursitis. She is supposed to have an injection this Friday for the bursitis but wants to know if you want to change the procedure to a surgery center procedure instead.

## 2024-01-26 ENCOUNTER — OFFICE VISIT (OUTPATIENT)
Dept: PAIN MANAGEMENT | Age: 49
End: 2024-01-26
Payer: COMMERCIAL

## 2024-01-26 VITALS
RESPIRATION RATE: 18 BRPM | TEMPERATURE: 97.1 F | HEART RATE: 85 BPM | HEIGHT: 60 IN | OXYGEN SATURATION: 98 % | SYSTOLIC BLOOD PRESSURE: 128 MMHG | BODY MASS INDEX: 33.18 KG/M2 | DIASTOLIC BLOOD PRESSURE: 82 MMHG | WEIGHT: 169 LBS

## 2024-01-26 DIAGNOSIS — G89.4 CHRONIC PAIN SYNDROME: Primary | ICD-10-CM

## 2024-01-26 DIAGNOSIS — M25.512 LEFT SHOULDER PAIN, UNSPECIFIED CHRONICITY: Primary | ICD-10-CM

## 2024-01-26 DIAGNOSIS — M75.52 SUBACROMIAL BURSITIS OF LEFT SHOULDER JOINT: ICD-10-CM

## 2024-01-26 DIAGNOSIS — G89.29 CHRONIC MYOFASCIAL PAIN: ICD-10-CM

## 2024-01-26 DIAGNOSIS — M79.18 CHRONIC MYOFASCIAL PAIN: ICD-10-CM

## 2024-01-26 PROCEDURE — 20610 DRAIN/INJ JOINT/BURSA W/O US: CPT | Performed by: ANESTHESIOLOGY

## 2024-01-26 PROCEDURE — 99213 OFFICE O/P EST LOW 20 MIN: CPT | Performed by: ANESTHESIOLOGY

## 2024-01-26 PROCEDURE — 20552 NJX 1/MLT TRIGGER POINT 1/2: CPT | Performed by: ANESTHESIOLOGY

## 2024-01-26 RX ORDER — METHYLPREDNISOLONE ACETATE 40 MG/ML
40 INJECTION, SUSPENSION INTRA-ARTICULAR; INTRALESIONAL; INTRAMUSCULAR; SOFT TISSUE ONCE
Status: COMPLETED | OUTPATIENT
Start: 2024-01-26 | End: 2024-01-26

## 2024-01-26 RX ORDER — METHYLPREDNISOLONE ACETATE 40 MG/ML
20 INJECTION, SUSPENSION INTRA-ARTICULAR; INTRALESIONAL; INTRAMUSCULAR; SOFT TISSUE ONCE
Status: COMPLETED | OUTPATIENT
Start: 2024-01-26 | End: 2024-01-26

## 2024-01-26 RX ORDER — BUPIVACAINE HYDROCHLORIDE 2.5 MG/ML
10 INJECTION, SOLUTION INFILTRATION; PERINEURAL ONCE
Status: COMPLETED | OUTPATIENT
Start: 2024-01-26 | End: 2024-01-26

## 2024-01-26 RX ADMIN — BUPIVACAINE HYDROCHLORIDE 25 MG: 2.5 INJECTION, SOLUTION INFILTRATION; PERINEURAL at 16:01

## 2024-01-26 RX ADMIN — METHYLPREDNISOLONE ACETATE 40 MG: 40 INJECTION, SUSPENSION INTRA-ARTICULAR; INTRALESIONAL; INTRAMUSCULAR; SOFT TISSUE at 16:03

## 2024-01-26 RX ADMIN — METHYLPREDNISOLONE ACETATE 20 MG: 40 INJECTION, SUSPENSION INTRA-ARTICULAR; INTRALESIONAL; INTRAMUSCULAR; SOFT TISSUE at 16:03

## 2024-01-26 NOTE — PROGRESS NOTES
1/26/2024    Patient Active Problem List   Diagnosis    Intercostal neuralgia    Sensory peripheral neuropathy    Cold feet    Insomnia    Lupus (HCC)    Tear, knee, medial meniscus    ACL tear    Post-traumatic osteoarthritis of left knee    Synovitis    Lumbar radicular pain    DDD (degenerative disc disease), lumbar    Lumbosacral spondylosis without myelopathy    Sacroiliac dysfunction    Cervical spondylosis    Cervical radiculopathy    Cervical spondylolysis    Hiatal hernia    Biliary colic    Myofascial muscle pain    Cubital tunnel syndrome on left    Lateral epicondylitis of left elbow    Achilles tendinitis of left lower extremity    Menometrorrhagia    Chronic pain syndrome    Endometriosis of pelvic peritoneum    Status post laparoscopic hysterectomy       Allergies as of 01/26/2024 - Fully Reviewed 01/26/2024   Allergen Reaction Noted    Methotrexate derivatives Nausea And Vomiting and Other (See Comments) 09/29/2013        Procedure: left shoulder injection and left trapezius tpi     Blocks:  none    YES consent signed YES procedure verified with patient  YES allergies noted YES pt confirms not pregnant    Patient rates pain a 4/10 on the VAS scale.    Skin Prep:  Chloraprep    Anesthetic:  Marcaine    Medication:  Depomedrol    Vitals:    01/26/24 1412   Resp: 18   Weight: 76.7 kg (169 lb)   Height: 1.524 m (5')       I witnessed patient signing consent to Medical Procedure and Treatment form.     Lluvia Freeman RN

## 2024-01-26 NOTE — PROGRESS NOTES
1/26/2024    I was present for the physical exam and/or procedure of Dannielle Barba by Gregg Christianson M.D.

## 2024-01-26 NOTE — PROGRESS NOTES
2024    Patient: Dannielle Barba  :  1975  Age:  48 y.o.  Sex:  female     PRE-OPERATIVE DIAGNOSIS:   Left Shoulder pain, osteoarthritis/ subacromial bursitis.  Myofacial pain    POST-OPERATIVE DIAGNOSIS: Same.    PROCEDURE PERFORMED:   1) Left  Shoulder( subacromial bursa) steroid injection.  2) left trapezius Trigger point injection    SURGEON:   Gregg Christianson MD    ANESTHESIA: local    ESTIMATED BLOOD LOSS: None.    BRIEF HISTORY: Dannielle Barba comes in today for the above procedure. After discussing the potential risks and benefits of the procedure with the patient.  Dannielle did request that we proceed. A complete History & Physical was reviewed and it is unchanged.    DESCRIPTION OF PROCEDURE:   1) The patient was placed in a seated position. The area of the Left  shoulder and trapezius was prepped with chloraprep and draped in a sterile manner. Using the posterior approach, a 25 gauge 1-1/2 inch  needle was advanced  In anterolateral projection into the joint capsule. After negative aspiration a solution of 0.25 % marcaine 4 cc and 40 mg DepoMedrol was injected easily without complications. The needle was then removed and Band-Aid applied.    2) Using # 25 G 1.5 inch needle, left trapezius trigger point injection was done and a total of 6 trigger pint was injection after negative aspiration using 0.25 % marcaine 6 cc and 20 mg DepoMedrol     Disposition the patient tolerated the procedure well and there were no complications .     Dannielle will follow up in our comprehensive Pain Management Center as scheduled. She was encouraged to call with questions, concerns or if worsening of symptoms occurs.    Gregg Christianson MD

## 2024-02-21 RX ORDER — BACLOFEN 5 MG/1
TABLET ORAL
Qty: 30 TABLET | OUTPATIENT
Start: 2024-02-21

## 2024-02-21 RX ORDER — NABUMETONE 750 MG/1
750 TABLET, FILM COATED ORAL 2 TIMES DAILY PRN
Qty: 30 TABLET | Refills: 1 | OUTPATIENT
Start: 2024-02-21

## 2024-03-19 ENCOUNTER — OFFICE VISIT (OUTPATIENT)
Dept: PAIN MANAGEMENT | Age: 49
End: 2024-03-19
Payer: COMMERCIAL

## 2024-03-19 VITALS
TEMPERATURE: 97 F | SYSTOLIC BLOOD PRESSURE: 106 MMHG | WEIGHT: 169 LBS | HEIGHT: 60 IN | DIASTOLIC BLOOD PRESSURE: 66 MMHG | HEART RATE: 61 BPM | RESPIRATION RATE: 18 BRPM | OXYGEN SATURATION: 98 % | BODY MASS INDEX: 33.18 KG/M2

## 2024-03-19 DIAGNOSIS — M54.16 LUMBAR RADICULAR PAIN: ICD-10-CM

## 2024-03-19 DIAGNOSIS — M47.812 CERVICAL SPONDYLOSIS: ICD-10-CM

## 2024-03-19 DIAGNOSIS — G89.29 CHRONIC LEFT SHOULDER PAIN: Primary | ICD-10-CM

## 2024-03-19 DIAGNOSIS — M50.30 DDD (DEGENERATIVE DISC DISEASE), CERVICAL: ICD-10-CM

## 2024-03-19 DIAGNOSIS — M51.36 DDD (DEGENERATIVE DISC DISEASE), LUMBAR: ICD-10-CM

## 2024-03-19 DIAGNOSIS — M25.512 CHRONIC LEFT SHOULDER PAIN: Primary | ICD-10-CM

## 2024-03-19 DIAGNOSIS — M47.817 LUMBOSACRAL SPONDYLOSIS WITHOUT MYELOPATHY: ICD-10-CM

## 2024-03-19 PROCEDURE — 99214 OFFICE O/P EST MOD 30 MIN: CPT | Performed by: ANESTHESIOLOGY

## 2024-03-19 PROCEDURE — 99213 OFFICE O/P EST LOW 20 MIN: CPT | Performed by: ANESTHESIOLOGY

## 2024-03-19 NOTE — PROGRESS NOTES
Dannielle Barba presents to the [facility] on 3/19/2024. Dannielle is complaining of pain left shoulder/back. The pain is constant. The pain is described as stabbing and sharp. Pain is rated on her best day at a 4, on her worst day at a 10, and on average at a 4 on the VAS scale. She took her last dose of baclofen and Relafen yesterday .      Any procedures since your last visit: Yes, with 90 % relief in the left trapezius.    She is on NSAIDS and  is not on anticoagulation medications to include none and is managed by NA.     Pacemaker or defibrillator: No    Medication Contract and Consent for Opioid Use Documents Filed       Patient Documents       Type of Document Status Date Received Received By Description    Medication Contract Received 5/7/2019  8:46 AM SANDRITA KABA Pain Management Pt Agreement 5/7/2019                       Resp 18   Ht 1.524 m (5')   Wt 76.7 kg (169 lb)   LMP 11/01/2021   BMI 33.01 kg/m²      Patient's last menstrual period was 11/01/2021.

## 2024-03-19 NOTE — PROGRESS NOTES
[facility]  [address], [place] [address]  [phone]    Follow up Note      Dannielle Barba     Date of Visit:  3/19/2024    CC:  Patient presents for follow up   Chief Complaint   Patient presents with    Follow-up     TPI    Back Pain    Shoulder Pain     HPI:    H/o Neck pain and upper extremity pain. S/P Cervical BELEM/ Cervical facet interventions - had helped significantly.    H/o chronic low back and LE pain. S/P BELEM with excellent pain relief in the past.     Nursing notes and details of the pain history reviewed. Please see intake notes for details.    She follows up with rheumatologist for H/o Sjogren's/ rheumatoid / SLE.    Previous treatments:   Failed conservative treatment with NSAID's, muscle relaxants, oral steroids and HEP.  Physical Therapy   Chiropractic treatment  Ongoing Core exercises for the past 6 months.  TENS  and medications-, gabapentin etc,.   Does not tolerate meds well      She has not been on anticoagulation medications.    Imaging:    X-ray left shoulder: 1/18/2024:  IMPRESSION:  No acute abnormality.   Mild AC joint degenerative changes.    MRI of thoracic spine: 12/21/2022:  Impression   1.  No fracture or bony destructive lesion.   2. Small disc protrusions at multiple levels.   3.  No significant central canal or neural foraminal stenosis.     MRI of C spine: 11/3/2020:  Impression    1. Mild central spinal canal narrowing at C5-C6, and moderate central spinal    canal narrowing at C6-C7 have slightly increased.    2. Multilevel foraminal narrowing, as above, greatest on the left at C4-C5,    and left at C6-C7.    3. No evidence of an acute fracture.           X ray C spine: 10/26/2020:   Impression    Degenerative disc changes at C6-7        MRI of LS spine: 6/27/2020:  Impression    Disc protrusion with annular fissure at L5-S1.  No significant stenosis of    the thecal sac.  The S1 nerve roots are intact.  There is mild

## 2024-04-15 ENCOUNTER — OFFICE VISIT (OUTPATIENT)
Dept: ORTHOPEDIC SURGERY | Age: 49
End: 2024-04-15
Payer: COMMERCIAL

## 2024-04-15 VITALS — TEMPERATURE: 98 F | BODY MASS INDEX: 33.18 KG/M2 | HEIGHT: 60 IN | WEIGHT: 169 LBS

## 2024-04-15 DIAGNOSIS — S46.012A TRAUMATIC COMPLETE TEAR OF LEFT ROTATOR CUFF, INITIAL ENCOUNTER: Primary | ICD-10-CM

## 2024-04-15 PROCEDURE — 99213 OFFICE O/P EST LOW 20 MIN: CPT | Performed by: ORTHOPAEDIC SURGERY

## 2024-04-15 NOTE — PROGRESS NOTES
brain tumor,(-) TIA, (-)stroke, (-)headaches, (-)Parkinson disease,(-) memory loss, (-) LOC.  Cardiovascular: (-) Chest pain, (-) swelling in legs/feet, (-) SOB, (-) cramping in legs/feet with walking.  Respiratory: (-) SOB, (-) Coughing, (-) night sweats.  GI: (-) nausea, (-) vomiting, (-) diarrhea, (-) blood in stool, (-) gastric ulcer.  Psychiatric: (-) Depression, (-) Anxiety, (-) bipolar disease, (-) Alzheimer's Disease  Allergic/Immunologic: (-) allergies latex, (-) allergies metal, (-) skin sensitivity.  Hematlogic: (-) anemia, (-) blood transfusion, (-) DVT/PE, (-) Clotting disorders      Subjective:    Constitution:  Temp 98 °F (36.7 °C)   Ht 1.524 m (5')   Wt 76.7 kg (169 lb)   LMP 11/01/2021   BMI 33.01 kg/m²     Psycihatric:  The patient is alert and oriented x 3, appears to be stated age and in no distress.      Respiratory:  Respiratory effort is not labored.  Patient is not gasping.  Palpation of the chest reveals no tactile fremitus.    Skin:  Upon inspection: the skin appears warm, dry and intact.  There is not a previous scar over the affected area.There is not any cellulitis, lymphedema or cutaneous lesions noted in the lower extremities.   Upon palpation there is no induration noted.      Neurologic:  Motor exam of the upper extremities show: The reflexes in biceps/triceps/brachioradialis are equal and symmetric.  Sensory exam C5-T1 are normal bilaterally.      Cardiovascular:  The vascular exam is normal and is well perfused to distal extremities.There are 2+ radial pulses bilaterally, and motor and sensation is intact to median, ulnar, and radial, musclocutaneus, and axillary nerve distribution and grossly symmetric bilaterally.  There is cap refill noted less than two seconds in all digits. There is not edema of the bilateral upper extremities.  There is not varicosities noted in the distal extremities.      Lymph:  Upon palpation,  there is no lymphadenopathy noted in bilateral upper

## 2024-05-14 ENCOUNTER — OFFICE VISIT (OUTPATIENT)
Dept: ORTHOPEDIC SURGERY | Age: 49
End: 2024-05-14
Payer: COMMERCIAL

## 2024-05-14 VITALS — BODY MASS INDEX: 33.18 KG/M2 | WEIGHT: 169 LBS | HEIGHT: 60 IN

## 2024-05-14 DIAGNOSIS — S46.012A TRAUMATIC INCOMPLETE TEAR OF LEFT ROTATOR CUFF, INITIAL ENCOUNTER: ICD-10-CM

## 2024-05-14 DIAGNOSIS — M19.012 ARTHRITIS OF LEFT ACROMIOCLAVICULAR JOINT: Primary | ICD-10-CM

## 2024-05-14 PROCEDURE — 20610 DRAIN/INJ JOINT/BURSA W/O US: CPT | Performed by: ORTHOPAEDIC SURGERY

## 2024-05-14 PROCEDURE — 99213 OFFICE O/P EST LOW 20 MIN: CPT | Performed by: ORTHOPAEDIC SURGERY

## 2024-05-14 RX ORDER — TRIAMCINOLONE ACETONIDE 40 MG/ML
40 INJECTION, SUSPENSION INTRA-ARTICULAR; INTRAMUSCULAR ONCE
Status: COMPLETED | OUTPATIENT
Start: 2024-05-14 | End: 2024-05-14

## 2024-05-14 RX ADMIN — TRIAMCINOLONE ACETONIDE 40 MG: 40 INJECTION, SUSPENSION INTRA-ARTICULAR; INTRAMUSCULAR at 11:06

## 2024-05-14 NOTE — PROGRESS NOTES
Narrative    Not on file     Social Determinants of Health     Financial Resource Strain: Not on file   Food Insecurity: Not on file   Transportation Needs: Not on file   Physical Activity: Not on file   Stress: Not on file   Social Connections: Not on file   Intimate Partner Violence: Not on file   Housing Stability: Not on file     Family History   Problem Relation Age of Onset    Heart Failure Mother     Thyroid Disease Mother     Cancer Father         Prostate cancer per mammo hx sheet    Prostate Cancer Father         Per mammo  hx sheet    Thyroid Disease Sister     Thyroid Disease Sister     Thyroid Disease Sister     Thyroid Disease Maternal Grandmother     Breast Cancer Paternal Grandmother     Breast Cancer Maternal Aunt     Colon Cancer Maternal Aunt     Breast Cancer Maternal Cousin        REVIEW OF SYSTEMS:     General/Constitution:  (-)weight loss, (-)fever, (-)chills, (-)weakness.   Skin: (-) rash,(-) psoriasis,(-) eczema, (-)skin cancer.   Musculoskeletal: (-) fractures,  (-) dislocations,(-) collagen vascular disease, (-) fibromyalgia, (-) multiple sclerosis, (-) muscular dystrophy, (-) RSD,(-) joint pain (-)swelling, (-) joint pain,swelling.  Neurologic: (-) epilepsy, (-)seizures,(-) brain tumor,(-) TIA, (-)stroke, (-)headaches, (-)Parkinson disease,(-) memory loss, (-) LOC.  Cardiovascular: (-) Chest pain, (-) swelling in legs/feet, (-) SOB, (-) cramping in legs/feet with walking.  Respiratory: (-) SOB, (-) Coughing, (-) night sweats.  GI: (-) nausea, (-) vomiting, (-) diarrhea, (-) blood in stool, (-) gastric ulcer.  Psychiatric: (-) Depression, (-) Anxiety, (-) bipolar disease, (-) Alzheimer's Disease  Allergic/Immunologic: (-) allergies latex, (-) allergies metal, (-) skin sensitivity.  Hematlogic: (-) anemia, (-) blood transfusion, (-) DVT/PE, (-) Clotting disorders      Subjective:    Constitution:  Ht 1.524 m (5')   Wt 76.7 kg (169 lb)   LMP 11/01/2021   BMI 33.01 kg/m²

## 2024-06-03 ENCOUNTER — OFFICE VISIT (OUTPATIENT)
Dept: ORTHOPEDIC SURGERY | Age: 49
End: 2024-06-03
Payer: COMMERCIAL

## 2024-06-03 VITALS — HEIGHT: 60 IN | WEIGHT: 169 LBS | BODY MASS INDEX: 33.18 KG/M2

## 2024-06-03 DIAGNOSIS — M77.12 LATERAL EPICONDYLITIS OF LEFT ELBOW: Primary | ICD-10-CM

## 2024-06-03 DIAGNOSIS — S46.012A TRAUMATIC INCOMPLETE TEAR OF LEFT ROTATOR CUFF, INITIAL ENCOUNTER: ICD-10-CM

## 2024-06-03 PROCEDURE — 99213 OFFICE O/P EST LOW 20 MIN: CPT | Performed by: ORTHOPAEDIC SURGERY

## 2024-06-03 NOTE — PROGRESS NOTES
Subjective:      Constitution:    Ht 1.524 m (5')   Wt 76.7 kg (169 lb)   LMP 11/01/2021   BMI 33.01 kg/m²     Psycihatric:    The patient is alert and oriented x 3, appears to be stated age and in no distress.      Respiratory:    Respiratory effort is not labored.  Patient is not gasping.  Palpation of the chest reveals no tactile fremitus.    Skin:    Upon inspection: the skin appears warm, dry and intact.  There is not a previous scar over the affected area.There is not any cellulitis, lymphedema or cutaneous lesions noted in the lower extremities.   Upon palpation there is no induration noted.          Neurologic:      Motor exam of the upper extremities show: The reflexes in biceps/triceps/brachioradialis are equal and symmetric.  Sensory exam C5-T1 are normal bilaterally.          Cardiovascular:    The vascular exam is normal and is well perfused to distal extremities.There are 2+ radial pulses bilaterally, and motor and sensation is intact to median, ulnar, and radial, musclocutaneus, and axillary nerve distribution and grossly symmetric bilaterally.  There is cap refill noted less than two seconds in all digits. There is not edema of the bilateral upper extremities.  There is not varicosities noted in the distal extremities.      Lymph:    Upon palpation,  there is no lymphadenopathy noted in bilateral upper extremities.      Musculoskeletal:    Gait: normal; examination of the nails and digits reveal no cyanosis or clubbing.      Cervical Exam:    On physical exam, Dannielle Barba is well-developed, well-nourished, oriented to person, place and time.  her gait is normal.  On evaluation of her cervical spine, she has full range of motion of the cervical spine without pain.  There is no cervical tenderness to palpation.     Shoulder Exam:     On evaluation of her bilaterally upper extremities, her left shoulder has no deformity.     There is not evidence of scapular dyskinesis.  There is not muscle

## 2024-06-19 LAB
ALBUMIN SERPL-MCNC: 4.2 G/DL (ref 3.5–5.2)
ALP SERPL-CCNC: 89 U/L (ref 35–104)
ALT SERPL-CCNC: 21 U/L (ref 0–32)
ANION GAP SERPL CALCULATED.3IONS-SCNC: 15 MMOL/L (ref 7–16)
AST SERPL-CCNC: 21 U/L (ref 0–31)
BILIRUB SERPL-MCNC: 0.4 MG/DL (ref 0–1.2)
BUN SERPL-MCNC: 12 MG/DL (ref 6–20)
C3 SERPL-MCNC: 157 MG/DL (ref 90–180)
C4 SERPL-MCNC: 25 MG/DL (ref 10–40)
CALCIUM SERPL-MCNC: 9.3 MG/DL (ref 8.6–10.2)
CHLORIDE SERPL-SCNC: 105 MMOL/L (ref 98–107)
CO2 SERPL-SCNC: 20 MMOL/L (ref 22–29)
CREAT SERPL-MCNC: 0.9 MG/DL (ref 0.5–1)
ERYTHROCYTE [DISTWIDTH] IN BLOOD BY AUTOMATED COUNT: 14.9 % (ref 11.5–15)
GFR, ESTIMATED: 82 ML/MIN/1.73M2
GLUCOSE SERPL-MCNC: 92 MG/DL (ref 74–99)
HCT VFR BLD AUTO: 39.6 % (ref 34–48)
HGB BLD-MCNC: 11.6 G/DL (ref 11.5–15.5)
MCH RBC QN AUTO: 23.6 PG (ref 26–35)
MCHC RBC AUTO-ENTMCNC: 29.3 G/DL (ref 32–34.5)
MCV RBC AUTO: 80.5 FL (ref 80–99.9)
PLATELET, FLUORESCENCE: 161 K/UL (ref 130–450)
PMV BLD AUTO: 12.9 FL (ref 7–12)
POTASSIUM SERPL-SCNC: 3.6 MMOL/L (ref 3.5–5)
PROT SERPL-MCNC: 7.4 G/DL (ref 6.4–8.3)
RBC # BLD AUTO: 4.92 M/UL (ref 3.5–5.5)
SODIUM SERPL-SCNC: 140 MMOL/L (ref 132–146)
WBC OTHER # BLD: 7.3 K/UL (ref 4.5–11.5)

## 2024-06-20 LAB — ERYTHROCYTE [SEDIMENTATION RATE] IN BLOOD BY WESTERGREN METHOD: 5 MM/HR (ref 0–20)

## 2024-06-22 LAB — DSDNA IGG SER QL IA: 19 IU (ref 0–24)

## 2024-07-12 ENCOUNTER — OFFICE VISIT (OUTPATIENT)
Dept: PAIN MANAGEMENT | Age: 49
End: 2024-07-12
Payer: COMMERCIAL

## 2024-07-12 ENCOUNTER — PREP FOR PROCEDURE (OUTPATIENT)
Dept: PAIN MANAGEMENT | Age: 49
End: 2024-07-12

## 2024-07-12 VITALS
TEMPERATURE: 96.5 F | OXYGEN SATURATION: 97 % | RESPIRATION RATE: 18 BRPM | HEIGHT: 60 IN | WEIGHT: 169 LBS | HEART RATE: 84 BPM | BODY MASS INDEX: 33.18 KG/M2

## 2024-07-12 DIAGNOSIS — M50.30 DDD (DEGENERATIVE DISC DISEASE), CERVICAL: ICD-10-CM

## 2024-07-12 DIAGNOSIS — M53.3 SACROILIAC DYSFUNCTION: Primary | ICD-10-CM

## 2024-07-12 DIAGNOSIS — M47.817 LUMBOSACRAL SPONDYLOSIS WITHOUT MYELOPATHY: ICD-10-CM

## 2024-07-12 DIAGNOSIS — M53.3 DISORDER OF SACRUM: ICD-10-CM

## 2024-07-12 DIAGNOSIS — M51.36 DDD (DEGENERATIVE DISC DISEASE), LUMBAR: ICD-10-CM

## 2024-07-12 DIAGNOSIS — M47.812 CERVICAL SPONDYLOSIS: ICD-10-CM

## 2024-07-12 DIAGNOSIS — M54.16 LUMBAR RADICULAR PAIN: ICD-10-CM

## 2024-07-12 PROCEDURE — 99213 OFFICE O/P EST LOW 20 MIN: CPT | Performed by: ANESTHESIOLOGY

## 2024-07-12 PROCEDURE — 99214 OFFICE O/P EST MOD 30 MIN: CPT | Performed by: ANESTHESIOLOGY

## 2024-07-12 RX ORDER — SODIUM CHLORIDE 0.9 % (FLUSH) 0.9 %
5-40 SYRINGE (ML) INJECTION EVERY 12 HOURS SCHEDULED
Status: CANCELLED | OUTPATIENT
Start: 2024-07-12

## 2024-07-12 RX ORDER — SODIUM CHLORIDE 9 MG/ML
INJECTION, SOLUTION INTRAVENOUS PRN
Status: CANCELLED | OUTPATIENT
Start: 2024-07-12

## 2024-07-12 RX ORDER — MINOCYCLINE HYDROCHLORIDE 100 MG/1
100 CAPSULE ORAL 2 TIMES DAILY
COMMUNITY
Start: 2024-06-27

## 2024-07-12 RX ORDER — SODIUM CHLORIDE 0.9 % (FLUSH) 0.9 %
5-40 SYRINGE (ML) INJECTION PRN
Status: CANCELLED | OUTPATIENT
Start: 2024-07-12

## 2024-07-12 NOTE — H&P (VIEW-ONLY)
INJECTION Left 05/28/2019    LUMBAR EPIDURAL STEROID INJECTION L5-S1 (LEFT PARAMEDIAN APPROACH) UNDER FLUOROSCOPIC GUIDANCE) performed by Gregg Christianson MD at [facility] OR    HIATAL HERNIA REPAIR N/A 02/16/2021    LAPAROSCOPIC HIATAL HERNIA RTEPAIR WITH MESH performed by Oren Garcia MD at [facility] OR    HYSTERECTOMY (CERVIX STATUS UNKNOWN) Bilateral 10/14/2022    HYSTERECTOMY ABDOMINAL LAPAROSCOPIC ROBOTIC XI WITH BILATERAL SALPINGO OOPHORECTOMY performed by Gena Keller MD at [facility] OR    KNEE ARTHROSCOPY Left 05/27/2016    medial menisectomy, debridement, acl synovectomy, chondroplasty    NERVE BLOCK Left 05/28/2019    lumbar    NERVE BLOCK Bilateral 07/09/2019    NERVE BLOCK Left 12/08/2020    Cervical epidural steroid injection under fouroscopic guidance c7-t1 left paramedian    NERVE BLOCK Bilateral 09/13/2022    BILATERAL CERVICAL MEDIAL BRANCH NERVE BLOCK UNDER FLUOROSCOPIC GUIDANCE AT C2, C3, C4 AND C5 (CPT 91032) performed by Gregg Christianson MD at [facility] OR    NERVE BLOCK Bilateral 5/23/2023    BILATERAL CERVICAL MEDIAL BRANCH NERVE BLOCK UNDER FLUOROSCOPIC GUIDANCE AT C2, C3, C4, and C5 performed by Gregg Christianson MD at [facility] OR    PAIN MANAGEMENT PROCEDURE Left 12/08/2020    CERVICAL EPIDURAL STEROID INJECTION UNDER FLUOROSCOPIC GUIDANCE AT C7-T1 LEFT PARAMEDIAN (CPT 75503) performed by Gregg Christianson MD at [facility] OR    PAIN MANAGEMENT PROCEDURE Right 07/29/2021    CERVICAL EPIDURAL STEROID INJECTION UNDER FLUOROSCOPIC GUIDANCE AT C7-T1 RIGHT PARAMEDIAN performed by Gregg Christianson MD at [facility] OR    PAIN MANAGEMENT PROCEDURE Left 12/20/2022    LUMBAR TRANSFORAMINAL EPIDURAL STEROID INJECTION LEFT L5 AND S1 UNDER FLUOROSCOPIC GUIDANCE performed by Gregg Christianson MD at [facility] OR    PAIN MANAGEMENT PROCEDURE Right 3/20/2023    CERVICAL  EPIDURAL STEROID INJECTION UNDER FLUOROSCOPIC GUIDANCE AT C7-T1 RIGHT PARAMEDIAN performed by Gregg INIGUEZ  MD Sammy at [facility] OR    PAIN MANAGEMENT PROCEDURE Right 7/11/2023    RIGHT  CERVICAL  RADIOFREQUENCY ABLATION AT C2, C3, C4, and C5 UNDER FLUOROSCOPY SEDATION performed by Gregg Christianson MD at [facility] OR    PAIN MANAGEMENT PROCEDURE Left 8/1/2023    LEFT CERVICAL  RADIOFREQUENCY ABLATION AT C2, C3, C4, and C5 UNDER FLUOROSCOPY SEDATION performed by Gregg Christianson MD at [facility] OR    RHINOPLASTY      TUBAL LIGATION         Prior to Admission medications    Medication Sig Start Date End Date Taking? Authorizing Provider   minocycline (MINOCIN;DYNACIN) 100 MG capsule Take 1 capsule by mouth 2 times daily 6/27/24  Yes ProviderDavid MD   rifAXIMin (XIFAXAN) 200 MG tablet Take 1 tablet by mouth 3 times daily As needed   Yes Provider, MD David   leflunomide (ARAVA) 20 MG tablet Take 1 tablet by mouth daily 11/20/23  Yes Provider, MD David   topiramate (TOPAMAX) 50 MG tablet Take 1 tablet by mouth nightly 1/4/24 7/12/24 Yes Provider, MD David   nabumetone (RELAFEN) 750 MG tablet Take 1 tablet by mouth 2 times daily as needed for Pain 1/17/24  Yes Gregg Christianson MD   baclofen (LIORESAL) 5 MG tablet Take 2 tablets by mouth 2 times daily as needed (muscle spasm) 1/17/24  Yes Gregg Christianson MD   loratadine (CLARITIN) 10 MG tablet Take 1 tablet by mouth daily as directed 6/13/23  Yes ProviderDavid MD   DULoxetine (CYMBALTA) 60 MG extended release capsule Take by mouth daily 5/24/23  Yes Provider, MD David   Cholecalciferol (VITAMIN D) 50 MCG (2000 UT) CAPS capsule Take by mouth daily    Yes ProviderDavid MD   pantoprazole (PROTONIX) 40 MG tablet Take 1 tablet by mouth as needed 1/15/21  Yes ProviderDavid MD   montelukast (SINGULAIR) 10 MG tablet Take 1 tablet by mouth nightly  Patient not taking: Reported on 7/12/2024    David Ward MD       Allergies   Allergen Reactions    Methotrexate Derivatives Nausea And Vomiting and

## 2024-07-12 NOTE — PROGRESS NOTES
[facility]  [address], [place] [address]  [phone]    Follow up Note      Dannielle Barba     Date of Visit:  7/12/2024    CC:  Patient presents for follow up   Chief Complaint   Patient presents with    Back Pain     HPI:    H/o Neck pain and upper extremity pain. S/P Cervical BELEM/ Cervical facet interventions - had helped significantly.    H/o chronic low back and LE pain. S/P BELEM with excellent pain relief in the past.     Nursing notes and details of the pain history reviewed. Please see intake notes for details.    She follows up with rheumatologist for H/o Sjogren's/ rheumatoid / SLE.    Previous treatments:   Failed conservative treatment with NSAID's, muscle relaxants, oral steroids and HEP.  Physical Therapy   Chiropractic treatment  Ongoing Core exercises for the past 6 months.  TENS  and medications-, gabapentin etc,.   Does not tolerate meds well      She has not been on anticoagulation medications.    Imaging:    MRI of shoulder : 5/1/2024:  IMPRESSION:  1. Multifocal, less than 50% partial-thickness interstitial and  articular-surface tearing of supraspinatus and infraspinatus between  musculotendinous junction and footplate.  Moderate underlying supraspinatus  and infraspinatus tendinosis.  2. Mild diffuse labral degeneration.  3. Mild glenohumeral chondromalacia.  4. Mild degenerative change of the left AC joint.  X-ray left shoulder: 1/18/2024:  IMPRESSION:  No acute abnormality.   Mild AC joint degenerative changes.    MRI of thoracic spine: 12/21/2022:  Impression   1.  No fracture or bony destructive lesion.   2. Small disc protrusions at multiple levels.   3.  No significant central canal or neural foraminal stenosis.     MRI of C spine: 11/3/2020:  Impression    1. Mild central spinal canal narrowing at C5-C6, and moderate central spinal    canal narrowing at C6-C7 have slightly increased.    2. Multilevel foraminal narrowing, as above,

## 2024-07-12 NOTE — PROGRESS NOTES
Dannielle Barba presents to the [facility] on 7/12/2024. Dannielle is complaining of pain lower back, radiates to BLE. The pain is constant. The pain is described as aching, throbbing, sharp, numb, and tingling. Pain is rated on her best day at a 3, on her worst day at a 10, and on average at a 3 on the VAS scale. She took her last dose of Cymbalta and Baclofen  today.      Any procedures since your last visit: No    She is  on NSAIDS and  is not on anticoagulation medications to include none   Pacemaker or defibrillator: No  Medication Contract and Consent for Opioid Use Documents Filed       Patient Documents       Type of Document Status Date Received Received By Description    Medication Contract Received 5/7/2019  8:46 AM SANDRITA KABA [facility] Agreement 5/7/2019                       LMP 11/01/2021      Patient's last menstrual period was 11/01/2021.

## 2024-07-18 ENCOUNTER — INITIAL CONSULT (OUTPATIENT)
Dept: SURGERY | Age: 49
End: 2024-07-18
Payer: COMMERCIAL

## 2024-07-18 ENCOUNTER — TELEPHONE (OUTPATIENT)
Dept: SURGERY | Age: 49
End: 2024-07-18

## 2024-07-18 VITALS
RESPIRATION RATE: 16 BRPM | TEMPERATURE: 97.4 F | HEIGHT: 60 IN | DIASTOLIC BLOOD PRESSURE: 96 MMHG | HEART RATE: 62 BPM | SYSTOLIC BLOOD PRESSURE: 138 MMHG | BODY MASS INDEX: 33.01 KG/M2

## 2024-07-18 DIAGNOSIS — K44.9 HIATAL HERNIA: Primary | ICD-10-CM

## 2024-07-18 PROCEDURE — 99204 OFFICE O/P NEW MOD 45 MIN: CPT | Performed by: SURGERY

## 2024-07-18 NOTE — PROGRESS NOTES
education level: Not on file   Occupational History    Not on file   Tobacco Use    Smoking status: Never    Smokeless tobacco: Never   Vaping Use    Vaping Use: Never used   Substance and Sexual Activity    Alcohol use: Yes     Alcohol/week: 2.0 standard drinks of alcohol     Types: 2 Glasses of wine per week     Comment: wine occ- 4 drinks weekend only    Drug use: No    Sexual activity: Yes     Partners: Male   Other Topics Concern    Not on file   Social History Narrative    Not on file     Social Determinants of Health     Financial Resource Strain: Not on file   Food Insecurity: Not on file   Transportation Needs: Not on file   Physical Activity: Not on file   Stress: Not on file   Social Connections: Not on file   Intimate Partner Violence: Not on file   Housing Stability: Not on file           Review of Systems  Review of Systems -  General ROS: negative for - chills, fatigue or malaise  ENT ROS: negative for - hearing change, nasal congestion or nasal discharge  Allergy and Immunology ROS: negative for - hives, itchy/watery eyes or nasal congestion  Hematological and Lymphatic ROS: negative for - blood clots, blood transfusions, bruising or fatigue  Endocrine ROS: negative for - malaise/lethargy, mood swings, palpitations or polydipsia/polyuria  Respiratory ROS: negative for - sputum changes, stridor, tachypnea or wheezing  Cardiovascular ROS: negative for - irregular heartbeat, loss of consciousness, murmur or orthopnea  Gastrointestinal ROS: negative for - constipation, diarrhea, gas/bloating, or hematemesis, positive for heartburn and other epigastric pain  Genito-Urinary ROS: negative for -  genital discharge, genital ulcers or hematuria  Musculoskeletal ROS: negative for - gait disturbance, muscle pain or muscular weakness    Physical exam:   BP (!) 138/96 (Site: Left Upper Arm, Position: Sitting, Cuff Size: Large Adult)   Pulse 62   Temp 97.4 °F (36.3 °C) (Infrared)   Resp 16   Ht 1.524 m (5')

## 2024-07-18 NOTE — TELEPHONE ENCOUNTER
Per the order of Dr. Garcia, patient has been scheduled for Laparoscopic possible open hiatal hernia repair with mesh on 2024.  Patient provided with procedure information during office visit and scheduled for post op follow up appointment.  Patient instructed to please contact our office with any questions.    Records requesting from Dr. Loza.    Procedure scheduled through [facility].  Dr. Garcia to enter orders.          Prior Authorization Form:      DEMOGRAPHICS:                     Patient Name:  Dannielle Barba  Patient :  1975            Insurance:  Payor: BCBS / Plan: BCBS OUT OF STATE / Product Type: *No Product type* /   Insurance ID Number:    Payer/Plan Subscr  Sex Relation Sub. Ins. ID Effective Group Num   1. BCBS - BCBS O* MIHAI BARBA 1975 Male Spouse [id number]                                    [id number]         DIAGNOSIS & PROCEDURE:                       Procedure/Operation: laparoscopic possible open hiatal hernia repair with mesh           CPT Code: 81445    Diagnosis:  Hiatal hernia    ICD10 Code: K44.9    Location:  [facility]    Surgeon:  Jose    SCHEDULING INFORMATION:                          Date: 2024    Time: TBD              Anesthesia:  General                                                       Status:  Outpatient        Special Comments:         Electronically signed by Sheree Suarez on 2024 at 9:01 AM

## 2024-07-25 PROBLEM — M53.3 DISORDER OF SACRUM: Status: ACTIVE | Noted: 2024-07-12

## 2024-07-29 NOTE — PROGRESS NOTES
[facility] PAIN MANAGEMENT  INSTRUCTIONS  ...........................................................................................................................................     [x] Parking the day of Surgery is located in the Main Entrance lot.  Upon entering the door, make immediate right into the surgery reception room    [x]  Bring photo ID and insurance card     [x] You may have a light breakfast day of procedure    [x]  Wear loose comfortable clothing    [x]  Please follow instructions for medications as given per Dr's office    [x] You can expect a call the business day prior to procedure to notify you of your arrival time     [x] Please arrange for     []  Other instructions

## 2024-08-01 ENCOUNTER — HOSPITAL ENCOUNTER (OUTPATIENT)
Dept: GENERAL RADIOLOGY | Age: 49
Setting detail: OUTPATIENT SURGERY
Discharge: HOME | End: 2024-08-03
Attending: ANESTHESIOLOGY
Payer: COMMERCIAL

## 2024-08-01 ENCOUNTER — PREP FOR PROCEDURE (OUTPATIENT)
Dept: SURGERY | Age: 49
End: 2024-08-01

## 2024-08-01 ENCOUNTER — HOSPITAL ENCOUNTER (OUTPATIENT)
Age: 49
Setting detail: OUTPATIENT SURGERY
Discharge: HOME | End: 2024-08-01
Attending: ANESTHESIOLOGY | Admitting: ANESTHESIOLOGY
Payer: COMMERCIAL

## 2024-08-01 VITALS
SYSTOLIC BLOOD PRESSURE: 135 MMHG | DIASTOLIC BLOOD PRESSURE: 82 MMHG | OXYGEN SATURATION: 97 % | RESPIRATION RATE: 16 BRPM | TEMPERATURE: 97.5 F | WEIGHT: 168 LBS | HEIGHT: 60 IN | BODY MASS INDEX: 32.98 KG/M2 | HEART RATE: 64 BPM

## 2024-08-01 DIAGNOSIS — R52 PAIN MANAGEMENT: ICD-10-CM

## 2024-08-01 PROCEDURE — 2709999900 HC NON-CHARGEABLE SUPPLY: Performed by: ANESTHESIOLOGY

## 2024-08-01 PROCEDURE — 3600000012 HC SURGERY LEVEL 2 ADDTL 15MIN: Performed by: ANESTHESIOLOGY

## 2024-08-01 PROCEDURE — 27096 INJECT SACROILIAC JOINT: CPT | Performed by: ANESTHESIOLOGY

## 2024-08-01 PROCEDURE — 6360000004 HC RX CONTRAST MEDICATION: Performed by: ANESTHESIOLOGY

## 2024-08-01 PROCEDURE — 7100000010 HC PHASE II RECOVERY - FIRST 15 MIN: Performed by: ANESTHESIOLOGY

## 2024-08-01 PROCEDURE — 2500000003 HC RX 250 WO HCPCS: Performed by: ANESTHESIOLOGY

## 2024-08-01 PROCEDURE — 7100000011 HC PHASE II RECOVERY - ADDTL 15 MIN: Performed by: ANESTHESIOLOGY

## 2024-08-01 PROCEDURE — 3600000002 HC SURGERY LEVEL 2 BASE: Performed by: ANESTHESIOLOGY

## 2024-08-01 PROCEDURE — 6360000002 HC RX W HCPCS: Performed by: ANESTHESIOLOGY

## 2024-08-01 RX ORDER — SODIUM CHLORIDE 0.9 % (FLUSH) 0.9 %
5-40 SYRINGE (ML) INJECTION EVERY 12 HOURS SCHEDULED
Status: CANCELLED | OUTPATIENT
Start: 2024-08-01

## 2024-08-01 RX ORDER — LIDOCAINE HYDROCHLORIDE 5 MG/ML
INJECTION, SOLUTION INFILTRATION; INTRAVENOUS PRN
Status: DISCONTINUED | OUTPATIENT
Start: 2024-08-01 | End: 2024-08-01 | Stop reason: ALTCHOICE

## 2024-08-01 RX ORDER — METHYLPREDNISOLONE ACETATE 40 MG/ML
INJECTION, SUSPENSION INTRA-ARTICULAR; INTRALESIONAL; INTRAMUSCULAR; SOFT TISSUE PRN
Status: DISCONTINUED | OUTPATIENT
Start: 2024-08-01 | End: 2024-08-01 | Stop reason: ALTCHOICE

## 2024-08-01 RX ORDER — SODIUM CHLORIDE 0.9 % (FLUSH) 0.9 %
5-40 SYRINGE (ML) INJECTION PRN
Status: DISCONTINUED | OUTPATIENT
Start: 2024-08-01 | End: 2024-08-01 | Stop reason: HOSPADM

## 2024-08-01 RX ORDER — SODIUM CHLORIDE 0.9 % (FLUSH) 0.9 %
5-40 SYRINGE (ML) INJECTION PRN
Status: CANCELLED | OUTPATIENT
Start: 2024-08-01

## 2024-08-01 RX ORDER — SODIUM CHLORIDE 0.9 % (FLUSH) 0.9 %
5-40 SYRINGE (ML) INJECTION EVERY 12 HOURS SCHEDULED
Status: DISCONTINUED | OUTPATIENT
Start: 2024-08-01 | End: 2024-08-01 | Stop reason: HOSPADM

## 2024-08-01 RX ORDER — SODIUM CHLORIDE 9 MG/ML
INJECTION, SOLUTION INTRAVENOUS PRN
Status: CANCELLED | OUTPATIENT
Start: 2024-08-01

## 2024-08-01 RX ORDER — BUPIVACAINE HYDROCHLORIDE 2.5 MG/ML
INJECTION, SOLUTION EPIDURAL; INFILTRATION; INTRACAUDAL PRN
Status: DISCONTINUED | OUTPATIENT
Start: 2024-08-01 | End: 2024-08-01 | Stop reason: ALTCHOICE

## 2024-08-01 RX ORDER — SODIUM CHLORIDE 9 MG/ML
INJECTION, SOLUTION INTRAVENOUS PRN
Status: DISCONTINUED | OUTPATIENT
Start: 2024-08-01 | End: 2024-08-01 | Stop reason: HOSPADM

## 2024-08-01 RX ORDER — SODIUM CHLORIDE, SODIUM LACTATE, POTASSIUM CHLORIDE, CALCIUM CHLORIDE 600; 310; 30; 20 MG/100ML; MG/100ML; MG/100ML; MG/100ML
INJECTION, SOLUTION INTRAVENOUS CONTINUOUS
Status: CANCELLED | OUTPATIENT
Start: 2024-08-01

## 2024-08-01 RX ORDER — IOPAMIDOL 612 MG/ML
INJECTION, SOLUTION INTRATHECAL PRN
Status: DISCONTINUED | OUTPATIENT
Start: 2024-08-01 | End: 2024-08-01 | Stop reason: ALTCHOICE

## 2024-08-01 ASSESSMENT — PAIN SCALES - GENERAL
PAINLEVEL_OUTOF10: 0
PAINLEVEL_OUTOF10: 3
PAINLEVEL_OUTOF10: 0
PAINLEVEL_OUTOF10: 0

## 2024-08-01 ASSESSMENT — PAIN DESCRIPTION - ORIENTATION: ORIENTATION: LOWER

## 2024-08-01 ASSESSMENT — PAIN DESCRIPTION - LOCATION: LOCATION: BACK;LEG

## 2024-08-01 NOTE — OP NOTE
Operative Note      Patient: Dannielle Barba  YOB: 1975  MRN: [id number]    Date of Procedure: 2024    Pre-Op Diagnosis Codes:     * Disorder of sacrum [M53.3]    Post-Op Diagnosis: Same       Procedure(s):  BILATERAL SACROILIAC JOINT INJECTION UNDER FLUOROSCOPIC GUIDANCE    Surgeon(s):  Gregg Christianson MD    Assistant:   * No surgical staff found *    Anesthesia: Local    Estimated Blood Loss (mL): Minimal    Complications: None    Specimens:   * No specimens in log *    Implants:  * No implants in log *      Drains: * No LDAs found *    Findings:  Infection Present At Time Of Surgery (PATOS) (choose all levels that have infection present):  No infection present  Other Findings: good needle placement    Detailed Description of Procedure:   2024    Patient: Dannielle Barba  :  1975  Age:  49 y.o.  Sex:  female     PRE-OPERATIVE DIAGNOSIS:   Sacroiliac dysfunction    POST-OPERATIVE DIAGNOSIS: Same.    PROCEDURE:  Fluoroscopic guided Bilateral   sacroiliac joint injection with steroid.    SURGEON:  Gregg Christianson MD    ANESTHESIA: Local    ESTIMATED BLOOD LOSS: None.  ______________________________________________________________________  BRIEF HISTORY:  Dannielle Barba comes in today for  Bilateral sacroiliac joint injection under fluoroscopic guidance. The potential complications as well as the procedure in detail were explained to her today. She has elected to undergo the aforementioned procedure.    Dannielle's complete History & Physical examination were reviewed in depth, a copy of which is in the chart.      DESCRIPTION OF PROCEDURE:    After confirming written and informed consent, a time-out was performed and Dannielle’s name and date of birth, the procedure to be performed as well as the plan for the location of the needle insertion were confirmed.    The patient was brought into the procedure room and placed in the prone position on the fluoroscopy table. Standard

## 2024-08-01 NOTE — INTERVAL H&P NOTE
Update History & Physical    The patient's History and Physical of July 12, 2024 was reviewed with the patient and I examined the patient. There was no change. The surgical site was confirmed by the patient and me.     Plan: The risks, benefits, expected outcome, and alternative to the recommended procedure have been discussed with the patient. Patient understands and wants to proceed with the procedure.     Electronically signed by Gregg Christianson MD on 8/1/2024

## 2024-08-01 NOTE — DISCHARGE INSTRUCTIONS
[facility] Management Department  [place] [phone]  Dr. Sammy Vo   Post-Pain Block/Radiofrequency  Home Going Instructions    1-Go home, rest for the remainder of the day  2-Please do not lift over 20 pounds the day of the injection  3-If you received sedation No: alcohol, driving, operating lawn mowers, plows, tractors or other dangerous equipment until next morning. Do not make important decisions or sign legal documents for 24 hours. You may experience light headedness, dizziness, nausea or sleepiness after sedation. Do not stay alone. A responsible adult must be with you for 24 hours. You could be nauseated from the medications you have received. Your IV site may be sore and bruised.    4-No dietary restrictions     5-Resume all medications the same day, blood thinners to be resumed 24 hours after injection if you were instructed to stop any.    6-Keep the surgical site clean and dry, you may shower the next morning and remove the      dressing.     7- No sitz baths, tub baths or hot tubs/swimming for 24 hours.       8- If you have any pain at the injection site(s), application of an ice pack to the area should be       helpful, 20 minutes on/20 minutes off for next 48 hours.  9- Call [facility] immediately at if you develop.  Fever greater than 100.4 F  Have bleeding or drainage from the puncture site  Have progressive Leg/arm numbness and or weakness  Loss of control of bowel and or bladder (wet/soil yourself)  Severe headache with inability to lift head  10-You may return to work the next day

## 2024-08-05 ENCOUNTER — OFFICE VISIT (OUTPATIENT)
Dept: ORTHOPEDIC SURGERY | Age: 49
End: 2024-08-05
Payer: COMMERCIAL

## 2024-08-05 VITALS — HEIGHT: 60 IN | BODY MASS INDEX: 32.98 KG/M2 | WEIGHT: 168 LBS

## 2024-08-05 DIAGNOSIS — G56.02 LEFT CARPAL TUNNEL SYNDROME: ICD-10-CM

## 2024-08-05 DIAGNOSIS — M77.12 LATERAL EPICONDYLITIS OF LEFT ELBOW: Primary | ICD-10-CM

## 2024-08-05 PROCEDURE — 99213 OFFICE O/P EST LOW 20 MIN: CPT | Performed by: ORTHOPAEDIC SURGERY

## 2024-08-05 NOTE — PROGRESS NOTES
Chief Complaint   Patient presents with    Elbow Pain     Left Elbow F/U        Dannielle Barba  female  presents today for follow up left elbow pain. She is about the same as when I saw her last. She also does get intermittent numbness and tingling in the hand. She did have an injury about a few months ago and felt a pop in the shoulder. The pain is aching, sharp, shooting, and stabbing in nature.  It is worse with activity and better with rest.  The patient does complain of night pain.  The patient is right hand dominant.      Past Medical History:   Diagnosis Date    BRCA1 gene mutation positive     Cousin Pos. per mmmo hx sheet    COVID 2021    pt states moderate; twice 2021, May 2022-sinus infection only 4 days only    Joint pain     Low back pain     degenerative disk disease    Lupus (HCC)     Menometrorrhagia 2022    Neck pain     RA (rheumatoid arthritis) ([facility])     RH    Radiculopathy     Sjogren's disease ([facility])      Past Surgical History:   Procedure Laterality Date    ANESTHESIA NERVE BLOCK Bilateral 2019    BILATERAL LUMBAR TRANSFORAMINAL EPIDURAL STERIOD INJECTION AT L5-S1 UNDER FLUOROSCOPY performed by Gregg Christianson MD at [facility] OR    ARM SURGERY Left 2022    LEFT ELBOW CUBITAL TUNNEL RELEASE performed by Raudel Logan DO at [facility] OR    BACK INJECTION Bilateral 2024    BILATERAL SACROILIAC JOINT INJECTION UNDER FLUOROSCOPIC GUIDANCE performed by Gregg Christianson MD at [facility] OR    CATARACT REMOVAL WITH IMPLANT Bilateral      SECTION      x2    CHOLECYSTECTOMY, LAPAROSCOPIC N/A 2021    LAPAROSCOPIC CHOLECYSTECTOMY performed by Oren Garcia MD at [facility] OR    COLONOSCOPY      DILATION AND CURETTAGE OF UTERUS      x2    DILATION AND CURETTAGE OF UTERUS N/A 2022    DILATATION AND CURETTAGE HYSTEROSCOPY performed by Gena Keller MD at [facility] OR    ENDOSCOPY, COLON, DIAGNOSTIC      EPIDURAL STEROID INJECTION Left

## 2024-08-16 ENCOUNTER — HOSPITAL ENCOUNTER (OUTPATIENT)
Dept: MRI IMAGING | Age: 49
Discharge: HOME | End: 2024-08-16
Attending: ORTHOPAEDIC SURGERY
Payer: COMMERCIAL

## 2024-08-16 DIAGNOSIS — M77.12 LATERAL EPICONDYLITIS OF LEFT ELBOW: ICD-10-CM

## 2024-08-16 PROCEDURE — 73221 MRI JOINT UPR EXTREM W/O DYE: CPT

## 2024-08-19 ENCOUNTER — TELEPHONE (OUTPATIENT)
Dept: SURGERY | Age: 49
End: 2024-08-19

## 2024-08-19 NOTE — TELEPHONE ENCOUNTER
Patient called to cancel hernia surgery /6/24. Following with ENT and that appointment is mid October. Will call back after follow up.

## 2024-08-21 ENCOUNTER — PROCEDURE VISIT (OUTPATIENT)
Dept: PHYSICAL MEDICINE AND REHAB | Age: 49
End: 2024-08-21
Payer: COMMERCIAL

## 2024-08-21 VITALS — BODY MASS INDEX: 32.98 KG/M2 | HEIGHT: 60 IN | WEIGHT: 168 LBS

## 2024-08-21 DIAGNOSIS — G56.02 LEFT CARPAL TUNNEL SYNDROME: ICD-10-CM

## 2024-08-21 PROCEDURE — 95886 MUSC TEST DONE W/N TEST COMP: CPT | Performed by: PHYSICAL MEDICINE & REHABILITATION

## 2024-08-21 PROCEDURE — 95911 NRV CNDJ TEST 9-10 STUDIES: CPT | Performed by: PHYSICAL MEDICINE & REHABILITATION

## 2024-08-21 NOTE — PROGRESS NOTES
[facility]  *Accredited by the [facility] with exemplary status  [address] [place] [address]  Phone: [phone]  Fax: [phone]    Referring Provider: Raudel Logan DO  Primary Care Physician: Arnulfo Rodriguez MD  Patient Name: Dannielle Barba  Patient YOB: 1975  Gender: female  BMI: Body mass index is 32.81 kg/m².  Height 1.524 m (5'), weight 76.2 kg (168 lb), last menstrual period 11/01/2021, not currently breastfeeding.    8/21/2024    Reason for Referral: Left carpal tunnel    Description of clinical problem:   Chief Complaint   Patient presents with    Extremity Pain     Left elbow pain and wrist.  Symptoms for 5 months.     Numbness     Whole left hand numbness and tingling. Had cubital tunnel release in May of 2022.      Sensory NCS      Nerve / Sites Rec. Site Peak Lat PP Amp Segments Distance Velocity Temp.     ms µV  cm m/s °C   L Median - Digit II (Antidromic)      Palm Dig II 1.72 49.9 Palm - Dig II 7 64 32      Wrist Dig II 3.75 43.1 Wrist - Dig II 14 49 32   L Ulnar - Digit V (Antidromic)      Wrist Dig V 3.07 37.4 Wrist - Dig V 14 64 32   L Radial - Anatomical snuff box (Forearm)      Forearm Wrist 2.03 29.6 Forearm - Wrist 10 69 32       Combined Sensory Index      Nerve / Sites Rec. Site Peak Lat NP Amp PP Amp Segments Dist. Peak Diff Temp.     ms µV µV  cm ms °C   L Median - CSI      Median Thumb 3.28 6.8 28.8 Median - Radial 10 0.42 32      Radial Thumb 2.86 10.7 18.4 Median - Ulnar 14 0.68 32      Median Ring 4.27 5.6 12.7 Median palm - Ulnar palm 8        Ulnar Ring 3.59 40.3 58.9          CSI     CSI  1.09*        Motor NCS      Nerve / Sites Muscle Onset Amplitude Segments Distance Velocity Temp.     ms mV  cm m/s °C   L Median - APB      Palm APB 1.98 13.1 Palm - APB   32      Wrist APB 3.59 12.8 Wrist - Palm 8 50 32      Elbow APB 7.19 12.4 Elbow - Wrist 18 50 32   L Ulnar - ADM      Wrist ADM 2.86 13.0

## 2024-08-22 ENCOUNTER — HOSPITAL ENCOUNTER (OUTPATIENT)
Dept: ULTRASOUND IMAGING | Age: 49
Discharge: HOME | End: 2024-08-24
Payer: COMMERCIAL

## 2024-08-22 ENCOUNTER — TRANSCRIBE ORDERS (OUTPATIENT)
Dept: ADMINISTRATIVE | Age: 49
End: 2024-08-22

## 2024-08-22 DIAGNOSIS — R92.2 INCONCLUSIVE MAMMOGRAPHY DUE TO DENSE BREASTS: Primary | ICD-10-CM

## 2024-08-22 DIAGNOSIS — R92.30 INCONCLUSIVE MAMMOGRAPHY DUE TO DENSE BREASTS: Primary | ICD-10-CM

## 2024-08-22 DIAGNOSIS — R05.9 COUGH, UNSPECIFIED TYPE: ICD-10-CM

## 2024-08-22 PROCEDURE — 76536 US EXAM OF HEAD AND NECK: CPT

## 2024-08-26 ENCOUNTER — OFFICE VISIT (OUTPATIENT)
Dept: ORTHOPEDIC SURGERY | Age: 49
End: 2024-08-26
Payer: COMMERCIAL

## 2024-08-26 VITALS — WEIGHT: 168 LBS | HEIGHT: 60 IN | TEMPERATURE: 98.6 F | BODY MASS INDEX: 32.98 KG/M2

## 2024-08-26 DIAGNOSIS — G56.02 LEFT CARPAL TUNNEL SYNDROME: Primary | ICD-10-CM

## 2024-08-26 PROCEDURE — 99214 OFFICE O/P EST MOD 30 MIN: CPT | Performed by: ORTHOPAEDIC SURGERY

## 2024-08-26 NOTE — PROGRESS NOTES
08/01/2023    LEFT CERVICAL  RADIOFREQUENCY ABLATION AT C2, C3, C4, and C5 UNDER FLUOROSCOPY SEDATION performed by Gregg Christianson MD at [facility] OR    RHINOPLASTY  2013    TUBAL LIGATION      TURBINATE RESECTION  2023       Current Outpatient Medications:     minocycline (MINOCIN;DYNACIN) 100 MG capsule, Take 1 capsule by mouth 2 times daily, Disp: , Rfl:     rifAXIMin (XIFAXAN) 200 MG tablet, Take 1 tablet by mouth 3 times daily As needed, Disp: , Rfl:     leflunomide (ARAVA) 20 MG tablet, Take 1 tablet by mouth daily, Disp: , Rfl:     nabumetone (RELAFEN) 750 MG tablet, Take 1 tablet by mouth 2 times daily as needed for Pain, Disp: 30 tablet, Rfl: 1    baclofen (LIORESAL) 5 MG tablet, Take 2 tablets by mouth 2 times daily as needed (muscle spasm), Disp: 2 tablet, Rfl: 1    loratadine (CLARITIN) 10 MG tablet, Take 1 tablet by mouth daily as directed, Disp: , Rfl:     montelukast (SINGULAIR) 10 MG tablet, Take 1 tablet by mouth nightly, Disp: , Rfl:     DULoxetine (CYMBALTA) 60 MG extended release capsule, Take by mouth daily, Disp: , Rfl:     Cholecalciferol (VITAMIN D) 50 MCG (2000 UT) CAPS capsule, Take by mouth daily , Disp: , Rfl:     pantoprazole (PROTONIX) 40 MG tablet, Take 1 tablet by mouth daily, Disp: , Rfl:     topiramate (TOPAMAX) 50 MG tablet, Take 1 tablet by mouth nightly, Disp: 30 tablet, Rfl: 2  Allergies   Allergen Reactions    Iron Other (See Comments)     blisters    Methotrexate Derivatives Nausea And Vomiting and Other (See Comments)     Mouth sores     Social History     Socioeconomic History    Marital status:      Spouse name: Not on file    Number of children: Not on file    Years of education: Not on file    Highest education level: Not on file   Occupational History    Not on file   Tobacco Use    Smoking status: Never    Smokeless tobacco: Never   Vaping Use    Vaping status: Never Used   Substance and Sexual Activity    Alcohol use: Yes     Alcohol/week: 2.0 standard    Electrodiagnosis: There is electrodiagnostic evidence of a median mononeuropathy.   Location: left, at the wrist.   Nature: [  ] Axonal   [ X ] Demyelinating  [  ] Mixed axonal and demyelinating                     [  ] Sensory [  ] Motor               [X  ] Mixed sensorimotor                     [  ] with active denervation       [ X ] without active denervation  Duration: Acute  Severity: mild  Prognosis: Good        Previous Study: There is  a prior study for comparison. Date: 3/2022. Provider: Dr. Sanchez. Compared to prior study, today's examination shows resolution of left ulnar neuropathy at the elbow. New left carpal tunnel and left C7 radiculopathy.     Radiographic findings reviewed with patient    Assessment:   Encounter Diagnosis   Name Primary?    Left carpal tunnel syndrome Yes           Plan:    Natural history and expected course discussed. Questions answered.  Rest, ice, compression, and elevation (RICE) therapy.  Reduction in offending activity.     I discussed the treatment options with the patient.  I cannot explain the pain in her posterior elbow, however that could be coming from her C7 nerve root.  She will speak to her physician at [facility].    As for her carpal tunnel, I suggest surgical intervention.  The patient agrees.  She will let me know when she wants to schedule.      At least 30 minutes was spent discussing the diagnosis and treatment options with the patient with at least 50% of the time was spent with decision making and counseling the patient.

## 2024-09-03 ENCOUNTER — PREP FOR PROCEDURE (OUTPATIENT)
Dept: PAIN MANAGEMENT | Age: 49
End: 2024-09-03

## 2024-09-03 ENCOUNTER — OFFICE VISIT (OUTPATIENT)
Dept: PAIN MANAGEMENT | Age: 49
End: 2024-09-03
Payer: COMMERCIAL

## 2024-09-03 VITALS
BODY MASS INDEX: 32.98 KG/M2 | TEMPERATURE: 96.9 F | RESPIRATION RATE: 18 BRPM | HEIGHT: 60 IN | SYSTOLIC BLOOD PRESSURE: 120 MMHG | OXYGEN SATURATION: 98 % | HEART RATE: 77 BPM | WEIGHT: 168 LBS | DIASTOLIC BLOOD PRESSURE: 80 MMHG

## 2024-09-03 DIAGNOSIS — M47.812 CERVICAL SPONDYLOSIS: ICD-10-CM

## 2024-09-03 DIAGNOSIS — M47.817 LUMBOSACRAL SPONDYLOSIS WITHOUT MYELOPATHY: ICD-10-CM

## 2024-09-03 DIAGNOSIS — M54.12 CERVICAL RADICULAR PAIN: Primary | ICD-10-CM

## 2024-09-03 DIAGNOSIS — M50.30 DDD (DEGENERATIVE DISC DISEASE), CERVICAL: Primary | ICD-10-CM

## 2024-09-03 DIAGNOSIS — M51.36 DDD (DEGENERATIVE DISC DISEASE), LUMBAR: ICD-10-CM

## 2024-09-03 DIAGNOSIS — M54.12 CERVICAL RADICULAR PAIN: ICD-10-CM

## 2024-09-03 PROCEDURE — 99214 OFFICE O/P EST MOD 30 MIN: CPT

## 2024-09-03 PROCEDURE — 99214 OFFICE O/P EST MOD 30 MIN: CPT | Performed by: ANESTHESIOLOGY

## 2024-09-03 RX ORDER — SODIUM CHLORIDE 9 MG/ML
INJECTION, SOLUTION INTRAVENOUS PRN
Status: CANCELLED | OUTPATIENT
Start: 2024-09-03

## 2024-09-03 RX ORDER — AZELASTINE 1 MG/ML
SPRAY, METERED NASAL
COMMUNITY
Start: 2024-08-16

## 2024-09-03 RX ORDER — SODIUM CHLORIDE 0.9 % (FLUSH) 0.9 %
5-40 SYRINGE (ML) INJECTION EVERY 12 HOURS SCHEDULED
Status: CANCELLED | OUTPATIENT
Start: 2024-09-03

## 2024-09-03 RX ORDER — CLINDAMYCIN PHOSPHATE 10 UG/ML
LOTION TOPICAL
COMMUNITY
Start: 2024-08-29

## 2024-09-03 RX ORDER — SODIUM CHLORIDE 0.9 % (FLUSH) 0.9 %
5-40 SYRINGE (ML) INJECTION PRN
Status: CANCELLED | OUTPATIENT
Start: 2024-09-03

## 2024-09-03 RX ORDER — IBUPROFEN 600 MG/1
TABLET, FILM COATED ORAL
COMMUNITY
End: 2024-09-03 | Stop reason: ALTCHOICE

## 2024-09-03 NOTE — PROGRESS NOTES
Dannielle Barba presents to the [facility] on 9/3/2024. Dannielle is complaining of pain lower back, neck. The pain is constant. The pain is described as aching, dull, and sharp. Pain is rated on her best day at a 3, on her worst day at a 10, and on average at a 3 on the VAS scale. She took her last dose of Relafen, Cymbalta, and Baclofen  last night.  Rare occasion is Aleve    Any procedures since your last visit: Yes, BILATERAL SACROILIAC JOINT INJECTION UNDER FLUOROSCOPIC GUIDANCE with 75 % relief.    She is sometimes  on NSAIDS and  is not on anticoagulation medications to include none   Pacemaker or defibrillator: No  Medication Contract and Consent for Opioid Use Documents Filed       Patient Documents       Type of Document Status Date Received Received By Description    Medication Contract Received 5/7/2019  8:46 AM SANDRITA KABA [facility] Pt Agreement 5/7/2019                       LMP 11/01/2021      Patient's last menstrual period was 11/01/2021.

## 2024-09-03 NOTE — PROGRESS NOTES
[facility]  [address], [place] [address]  [phone]    Follow up Note      Dannielle Barba     Date of Visit:  9/3/2024    CC:  Patient presents for follow up   Chief Complaint   Patient presents with    Follow-up     BILATERAL SACROILIAC JOINT INJECTION UNDER FLUOROSCOPIC GUIDANCE     HPI:    H/o Neck pain and upper extremity pain. S/P Cervical BELEM/ Cervical facet interventions - had helped significantly.    H/o chronic low back and LE pain. S/P BELEM with excellent pain relief in the past.     Nursing notes and details of the pain history reviewed. Please see intake notes for details.    She follows up with rheumatologist for H/o Sjogren's/ rheumatoid / SLE.    Previous treatments:   Failed conservative treatment with NSAID's, muscle relaxants, oral steroids and HEP.  Physical Therapy   Chiropractic treatment  Ongoing Core exercises for the past 6 months.  TENS  and medications-, gabapentin etc,.   Does not tolerate meds well      She has not been on anticoagulation medications.    EMG left UE: 8/21/2024: .  Diagnostic Interpretation:   This study was abnormal.      Electrodiagnosis: There is electrodiagnostic evidence of a cervical radiculopathy.   Location: left C7.   Nature: [ X ] Axonal   [  ] Demyelinating  [  ] Mixed axonal and demyelinating                     [  ] Sensory [ X ] Motor               [  ] Mixed sensorimotor                     [ X ] with active denervation       [  ] without active denervation  Duration: Subacute  Severity: moderate  Prognosis: Poor.  The prognosis for recovery of axonal lesions is poor and dependant on collateral sprouting and reinnervation.        Electrodiagnosis: There is electrodiagnostic evidence of a median mononeuropathy.   Location: left, at the wrist.   Nature: [  ] Axonal   [ X ] Demyelinating  [  ] Mixed axonal and demyelinating                     [  ] Sensory [  ] Motor               [X  ] Mixed

## 2024-09-05 DIAGNOSIS — M77.12 LATERAL EPICONDYLITIS OF LEFT ELBOW: Primary | ICD-10-CM

## 2024-09-16 ENCOUNTER — TELEPHONE (OUTPATIENT)
Dept: PAIN MANAGEMENT | Age: 49
End: 2024-09-16

## 2024-09-16 DIAGNOSIS — M54.12 CERVICAL RADICULOPATHY: ICD-10-CM

## 2024-09-17 ENCOUNTER — HOSPITAL ENCOUNTER (OUTPATIENT)
Dept: MAMMOGRAPHY | Age: 49
Discharge: HOME | End: 2024-09-19
Attending: OBSTETRICS & GYNECOLOGY

## 2024-09-17 DIAGNOSIS — R92.30 INCONCLUSIVE MAMMOGRAPHY DUE TO DENSE BREASTS: ICD-10-CM

## 2024-09-17 DIAGNOSIS — R92.2 INCONCLUSIVE MAMMOGRAPHY DUE TO DENSE BREASTS: ICD-10-CM

## 2024-09-24 ENCOUNTER — HOSPITAL ENCOUNTER (OUTPATIENT)
Dept: OPERATING ROOM | Age: 49
Setting detail: OUTPATIENT SURGERY
Discharge: HOME | End: 2024-09-24
Attending: ANESTHESIOLOGY
Payer: COMMERCIAL

## 2024-09-24 ENCOUNTER — HOSPITAL ENCOUNTER (OUTPATIENT)
Age: 49
Setting detail: OUTPATIENT SURGERY
Discharge: HOME | End: 2024-09-24
Attending: ANESTHESIOLOGY | Admitting: ANESTHESIOLOGY
Payer: COMMERCIAL

## 2024-09-24 VITALS
OXYGEN SATURATION: 98 % | WEIGHT: 168 LBS | SYSTOLIC BLOOD PRESSURE: 137 MMHG | HEIGHT: 60 IN | TEMPERATURE: 97.9 F | DIASTOLIC BLOOD PRESSURE: 84 MMHG | HEART RATE: 74 BPM | RESPIRATION RATE: 16 BRPM | BODY MASS INDEX: 32.98 KG/M2

## 2024-09-24 DIAGNOSIS — M51.9 LUMBAR DISC DISEASE: ICD-10-CM

## 2024-09-24 PROCEDURE — 3600000005 HC SURGERY LEVEL 5 BASE: Performed by: ANESTHESIOLOGY

## 2024-09-24 PROCEDURE — 2580000003 HC RX 258: Performed by: ANESTHESIOLOGY

## 2024-09-24 PROCEDURE — 62321 NJX INTERLAMINAR CRV/THRC: CPT | Performed by: ANESTHESIOLOGY

## 2024-09-24 PROCEDURE — 7100000010 HC PHASE II RECOVERY - FIRST 15 MIN: Performed by: ANESTHESIOLOGY

## 2024-09-24 PROCEDURE — 3600000015 HC SURGERY LEVEL 5 ADDTL 15MIN: Performed by: ANESTHESIOLOGY

## 2024-09-24 PROCEDURE — 2500000003 HC RX 250 WO HCPCS: Performed by: ANESTHESIOLOGY

## 2024-09-24 PROCEDURE — 7100000011 HC PHASE II RECOVERY - ADDTL 15 MIN: Performed by: ANESTHESIOLOGY

## 2024-09-24 PROCEDURE — 6360000002 HC RX W HCPCS: Performed by: ANESTHESIOLOGY

## 2024-09-24 PROCEDURE — 6360000004 HC RX CONTRAST MEDICATION: Performed by: ANESTHESIOLOGY

## 2024-09-24 PROCEDURE — 2709999900 HC NON-CHARGEABLE SUPPLY: Performed by: ANESTHESIOLOGY

## 2024-09-24 RX ORDER — SODIUM CHLORIDE 9 MG/ML
INJECTION, SOLUTION INTRAMUSCULAR; INTRAVENOUS; SUBCUTANEOUS PRN
Status: DISCONTINUED | OUTPATIENT
Start: 2024-09-24 | End: 2024-09-24 | Stop reason: ALTCHOICE

## 2024-09-24 RX ORDER — LIDOCAINE HYDROCHLORIDE 5 MG/ML
INJECTION, SOLUTION INFILTRATION; INTRAVENOUS PRN
Status: DISCONTINUED | OUTPATIENT
Start: 2024-09-24 | End: 2024-09-24 | Stop reason: ALTCHOICE

## 2024-09-24 RX ORDER — SODIUM CHLORIDE 9 MG/ML
INJECTION, SOLUTION INTRAVENOUS PRN
Status: DISCONTINUED | OUTPATIENT
Start: 2024-09-24 | End: 2024-09-24 | Stop reason: HOSPADM

## 2024-09-24 RX ORDER — SODIUM CHLORIDE 0.9 % (FLUSH) 0.9 %
5-40 SYRINGE (ML) INJECTION PRN
Status: DISCONTINUED | OUTPATIENT
Start: 2024-09-24 | End: 2024-09-24 | Stop reason: HOSPADM

## 2024-09-24 RX ORDER — METHYLPREDNISOLONE ACETATE 40 MG/ML
INJECTION, SUSPENSION INTRA-ARTICULAR; INTRALESIONAL; INTRAMUSCULAR; SOFT TISSUE PRN
Status: DISCONTINUED | OUTPATIENT
Start: 2024-09-24 | End: 2024-09-24 | Stop reason: ALTCHOICE

## 2024-09-24 RX ORDER — SODIUM CHLORIDE 0.9 % (FLUSH) 0.9 %
5-40 SYRINGE (ML) INJECTION EVERY 12 HOURS SCHEDULED
Status: DISCONTINUED | OUTPATIENT
Start: 2024-09-24 | End: 2024-09-24 | Stop reason: HOSPADM

## 2024-09-24 ASSESSMENT — PAIN - FUNCTIONAL ASSESSMENT
PAIN_FUNCTIONAL_ASSESSMENT: NONE - DENIES PAIN
PAIN_FUNCTIONAL_ASSESSMENT: NONE - DENIES PAIN
PAIN_FUNCTIONAL_ASSESSMENT: 0-10

## 2024-09-24 ASSESSMENT — PAIN DESCRIPTION - DESCRIPTORS: DESCRIPTORS: ACHING

## 2024-09-26 ENCOUNTER — HOSPITAL ENCOUNTER (OUTPATIENT)
Dept: CT IMAGING | Age: 49
Discharge: HOME | End: 2024-09-28
Payer: COMMERCIAL

## 2024-09-26 DIAGNOSIS — J84.10 PULMONARY FIBROSIS, UNSPECIFIED (HCC): ICD-10-CM

## 2024-09-26 DIAGNOSIS — M77.12 LATERAL EPICONDYLITIS OF LEFT ELBOW: ICD-10-CM

## 2024-09-26 PROCEDURE — 73200 CT UPPER EXTREMITY W/O DYE: CPT

## 2024-09-26 PROCEDURE — 71250 CT THORAX DX C-: CPT

## 2024-10-09 ENCOUNTER — CLINICAL DOCUMENTATION (OUTPATIENT)
Dept: GENERAL RADIOLOGY | Age: 49
End: 2024-10-09

## 2024-10-09 NOTE — PROGRESS NOTES
Spoke with patient in detail regarding breast density and Tyrer Cuzick score.  Patient to discuss with Dr Keller to request an order for breast MRI.  (Patient denies taking any medications for HTN or DM.)

## 2024-10-14 ENCOUNTER — CLINICAL DOCUMENTATION (OUTPATIENT)
Dept: GENERAL RADIOLOGY | Age: 49
End: 2024-10-14

## 2024-10-15 ENCOUNTER — OFFICE VISIT (OUTPATIENT)
Dept: ORTHOPEDIC SURGERY | Age: 49
End: 2024-10-15

## 2024-10-15 VITALS — HEIGHT: 60 IN | WEIGHT: 168 LBS | BODY MASS INDEX: 32.98 KG/M2

## 2024-10-15 DIAGNOSIS — M77.12 LATERAL EPICONDYLITIS OF LEFT ELBOW: Primary | ICD-10-CM

## 2024-10-15 RX ORDER — TRIAMCINOLONE ACETONIDE 40 MG/ML
20 INJECTION, SUSPENSION INTRA-ARTICULAR; INTRAMUSCULAR ONCE
Status: COMPLETED | OUTPATIENT
Start: 2024-10-15 | End: 2024-10-15

## 2024-10-15 RX ADMIN — TRIAMCINOLONE ACETONIDE 20 MG: 40 INJECTION, SUSPENSION INTRA-ARTICULAR; INTRAMUSCULAR at 12:43

## 2024-10-15 NOTE — PROGRESS NOTES
Chief Complaint   Patient presents with    Elbow Pain     Follow up CT scan left elbow. Pain severe and getting worse moving into the upper arm.        Dannielle Barba  female  presents today for follow up left elbow pain. She recently underwent a CT scan and is here to undergo to testing. She has been dealing with elbow pain for at least 7 months. She also has numbness and tingling in the fingers. She did have injection in the shoulder that did help her shoulder pain.     Past Medical History:   Diagnosis Date    BRCA1 gene mutation positive     Cousin Pos. per mmmo hx sheet    COVID 2021    pt states moderate; twice 2021, May 2022-sinus infection only 4 days only    Joint pain     Low back pain     degenerative disk disease    Lupus (HCC)     Menometrorrhagia 2022    Neck pain     RA (rheumatoid arthritis) ([facility])     RH    Radiculopathy     Sjogren's disease ([facility])      Past Surgical History:   Procedure Laterality Date    ANESTHESIA NERVE BLOCK Bilateral 2019    BILATERAL LUMBAR TRANSFORAMINAL EPIDURAL STERIOD INJECTION AT L5-S1 UNDER FLUOROSCOPY performed by Gregg Christianson MD at [facility] OR    ARM SURGERY Left 2022    LEFT ELBOW CUBITAL TUNNEL RELEASE performed by Raudel Logan DO at [facility] OR    BACK INJECTION Bilateral 2024    BILATERAL SACROILIAC JOINT INJECTION UNDER FLUOROSCOPIC GUIDANCE performed by Gregg Christianson MD at [facility] OR    CATARACT REMOVAL WITH IMPLANT Bilateral      SECTION      x2    CHOLECYSTECTOMY, LAPAROSCOPIC N/A 2021    LAPAROSCOPIC CHOLECYSTECTOMY performed by Oren Garcia MD at [facility] OR    COLONOSCOPY      DILATION AND CURETTAGE OF UTERUS      x2    DILATION AND CURETTAGE OF UTERUS N/A 2022    DILATATION AND CURETTAGE HYSTEROSCOPY performed by Gena Keller MD at [facility] OR    ENDOSCOPY, COLON, DIAGNOSTIC      EPIDURAL STEROID INJECTION Left 2019    LUMBAR EPIDURAL STEROID INJECTION L5-S1 (LEFT

## 2024-11-03 ENCOUNTER — APPOINTMENT (OUTPATIENT)
Dept: GENERAL RADIOLOGY | Age: 49
End: 2024-11-03
Payer: COMMERCIAL

## 2024-11-03 ENCOUNTER — HOSPITAL ENCOUNTER (EMERGENCY)
Age: 49
Discharge: HOME | End: 2024-11-03
Payer: COMMERCIAL

## 2024-11-03 VITALS
HEART RATE: 73 BPM | OXYGEN SATURATION: 99 % | BODY MASS INDEX: 33.2 KG/M2 | RESPIRATION RATE: 22 BRPM | WEIGHT: 170 LBS | TEMPERATURE: 97.3 F | SYSTOLIC BLOOD PRESSURE: 145 MMHG | DIASTOLIC BLOOD PRESSURE: 82 MMHG

## 2024-11-03 DIAGNOSIS — S83.402A SPRAIN OF COLLATERAL LIGAMENT OF LEFT KNEE, INITIAL ENCOUNTER: Primary | ICD-10-CM

## 2024-11-03 PROCEDURE — 73562 X-RAY EXAM OF KNEE 3: CPT

## 2024-11-03 PROCEDURE — 99283 EMERGENCY DEPT VISIT LOW MDM: CPT

## 2024-11-03 PROCEDURE — 6370000000 HC RX 637 (ALT 250 FOR IP): Performed by: PHYSICIAN ASSISTANT

## 2024-11-03 RX ORDER — NAPROXEN 500 MG/1
500 TABLET ORAL 2 TIMES DAILY WITH MEALS
Qty: 10 TABLET | Refills: 0 | Status: SHIPPED | OUTPATIENT
Start: 2024-11-03 | End: 2024-11-08

## 2024-11-03 RX ORDER — OXYCODONE AND ACETAMINOPHEN 5; 325 MG/1; MG/1
1 TABLET ORAL ONCE
Status: COMPLETED | OUTPATIENT
Start: 2024-11-03 | End: 2024-11-03

## 2024-11-03 RX ORDER — OXYCODONE AND ACETAMINOPHEN 5; 325 MG/1; MG/1
1 TABLET ORAL EVERY 6 HOURS PRN
Qty: 12 TABLET | Refills: 0 | Status: SHIPPED | OUTPATIENT
Start: 2024-11-03 | End: 2024-11-06

## 2024-11-03 RX ADMIN — OXYCODONE AND ACETAMINOPHEN 1 TABLET: 5; 325 TABLET ORAL at 12:31

## 2024-11-03 ASSESSMENT — PAIN SCALES - GENERAL
PAINLEVEL_OUTOF10: 6
PAINLEVEL_OUTOF10: 6

## 2024-11-03 ASSESSMENT — PAIN DESCRIPTION - LOCATION: LOCATION: KNEE

## 2024-11-03 ASSESSMENT — PAIN DESCRIPTION - ORIENTATION: ORIENTATION: LEFT

## 2024-11-03 ASSESSMENT — PAIN - FUNCTIONAL ASSESSMENT: PAIN_FUNCTIONAL_ASSESSMENT: 0-10

## 2024-11-03 NOTE — ED PROVIDER NOTES
Sensory deficit: none.             Pulse deficit: 2+ dorsalis pedis and posterior tibial pulses intact.             Capillary refill: normal.  Gait:  ambulates with need of crutches..  Vital signs afebrile, nontachycardic, nontachypneic, nonhypoxic, no acute distress.  Differential diagnoses include but not limited to fracture, strain, sprain, dislocation, effusion, septic joint. Diagnostic studies revealed no acute abnormalities or dislocation about the left knee per radiology. Consults included none. Results were discussed with patient and patient's  prior to disposition all questions were answered.  Patient was given Percocet by mouth for their symptoms good improvement.  Placed in Ace wrap over knee for compression and support via nursing staff.  Neurovascularly intact after Ace wrap placement.  Patient reports that she has crutches at home that she can utilize.  She is instructed to utilize RICE with outpatient follow-up to her orthopedic provider. Patient will be discharged home with the following prescriptions, Percocet and naproxen to utilize as directed.  Discussed appropriate use and potential side effects of starting the prescribed medications. Patient continues to be non-toxic on re-evaluation. Findings were discussed with the patient and reasons to immediately return to the ED were articulated to them. They will follow-up with their orthopedic provider return to ER with any worsening symptoms.  Patient is understandable and agreeable to plan.      Discharge Instructions:   Patient referred to  Raudel Logan DO  [address] [address]  [phone]    Schedule an appointment as soon as possible for a visit in 1 week        MEDICATIONS:   DISCHARGE MEDICATIONS:  New Prescriptions    NAPROXEN (NAPROSYN) 500 MG TABLET    Take 1 tablet by mouth 2 times daily (with meals) for 5 days    OXYCODONE-ACETAMINOPHEN (PERCOCET) 5-325 MG PER TABLET    Take 1 tablet by mouth every 6

## 2024-11-04 ENCOUNTER — OFFICE VISIT (OUTPATIENT)
Dept: ORTHOPEDIC SURGERY | Age: 49
End: 2024-11-04
Payer: COMMERCIAL

## 2024-11-04 VITALS — WEIGHT: 170 LBS | BODY MASS INDEX: 33.38 KG/M2 | HEIGHT: 60 IN

## 2024-11-04 DIAGNOSIS — S83.242A ACUTE MEDIAL MENISCUS TEAR, LEFT, INITIAL ENCOUNTER: Primary | ICD-10-CM

## 2024-11-04 PROCEDURE — 99213 OFFICE O/P EST LOW 20 MIN: CPT | Performed by: ORTHOPAEDIC SURGERY

## 2024-11-04 RX ORDER — ALBUTEROL SULFATE 90 UG/1
2 INHALANT RESPIRATORY (INHALATION) EVERY 4 HOURS PRN
COMMUNITY
Start: 2024-10-17

## 2024-11-04 RX ORDER — MOMETASONE FUROATE 200 UG/1
2 AEROSOL RESPIRATORY (INHALATION) 2 TIMES DAILY
COMMUNITY
Start: 2024-10-23

## 2024-11-04 NOTE — PROGRESS NOTES
cutaneous lesions noted in the lower extremities.   Upon palpation there is no induration noted.      Neurologic:  Gait: antalgic;  Motor exam of the lower extremities show ; quadriceps, hamstrings, foot dorsi and plantar flexors intact R.  5/5 and L. 5/5. Deep tendon reflexes are 2/4 at the knees and 2/4 at the ankles with strong extensor hallicus longus motor strength bilaterally. Sensory to both feet is intact to all sensory roots.    Cardiovascular:  The vascular exam is normal and is well perfused to distal extremities.  Distal pulses DP/PT: R. 2+; L. 2+.  There is cap refill noted less than two seconds in all digits. There is not edema of the bilateral lower extremities.  There is not varicosities noted in the distal extremities.      Lymph:  Upon palpation,  there is no lymphadenopathy noted in bilateral lower extremities.      Musculoskeletal:  Gait: antalgic; examination of the nails and digits reveal no cyanosis or clubbing.    Lumbar exam:  On visual inspection, there is not deformity of the spine.   full range of motion, no tenderness, palpable spasm or pain on motion. Special tests: Straight Leg Raise negative, Brandi test negative.      Hip exam:   Upon inspection, there is not deformity noted.  Upon palpation there is not tenderness.  ROM: is  full and symmetrical.   Strength: Hip Flexors 5/5; Hip Abductors 5/5; Hip Adduction 5/5.     Knee exam:  Left knee exam shows;  range of motion of R. Knee is 0 to 125, and L. Knee is 15 to 90. The patient does have  pain on motion, effusion is mild, there is tenderness over the  medial, anterior region, there are not any masses, there is not ligamentous instability, there is not  deformity noted.    Knee exam: bilateral positive for moderate crepitations, some mild tenderness laxity is not noted with stress.  There is not a popliteal cyst.    R. Knee:  Lachman's negative, Anterior Drawer negative, Posterior Drawer negative  Miki's negative, Thallasy

## 2024-11-05 ENCOUNTER — HOSPITAL ENCOUNTER (OUTPATIENT)
Dept: MRI IMAGING | Age: 49
Discharge: HOME | End: 2024-11-07
Payer: COMMERCIAL

## 2024-11-05 DIAGNOSIS — Z91.89 AT HIGH RISK FOR BREAST CANCER: ICD-10-CM

## 2024-11-05 PROCEDURE — 6360000004 HC RX CONTRAST MEDICATION: Performed by: RADIOLOGY

## 2024-11-05 PROCEDURE — C8908 MRI W/O FOL W/CONT, BREAST,: HCPCS

## 2024-11-05 PROCEDURE — A9585 GADOBUTROL INJECTION: HCPCS | Performed by: RADIOLOGY

## 2024-11-05 RX ORDER — GADOBUTROL 604.72 MG/ML
8 INJECTION INTRAVENOUS
Status: COMPLETED | OUTPATIENT
Start: 2024-11-05 | End: 2024-11-05

## 2024-11-05 RX ADMIN — GADOBUTROL 8 ML: 604.72 INJECTION INTRAVENOUS at 11:35

## 2024-11-11 ENCOUNTER — OFFICE VISIT (OUTPATIENT)
Dept: ORTHOPEDIC SURGERY | Age: 49
End: 2024-11-11
Payer: COMMERCIAL

## 2024-11-11 VITALS
DIASTOLIC BLOOD PRESSURE: 80 MMHG | OXYGEN SATURATION: 98 % | HEART RATE: 91 BPM | BODY MASS INDEX: 33.38 KG/M2 | TEMPERATURE: 98.1 F | HEIGHT: 60 IN | WEIGHT: 170 LBS | SYSTOLIC BLOOD PRESSURE: 148 MMHG

## 2024-11-11 DIAGNOSIS — S83.212A BUCKET-HANDLE TEAR OF MEDIAL MENISCUS OF LEFT KNEE AS CURRENT INJURY, INITIAL ENCOUNTER: Primary | ICD-10-CM

## 2024-11-11 DIAGNOSIS — S83.242A ACUTE MEDIAL MENISCUS TEAR, LEFT, INITIAL ENCOUNTER: ICD-10-CM

## 2024-11-11 PROCEDURE — 99214 OFFICE O/P EST MOD 30 MIN: CPT | Performed by: ORTHOPAEDIC SURGERY

## 2024-11-11 NOTE — PROGRESS NOTES
CERVICAL  RADIOFREQUENCY ABLATION AT C2, C3, C4, and C5 UNDER FLUOROSCOPY SEDATION performed by Gregg Christianson MD at [facility] OR    PAIN MANAGEMENT PROCEDURE Left 08/01/2023    LEFT CERVICAL  RADIOFREQUENCY ABLATION AT C2, C3, C4, and C5 UNDER FLUOROSCOPY SEDATION performed by Gregg Christianson MD at [facility] OR    PAIN MANAGEMENT PROCEDURE Left 9/24/2024    CERVICAL 7-THORACIC 1 EPIDURAL STEROID INJECTION LEFT PARAMEDIAN UNDER FLUOROSCOPIC GUIDANCE performed by Gregg Christianson MD at [facility] OR    RHINOPLASTY  2013    TUBAL LIGATION      TURBINATE RESECTION  2023       Current Outpatient Medications:     albuterol sulfate HFA (PROVENTIL;VENTOLIN;PROAIR) 108 (90 Base) MCG/ACT inhaler, Inhale 2 puffs into the lungs every 4 hours as needed, Disp: , Rfl:     ASMANEX  MCG/ACT AERO inhaler, Inhale 2 puffs into the lungs 2 times daily, Disp: , Rfl:     clindamycin (CLEOCIN T) 1 % lotion, APPLY TO LARGE SERVICE AREA OF BODY TWICE DAILY, Disp: , Rfl:     rifAXIMin (XIFAXAN) 200 MG tablet, Take 1 tablet by mouth 3 times daily As needed, Disp: , Rfl:     leflunomide (ARAVA) 20 MG tablet, Take 1 tablet by mouth daily, Disp: , Rfl:     loratadine (CLARITIN) 10 MG tablet, Take 1 tablet by mouth daily as directed, Disp: , Rfl:     DULoxetine (CYMBALTA) 60 MG extended release capsule, Take by mouth daily, Disp: , Rfl:     Cholecalciferol (VITAMIN D) 50 MCG (2000 UT) CAPS capsule, Take by mouth daily , Disp: , Rfl:     pantoprazole (PROTONIX) 40 MG tablet, Take 1 tablet by mouth daily, Disp: , Rfl:     topiramate (TOPAMAX) 50 MG tablet, Take 1 tablet by mouth nightly, Disp: 30 tablet, Rfl: 2  Allergies   Allergen Reactions    Iron Other (See Comments)     blisters    Methotrexate Derivatives Nausea And Vomiting and Other (See Comments)     Mouth sores     Social History     Socioeconomic History    Marital status:      Spouse name: Not on file    Number of children: Not on file    Years of

## 2024-11-13 ENCOUNTER — PREP FOR PROCEDURE (OUTPATIENT)
Dept: ORTHOPEDIC SURGERY | Age: 49
End: 2024-11-13

## 2024-11-13 ENCOUNTER — TELEPHONE (OUTPATIENT)
Dept: ORTHOPEDIC SURGERY | Age: 49
End: 2024-11-13

## 2024-11-13 PROBLEM — S83.212A BUCKET-HANDLE TEAR OF MEDIAL MENISCUS OF LEFT KNEE AS CURRENT INJURY: Status: ACTIVE | Noted: 2024-11-13

## 2024-11-13 NOTE — TELEPHONE ENCOUNTER
Prior Authorization Form:      DEMOGRAPHICS:                     Patient Name:  Dannielle Levy  Patient :  1975            Insurance:  Payor: BCBS / Plan: [place]BS - [place] PPO / Product Type: *No Product type* /   Insurance ID Number:    Payer/Plan Subscr  Sex Relation Sub. Ins. ID Effective Group Num   1. BCBS - BCBS -* MIHAI LEVY 1975 Male Spouse [id number]                                    [id number]         DIAGNOSIS & PROCEDURE:                       Procedure/Operation: LEFT KNEE ARTHROSCOPY MEDIAL MENISECTOMY AND DEBRIDEMENT           CPT Code: 91769    Diagnosis:  LEFT KNEE MEDIAL MENISCUS TEAR    ICD10 Code: S83.212A    Location:   [facility]    Surgeon:  DYLAN CORTEZ    SCHEDULING INFORMATION:                          Date: 24    Time: TOO FOLLOW              Anesthesia:  General                                                       Status:  Outpatient        Special Comments:  NONE       Electronically signed by Suzette Valente ATC on 2024 at 10:26 AM

## 2024-11-25 ENCOUNTER — ANESTHESIA EVENT (OUTPATIENT)
Dept: OPERATING ROOM | Age: 49
End: 2024-11-25
Payer: COMMERCIAL

## 2024-11-26 ENCOUNTER — OFFICE VISIT (OUTPATIENT)
Dept: PAIN MANAGEMENT | Age: 49
End: 2024-11-26
Payer: COMMERCIAL

## 2024-11-26 ENCOUNTER — PREP FOR PROCEDURE (OUTPATIENT)
Dept: PAIN MANAGEMENT | Age: 49
End: 2024-11-26

## 2024-11-26 VITALS
OXYGEN SATURATION: 99 % | SYSTOLIC BLOOD PRESSURE: 148 MMHG | HEIGHT: 60 IN | BODY MASS INDEX: 33.37 KG/M2 | TEMPERATURE: 97.5 F | HEART RATE: 73 BPM | WEIGHT: 169.97 LBS | RESPIRATION RATE: 18 BRPM | DIASTOLIC BLOOD PRESSURE: 84 MMHG

## 2024-11-26 DIAGNOSIS — M47.817 LUMBOSACRAL SPONDYLOSIS WITHOUT MYELOPATHY: ICD-10-CM

## 2024-11-26 DIAGNOSIS — M50.30 DDD (DEGENERATIVE DISC DISEASE), CERVICAL: ICD-10-CM

## 2024-11-26 DIAGNOSIS — M51.369 DEGENERATION OF INTERVERTEBRAL DISC OF LUMBAR REGION, UNSPECIFIED WHETHER PAIN PRESENT: ICD-10-CM

## 2024-11-26 DIAGNOSIS — M54.12 CERVICAL RADICULAR PAIN: Primary | ICD-10-CM

## 2024-11-26 DIAGNOSIS — M47.812 CERVICAL SPONDYLOSIS: Primary | ICD-10-CM

## 2024-11-26 PROCEDURE — 99213 OFFICE O/P EST LOW 20 MIN: CPT | Performed by: ANESTHESIOLOGY

## 2024-11-26 RX ORDER — SODIUM CHLORIDE 9 MG/ML
INJECTION, SOLUTION INTRAVENOUS PRN
OUTPATIENT
Start: 2024-11-26

## 2024-11-26 RX ORDER — SODIUM CHLORIDE 0.9 % (FLUSH) 0.9 %
5-40 SYRINGE (ML) INJECTION PRN
OUTPATIENT
Start: 2024-11-26

## 2024-11-26 RX ORDER — SODIUM CHLORIDE 0.9 % (FLUSH) 0.9 %
5-40 SYRINGE (ML) INJECTION EVERY 12 HOURS SCHEDULED
OUTPATIENT
Start: 2024-11-26

## 2024-11-26 NOTE — ANESTHESIA PRE PROCEDURE
STEROID INJECTION LEFT PARAMEDIAN UNDER FLUOROSCOPIC GUIDANCE performed by Gregg Christianson MD at [facility] OR    RHINOPLASTY  2013    TUBAL LIGATION      TURBINATE RESECTION  2023       Social History:    Social History     Tobacco Use    Smoking status: Never    Smokeless tobacco: Never   Substance Use Topics    Alcohol use: Yes     Alcohol/week: 2.0 standard drinks of alcohol     Types: 2 Glasses of wine per week     Comment: wine occ- 4 drinks weekend only                                Counseling given: Not Answered      Vital Signs (Current):   There were no vitals filed for this visit.                                           BP Readings from Last 3 Encounters:   11/11/24 (!) 148/80   11/03/24 (!) 145/82   09/24/24 137/84       NPO Status:                                                                                 BMI:   Wt Readings from Last 3 Encounters:   11/11/24 77.1 kg (170 lb)   11/04/24 77.1 kg (170 lb)   11/03/24 77.1 kg (170 lb)     There is no height or weight on file to calculate BMI.    CBC:   Lab Results   Component Value Date/Time    WBC 7.3 06/19/2024 12:29 PM    RBC 4.92 06/19/2024 12:29 PM    HGB 11.6 06/19/2024 12:29 PM    HCT 39.6 06/19/2024 12:29 PM    MCV 80.5 06/19/2024 12:29 PM    RDW 14.9 06/19/2024 12:29 PM     03/19/2024 02:44 PM       CMP:   Lab Results   Component Value Date/Time     06/19/2024 12:29 PM    K 3.6 06/19/2024 12:29 PM     06/19/2024 12:29 PM    CO2 20 06/19/2024 12:29 PM    BUN 12 06/19/2024 12:29 PM    CREATININE 0.9 06/19/2024 12:29 PM    GFRAA >60 12/20/2021 11:20 AM    LABGLOM 82 06/19/2024 12:29 PM    LABGLOM >60 03/19/2024 02:44 PM    GLUCOSE 92 06/19/2024 12:29 PM    GLUCOSE 97 08/30/2011 10:00 AM    CALCIUM 9.3 06/19/2024 12:29 PM    BILITOT 0.4 06/19/2024 12:29 PM    ALKPHOS 89 06/19/2024 12:29 PM    AST 21 06/19/2024 12:29 PM    ALT 21 06/19/2024 12:29 PM       POC Tests: No results for input(s): \"POCGLU\", \"POCNA\", \"POCK\",

## 2024-11-26 NOTE — PROGRESS NOTES
[facility]                                                                                                                    PRE OP INSTRUCTIONS FOR  Dannielle Barba        Date: 11/26/2024    Date of surgery: 11/27/24   Arrival Time: Hospital will call you between 5pm and 7pm with your final arrival time for surgery. Go to front of hospital and check in at information desk.    Nothing by mouth (NPO) as instructed. May have clear liquids up to 2 hours prior to surgery. Nothing solid after midnight. Examples: water, apple juice, black coffee, plain tea    Take the following medications with a small sip of water on the morning of Surgery: Protonix, Cymbalta, Asmanex inhaler and bring albuterol inhaler     Diabetics may take half the evening dose of insulin but none after midnight.  If you feel symptomatic or have low blood sugar morning of surgery drink 1-2 ounces of apple juice only. If you take a weekly insulin injection _______________, stop 7 days prior to surgery. If you take _______________, stop 3-4 days prior to surgery.    Aspirin, Ibuprofen, Advil, Naproxen, other Anti-inflammatory products should be stopped before surgery as directed by your surgeon, cardiologist, or primary care Doctor. Herbal supplements and Vitamin E should be stopped five days prior.  May take Tylenol unless instructed otherwise by your surgeon.    Check with your Doctor regarding stopping Plavix, Coumadin, Lovenox, Eliquis, Effient, or other blood thinners such as, pradaxa, lixiana, xaralto and savaysa.    Do not smoke, chew tobacco, vape, or use illicit drugs and do not drink any alcoholic beverages 24 hours prior to surgery.    You may brush your teeth the morning of surgery.      You MUST make arrangements for a responsible adult, 18 and over, to take you home after your surgery. You will not be allowed to leave alone or drive yourself home.  You will need someone stay with you the first 24 hrs. Your  machine.    22. Other:                     Please call AMBULATORY CARE if you have any further questions.   Pre Admit Testing           [phone]     [facility] [phone]

## 2024-11-26 NOTE — PROGRESS NOTES
Dannielle Barba presents to the [facility] on 11/26/2024. Dannielle is complaining of pain in her low back. The pain is intermittent. The pain is described as aching, throbbing, and stabbing. Pain is rated on her best day at a 4, on her worst day at a 10, and on average at a 4 on the VAS scale. She took her last dose of Tylenol a few weeks ago.      Any procedures since your last visit: Yes    She is not on NSAIDS and  is not on anticoagulation medications to include none    Pacemaker or defibrillator: No .    Do you want someone present when the provider examines you? No    Medication Contract and Consent for Opioid Use Documents Filed       Patient Documents       Type of Document Status Date Received Received By Description    Medication Contract Received 5/7/2019  8:46 AM SANDRITA [facility] Pt Agreement 5/7/2019                       Resp 18   Ht 1.524 m (5')   Wt 77.1 kg (169 lb 15.6 oz)   LMP 11/01/2021   BMI 33.20 kg/m²      Patient's last menstrual period was 11/01/2021.  
Call to Dannielle Barba that procedure was scheduled for 12/3/2024 and that [facility] should call her a few days before for the pre op call and after 3:00 PM the business day before with the arrival time. Instructed Dannielle to hold ibuprofen for 24 hours, Celebrex, Mobic, and naprosyn for 4 days and any aspirin containing products, CoQ 10, or fish oil for 7 days. Instructed to call office back if any questions. Dannielle verbalized understanding.    Electronically signed by Ricardo Holly RN on 11/26/2024 at 12:28 PM   
Violence: Not on file   Housing Stability: Not on file       Family History   Problem Relation Age of Onset    Heart Failure Mother     Thyroid Disease Mother     Cancer Father         Prostate cancer per mammo hx sheet    Prostate Cancer Father         Per mammo  hx sheet    Thyroid Disease Sister     Thyroid Disease Sister     Thyroid Disease Sister     Thyroid Disease Maternal Grandmother     Breast Cancer Paternal Grandmother     Breast Cancer Maternal Aunt     Colon Cancer Maternal Aunt     Breast Cancer Maternal Cousin        REVIEW OF SYSTEMS:     Dannielle denies fever/chills, chest pain, shortness of breath, new bowel or bladder complaints. All other review of systems was negative.    PHYSICAL EXAMINATION:      BP (!) 148/84   Pulse 73   Temp 97.5 °F (36.4 °C) (Temporal)   Resp 18   Ht 1.524 m (5')   Wt 77.1 kg (169 lb 15.6 oz)   LMP 11/01/2021   SpO2 99%   BMI 33.20 kg/m²    General:       General appearance:   pleasant and well-hydrated.   , in no distress and A & O x3  Build:Obese  Function:Moves about room without difficulty.     HEENT:     Head:normocephalic, atraumatic    Lungs:     Breathing:normal breathing pattern      CVS :   RRR     Abdomen:     Shape:non-distended and normal     Cervical spine:     Inspection:normal  Palpation:tenderness paravertebral muscles, tenderness trapezium, left, right and negative  Range of motion:abnormal mildly flexion, extension rotation bilateral and is painful.  Facet tenderness  improved     Thoracic spine:     Spine inspection:normal   Palpation:tender paraspinals,  Range of motion:normal in flexion, extension rotation bilateral and is not painful.     Lumbar spine:     Spine inspection:normal   Palpation:tenderness paravertebral muscles, left, right and positive.  Range of motion: Reduced, Pain on Lateral bending, flexion, extension rotation bilateral and is Painful.  B/l Lumbar facet loading   SIJ tenderness improved  Sensory and motor in B/l LE

## 2024-11-27 ENCOUNTER — HOSPITAL ENCOUNTER (OUTPATIENT)
Age: 49
Setting detail: OUTPATIENT SURGERY
Discharge: HOME | End: 2024-11-27
Attending: ORTHOPAEDIC SURGERY | Admitting: ORTHOPAEDIC SURGERY
Payer: COMMERCIAL

## 2024-11-27 ENCOUNTER — ANESTHESIA (OUTPATIENT)
Dept: OPERATING ROOM | Age: 49
End: 2024-11-27
Payer: COMMERCIAL

## 2024-11-27 VITALS
HEIGHT: 60 IN | TEMPERATURE: 97.3 F | BODY MASS INDEX: 32.79 KG/M2 | RESPIRATION RATE: 14 BRPM | WEIGHT: 167 LBS | SYSTOLIC BLOOD PRESSURE: 133 MMHG | DIASTOLIC BLOOD PRESSURE: 87 MMHG | OXYGEN SATURATION: 97 % | HEART RATE: 80 BPM

## 2024-11-27 DIAGNOSIS — S83.212A BUCKET-HANDLE TEAR OF MEDIAL MENISCUS OF LEFT KNEE AS CURRENT INJURY, INITIAL ENCOUNTER: Primary | ICD-10-CM

## 2024-11-27 PROCEDURE — 6360000002 HC RX W HCPCS

## 2024-11-27 PROCEDURE — 6360000002 HC RX W HCPCS: Performed by: ORTHOPAEDIC SURGERY

## 2024-11-27 PROCEDURE — 2709999900 HC NON-CHARGEABLE SUPPLY: Performed by: ORTHOPAEDIC SURGERY

## 2024-11-27 PROCEDURE — 7100000010 HC PHASE II RECOVERY - FIRST 15 MIN: Performed by: ORTHOPAEDIC SURGERY

## 2024-11-27 PROCEDURE — 7100000000 HC PACU RECOVERY - FIRST 15 MIN: Performed by: ORTHOPAEDIC SURGERY

## 2024-11-27 PROCEDURE — 3700000001 HC ADD 15 MINUTES (ANESTHESIA): Performed by: ORTHOPAEDIC SURGERY

## 2024-11-27 PROCEDURE — 2580000003 HC RX 258

## 2024-11-27 PROCEDURE — 6360000002 HC RX W HCPCS: Performed by: ANESTHESIOLOGY

## 2024-11-27 PROCEDURE — 2580000003 HC RX 258: Performed by: ORTHOPAEDIC SURGERY

## 2024-11-27 PROCEDURE — 3600000003 HC SURGERY LEVEL 3 BASE: Performed by: ORTHOPAEDIC SURGERY

## 2024-11-27 PROCEDURE — 29880 ARTHRS KNE SRG MNISECTMY M&L: CPT | Performed by: ORTHOPAEDIC SURGERY

## 2024-11-27 PROCEDURE — 7100000011 HC PHASE II RECOVERY - ADDTL 15 MIN: Performed by: ORTHOPAEDIC SURGERY

## 2024-11-27 PROCEDURE — 3600000013 HC SURGERY LEVEL 3 ADDTL 15MIN: Performed by: ORTHOPAEDIC SURGERY

## 2024-11-27 PROCEDURE — 3700000000 HC ANESTHESIA ATTENDED CARE: Performed by: ORTHOPAEDIC SURGERY

## 2024-11-27 PROCEDURE — 7100000001 HC PACU RECOVERY - ADDTL 15 MIN: Performed by: ORTHOPAEDIC SURGERY

## 2024-11-27 RX ORDER — SODIUM CHLORIDE 9 MG/ML
INJECTION, SOLUTION INTRAVENOUS
Status: DISCONTINUED | OUTPATIENT
Start: 2024-11-27 | End: 2024-11-27 | Stop reason: SDUPTHER

## 2024-11-27 RX ORDER — SODIUM CHLORIDE 0.9 % (FLUSH) 0.9 %
5-40 SYRINGE (ML) INJECTION PRN
Status: DISCONTINUED | OUTPATIENT
Start: 2024-11-27 | End: 2024-11-27 | Stop reason: HOSPADM

## 2024-11-27 RX ORDER — HYDROCODONE BITARTRATE AND ACETAMINOPHEN 5; 325 MG/1; MG/1
1 TABLET ORAL EVERY 6 HOURS PRN
Qty: 28 TABLET | Refills: 0 | Status: SHIPPED | OUTPATIENT
Start: 2024-11-27 | End: 2024-11-27 | Stop reason: HOSPADM

## 2024-11-27 RX ORDER — MIDAZOLAM HYDROCHLORIDE 1 MG/ML
INJECTION, SOLUTION INTRAMUSCULAR; INTRAVENOUS
Status: DISCONTINUED | OUTPATIENT
Start: 2024-11-27 | End: 2024-11-27 | Stop reason: SDUPTHER

## 2024-11-27 RX ORDER — NALOXONE HYDROCHLORIDE 0.4 MG/ML
INJECTION, SOLUTION INTRAMUSCULAR; INTRAVENOUS; SUBCUTANEOUS PRN
Status: DISCONTINUED | OUTPATIENT
Start: 2024-11-27 | End: 2024-11-27 | Stop reason: HOSPADM

## 2024-11-27 RX ORDER — KETOROLAC TROMETHAMINE 30 MG/ML
30 INJECTION, SOLUTION INTRAMUSCULAR; INTRAVENOUS ONCE
Status: COMPLETED | OUTPATIENT
Start: 2024-11-27 | End: 2024-11-27

## 2024-11-27 RX ORDER — BUPIVACAINE HYDROCHLORIDE 2.5 MG/ML
INJECTION, SOLUTION EPIDURAL; INFILTRATION; INTRACAUDAL PRN
Status: DISCONTINUED | OUTPATIENT
Start: 2024-11-27 | End: 2024-11-27 | Stop reason: ALTCHOICE

## 2024-11-27 RX ORDER — SODIUM CHLORIDE 0.9 % (FLUSH) 0.9 %
5-40 SYRINGE (ML) INJECTION EVERY 12 HOURS SCHEDULED
Status: DISCONTINUED | OUTPATIENT
Start: 2024-11-27 | End: 2024-11-27 | Stop reason: HOSPADM

## 2024-11-27 RX ORDER — PROPOFOL 10 MG/ML
INJECTION, EMULSION INTRAVENOUS
Status: DISCONTINUED | OUTPATIENT
Start: 2024-11-27 | End: 2024-11-27 | Stop reason: SDUPTHER

## 2024-11-27 RX ORDER — SODIUM CHLORIDE 9 MG/ML
INJECTION, SOLUTION INTRAVENOUS PRN
Status: DISCONTINUED | OUTPATIENT
Start: 2024-11-27 | End: 2024-11-27 | Stop reason: HOSPADM

## 2024-11-27 RX ORDER — MEPERIDINE HYDROCHLORIDE 25 MG/ML
12.5 INJECTION INTRAMUSCULAR; INTRAVENOUS; SUBCUTANEOUS EVERY 5 MIN PRN
Status: DISCONTINUED | OUTPATIENT
Start: 2024-11-27 | End: 2024-11-27 | Stop reason: HOSPADM

## 2024-11-27 RX ORDER — HYDRALAZINE HYDROCHLORIDE 20 MG/ML
10 INJECTION INTRAMUSCULAR; INTRAVENOUS
Status: DISCONTINUED | OUTPATIENT
Start: 2024-11-27 | End: 2024-11-27 | Stop reason: HOSPADM

## 2024-11-27 RX ORDER — OXYCODONE AND ACETAMINOPHEN 5; 325 MG/1; MG/1
1 TABLET ORAL EVERY 6 HOURS PRN
Qty: 28 TABLET | Refills: 0 | Status: SHIPPED | OUTPATIENT
Start: 2024-11-27 | End: 2024-12-04

## 2024-11-27 RX ORDER — IPRATROPIUM BROMIDE AND ALBUTEROL SULFATE 2.5; .5 MG/3ML; MG/3ML
1 SOLUTION RESPIRATORY (INHALATION)
Status: DISCONTINUED | OUTPATIENT
Start: 2024-11-27 | End: 2024-11-27 | Stop reason: HOSPADM

## 2024-11-27 RX ORDER — LABETALOL HYDROCHLORIDE 5 MG/ML
10 INJECTION, SOLUTION INTRAVENOUS
Status: DISCONTINUED | OUTPATIENT
Start: 2024-11-27 | End: 2024-11-27 | Stop reason: HOSPADM

## 2024-11-27 RX ORDER — PROCHLORPERAZINE EDISYLATE 5 MG/ML
5 INJECTION INTRAMUSCULAR; INTRAVENOUS
Status: DISCONTINUED | OUTPATIENT
Start: 2024-11-27 | End: 2024-11-27 | Stop reason: HOSPADM

## 2024-11-27 RX ORDER — ASPIRIN 81 MG/1
81 TABLET ORAL DAILY
Qty: 30 TABLET | Refills: 0 | Status: SHIPPED | OUTPATIENT
Start: 2024-11-27

## 2024-11-27 RX ORDER — FENTANYL CITRATE 50 UG/ML
INJECTION, SOLUTION INTRAMUSCULAR; INTRAVENOUS
Status: DISCONTINUED | OUTPATIENT
Start: 2024-11-27 | End: 2024-11-27 | Stop reason: SDUPTHER

## 2024-11-27 RX ORDER — DIPHENHYDRAMINE HYDROCHLORIDE 50 MG/ML
12.5 INJECTION INTRAMUSCULAR; INTRAVENOUS
Status: DISCONTINUED | OUTPATIENT
Start: 2024-11-27 | End: 2024-11-27 | Stop reason: HOSPADM

## 2024-11-27 RX ORDER — DEXAMETHASONE SODIUM PHOSPHATE 10 MG/ML
INJECTION, SOLUTION INTRAMUSCULAR; INTRAVENOUS
Status: DISCONTINUED | OUTPATIENT
Start: 2024-11-27 | End: 2024-11-27 | Stop reason: SDUPTHER

## 2024-11-27 RX ORDER — HALOPERIDOL 5 MG/ML
1 INJECTION INTRAMUSCULAR
Status: DISCONTINUED | OUTPATIENT
Start: 2024-11-27 | End: 2024-11-27 | Stop reason: HOSPADM

## 2024-11-27 RX ORDER — SODIUM CHLORIDE 9 MG/ML
INJECTION, SOLUTION INTRAVENOUS CONTINUOUS
Status: DISCONTINUED | OUTPATIENT
Start: 2024-11-27 | End: 2024-11-27 | Stop reason: HOSPADM

## 2024-11-27 RX ORDER — LIDOCAINE HYDROCHLORIDE 20 MG/ML
INJECTION, SOLUTION INTRAVENOUS
Status: DISCONTINUED | OUTPATIENT
Start: 2024-11-27 | End: 2024-11-27 | Stop reason: SDUPTHER

## 2024-11-27 RX ADMIN — FENTANYL CITRATE 25 MCG: 50 INJECTION, SOLUTION INTRAMUSCULAR; INTRAVENOUS at 07:27

## 2024-11-27 RX ADMIN — MIDAZOLAM 2 MG: 1 INJECTION INTRAMUSCULAR; INTRAVENOUS at 06:57

## 2024-11-27 RX ADMIN — HYDROMORPHONE HYDROCHLORIDE 0.5 MG: 1 INJECTION, SOLUTION INTRAMUSCULAR; INTRAVENOUS; SUBCUTANEOUS at 08:59

## 2024-11-27 RX ADMIN — KETOROLAC TROMETHAMINE 30 MG: 30 INJECTION, SOLUTION INTRAMUSCULAR at 08:56

## 2024-11-27 RX ADMIN — PROPOFOL 170 MG: 10 INJECTION, EMULSION INTRAVENOUS at 07:04

## 2024-11-27 RX ADMIN — FENTANYL CITRATE 50 MCG: 50 INJECTION, SOLUTION INTRAMUSCULAR; INTRAVENOUS at 07:04

## 2024-11-27 RX ADMIN — LIDOCAINE HYDROCHLORIDE 80 MG: 20 INJECTION INTRAVENOUS at 07:04

## 2024-11-27 RX ADMIN — DEXAMETHASONE SODIUM PHOSPHATE 10 MG: 10 INJECTION INTRAMUSCULAR; INTRAVENOUS at 07:08

## 2024-11-27 RX ADMIN — SODIUM CHLORIDE: 9 INJECTION, SOLUTION INTRAVENOUS at 06:57

## 2024-11-27 ASSESSMENT — PAIN DESCRIPTION - LOCATION
LOCATION: KNEE
LOCATION: KNEE

## 2024-11-27 ASSESSMENT — PAIN DESCRIPTION - DESCRIPTORS
DESCRIPTORS: ACHING
DESCRIPTORS: ACHING

## 2024-11-27 ASSESSMENT — PAIN SCALES - GENERAL
PAINLEVEL_OUTOF10: 7
PAINLEVEL_OUTOF10: 4

## 2024-11-27 ASSESSMENT — PAIN DESCRIPTION - ORIENTATION: ORIENTATION: LEFT

## 2024-11-27 ASSESSMENT — PAIN - FUNCTIONAL ASSESSMENT
PAIN_FUNCTIONAL_ASSESSMENT: 0-10
PAIN_FUNCTIONAL_ASSESSMENT: FACE, LEGS, ACTIVITY, CRY, AND CONSOLABILITY (FLACC)
PAIN_FUNCTIONAL_ASSESSMENT: 0-10

## 2024-11-27 NOTE — H&P
Updated H&P    Chief Complaint   Patient presents with    Follow-up       Patient is here to go over her MRI on her Left Knee. Patient is still limited weightbearing. She is unable to straighten her leg.         Subjective:     Patient ID: Dannielle Barba is a 49 y.o..  female     Knee Pain  Patient complains of left knee pain. She stated she is about the same as when I saw her last. She has trouble bearing weight and bending the knee. She also complains of swelling in the knee.     Past Medical History        Past Medical History:   Diagnosis Date    BRCA1 gene mutation positive       Cousin Pos. per mmmo hx sheet    COVID 2021     pt states moderate; twice 2021, May 2022-sinus infection only 4 days only    Joint pain      Low back pain       degenerative disk disease    Lupus      Menometrorrhagia 2022    Neck pain      RA (rheumatoid arthritis) ([facility])       RH    Radiculopathy      Sjogren's disease ([facility])           Past Surgical History         Past Surgical History:   Procedure Laterality Date    ANESTHESIA NERVE BLOCK Bilateral 2019     BILATERAL LUMBAR TRANSFORAMINAL EPIDURAL STERIOD INJECTION AT L5-S1 UNDER FLUOROSCOPY performed by Gregg Christianson MD at [facility] OR    ARM SURGERY Left 2022     LEFT ELBOW CUBITAL TUNNEL RELEASE performed by Raudel Logan DO at [facility] OR    BACK INJECTION Bilateral 2024     BILATERAL SACROILIAC JOINT INJECTION UNDER FLUOROSCOPIC GUIDANCE performed by Gregg Christianson MD at [facility] OR    CATARACT REMOVAL WITH IMPLANT Bilateral      SECTION         x2    CHOLECYSTECTOMY, LAPAROSCOPIC N/A 2021     LAPAROSCOPIC CHOLECYSTECTOMY performed by Oren Garcia MD at [facility] OR    COLONOSCOPY        DILATION AND CURETTAGE OF UTERUS         x2    DILATION AND CURETTAGE OF UTERUS N/A 2022     DILATATION AND CURETTAGE HYSTEROSCOPY performed by Gena Keller MD at [facility] OR    ENDOSCOPY, COLON, DIAGNOSTIC      PARAMEDIAN performed by Gregg Christianson MD at [facility] OR    PAIN MANAGEMENT PROCEDURE Right 07/11/2023     RIGHT  CERVICAL  RADIOFREQUENCY ABLATION AT C2, C3, C4, and C5 UNDER FLUOROSCOPY SEDATION performed by Gregg Christianson MD at [facility] OR    PAIN MANAGEMENT PROCEDURE Left 08/01/2023     LEFT CERVICAL  RADIOFREQUENCY ABLATION AT C2, C3, C4, and C5 UNDER FLUOROSCOPY SEDATION performed by Gregg Christianson MD at [facility] OR    PAIN MANAGEMENT PROCEDURE Left 9/24/2024     CERVICAL 7-THORACIC 1 EPIDURAL STEROID INJECTION LEFT PARAMEDIAN UNDER FLUOROSCOPIC GUIDANCE performed by Gregg Christianson MD at [facility] OR    RHINOPLASTY   2013    TUBAL LIGATION        TURBINATE RESECTION   2023           Current Medication      Current Outpatient Medications:     albuterol sulfate HFA (PROVENTIL;VENTOLIN;PROAIR) 108 (90 Base) MCG/ACT inhaler, Inhale 2 puffs into the lungs every 4 hours as needed, Disp: , Rfl:     ASMANEX  MCG/ACT AERO inhaler, Inhale 2 puffs into the lungs 2 times daily, Disp: , Rfl:     clindamycin (CLEOCIN T) 1 % lotion, APPLY TO LARGE SERVICE AREA OF BODY TWICE DAILY, Disp: , Rfl:     rifAXIMin (XIFAXAN) 200 MG tablet, Take 1 tablet by mouth 3 times daily As needed, Disp: , Rfl:     leflunomide (ARAVA) 20 MG tablet, Take 1 tablet by mouth daily, Disp: , Rfl:     loratadine (CLARITIN) 10 MG tablet, Take 1 tablet by mouth daily as directed, Disp: , Rfl:     DULoxetine (CYMBALTA) 60 MG extended release capsule, Take by mouth daily, Disp: , Rfl:     Cholecalciferol (VITAMIN D) 50 MCG (2000 UT) CAPS capsule, Take by mouth daily , Disp: , Rfl:     pantoprazole (PROTONIX) 40 MG tablet, Take 1 tablet by mouth daily, Disp: , Rfl:     topiramate (TOPAMAX) 50 MG tablet, Take 1 tablet by mouth nightly, Disp: 30 tablet, Rfl: 2     Allergies         Allergies   Allergen Reactions    Iron Other (See Comments)       blisters    Methotrexate Derivatives Nausea And Vomiting and Other (See

## 2024-11-27 NOTE — DISCHARGE INSTRUCTIONS
Orthopedics  Weight bearing Status - Weight bearing as tolerated - operative leg  Pain medication Per Prescription  Ice and Elevate effected Extremity  Continue DVT Prophylaxis  Wound care -  ok to remove dressing on post operative day 3 and cover with a clean dry dressing as needed for drainage.   Follow Up in Office in 2 weeks with Dr. Logan, call the office to schedule an appointment.

## 2024-11-27 NOTE — ANESTHESIA POSTPROCEDURE EVALUATION
Department of Anesthesiology  Postprocedure Note    Patient: Dannielle Barba  MRN: [id number]  YOB: 1975  Date of evaluation: 11/27/2024    Procedure Summary       Date: 11/27/24 Room / Location: [address]    Anesthesia Start: 0657 Anesthesia Stop: 0830    Procedure: LEFT KNEE ARTHROSOCPY MEDIAL MENISECTOMY AND DEBRIDEMENT-11/27/24 (Left: Knee) Diagnosis:       Bucket-handle tear of medial meniscus of left knee as current injury      (Bucket-handle tear of medial meniscus of left knee as current injury [S83.212A])    Surgeons: Raudel Logan DO Responsible Provider: Arley Mclaughlin MD    Anesthesia Type: General ASA Status: 3            Anesthesia Type: General    Fredi Phase I: Fredi Score: 10    Fredi Phase II: Fredi Score: 10    Anesthesia Post Evaluation    Patient location during evaluation: PACU  Patient participation: complete - patient participated  Level of consciousness: awake  Pain score: 4  Airway patency: patent  Nausea & Vomiting: no vomiting and no nausea  Cardiovascular status: hemodynamically stable  Respiratory status: acceptable  Hydration status: stable  Pain management: adequate        No notable events documented.

## 2024-11-27 NOTE — OP NOTE
Operative Note      Patient: Dannielle Barba  YOB: 1975  MRN: [id number]    Date of Procedure: 11/27/2024    Pre-Op Diagnosis Codes:      * Bucket-handle tear of medial meniscus of left knee as current injury [S83.212A]    Post-Op Diagnosis: Same + lateral meniscus tear       Procedure(s):  LEFT KNEE ARTHROSOCPY MEDIAL MENISECTOMY AND DEBRIDEMENT-11/27/24    Surgeon(s):  Raudel Logan DO    Assistant:   Resident: Ranjeet Fish DO; Lorenzo Reyes DO    Anesthesia: General    Estimated Blood Loss (mL): less than 100     Complications: None    Specimens:   * No specimens in log *    Implants:  * No implants in log *      Drains: * No LDAs found *    Findings:  Infection Present At Time Of Surgery (PATOS) (choose all levels that have infection present):  No infection present  Other Findings: as above    Detailed Description of Procedure:   below      ESTIMATED BLOOD LOSS: Minimal.   COMPLICATIONS: None.   OPERATIVE PROCEDURE: The patient was taken to the operative suite and was   given general anesthesia. The Left knee was identified with preoperative time-out. I applied a tourniquet to the  thigh, placed the  leg in a legholder, prepped and draped the leg in sterile fashion.  I outlined an   incision along the anteromedial and anterolateral aspects of the knee.  An incision was then made in the anterior medial and lateral aspects of the knee with an 11 blade. A blunt trocar was then inserted within the knee and I carried out a diagnostic arthroscopy.     The patient had evidence of synovitis within the suprapatellar pouch, medial and lateral aspects of the knee.  There was a large suprapatellar, medial and infrapatellar plica.    The patellar surface was stable  and trochlear surface was stable.  I then advanced the scope into the intracondylar notch.  The patients ACL/ PCL were intact.   I then advanced the scope into the medial compartment. The medial femoral condyle had grade III defects

## 2024-12-11 ENCOUNTER — OFFICE VISIT (OUTPATIENT)
Dept: ORTHOPEDIC SURGERY | Age: 49
End: 2024-12-11

## 2024-12-11 VITALS — WEIGHT: 167 LBS | BODY MASS INDEX: 32.79 KG/M2 | HEIGHT: 60 IN

## 2024-12-11 DIAGNOSIS — Z98.890 S/P LEFT KNEE ARTHROSCOPY: Primary | ICD-10-CM

## 2024-12-11 PROCEDURE — 99024 POSTOP FOLLOW-UP VISIT: CPT

## 2024-12-11 NOTE — PROGRESS NOTES
Subjective:        Dannielle Barba is here for follow-up after left knee arthroscopy. Findings at surgery: medial meniscus tear, djd mfc.   Pain is controlled without any medications.. She denies fever, wound drainage, increasing redness, pus, increasing pain, increasing swelling. Post op problems reported: none.  She is ambulating without aid.         Objective:           General :    alert, appears stated age, and cooperative   Gait:  Antalgic.   Sutures:   Sutures out.   Incision:  healing well, no significant drainage, no dehiscence, no significant erythema   Tenderness:  none   Flexion ROM:  full range of motion   Extension ROM:  full range of motion   Effusion:  mild   DVT Evaluation:  No evidence of DVT seen on physical exam.           Assessment:     Encounter Diagnosis   Name Primary?    S/P left knee arthroscopy Yes         Plan:      Surgical pictures from the surgery were reveiwed with the patient  HEP  Sutures removed today.  Follow up: 4 weeks.   PT referral

## 2025-01-06 ENCOUNTER — TELEPHONE (OUTPATIENT)
Dept: PAIN MANAGEMENT | Age: 50
End: 2025-01-06

## 2025-01-06 NOTE — TELEPHONE ENCOUNTER
Patient called and states she is going to cancel her procedure at this time and that she is feeling better. Wants to keep her in office appt for March. [facility] & scheduling notified

## 2025-01-08 ENCOUNTER — OFFICE VISIT (OUTPATIENT)
Dept: ORTHOPEDIC SURGERY | Age: 50
End: 2025-01-08

## 2025-01-08 VITALS — BODY MASS INDEX: 32.79 KG/M2 | WEIGHT: 167 LBS | HEIGHT: 60 IN

## 2025-01-08 DIAGNOSIS — Z98.890 S/P LEFT KNEE ARTHROSCOPY: Primary | ICD-10-CM

## 2025-01-08 PROCEDURE — 99024 POSTOP FOLLOW-UP VISIT: CPT

## 2025-01-08 RX ORDER — METHYLPREDNISOLONE 4 MG/1
TABLET ORAL
Qty: 1 KIT | Refills: 0 | Status: SHIPPED | OUTPATIENT
Start: 2025-01-08 | End: 2025-01-14

## 2025-01-08 NOTE — PROGRESS NOTES
Subjective:        Dannielle Barba is here for follow-up after left knee arthroscopy 11/27/24. Findings at surgery: medial meniscus tear, djd mfc.   Pain is controlled without any medications.. She denies fever, wound drainage, increasing redness, pus, increasing pain, increasing swelling. Post op problems reported: none.  She is ambulating without aid. She is having some issues with range of motion and swelling in the knee. She has not started PT yet.         Objective:           General :    alert, appears stated age, and cooperative   Gait:  Antalgic.   Sutures:   Sutures out.   Incision:  healing well, no significant drainage, no dehiscence, no significant erythema   Tenderness:  none   Flexion ROM:  full range of motion   Extension ROM:  full range of motion   Effusion:  mild   DVT Evaluation:  No evidence of DVT seen on physical exam.           Assessment:     Encounter Diagnosis   Name Primary?    S/P left knee arthroscopy Yes           Plan:     Medrol dose pack  PT referral  Follow up 6 weeks. If still having problems will consider CSI.

## 2025-02-17 ENCOUNTER — HOSPITAL ENCOUNTER (OUTPATIENT)
Dept: GENERAL RADIOLOGY | Age: 50
Discharge: HOME | End: 2025-02-19
Payer: COMMERCIAL

## 2025-02-17 DIAGNOSIS — R92.30 INCONCLUSIVE MAMMOGRAPHY DUE TO DENSE BREASTS: ICD-10-CM

## 2025-02-17 DIAGNOSIS — R92.2 INCONCLUSIVE MAMMOGRAPHY DUE TO DENSE BREASTS: ICD-10-CM

## 2025-02-17 PROCEDURE — 77067 SCR MAMMO BI INCL CAD: CPT

## 2025-02-17 PROCEDURE — 77063 BREAST TOMOSYNTHESIS BI: CPT

## 2025-02-20 ENCOUNTER — CLINICAL DOCUMENTATION (OUTPATIENT)
Dept: GENERAL RADIOLOGY | Age: 50
End: 2025-02-20

## 2025-02-20 ENCOUNTER — OFFICE VISIT (OUTPATIENT)
Dept: ORTHOPEDIC SURGERY | Age: 50
End: 2025-02-20

## 2025-02-20 VITALS — WEIGHT: 167 LBS | HEIGHT: 60 IN | BODY MASS INDEX: 32.79 KG/M2

## 2025-02-20 DIAGNOSIS — Z98.890 S/P LEFT KNEE ARTHROSCOPY: Primary | ICD-10-CM

## 2025-02-20 DIAGNOSIS — S83.212A BUCKET-HANDLE TEAR OF MEDIAL MENISCUS OF LEFT KNEE AS CURRENT INJURY, INITIAL ENCOUNTER: ICD-10-CM

## 2025-02-20 RX ORDER — CELECOXIB 200 MG/1
200 CAPSULE ORAL DAILY
Qty: 30 CAPSULE | Refills: 3 | Status: SHIPPED | OUTPATIENT
Start: 2025-02-20 | End: 2025-06-20

## 2025-02-20 NOTE — PROGRESS NOTES
Subjective:        Dannielle Barba is here for follow-up after left knee arthroscopy 11/27/24. Findings at surgery: medial meniscus tear, djd mfc.   Pain is controlled without any medications.. She is doing well and continues with PT.        Objective:           General :    alert, appears stated age, and cooperative   Gait:  Antalgic.   Sutures:   Sutures out.   Incision:  healing well, no significant drainage, no dehiscence, no significant erythema   Tenderness:  none   Flexion ROM:  full range of motion   Extension ROM:  full range of motion   Effusion:  mild   DVT Evaluation:  No evidence of DVT seen on physical exam.           Assessment:     Encounter Diagnosis   Name Primary?    S/P left knee arthroscopy Yes           Plan:     I will order Celebrex and she will complete PT.  I will see her back as needed.

## 2025-02-20 NOTE — PROGRESS NOTES
Mammogram clinical report and patient result letter mailed to patient.  Report sent  to Dr Rodriguez,  per patient request on Authorization to Release the Results of Mammogram form.
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
## 2025-03-28 ENCOUNTER — OFFICE VISIT (OUTPATIENT)
Dept: PAIN MANAGEMENT | Age: 50
End: 2025-03-28
Payer: COMMERCIAL

## 2025-03-28 ENCOUNTER — PREP FOR PROCEDURE (OUTPATIENT)
Dept: PAIN MANAGEMENT | Age: 50
End: 2025-03-28

## 2025-03-28 VITALS
OXYGEN SATURATION: 97 % | HEIGHT: 60 IN | BODY MASS INDEX: 32.79 KG/M2 | SYSTOLIC BLOOD PRESSURE: 120 MMHG | DIASTOLIC BLOOD PRESSURE: 86 MMHG | TEMPERATURE: 96.8 F | RESPIRATION RATE: 18 BRPM | WEIGHT: 167 LBS | HEART RATE: 82 BPM

## 2025-03-28 DIAGNOSIS — M47.812 CERVICAL SPONDYLOSIS: Primary | ICD-10-CM

## 2025-03-28 DIAGNOSIS — M50.30 DDD (DEGENERATIVE DISC DISEASE), CERVICAL: ICD-10-CM

## 2025-03-28 DIAGNOSIS — M51.369 DEGENERATION OF INTERVERTEBRAL DISC OF LUMBAR REGION, UNSPECIFIED WHETHER PAIN PRESENT: ICD-10-CM

## 2025-03-28 DIAGNOSIS — M47.817 LUMBOSACRAL SPONDYLOSIS WITHOUT MYELOPATHY: ICD-10-CM

## 2025-03-28 PROCEDURE — 99214 OFFICE O/P EST MOD 30 MIN: CPT | Performed by: ANESTHESIOLOGY

## 2025-03-28 PROCEDURE — 99214 OFFICE O/P EST MOD 30 MIN: CPT

## 2025-03-28 RX ORDER — SODIUM CHLORIDE 9 MG/ML
INJECTION, SOLUTION INTRAVENOUS PRN
Status: CANCELLED | OUTPATIENT
Start: 2025-03-28

## 2025-03-28 RX ORDER — SODIUM CHLORIDE 0.9 % (FLUSH) 0.9 %
5-40 SYRINGE (ML) INJECTION EVERY 12 HOURS SCHEDULED
Status: CANCELLED | OUTPATIENT
Start: 2025-03-28

## 2025-03-28 RX ORDER — SODIUM CHLORIDE 0.9 % (FLUSH) 0.9 %
5-40 SYRINGE (ML) INJECTION PRN
Status: CANCELLED | OUTPATIENT
Start: 2025-03-28

## 2025-03-28 NOTE — PROGRESS NOTES
Dannielle Barba presents to the [facility] on 3/28/2025. Dannielle is complaining of pain in her back and neck radiating to back of head and right shoulder. The pain is constant. The pain is described as aching, throbbing, and stabbing. Pain is rated on her best day at a 3, on her worst day at a 10, and on average at a 3 on the VAS scale. She took her last dose of Aleve or Lopez back and body occasionally.      Any procedures since your last visit: No    She is not on NSAIDS and  is  on anticoagulation medications to include none and is managed by NA.     Pacemaker or defibrillator: No     Medication Contract and Consent for Opioid Use Documents Filed       Patient Documents       Type of Document Status Date Received Received By Description    Medication Contract Received 5/7/2019  8:46 AM SANDRITA KABA [facility] Pt Agreement 5/7/2019                       Resp 18   Ht 1.524 m (5')   Wt 75.8 kg (167 lb)   LMP 11/01/2021   BMI 32.61 kg/m²      Patient's last menstrual period was 11/01/2021.

## 2025-03-28 NOTE — PROGRESS NOTES
[facility]  [address], [place] [address]  [phone]    Follow up Note      Dannielle Barba     Date of Visit:  3/28/2025    CC:  Patient presents for follow up   Chief Complaint   Patient presents with    Neck Pain    Back Pain     HPI:  H/o Neck pain and upper extremity pain. S/P Cervical BELEM/ Cervical facet interventions - had helped significantly.    H/o chronic low back and LE pain. S/P BELEM with excellent pain relief in the past.     Nursing notes and details of the pain history reviewed. Please see intake notes for details.    She follows up with rheumatologist for H/o Sjogren's/ rheumatoid / SLE.    Previous treatments:   Failed conservative treatment with NSAID's, muscle relaxants, oral steroids and HEP.  Physical Therapy   Chiropractic treatment  Ongoing Core exercises for the past 6 months.  TENS  and medications-, gabapentin etc,.   Does not tolerate meds well      She has not been on anticoagulation medications.    EMG left UE: 8/21/2024: .  Diagnostic Interpretation:   This study was abnormal.      Electrodiagnosis: There is electrodiagnostic evidence of a cervical radiculopathy.   Location: left C7.   Nature: [ X ] Axonal   [  ] Demyelinating  [  ] Mixed axonal and demyelinating                     [  ] Sensory [ X ] Motor               [  ] Mixed sensorimotor                     [ X ] with active denervation       [  ] without active denervation  Duration: Subacute  Severity: moderate  Prognosis: Poor.  The prognosis for recovery of axonal lesions is poor and dependant on collateral sprouting and reinnervation.        Electrodiagnosis: There is electrodiagnostic evidence of a median mononeuropathy.   Location: left, at the wrist.   Nature: [  ] Axonal   [ X ] Demyelinating  [  ] Mixed axonal and demyelinating                     [  ] Sensory [  ] Motor               [X  ] Mixed sensorimotor                     [  ] with active denervation

## 2025-03-28 NOTE — PROGRESS NOTES
Dannielle Barba presents to the [facility] on 3/28/2025. Dannielle is complaining of pain ***. The pain is {Desc; intermittent/persistent/constant:63362}. The pain is described as {PAIN ASSESSMENT:99031}. Pain is rated on her best day at a {HH 0-10:678953}, on her worst day at a {HH 0-10:349355}, and on average at a {HH 0-10:699422} on the VAS scale. She took her last dose of {Pain Meds:24797} ***.      Any procedures since your last visit: Yes, with *** % relief.    She is  on NSAIDS and  is not on anticoagulation medications  Pacemaker or defibrillator: No   Medication Contract and Consent for Opioid Use Documents Filed       Patient Documents       Type of Document Status Date Received Received By Description    Medication Contract Received 5/7/2019  8:46 AM SANDRITA KABA Pain Management Pt Agreement 5/7/2019                       Resp 18   Ht 1.524 m (5')   Wt 75.8 kg (167 lb)   LMP 11/01/2021   BMI 32.61 kg/m²      Patient's last menstrual period was 11/01/2021.

## 2025-04-07 ENCOUNTER — OFFICE VISIT (OUTPATIENT)
Dept: ORTHOPEDIC SURGERY | Age: 50
End: 2025-04-07
Payer: COMMERCIAL

## 2025-04-07 VITALS — BODY MASS INDEX: 32.79 KG/M2 | WEIGHT: 167 LBS | HEIGHT: 60 IN

## 2025-04-07 DIAGNOSIS — S46.212A TEAR OF LEFT BICEPS MUSCLE, INITIAL ENCOUNTER: Primary | ICD-10-CM

## 2025-04-07 DIAGNOSIS — S46.012A TRAUMATIC COMPLETE TEAR OF LEFT ROTATOR CUFF, INITIAL ENCOUNTER: ICD-10-CM

## 2025-04-07 PROCEDURE — 99213 OFFICE O/P EST LOW 20 MIN: CPT | Performed by: ORTHOPAEDIC SURGERY

## 2025-04-07 NOTE — PROGRESS NOTES
palpation there is no induration noted.      Neurologic:  Motor exam of the upper extremities show: The reflexes in biceps/triceps/brachioradialis are equal and symmetric.  Sensory exam C5-T1 are normal bilaterally.        Cardiovascular:  The vascular exam is normal and is well perfused to distal extremities.There are 2+ radial pulses bilaterally, and motor and sensation is intact to median, ulnar, and radial, musclocutaneus, and axillary nerve distribution and grossly symmetric bilaterally.  There is cap refill noted less than two seconds in all digits. There is not edema of the bilateral upper extremities.  There is not varicosities noted in the distal extremities.      Lymph:  Upon palpation,  there is no lymphadenopathy noted in bilateral upper extremities.      Musculoskeletal:  Gait: normal; examination of the nails and digits reveal no cyanosis or clubbing.      Cervical Exam:  On physical exam, Dannielle Barba is well-developed, well-nourished, oriented to person, place and time.  her gait is normal.  On evaluation of hercervical spine, She has full range of motion of the cervical spine without pain.  There is no cervical tenderness to palpation.   3  Shoulder Exam:  On evaluation of her bilaterally upper extremities, her left shoulder has no deformity.  There is tenderness upon palpation of the anterior and lateral shoulder.  There is not evidence of scapular dyskinesis.  There is not muscle atrophy in shoulder girdle. The range of motion for the Right Shoulder is 170/50/T8 and for the Left shoulder is 100/30/PL.  Right shoulder Motor strength is 5/5 in the supraspinatus, 5/5 internal rotation and 5/5 in external rotation, and Left shoulder motor strength 5-/5 in supraspinatus, 5-/5 in internal rotation, 5-/5 in external rotation.        Right shoulder:  negative Impingement , negative Gleason ,negative  Speeds,negative  Apprehension ,negative Alvarez Load Shift, negative Cesar manuver, negative Cross arm

## 2025-04-14 ENCOUNTER — TELEPHONE (OUTPATIENT)
Dept: PAIN MANAGEMENT | Age: 50
End: 2025-04-14

## 2025-04-14 NOTE — TELEPHONE ENCOUNTER
Call to Dannielle Barba that procedure was scheduled for 05/06/2025 and that [facility] should call her a few days before for the pre op call and after 3:00 PM the business day before with the arrival time. Instructed Dannielle to hold ibuprofen for 24 hours, Celebrex, Mobic, and naprosyn for 4 days and any aspirin containing products, CoQ 10, or fish oil for 7 days. Instructed to call office back if any questions. Dannielle verbalized understanding.    Electronically signed by Roro Boo RN on 4/14/2025 at 5:10 PM

## 2025-04-23 ENCOUNTER — HOSPITAL ENCOUNTER (OUTPATIENT)
Dept: MRI IMAGING | Age: 50
Discharge: HOME | End: 2025-04-25
Payer: COMMERCIAL

## 2025-04-23 DIAGNOSIS — S46.012A TRAUMATIC COMPLETE TEAR OF LEFT ROTATOR CUFF, INITIAL ENCOUNTER: ICD-10-CM

## 2025-04-23 DIAGNOSIS — S46.212A TEAR OF LEFT BICEPS MUSCLE, INITIAL ENCOUNTER: ICD-10-CM

## 2025-04-23 PROCEDURE — 73221 MRI JOINT UPR EXTREM W/O DYE: CPT

## 2025-04-28 ENCOUNTER — OFFICE VISIT (OUTPATIENT)
Dept: ORTHOPEDIC SURGERY | Age: 50
End: 2025-04-28
Payer: COMMERCIAL

## 2025-04-28 VITALS — WEIGHT: 167 LBS | HEIGHT: 60 IN | BODY MASS INDEX: 32.79 KG/M2

## 2025-04-28 DIAGNOSIS — M75.22 BICEPS TENDONITIS, LEFT: ICD-10-CM

## 2025-04-28 DIAGNOSIS — S46.012A TRAUMATIC COMPLETE TEAR OF LEFT ROTATOR CUFF, INITIAL ENCOUNTER: Primary | ICD-10-CM

## 2025-04-28 PROCEDURE — 99214 OFFICE O/P EST MOD 30 MIN: CPT | Performed by: ORTHOPAEDIC SURGERY

## 2025-04-28 NOTE — PROGRESS NOTES
ENDOSCOPY, COLON, DIAGNOSTIC      EPIDURAL STEROID INJECTION Left 05/28/2019    LUMBAR EPIDURAL STEROID INJECTION L5-S1 (LEFT PARAMEDIAN APPROACH) UNDER FLUOROSCOPIC GUIDANCE) performed by Gregg Christianson MD at [facility] OR    HIATAL HERNIA REPAIR N/A 02/16/2021    LAPAROSCOPIC HIATAL HERNIA RTEPAIR WITH MESH performed by Oren Garcia MD at [facility] OR    HYSTERECTOMY (CERVIX STATUS UNKNOWN) Bilateral 10/14/2022    HYSTERECTOMY ABDOMINAL LAPAROSCOPIC ROBOTIC XI WITH BILATERAL SALPINGO OOPHORECTOMY performed by Gena Keller MD at [facility] OR    KNEE ARTHROSCOPY Left 05/27/2016    medial menisectomy, debridement, acl synovectomy, chondroplasty    KNEE CARTILAGE SURGERY Left 11/27/2024    LEFT KNEE ARTHROSOCPY MEDIAL MENISECTOMY AND DEBRIDEMENT-11/27/24 performed by Raudel Logan DO at [facility] OR    NERVE BLOCK Left 05/28/2019    lumbar    NERVE BLOCK Bilateral 07/09/2019    NERVE BLOCK Left 12/08/2020    Cervical epidural steroid injection under fouroscopic guidance c7-t1 left paramedian    NERVE BLOCK Bilateral 09/13/2022    BILATERAL CERVICAL MEDIAL BRANCH NERVE BLOCK UNDER FLUOROSCOPIC GUIDANCE AT C2, C3, C4 AND C5 (CPT 24241) performed by Gregg Christianson MD at [facility] OR    NERVE BLOCK Bilateral 05/23/2023    BILATERAL CERVICAL MEDIAL BRANCH NERVE BLOCK UNDER FLUOROSCOPIC GUIDANCE AT C2, C3, C4, and C5 performed by Gregg Christianson MD at [facility] OR    PAIN MANAGEMENT PROCEDURE Left 12/08/2020    CERVICAL EPIDURAL STEROID INJECTION UNDER FLUOROSCOPIC GUIDANCE AT C7-T1 LEFT PARAMEDIAN (CPT 18582) performed by Gregg Christianson MD at [facility] OR    PAIN MANAGEMENT PROCEDURE Right 07/29/2021    CERVICAL EPIDURAL STEROID INJECTION UNDER FLUOROSCOPIC GUIDANCE AT C7-T1 RIGHT PARAMEDIAN performed by Gregg Christianson MD at [facility] OR    PAIN MANAGEMENT PROCEDURE Left 12/20/2022    LUMBAR TRANSFORAMINAL EPIDURAL STEROID INJECTION LEFT L5 AND S1 UNDER FLUOROSCOPIC GUIDANCE performed by

## 2025-05-01 ENCOUNTER — ANESTHESIA EVENT (OUTPATIENT)
Dept: OPERATING ROOM | Age: 50
End: 2025-05-01
Payer: COMMERCIAL

## 2025-05-06 ENCOUNTER — HOSPITAL ENCOUNTER (OUTPATIENT)
Age: 50
Setting detail: OUTPATIENT SURGERY
Discharge: HOME | End: 2025-05-06
Attending: ANESTHESIOLOGY | Admitting: ANESTHESIOLOGY
Payer: COMMERCIAL

## 2025-05-06 ENCOUNTER — HOSPITAL ENCOUNTER (OUTPATIENT)
Dept: OPERATING ROOM | Age: 50
Setting detail: OUTPATIENT SURGERY
Discharge: HOME | End: 2025-05-06
Attending: ANESTHESIOLOGY
Payer: COMMERCIAL

## 2025-05-06 ENCOUNTER — ANESTHESIA (OUTPATIENT)
Dept: OPERATING ROOM | Age: 50
End: 2025-05-06
Payer: COMMERCIAL

## 2025-05-06 VITALS
OXYGEN SATURATION: 98 % | HEART RATE: 63 BPM | HEIGHT: 60 IN | WEIGHT: 168 LBS | DIASTOLIC BLOOD PRESSURE: 58 MMHG | TEMPERATURE: 97.4 F | SYSTOLIC BLOOD PRESSURE: 145 MMHG | RESPIRATION RATE: 14 BRPM | BODY MASS INDEX: 32.98 KG/M2

## 2025-05-06 DIAGNOSIS — M47.892 OTHER OSTEOARTHRITIS OF SPINE, CERVICAL REGION: ICD-10-CM

## 2025-05-06 PROCEDURE — 64633 DESTROY CERV/THOR FACET JNT: CPT | Performed by: ANESTHESIOLOGY

## 2025-05-06 PROCEDURE — 3700000000 HC ANESTHESIA ATTENDED CARE: Performed by: ANESTHESIOLOGY

## 2025-05-06 PROCEDURE — 7100000011 HC PHASE II RECOVERY - ADDTL 15 MIN: Performed by: ANESTHESIOLOGY

## 2025-05-06 PROCEDURE — 3600000005 HC SURGERY LEVEL 5 BASE: Performed by: ANESTHESIOLOGY

## 2025-05-06 PROCEDURE — 6360000002 HC RX W HCPCS: Performed by: NURSE ANESTHETIST, CERTIFIED REGISTERED

## 2025-05-06 PROCEDURE — 6360000002 HC RX W HCPCS: Performed by: ANESTHESIOLOGY

## 2025-05-06 PROCEDURE — 7100000010 HC PHASE II RECOVERY - FIRST 15 MIN: Performed by: ANESTHESIOLOGY

## 2025-05-06 PROCEDURE — 3700000001 HC ADD 15 MINUTES (ANESTHESIA): Performed by: ANESTHESIOLOGY

## 2025-05-06 PROCEDURE — 64634 DESTROY C/TH FACET JNT ADDL: CPT | Performed by: ANESTHESIOLOGY

## 2025-05-06 PROCEDURE — 3600000015 HC SURGERY LEVEL 5 ADDTL 15MIN: Performed by: ANESTHESIOLOGY

## 2025-05-06 PROCEDURE — 2580000003 HC RX 258: Performed by: ANESTHESIOLOGY

## 2025-05-06 PROCEDURE — 2709999900 HC NON-CHARGEABLE SUPPLY: Performed by: ANESTHESIOLOGY

## 2025-05-06 RX ORDER — SODIUM CHLORIDE 0.9 % (FLUSH) 0.9 %
5-40 SYRINGE (ML) INJECTION PRN
Status: DISCONTINUED | OUTPATIENT
Start: 2025-05-06 | End: 2025-05-06 | Stop reason: HOSPADM

## 2025-05-06 RX ORDER — MORPHINE SULFATE 2 MG/ML
1 INJECTION, SOLUTION INTRAMUSCULAR; INTRAVENOUS EVERY 5 MIN PRN
Refills: 0 | Status: CANCELLED | OUTPATIENT
Start: 2025-05-06

## 2025-05-06 RX ORDER — DROPERIDOL 2.5 MG/ML
0.62 INJECTION, SOLUTION INTRAMUSCULAR; INTRAVENOUS
Status: CANCELLED | OUTPATIENT
Start: 2025-05-06

## 2025-05-06 RX ORDER — FENTANYL CITRATE 50 UG/ML
INJECTION, SOLUTION INTRAMUSCULAR; INTRAVENOUS
Status: DISCONTINUED | OUTPATIENT
Start: 2025-05-06 | End: 2025-05-06 | Stop reason: SDUPTHER

## 2025-05-06 RX ORDER — DEXAMETHASONE SODIUM PHOSPHATE 4 MG/ML
INJECTION, SOLUTION INTRA-ARTICULAR; INTRALESIONAL; INTRAMUSCULAR; INTRAVENOUS; SOFT TISSUE PRN
Status: DISCONTINUED | OUTPATIENT
Start: 2025-05-06 | End: 2025-05-06 | Stop reason: ALTCHOICE

## 2025-05-06 RX ORDER — NALOXONE HYDROCHLORIDE 0.4 MG/ML
INJECTION, SOLUTION INTRAMUSCULAR; INTRAVENOUS; SUBCUTANEOUS PRN
Status: CANCELLED | OUTPATIENT
Start: 2025-05-06

## 2025-05-06 RX ORDER — DIPHENHYDRAMINE HYDROCHLORIDE 50 MG/ML
12.5 INJECTION, SOLUTION INTRAMUSCULAR; INTRAVENOUS
Status: CANCELLED | OUTPATIENT
Start: 2025-05-06

## 2025-05-06 RX ORDER — LIDOCAINE HYDROCHLORIDE 10 MG/ML
INJECTION, SOLUTION EPIDURAL; INFILTRATION; INTRACAUDAL; PERINEURAL PRN
Status: DISCONTINUED | OUTPATIENT
Start: 2025-05-06 | End: 2025-05-06 | Stop reason: ALTCHOICE

## 2025-05-06 RX ORDER — SODIUM CHLORIDE 0.9 % (FLUSH) 0.9 %
5-40 SYRINGE (ML) INJECTION PRN
Status: CANCELLED | OUTPATIENT
Start: 2025-05-06

## 2025-05-06 RX ORDER — SODIUM CHLORIDE, SODIUM LACTATE, POTASSIUM CHLORIDE, CALCIUM CHLORIDE 600; 310; 30; 20 MG/100ML; MG/100ML; MG/100ML; MG/100ML
INJECTION, SOLUTION INTRAVENOUS CONTINUOUS
Status: DISCONTINUED | OUTPATIENT
Start: 2025-05-06 | End: 2025-05-06 | Stop reason: HOSPADM

## 2025-05-06 RX ORDER — LIDOCAINE HYDROCHLORIDE 5 MG/ML
INJECTION, SOLUTION INFILTRATION; INTRAVENOUS PRN
Status: DISCONTINUED | OUTPATIENT
Start: 2025-05-06 | End: 2025-05-06 | Stop reason: ALTCHOICE

## 2025-05-06 RX ORDER — SODIUM CHLORIDE 9 MG/ML
INJECTION, SOLUTION INTRAVENOUS PRN
Status: DISCONTINUED | OUTPATIENT
Start: 2025-05-06 | End: 2025-05-06 | Stop reason: HOSPADM

## 2025-05-06 RX ORDER — SODIUM CHLORIDE 9 MG/ML
INJECTION, SOLUTION INTRAVENOUS PRN
Status: CANCELLED | OUTPATIENT
Start: 2025-05-06

## 2025-05-06 RX ORDER — BUPIVACAINE HYDROCHLORIDE 2.5 MG/ML
INJECTION, SOLUTION EPIDURAL; INFILTRATION; INTRACAUDAL; PERINEURAL PRN
Status: DISCONTINUED | OUTPATIENT
Start: 2025-05-06 | End: 2025-05-06 | Stop reason: ALTCHOICE

## 2025-05-06 RX ORDER — SODIUM CHLORIDE 0.9 % (FLUSH) 0.9 %
5-40 SYRINGE (ML) INJECTION EVERY 12 HOURS SCHEDULED
Status: CANCELLED | OUTPATIENT
Start: 2025-05-06

## 2025-05-06 RX ORDER — SODIUM CHLORIDE 0.9 % (FLUSH) 0.9 %
5-40 SYRINGE (ML) INJECTION EVERY 12 HOURS SCHEDULED
Status: DISCONTINUED | OUTPATIENT
Start: 2025-05-06 | End: 2025-05-06 | Stop reason: HOSPADM

## 2025-05-06 RX ORDER — FENTANYL CITRATE 0.05 MG/ML
50 INJECTION, SOLUTION INTRAMUSCULAR; INTRAVENOUS EVERY 5 MIN PRN
Refills: 0 | Status: CANCELLED | OUTPATIENT
Start: 2025-05-06

## 2025-05-06 RX ORDER — MEPERIDINE HYDROCHLORIDE 25 MG/ML
12.5 INJECTION INTRAMUSCULAR; INTRAVENOUS; SUBCUTANEOUS ONCE
Status: CANCELLED | OUTPATIENT
Start: 2025-05-06

## 2025-05-06 RX ORDER — MIDAZOLAM HYDROCHLORIDE 1 MG/ML
INJECTION, SOLUTION INTRAMUSCULAR; INTRAVENOUS
Status: DISCONTINUED | OUTPATIENT
Start: 2025-05-06 | End: 2025-05-06 | Stop reason: SDUPTHER

## 2025-05-06 RX ADMIN — FENTANYL CITRATE 50 MCG: 50 INJECTION, SOLUTION INTRAMUSCULAR; INTRAVENOUS at 13:53

## 2025-05-06 RX ADMIN — FENTANYL CITRATE 50 MCG: 50 INJECTION, SOLUTION INTRAMUSCULAR; INTRAVENOUS at 13:54

## 2025-05-06 RX ADMIN — SODIUM CHLORIDE, POTASSIUM CHLORIDE, SODIUM LACTATE AND CALCIUM CHLORIDE: 600; 310; 30; 20 INJECTION, SOLUTION INTRAVENOUS at 12:48

## 2025-05-06 RX ADMIN — MIDAZOLAM 2 MG: 1 INJECTION INTRAMUSCULAR; INTRAVENOUS at 13:52

## 2025-05-06 ASSESSMENT — PAIN - FUNCTIONAL ASSESSMENT
PAIN_FUNCTIONAL_ASSESSMENT: NONE - DENIES PAIN
PAIN_FUNCTIONAL_ASSESSMENT: 0-10

## 2025-05-06 ASSESSMENT — PAIN DESCRIPTION - DESCRIPTORS: DESCRIPTORS: ACHING

## 2025-05-06 ASSESSMENT — LIFESTYLE VARIABLES: SMOKING_STATUS: 0

## 2025-05-06 NOTE — H&P
[facility]   [address], [place] [address]  [phone]    Procedure History & Physical      Dannielle Barba     HPI:    Patient  is here for Cervical MB RFA  for neck pain.  Labs/imaging studies reviewed   All question and concerns addressed including R/B/A associated with the procedure    Past Medical History:   Diagnosis Date    Asthma     BRCA1 gene mutation positive     Cousin Pos. per mmmo hx sheet    Bundle branch block, right     COVID 2021    pt states moderate; twice 2021, May 2022-sinus infection only 4 days only    Joint pain     Low back pain     degenerative disk disease    Lupus     Menometrorrhagia 2022    Neck pain     RA (rheumatoid arthritis) (HCC)     RH    Radiculopathy     Sjogren's disease     Sleep apnea     with cpap at 7       Past Surgical History:   Procedure Laterality Date    ANESTHESIA NERVE BLOCK Bilateral 2019    BILATERAL LUMBAR TRANSFORAMINAL EPIDURAL STERIOD INJECTION AT L5-S1 UNDER FLUOROSCOPY performed by Gregg Christianson MD at [facility] OR    ARM SURGERY Left 2022    LEFT ELBOW CUBITAL TUNNEL RELEASE performed by Raudel Logan DO at [facility] OR    BACK INJECTION Bilateral 2024    BILATERAL SACROILIAC JOINT INJECTION UNDER FLUOROSCOPIC GUIDANCE performed by Gregg Christianson MD at [facility] OR    CATARACT REMOVAL WITH IMPLANT Bilateral      SECTION      x2    CHOLECYSTECTOMY, LAPAROSCOPIC N/A 2021    LAPAROSCOPIC CHOLECYSTECTOMY performed by Oren Garcia MD at [facility] OR    COLONOSCOPY      DILATION AND CURETTAGE OF UTERUS      x2    DILATION AND CURETTAGE OF UTERUS N/A 2022    DILATATION AND CURETTAGE HYSTEROSCOPY performed by Gena Keller MD at [facility] OR    ENDOSCOPY, COLON, DIAGNOSTIC      EPIDURAL STEROID INJECTION Left 2019    LUMBAR EPIDURAL STEROID INJECTION L5-S1 (LEFT PARAMEDIAN APPROACH) UNDER FLUOROSCOPIC GUIDANCE) performed by Gregg INIGUEZ

## 2025-05-06 NOTE — ANESTHESIA POSTPROCEDURE EVALUATION
Department of Anesthesiology  Postprocedure Note    Patient: Dannielle Barba  MRN: [id number]  YOB: 1975  Date of evaluation: 5/6/2025    Procedure Summary       Date: 05/06/25 Room / Location: [address]    Anesthesia Start: 1349 Anesthesia Stop: 1416    Procedure: RIGHT CERVICAL RADIOFREQUENCY ABLATION AT CERVICAL 2, CERVICAL 3, CERVICAL  4, and CERVICAL 5 UNDER FLUOROSCOPY (Right) Diagnosis:       Cervical spondylosis      (Cervical spondylosis [M47.812])    Surgeons: Gregg Christianson MD Responsible Provider: Glynn Calderon MD    Anesthesia Type: MAC ASA Status: 3            Anesthesia Type: MAC    Fredi Phase I: Fredi Score: 10    Fredi Phase II: Fredi Score: 10    Anesthesia Post Evaluation    Patient location during evaluation: PACU  Patient participation: complete - patient participated  Level of consciousness: awake and alert  Airway patency: patent  Nausea & Vomiting: no nausea and no vomiting  Cardiovascular status: hemodynamically stable  Respiratory status: room air and spontaneous ventilation  Hydration status: stable  Pain management: adequate    No notable events documented.

## 2025-05-06 NOTE — DISCHARGE INSTRUCTIONS
[facility]  [phone]   Post-Pain Block/ Radiofrequency Home Going Instructions    1-Go home, rest for the remainder of the day  2-Please do not lift over 20 pounds the day of the injection  3-If you received sedation No: alcohol, driving, operating lawn mowers, plows, tractors or other dangerous equipment until next morning. Do not make important decisions or sign legal documents for 24 hours. You may experience light headedness, dizziness, nausea or sleepiness after sedation. Do not stay alone. A responsible adult must be with you for 24 hours. You could be nauseated from the medications you have received. Your IV site may be sore and bruised.    4-No dietary restrictions     5-Resume all medications the same day, blood thinners to be resumed 24 hours after injection    6-Keep the surgical site clean and dry, you may shower the next morning and remove the      dressing.     7- No sitz baths, tub baths or hot tubs/swimming for 24 hours.       8- If you have any pain at the injection site(s), application of an ice pack to the area should be       helpful, 20 minutes on/20 minutes off for next 48 hours.  9- Call [facility] pain management immediately at if you develop.  Fever greater than 100.4 F  Have bleeding or drainage from the puncture site  Have progressive Leg/arm numbness and or weakness  Loss of control of bowel and or bladder (wet/soil yourself)  Severe headache with inability to lift head  10-You may return to work the next day

## 2025-05-16 ENCOUNTER — PREP FOR PROCEDURE (OUTPATIENT)
Dept: ORTHOPEDIC SURGERY | Age: 50
End: 2025-05-16

## 2025-05-16 DIAGNOSIS — S46.012A RUPTURE OF SUPRASPINATUS TENDON, LEFT, INITIAL ENCOUNTER: ICD-10-CM

## 2025-05-16 DIAGNOSIS — M75.22 LEFT BICIPITAL TENOSYNOVITIS: ICD-10-CM

## 2025-05-29 ENCOUNTER — ANESTHESIA EVENT (OUTPATIENT)
Dept: OPERATING ROOM | Age: 50
End: 2025-05-29
Payer: COMMERCIAL

## 2025-05-29 NOTE — ANESTHESIA PRE PROCEDURE
CERVICAL 7-THORACIC 1 EPIDURAL STEROID INJECTION LEFT PARAMEDIAN UNDER FLUOROSCOPIC GUIDANCE performed by Gregg Christianson MD at [facility]   • PAIN MANAGEMENT PROCEDURE Right 5/6/2025    RIGHT CERVICAL RADIOFREQUENCY ABLATION AT CERVICAL 2, CERVICAL 3, CERVICAL  4, and CERVICAL 5 UNDER FLUOROSCOPY performed by Gregg Christianson MD at [facility]   • RHINOPLASTY  2013   • TUBAL LIGATION     • TURBINATE RESECTION  2023       Social History:    Social History     Tobacco Use   • Smoking status: Never   • Smokeless tobacco: Never   Substance Use Topics   • Alcohol use: Yes     Alcohol/week: 2.0 standard drinks of alcohol     Types: 2 Glasses of wine per week     Comment: wine occ- 2 drinks weekend only                                Counseling given: Not Answered      Vital Signs (Current):   Vitals:    05/22/25 1135   Weight: 74.8 kg (165 lb)   Height: 1.524 m (5')                                              BP Readings from Last 3 Encounters:   05/06/25 (!) 145/58   03/28/25 120/86   11/27/24 133/87       NPO Status:  >8.H                                                                               BMI:   Wt Readings from Last 3 Encounters:   05/06/25 76.2 kg (168 lb)   04/28/25 75.8 kg (167 lb)   04/07/25 75.8 kg (167 lb)     Body mass index is 32.22 kg/m².    CBC:   Lab Results   Component Value Date/Time    WBC 3.2 05/02/2025 07:46 AM    RBC 5.03 05/02/2025 07:46 AM    HGB 11.9 05/02/2025 07:46 AM    HCT 39.9 05/02/2025 07:46 AM    MCV 79.3 05/02/2025 07:46 AM    RDW 14.9 05/02/2025 07:46 AM     01/24/2025 11:21 AM       CMP:   Lab Results   Component Value Date/Time     05/02/2025 07:46 AM    K 3.9 05/02/2025 07:46 AM     05/02/2025 07:46 AM    CO2 23 05/02/2025 07:46 AM    BUN 12 05/02/2025 07:46 AM    CREATININE 0.7 05/02/2025 07:46 AM    GFRAA >60 12/20/2021 11:20 AM    LABGLOM >90 05/02/2025 07:46 AM    LABGLOM >60 03/19/2024 02:44 PM    GLUCOSE 95 05/02/2025 07:46 AM

## 2025-05-30 ENCOUNTER — ANESTHESIA (OUTPATIENT)
Dept: OPERATING ROOM | Age: 50
End: 2025-05-30
Payer: COMMERCIAL

## 2025-05-30 ENCOUNTER — HOSPITAL ENCOUNTER (OUTPATIENT)
Age: 50
Setting detail: OUTPATIENT SURGERY
Discharge: HOME | End: 2025-05-30
Attending: ORTHOPAEDIC SURGERY | Admitting: ORTHOPAEDIC SURGERY
Payer: COMMERCIAL

## 2025-05-30 VITALS
DIASTOLIC BLOOD PRESSURE: 86 MMHG | RESPIRATION RATE: 16 BRPM | TEMPERATURE: 97.8 F | HEART RATE: 69 BPM | SYSTOLIC BLOOD PRESSURE: 142 MMHG | HEIGHT: 60 IN | OXYGEN SATURATION: 96 % | WEIGHT: 166 LBS | BODY MASS INDEX: 32.59 KG/M2

## 2025-05-30 DIAGNOSIS — M75.22 LEFT BICIPITAL TENOSYNOVITIS: Primary | ICD-10-CM

## 2025-05-30 DIAGNOSIS — S46.012A RUPTURE OF SUPRASPINATUS TENDON, LEFT, INITIAL ENCOUNTER: ICD-10-CM

## 2025-05-30 PROCEDURE — 3600000004 HC SURGERY LEVEL 4 BASE: Performed by: ORTHOPAEDIC SURGERY

## 2025-05-30 PROCEDURE — 6360000002 HC RX W HCPCS: Performed by: ANESTHESIOLOGY

## 2025-05-30 PROCEDURE — 2580000003 HC RX 258: Performed by: ORTHOPAEDIC SURGERY

## 2025-05-30 PROCEDURE — 2580000003 HC RX 258: Performed by: ANESTHESIOLOGY

## 2025-05-30 PROCEDURE — 29826 SHO ARTHRS SRG DECOMPRESSION: CPT | Performed by: ORTHOPAEDIC SURGERY

## 2025-05-30 PROCEDURE — 3700000001 HC ADD 15 MINUTES (ANESTHESIA): Performed by: ORTHOPAEDIC SURGERY

## 2025-05-30 PROCEDURE — 7100000011 HC PHASE II RECOVERY - ADDTL 15 MIN: Performed by: ORTHOPAEDIC SURGERY

## 2025-05-30 PROCEDURE — 29823 SHO ARTHRS SRG XTNSV DBRDMT: CPT | Performed by: ORTHOPAEDIC SURGERY

## 2025-05-30 PROCEDURE — 2500000003 HC RX 250 WO HCPCS: Performed by: ORTHOPAEDIC SURGERY

## 2025-05-30 PROCEDURE — 2500000003 HC RX 250 WO HCPCS

## 2025-05-30 PROCEDURE — 6360000002 HC RX W HCPCS: Performed by: NURSE ANESTHETIST, CERTIFIED REGISTERED

## 2025-05-30 PROCEDURE — 3700000000 HC ANESTHESIA ATTENDED CARE: Performed by: ORTHOPAEDIC SURGERY

## 2025-05-30 PROCEDURE — 6360000002 HC RX W HCPCS: Performed by: ORTHOPAEDIC SURGERY

## 2025-05-30 PROCEDURE — 2709999900 HC NON-CHARGEABLE SUPPLY: Performed by: ORTHOPAEDIC SURGERY

## 2025-05-30 PROCEDURE — 64415 NJX AA&/STRD BRCH PLXS IMG: CPT | Performed by: ANESTHESIOLOGY

## 2025-05-30 PROCEDURE — 7100000010 HC PHASE II RECOVERY - FIRST 15 MIN: Performed by: ORTHOPAEDIC SURGERY

## 2025-05-30 PROCEDURE — 6360000002 HC RX W HCPCS

## 2025-05-30 PROCEDURE — 3600000014 HC SURGERY LEVEL 4 ADDTL 15MIN: Performed by: ORTHOPAEDIC SURGERY

## 2025-05-30 PROCEDURE — 2720000010 HC SURG SUPPLY STERILE: Performed by: ORTHOPAEDIC SURGERY

## 2025-05-30 PROCEDURE — 7100000001 HC PACU RECOVERY - ADDTL 15 MIN: Performed by: ORTHOPAEDIC SURGERY

## 2025-05-30 PROCEDURE — 7100000000 HC PACU RECOVERY - FIRST 15 MIN: Performed by: ORTHOPAEDIC SURGERY

## 2025-05-30 RX ORDER — ROCURONIUM BROMIDE 10 MG/ML
INJECTION, SOLUTION INTRAVENOUS
Status: DISCONTINUED | OUTPATIENT
Start: 2025-05-30 | End: 2025-05-30 | Stop reason: SDUPTHER

## 2025-05-30 RX ORDER — LABETALOL HYDROCHLORIDE 5 MG/ML
INJECTION, SOLUTION INTRAVENOUS
Status: DISCONTINUED | OUTPATIENT
Start: 2025-05-30 | End: 2025-05-30 | Stop reason: SDUPTHER

## 2025-05-30 RX ORDER — DEXAMETHASONE SODIUM PHOSPHATE 10 MG/ML
INJECTION, SOLUTION INTRAMUSCULAR; INTRAVENOUS
Status: DISCONTINUED | OUTPATIENT
Start: 2025-05-30 | End: 2025-05-30 | Stop reason: SDUPTHER

## 2025-05-30 RX ORDER — LABETALOL HYDROCHLORIDE 5 MG/ML
10 INJECTION, SOLUTION INTRAVENOUS
Status: DISCONTINUED | OUTPATIENT
Start: 2025-05-30 | End: 2025-05-30 | Stop reason: HOSPADM

## 2025-05-30 RX ORDER — DIPHENHYDRAMINE HYDROCHLORIDE 50 MG/ML
INJECTION, SOLUTION INTRAMUSCULAR; INTRAVENOUS
Status: DISCONTINUED | OUTPATIENT
Start: 2025-05-30 | End: 2025-05-30 | Stop reason: SDUPTHER

## 2025-05-30 RX ORDER — NALOXONE HYDROCHLORIDE 0.4 MG/ML
INJECTION, SOLUTION INTRAMUSCULAR; INTRAVENOUS; SUBCUTANEOUS PRN
Status: DISCONTINUED | OUTPATIENT
Start: 2025-05-30 | End: 2025-05-30 | Stop reason: HOSPADM

## 2025-05-30 RX ORDER — OXYCODONE AND ACETAMINOPHEN 7.5; 325 MG/1; MG/1
1 TABLET ORAL EVERY 6 HOURS PRN
Qty: 28 TABLET | Refills: 0 | Status: SHIPPED | OUTPATIENT
Start: 2025-05-30 | End: 2025-06-06

## 2025-05-30 RX ORDER — ONDANSETRON 2 MG/ML
4 INJECTION INTRAMUSCULAR; INTRAVENOUS
Status: DISCONTINUED | OUTPATIENT
Start: 2025-05-30 | End: 2025-05-30 | Stop reason: HOSPADM

## 2025-05-30 RX ORDER — LIDOCAINE HYDROCHLORIDE 20 MG/ML
INJECTION, SOLUTION INTRAVENOUS
Status: DISCONTINUED | OUTPATIENT
Start: 2025-05-30 | End: 2025-05-30 | Stop reason: SDUPTHER

## 2025-05-30 RX ORDER — GLYCOPYRROLATE 0.2 MG/ML
INJECTION INTRAMUSCULAR; INTRAVENOUS
Status: DISCONTINUED | OUTPATIENT
Start: 2025-05-30 | End: 2025-05-30 | Stop reason: SDUPTHER

## 2025-05-30 RX ORDER — ROPIVACAINE HYDROCHLORIDE 5 MG/ML
INJECTION, SOLUTION EPIDURAL; INFILTRATION; PERINEURAL
Status: COMPLETED | OUTPATIENT
Start: 2025-05-30 | End: 2025-05-30

## 2025-05-30 RX ORDER — SODIUM CHLORIDE 9 MG/ML
INJECTION, SOLUTION INTRAVENOUS PRN
Status: DISCONTINUED | OUTPATIENT
Start: 2025-05-30 | End: 2025-05-30 | Stop reason: HOSPADM

## 2025-05-30 RX ORDER — NEOSTIGMINE METHYLSULFATE 1 MG/ML
INJECTION, SOLUTION INTRAVENOUS
Status: DISCONTINUED | OUTPATIENT
Start: 2025-05-30 | End: 2025-05-30 | Stop reason: SDUPTHER

## 2025-05-30 RX ORDER — CEPHALEXIN 500 MG/1
500 CAPSULE ORAL 3 TIMES DAILY
Qty: 21 CAPSULE | Refills: 0 | Status: SHIPPED | OUTPATIENT
Start: 2025-05-30 | End: 2025-06-06

## 2025-05-30 RX ORDER — MIDAZOLAM HYDROCHLORIDE 1 MG/ML
2 INJECTION, SOLUTION INTRAMUSCULAR; INTRAVENOUS
Status: DISCONTINUED | OUTPATIENT
Start: 2025-05-30 | End: 2025-05-30 | Stop reason: HOSPADM

## 2025-05-30 RX ORDER — IPRATROPIUM BROMIDE AND ALBUTEROL SULFATE 2.5; .5 MG/3ML; MG/3ML
1 SOLUTION RESPIRATORY (INHALATION)
Status: DISCONTINUED | OUTPATIENT
Start: 2025-05-30 | End: 2025-05-30 | Stop reason: HOSPADM

## 2025-05-30 RX ORDER — SODIUM CHLORIDE 0.9 % (FLUSH) 0.9 %
5-40 SYRINGE (ML) INJECTION PRN
Status: DISCONTINUED | OUTPATIENT
Start: 2025-05-30 | End: 2025-05-30 | Stop reason: HOSPADM

## 2025-05-30 RX ORDER — KETOROLAC TROMETHAMINE 30 MG/ML
INJECTION, SOLUTION INTRAMUSCULAR; INTRAVENOUS
Status: DISCONTINUED | OUTPATIENT
Start: 2025-05-30 | End: 2025-05-30 | Stop reason: SDUPTHER

## 2025-05-30 RX ORDER — SODIUM CHLORIDE 0.9 % (FLUSH) 0.9 %
5-40 SYRINGE (ML) INJECTION EVERY 12 HOURS SCHEDULED
Status: DISCONTINUED | OUTPATIENT
Start: 2025-05-30 | End: 2025-05-30 | Stop reason: HOSPADM

## 2025-05-30 RX ORDER — MEPERIDINE HYDROCHLORIDE 25 MG/ML
12.5 INJECTION INTRAMUSCULAR; INTRAVENOUS; SUBCUTANEOUS EVERY 5 MIN PRN
Status: DISCONTINUED | OUTPATIENT
Start: 2025-05-30 | End: 2025-05-30 | Stop reason: HOSPADM

## 2025-05-30 RX ORDER — MIDAZOLAM HYDROCHLORIDE 1 MG/ML
INJECTION, SOLUTION INTRAMUSCULAR; INTRAVENOUS
Status: DISCONTINUED | OUTPATIENT
Start: 2025-05-30 | End: 2025-05-30 | Stop reason: SDUPTHER

## 2025-05-30 RX ORDER — HYDRALAZINE HYDROCHLORIDE 20 MG/ML
10 INJECTION INTRAMUSCULAR; INTRAVENOUS
Status: DISCONTINUED | OUTPATIENT
Start: 2025-05-30 | End: 2025-05-30 | Stop reason: HOSPADM

## 2025-05-30 RX ORDER — SODIUM CHLORIDE, SODIUM LACTATE, POTASSIUM CHLORIDE, CALCIUM CHLORIDE 600; 310; 30; 20 MG/100ML; MG/100ML; MG/100ML; MG/100ML
INJECTION, SOLUTION INTRAVENOUS CONTINUOUS
Status: DISCONTINUED | OUTPATIENT
Start: 2025-05-30 | End: 2025-05-30 | Stop reason: HOSPADM

## 2025-05-30 RX ORDER — NALOXONE HYDROCHLORIDE 0.4 MG/ML
INJECTION, SOLUTION INTRAMUSCULAR; INTRAVENOUS; SUBCUTANEOUS
Status: DISCONTINUED | OUTPATIENT
Start: 2025-05-30 | End: 2025-05-30 | Stop reason: SDUPTHER

## 2025-05-30 RX ORDER — PROPOFOL 10 MG/ML
INJECTION, EMULSION INTRAVENOUS
Status: DISCONTINUED | OUTPATIENT
Start: 2025-05-30 | End: 2025-05-30 | Stop reason: SDUPTHER

## 2025-05-30 RX ORDER — ONDANSETRON 2 MG/ML
INJECTION INTRAMUSCULAR; INTRAVENOUS
Status: DISCONTINUED | OUTPATIENT
Start: 2025-05-30 | End: 2025-05-30 | Stop reason: SDUPTHER

## 2025-05-30 RX ORDER — FENTANYL CITRATE 50 UG/ML
INJECTION, SOLUTION INTRAMUSCULAR; INTRAVENOUS
Status: DISCONTINUED | OUTPATIENT
Start: 2025-05-30 | End: 2025-05-30 | Stop reason: SDUPTHER

## 2025-05-30 RX ADMIN — LABETALOL HYDROCHLORIDE 2.5 MG: 5 INJECTION, SOLUTION INTRAVENOUS at 09:42

## 2025-05-30 RX ADMIN — MIDAZOLAM 2 MG: 1 INJECTION INTRAMUSCULAR; INTRAVENOUS at 09:01

## 2025-05-30 RX ADMIN — ROCURONIUM BROMIDE 40 MG: 10 INJECTION, SOLUTION INTRAVENOUS at 09:20

## 2025-05-30 RX ADMIN — DEXAMETHASONE SODIUM PHOSPHATE 10 MG: 10 INJECTION INTRAMUSCULAR; INTRAVENOUS at 09:19

## 2025-05-30 RX ADMIN — FENTANYL CITRATE 25 MCG: 50 INJECTION, SOLUTION INTRAMUSCULAR; INTRAVENOUS at 09:33

## 2025-05-30 RX ADMIN — NALOXONE HYDROCHLORIDE 0.12 MG: 0.4 INJECTION, SOLUTION INTRAMUSCULAR; INTRAVENOUS; SUBCUTANEOUS at 10:16

## 2025-05-30 RX ADMIN — KETOROLAC TROMETHAMINE 30 MG: 30 INJECTION, SOLUTION INTRAMUSCULAR at 10:07

## 2025-05-30 RX ADMIN — DIPHENHYDRAMINE HYDROCHLORIDE 25 MG: 50 INJECTION INTRAMUSCULAR; INTRAVENOUS at 09:19

## 2025-05-30 RX ADMIN — ROPIVACAINE HYDROCHLORIDE 20 ML: 5 INJECTION, SOLUTION EPIDURAL; INFILTRATION; PERINEURAL at 09:09

## 2025-05-30 RX ADMIN — ONDANSETRON 4 MG: 2 INJECTION, SOLUTION INTRAMUSCULAR; INTRAVENOUS at 09:58

## 2025-05-30 RX ADMIN — LIDOCAINE HYDROCHLORIDE 100 MG: 20 INJECTION, SOLUTION INTRAVENOUS at 09:19

## 2025-05-30 RX ADMIN — FENTANYL CITRATE 50 MCG: 50 INJECTION, SOLUTION INTRAMUSCULAR; INTRAVENOUS at 09:05

## 2025-05-30 RX ADMIN — FENTANYL CITRATE 50 MCG: 50 INJECTION, SOLUTION INTRAMUSCULAR; INTRAVENOUS at 09:01

## 2025-05-30 RX ADMIN — Medication 3 MG: at 10:07

## 2025-05-30 RX ADMIN — CEFAZOLIN SODIUM 2000 MG: 1 POWDER, FOR SOLUTION INTRAMUSCULAR; INTRAVENOUS at 09:24

## 2025-05-30 RX ADMIN — FENTANYL CITRATE 25 MCG: 50 INJECTION, SOLUTION INTRAMUSCULAR; INTRAVENOUS at 09:36

## 2025-05-30 RX ADMIN — GLYCOPYRROLATE 0.6 MG: 0.2 INJECTION INTRAMUSCULAR; INTRAVENOUS at 10:07

## 2025-05-30 RX ADMIN — PROPOFOL 150 MG: 10 INJECTION, EMULSION INTRAVENOUS at 09:19

## 2025-05-30 RX ADMIN — SODIUM CHLORIDE, POTASSIUM CHLORIDE, SODIUM LACTATE AND CALCIUM CHLORIDE: 600; 310; 30; 20 INJECTION, SOLUTION INTRAVENOUS at 08:51

## 2025-05-30 ASSESSMENT — LIFESTYLE VARIABLES: SMOKING_STATUS: 0

## 2025-05-30 ASSESSMENT — PAIN - FUNCTIONAL ASSESSMENT
PAIN_FUNCTIONAL_ASSESSMENT: 0-10
PAIN_FUNCTIONAL_ASSESSMENT: 0-10
PAIN_FUNCTIONAL_ASSESSMENT: NONE - DENIES PAIN
PAIN_FUNCTIONAL_ASSESSMENT: 0-10
PAIN_FUNCTIONAL_ASSESSMENT: NONE - DENIES PAIN
PAIN_FUNCTIONAL_ASSESSMENT: 0-10
PAIN_FUNCTIONAL_ASSESSMENT: 0-10
PAIN_FUNCTIONAL_ASSESSMENT: NONE - DENIES PAIN

## 2025-05-30 ASSESSMENT — PAIN DESCRIPTION - DESCRIPTORS: DESCRIPTORS: ACHING;BURNING;NAGGING

## 2025-05-30 NOTE — H&P
Updated H&P    Chief Complaint   Patient presents with    Shoulder Pain       Left shoulder MRI f/u          Dannielle Barba is a 49 y.o. year old   female who is seen today  for follow up of left shoulder pain. She is about the same as when I saw her last. She has been dealing with shoulder pain for over a year but had a recent injury about 1 month ago. She has failed conservative measures including OTC medications, HEP, CSI. Her pain is located over the anterior and lateral shoulder.             Chief Complaint   Patient presents with    Shoulder Pain       Left shoulder MRI f/u       Past Medical History        Past Medical History:   Diagnosis Date    Asthma      BRCA1 gene mutation positive       Cousin Pos. per mmmo hx sheet    COVID 2021     pt states moderate; twice 2021, May 2022-sinus infection only 4 days only    Joint pain      Low back pain       degenerative disk disease    Lupus      Menometrorrhagia 2022    Neck pain      RA (rheumatoid arthritis) (HCC)       RH    Radiculopathy      Sjogren's disease      Sleep apnea       with cpap at 7         Past Surgical History         Past Surgical History:   Procedure Laterality Date    ANESTHESIA NERVE BLOCK Bilateral 2019     BILATERAL LUMBAR TRANSFORAMINAL EPIDURAL STERIOD INJECTION AT L5-S1 UNDER FLUOROSCOPY performed by Gregg Christianson MD at [facility] OR    ARM SURGERY Left 2022     LEFT ELBOW CUBITAL TUNNEL RELEASE performed by Raudel Logan DO at [facility] OR    BACK INJECTION Bilateral 2024     BILATERAL SACROILIAC JOINT INJECTION UNDER FLUOROSCOPIC GUIDANCE performed by Gregg Christianson MD at [facility] OR    CATARACT REMOVAL WITH IMPLANT Bilateral      SECTION         x2    CHOLECYSTECTOMY, LAPAROSCOPIC N/A 2021     LAPAROSCOPIC CHOLECYSTECTOMY performed by Oren Garcia MD at [facility] OR    COLONOSCOPY        DILATION AND CURETTAGE OF UTERUS         x2    DILATION AND CURETTAGE OF  fremitus.     Skin:  Upon inspection: the skin appears warm, dry and intact.  There is not a previous scar over the affected area.There is not any cellulitis, lymphedema or cutaneous lesions noted in the lower extremities.   Upon palpation there is no induration noted.       Neurologic:  Motor exam of the upper extremities show: The reflexes in biceps/triceps/brachioradialis are equal and symmetric.  Sensory exam C5-T1 are normal bilaterally.         Cardiovascular:  The vascular exam is normal and is well perfused to distal extremities.There are 2+ radial pulses bilaterally, and motor and sensation is intact to median, ulnar, and radial, musclocutaneus, and axillary nerve distribution and grossly symmetric bilaterally.  There is cap refill noted less than two seconds in all digits. There is not edema of the bilateral upper extremities.  There is not varicosities noted in the distal extremities.       Lymph:  Upon palpation,  there is no lymphadenopathy noted in bilateral upper extremities.       Musculoskeletal:  Gait: normal; examination of the nails and digits reveal no cyanosis or clubbing.        Cervical Exam:  On physical exam, Dannielle Barba is well-developed, well-nourished, oriented to person, place and time.  her gait is normal.  On evaluation of hercervical spine, She has full range of motion of the cervical spine without pain.  There is no cervical tenderness to palpation.      Shoulder Exam:  On evaluation of her bilaterally upper extremities, her left shoulder has no deformity.  There is tenderness upon palpation of the anterior and lateral shoulder.  There is not evidence of scapular dyskinesis.  There is not muscle atrophy in shoulder girdle. The range of motion for the Right Shoulder is 170/50/T8 and for the Left shoulder is 100/30/PL.  Right shoulder Motor strength is 5/5 in the supraspinatus, 5/5 internal rotation and 5/5 in external rotation, and Left shoulder motor strength 5-/5 in

## 2025-05-30 NOTE — ANESTHESIA POSTPROCEDURE EVALUATION
Department of Anesthesiology  Postprocedure Note    Patient: Dannielle Barba  MRN: [id number]  YOB: 1975  Date of evaluation: 5/30/2025    Procedure Summary       Date: 05/30/25 Room / Location: [address]    Anesthesia Start: 0859 Anesthesia Stop: 1027    Procedure: LEFT SHOULDER ARTHROSCOPY SUBACROMIAL DECOMPRESSION  ROTATOR CUFF REAPIR, BICEPS TENODESIS AND DEBRIDEMENT-ARTHREX (Left) Diagnosis:       Rupture of supraspinatus tendon, left, initial encounter      Left bicipital tenosynovitis      (Rupture of supraspinatus tendon, left, initial encounter [S46.012A])      (Left bicipital tenosynovitis [M75.22])    Surgeons: Raudel Logan DO Responsible Provider: Jaylen Ribeiro MD    Anesthesia Type: General ASA Status: 3            Anesthesia Type: General    Fredi Phase I: Fredi Score: 10    Fredi Phase II: Fredi Score: 10    Anesthesia Post Evaluation    Patient location during evaluation: PACU  Patient participation: complete - patient participated  Level of consciousness: awake  Airway patency: patent  Nausea & Vomiting: no nausea and no vomiting  Cardiovascular status: hemodynamically stable  Respiratory status: acceptable  Hydration status: stable  Pain management: adequate    No notable events documented.

## 2025-05-30 NOTE — DISCHARGE INSTRUCTIONS
[facility]  [address] [place], [place] [address]  [phone]    Post-Op Instructions for Shoulder Surgery    Dr. Logan   [phone]      Change your operative dressing on post-op day #3.    You may shower with soap and water on post-op day #5. Do not soak your shoulder.    Use ice packs on your shoulder as much as tolerated over the next 2-3 days. This will help keep  the swelling down and minimize the pain.    Wear your sling   For comfort, you may remove your sling when you feel comfortabl.    Physical therapy   You are to begin gentle circumduction exercise on post-op day #1.   You may progress to passive/active shoulder range of motion exercise    Take your pain medication as prescribed. If you anticipate the need for a refill on your medication and the weekend is approaching, please call the office by noon on Friday. No refill will be called in over the weekend.    DO NOT TAKE TYLENOL OR TYLENOL PRODUCTS WHILE TAKING A PRESCRIBED PAIN MEDICATION.    Resume regular diet and medications (unless otherwise directed by your doctor.)    You should have a responsible adult with you for 24 hours.    If you have any questions of concerns,please call the office.    **Patients usually find sleeping in a recliner is most comfortable for several days after surgery. If sleeping in a bed, please use pillows to elevate head, back and shoulders. Patients also find wearing a shirt under sling/swathe is more comfortable than the device against bare skin.    If any problems occur or if you have any further questions, please call your doctor as soon as possible. If you find that you cannot reach your doctor as soon as possible. If you find that you cannot reach your doctor but feel that your condition needs a doctor's attention go to an emergency room.                 Nausea and Vomiting After Surgery: Care Instructions  Your Care Instructions     After you've had surgery, you may feel sick to your  after your procedure, follow your doctor's instructions about how to use it and take care of it.  If you had sedation or general anesthesia  Don't sign legal documents or do anything that requires attention to detail until you recover. It takes time for the medicine's effects to completely wear off.  Don't drive or operate any machinery for at least 24 hours. Wait until the medicine wears off and you can think clearly and react easily.  If you have sleep apnea and you have a CPAP machine, be sure to use it.  After the procedure, make sure to rest. Some people will feel drowsy or dizzy for up to a few hours after waking up.  Take your time, and move slowly. Sudden changes in position may cause nausea.  Don't drink alcohol for 24 hours.  You can eat your normal diet, unless your doctor gives you other instructions. If your stomach is upset, try clear liquids and bland, low-fat foods like plain toast or rice.  Drink plenty of fluids (unless your doctor tells you not to).  When should you call for help?   Call 911 anytime you think you may need emergency care. For example, call if:    You have trouble breathing.     You passed out (lost consciousness).   Call your doctor now or seek immediate medical care if:    You have nausea or vomiting that gets worse or won't stop.     You have a fever.     The medicine is not wearing off by the time the doctor said it should.     You have injured the numb area of your body.   Watch closely for changes in your health, and be sure to contact your doctor if:    You do not get better as expected.   Where can you learn more?  Go to https://www.[employer].net/patientEd and enter A627 to learn more about \"Peripheral Nerve Blocks: Care Instructions.\"  Current as of: July 31, 2024  Content Version: 14.4  © 4075-6974 [email or web address].   Care instructions adapted under license by [employer]. If you have questions about a medical condition or this instruction, always ask your

## 2025-05-30 NOTE — ANESTHESIA PROCEDURE NOTES
Peripheral Block    Patient location during procedure: procedure area  Reason for block: post-op pain management and at surgeon's request  Start time: 5/30/2025 9:09 AM  End time: 5/30/2025 9:14 AM  Staffing  Performed: anesthesiologist   Anesthesiologist: Jaylen Ribeiro MD  Performed by: Jaylen Ribeiro MD  Authorized by: Jaylen Ribeiro MD    Preanesthetic Checklist  Completed: patient identified, IV checked, site marked, risks and benefits discussed, surgical/procedural consents, equipment checked, pre-op evaluation, timeout performed, anesthesia consent given, oxygen available and monitors applied/VS acknowledged  Peripheral Block   Patient position: sitting  Prep: ChloraPrep  Provider prep: mask and sterile gloves  Patient monitoring: cardiac monitor, continuous pulse ox, frequent blood pressure checks and IV access  Block type: Brachial plexus  Interscalene  Laterality: left  Injection technique: single-shot  Guidance: ultrasound guided    Needle   Needle gauge: 21 G  Needle localization: ultrasound guidance  Needle length: 5 cm  Assessment   Injection assessment: negative aspiration for heme, no paresthesia on injection and local visualized surrounding nerve on ultrasound  Paresthesia pain: none  Slow fractionated injection: yes  Hemodynamics: stable  Outcomes: patient tolerated procedure well and uncomplicated    Medications Administered  ropivacaine (NAROPIN) injection 0.5% - Perineural, Neck   20 mL - 5/30/2025 9:09:00 AM

## 2025-05-30 NOTE — OP NOTE
Operative Note      Patient: Dannielle Barba  YOB: 1975  MRN: [id number]    Date of Procedure: 5/30/2025    Pre-Op Diagnosis Codes:      * Rupture of supraspinatus tendon, left, initial encounter [S46.012A]     * Left bicipital tenosynovitis [M75.22]    Post-Op Diagnosis: Same       Procedure: left shoulder arthroscopy subacromial decompression, debridement labraum/rtc tendon, bursa, biceps rroot    Surgeon(s):  Rajan Cortez DO    Assistant:   Resident: Michele Wilson DO    Anesthesia: General    Estimated Blood Loss (mL): less than 100     Complications: None    Specimens:   * No specimens in log *    Implants:  * No implants in log *      Drains: * No LDAs found *    Findings:  Infection Present At Time Of Surgery (PATOS) (choose all levels that have infection present):  No infection present  Other Findings: as above    Detailed Description of Procedure:   below      SURGEON: RAJAN CORTEZ D.O.   ASSISTANT: None.   PREOPERATIVE DIAGNOSIS: (1) Subacromial impingement, left shoulder (2) rotator cuff tear  POSTOPERATIVE DIAGNOSES: (1) Subacromial impingement, leftshoulder. (2)   Partial thickness rotator cuff tear. (3) Anterior and posterior labral tear. (4) biceps tendon tear  OPERATION: left shoulder arthroscopy with subacromial arch decompression.   (2) Labral debridement. (3) Debridement of partial thickness rotator cuff   tear (4) biceps tear debridement  ANESTHESIA: General.   ESTIMATED BLOOD LOSS: Minimal.   COMPLICATIONS: None.   OPERATIVE PROCEDURE: The patient was taken to the operative suite and was   given a general anesthesia. The patient was positioned in the lateral   decubitus position with a beanbag and then prepped and draped the arm in a   sterile fashion.   I outlined an incision along the lateral left shoulder. Hung 10 pounds of   traction from theleft arm, outlined the incisions along the acromial border,   1 posterolateral and lateral, and 1 anterior. I placed a blunt

## 2025-06-10 DIAGNOSIS — S46.012A RUPTURE OF SUPRASPINATUS TENDON, LEFT, INITIAL ENCOUNTER: Primary | ICD-10-CM

## 2025-06-13 ENCOUNTER — OFFICE VISIT (OUTPATIENT)
Dept: ORTHOPEDIC SURGERY | Age: 50
End: 2025-06-13

## 2025-06-13 VITALS — BODY MASS INDEX: 32.59 KG/M2 | TEMPERATURE: 98.6 F | WEIGHT: 166 LBS | HEIGHT: 60 IN

## 2025-06-13 DIAGNOSIS — Z98.890 S/P ARTHROSCOPY OF LEFT SHOULDER: Primary | ICD-10-CM

## 2025-06-13 PROCEDURE — 99024 POSTOP FOLLOW-UP VISIT: CPT

## 2025-06-13 NOTE — PROGRESS NOTES
Dannielle Barba is here for follow-up after left shoulder arthroscopy. Findings at surgery:  AC arthritis, bursitis.  Pain is controlled without any medications.  The patient denies fever, wound drainage, increasing redness, pus, increasing pain, increasing swelling. Post op problems reported: none.  She is ambulating without aid.      Shoulder exam -   The incisions are clean, dry and intact.    left 160/45/BL range of motion   no pain on motion, no tenderness or deformity noted.     Motor and sensory exam is grossly intact in B/L upper extremities.    Special test results are as follow:  Impingement negative, Gleason negative, Speeds negative, Apprehension negative, Alvarez negative, Load Shiftnegative, Cesar manuver negative, Cross arm test negative.      Encounter Diagnosis   Name Primary?    S/P arthroscopy of left shoulder Yes       Plan:    The patient will continue with gentle ROM exercises and being activities as tolerated.  The patient is not being referred to physical therapy.    Sling will be used for comfort ONLY.    Patient is to continue analgesics and needed and use ice for pain.    We will see the pain back in 4 weeks if needed.

## 2025-08-29 ENCOUNTER — OFFICE VISIT (OUTPATIENT)
Age: 50
End: 2025-08-29
Payer: COMMERCIAL

## 2025-08-29 VITALS
DIASTOLIC BLOOD PRESSURE: 89 MMHG | TEMPERATURE: 97.1 F | WEIGHT: 166.01 LBS | BODY MASS INDEX: 32.59 KG/M2 | HEART RATE: 63 BPM | OXYGEN SATURATION: 96 % | HEIGHT: 60 IN | RESPIRATION RATE: 18 BRPM | SYSTOLIC BLOOD PRESSURE: 129 MMHG

## 2025-08-29 DIAGNOSIS — M51.369 DEGENERATION OF INTERVERTEBRAL DISC OF LUMBAR REGION, UNSPECIFIED WHETHER PAIN PRESENT: Primary | ICD-10-CM

## 2025-08-29 DIAGNOSIS — M54.16 LUMBAR RADICULAR PAIN: ICD-10-CM

## 2025-08-29 DIAGNOSIS — M53.3 SACROILIAC DYSFUNCTION: ICD-10-CM

## 2025-08-29 DIAGNOSIS — M47.817 LUMBOSACRAL SPONDYLOSIS WITHOUT MYELOPATHY: ICD-10-CM

## 2025-08-29 DIAGNOSIS — M47.812 CERVICAL SPONDYLOSIS: ICD-10-CM

## 2025-08-29 DIAGNOSIS — M50.30 DDD (DEGENERATIVE DISC DISEASE), CERVICAL: ICD-10-CM

## 2025-08-29 PROCEDURE — 99213 OFFICE O/P EST LOW 20 MIN: CPT | Performed by: ANESTHESIOLOGY

## 2025-08-29 RX ORDER — TOPIRAMATE 50 MG/1
50 TABLET, FILM COATED ORAL NIGHTLY
COMMUNITY

## (undated) DEVICE — 6 ML SYRINGE LUER-LOCK TIP: Brand: MONOJECT

## (undated) DEVICE — GLOVE ORANGE PI 7 1/2   MSG9075

## (undated) DEVICE — CVD CANNULA

## (undated) DEVICE — TUBING PMP L16FT MAIN DISP FOR AR-6400 AR-6475 Â€“ ORDER MULTIPLES OF 10 EACH

## (undated) DEVICE — [HIGH FLOW INSUFFLATOR,  DO NOT USE IF PACKAGE IS DAMAGED,  KEEP DRY,  KEEP AWAY FROM SUNLIGHT,  PROTECT FROM HEAT AND RADIOACTIVE SOURCES.]: Brand: PNEUMOSURE

## (undated) DEVICE — TUBING, SUCTION, 1/4" X 10', STRAIGHT: Brand: MEDLINE

## (undated) DEVICE — PADDING CAST 4 YDX3 IN COTTON NS WBRL

## (undated) DEVICE — COLUMN DRAPE

## (undated) DEVICE — GAUZE,SPONGE,4"X4",12PLY,STERILE,LF,2'S: Brand: MEDLINE

## (undated) DEVICE — APPLICATOR MEDICATED 10.5 CC SOLUTION HI LT ORNG CHLORAPREP

## (undated) DEVICE — INSUFFLATION NEEDLE TO ESTABLISH PNEUMOPERITONEUM.: Brand: INSUFFLATION NEEDLE

## (undated) DEVICE — BANDAGE ADH W1XL3IN NAT FAB WVN FLX DURABLE N ADH PD SEAL

## (undated) DEVICE — PAD MATERNITY CURITY ADH STRIP DISP

## (undated) DEVICE — GAUZE,SPONGE,4"X4",16PLY,XRAY,STRL,LF: Brand: MEDLINE

## (undated) DEVICE — NEEDLE HYPO 18GA L1.5IN PNK POLYPR HUB S STL THN WALL FILL

## (undated) DEVICE — TIP IU L8CM DIA6.7MM BLU SIL FLX DISP RUMI II

## (undated) DEVICE — BANDAGE COMPR W4XL108IN WHT LAYERED NO CLSR SYN RUB ESMARCH

## (undated) DEVICE — SPONGE LAP W18XL18IN WHT COT 4 PLY FLD STRUNG RADPQ DISP ST

## (undated) DEVICE — NEEDLE HYPO 25GA L1.5IN BLU POLYPR HUB S STL REG BVL STR

## (undated) DEVICE — NON-DEHP CATHETER EXTENSION SET, MALE LUER LOCK ADAPTER

## (undated) DEVICE — SMARTGOWN BREATHABLE SURGICAL GOWN: Brand: CONVERTORS

## (undated) DEVICE — MARKER,SKIN,WI/RULER AND LABELS: Brand: MEDLINE

## (undated) DEVICE — GLOVE ORTHO 8   MSG9480

## (undated) DEVICE — COVER,LIGHT HANDLE,FLX,1/PK: Brand: MEDLINE INDUSTRIES, INC.

## (undated) DEVICE — 12 ML SYRINGE,LUER-LOCK TIP: Brand: MONOJECT

## (undated) DEVICE — DOUBLE BASIN SET: Brand: MEDLINE INDUSTRIES, INC.

## (undated) DEVICE — VAGINAL PREP TRAY: Brand: MEDLINE INDUSTRIES, INC.

## (undated) DEVICE — GLOVE SURG SZ 8 L11.2IN FNGR THK12.7MIL CUF THK9.7MIL BRN

## (undated) DEVICE — DRAPE SURG W88XL116IN SMS BODY SPL ORTH N FEN REINF FLD PCH

## (undated) DEVICE — DRESSING GZ XRFRM 4X4(25/BX 6BX/CS)

## (undated) DEVICE — Z CONVERTED USE 2275207 CLOTH PREP W7.5XL7.5IN 2% CHG SKIN ALC AND RNS FREE

## (undated) DEVICE — BASIC PACK: Brand: CONVERTORS

## (undated) DEVICE — SET ENDO INSTR RED YEL LAPAROSCOPIC

## (undated) DEVICE — ELECTRODE SURG MPLR NEUT SELF ADH PT PLT MULTIGEN

## (undated) DEVICE — 3M™ RED DOT™ MONITORING ELECTRODE WITH FOAM TAPE AND STICKY GEL 2560, 50/BAG, 20/CASE, 72/PLT: Brand: RED DOT™

## (undated) DEVICE — BLADE CLIPPER GEN PURP NS

## (undated) DEVICE — BUR SHV L13CM DIA5.5MM 8 FLUT OVL CLEARCUT COOLCUT

## (undated) DEVICE — SYRINGE MED 10ML LUERLOCK TIP W/O SFTY DISP

## (undated) DEVICE — KIT BEDSIDE REVITAL OX 500ML

## (undated) DEVICE — 5-IN-1 BARBED CONNECTOR POLYPROPYLENE 3/16 - 9/16 IN. (5 - 14.3 MM): Brand: ARGYLE

## (undated) DEVICE — Device: Brand: PORTEX

## (undated) DEVICE — LAPAROSCOPIC SCISSORS: Brand: EPIX LAPAROSCOPIC SCISSORS

## (undated) DEVICE — PACK PROC 3IN1 W/ L12FT DIA0.25IN REINF SUCT TBNG W50XL901IN

## (undated) DEVICE — PADDING CAST W4INXL4YD NONSTERILE COT COHESIVE HND TEARABLE

## (undated) DEVICE — GLOVE ORANGE PI 7   MSG9070

## (undated) DEVICE — TOWEL,OR,DSP,ST,BLUE,STD,6/PK,12PK/CS: Brand: MEDLINE

## (undated) DEVICE — NEEDLE, QUINCKE, 22GX5": Brand: MEDLINE

## (undated) DEVICE — NEEDLE HYPO 18GA L1.5IN PNK POLYPR HUB S STL REG BVL STR

## (undated) DEVICE — TOTAL TRAY, 16FR 10ML SIL FOLEY, URN: Brand: MEDLINE

## (undated) DEVICE — 3M™ STERI-DRAPE™ U-DRAPE, LONG 1019: Brand: STERI-DRAPE™

## (undated) DEVICE — MASTISOL ADHESIVE LIQ 2/3ML

## (undated) DEVICE — PADDING,UNDERCAST,COTTON, 4"X4YD STERILE: Brand: MEDLINE

## (undated) DEVICE — SYRINGE MED 5ML STD CLR PLAS LUERLOCK TIP N CTRL DISP

## (undated) DEVICE — MEDI-VAC YANKAUER SUCTION HANDLE W/BULBOUS TIP: Brand: CARDINAL HEALTH

## (undated) DEVICE — 22GX5" WHITACRE SPINAL NEEDLE: Brand: MEDLINE

## (undated) DEVICE — PROBE ABLAT 90DEG ASPIR MULTIPORT BPLR RF 1 PC ELECTRD ERGO

## (undated) DEVICE — GLOVE SURG 7 LTX TRIFLEX WIDE FINGER PWDR

## (undated) DEVICE — 3M™ IOBAN™ 2 ANTIMICROBIAL INCISE DRAPE 6640EZ: Brand: IOBAN™ 2

## (undated) DEVICE — BANDAGE COMPR M W6INXL10YD WHT BGE VELC E MTRX HK AND LOOP

## (undated) DEVICE — SET ENDO INSTR LAPAROSCOPIC INCISIONAL

## (undated) DEVICE — APPLICATOR MEDICATED 26 CC SOLUTION HI LT ORNG CHLORAPREP

## (undated) DEVICE — GAUZE,SPONGE,4"X4",16PLY,STRL,LF,10/TRAY: Brand: MEDLINE

## (undated) DEVICE — TRAY PROCED DILATATION CURETTAGE

## (undated) DEVICE — BASIC DOUBLE BASIN 2-LF: Brand: MEDLINE INDUSTRIES, INC.

## (undated) DEVICE — 20 ML SYRINGE REGULAR TIP: Brand: MONOJECT

## (undated) DEVICE — 3 ML SYRINGE LUER-LOCK TIP: Brand: MONOJECT

## (undated) DEVICE — CAMERA STRYKER 1488 HD GEN

## (undated) DEVICE — PLUMEPORT LAPAROSCOPIC SMOKE FILTRATION DEVICE: Brand: PLUMEPORT ACTIV

## (undated) DEVICE — TOWEL OR BLUEE 16X26IN ST 8 PACK ORB08 16X26ORTWL

## (undated) DEVICE — GARMENT,MEDLINE,DVT,INT,CALF,MED, GEN2: Brand: MEDLINE

## (undated) DEVICE — GOWN,SIRUS,NONRNF,SETINSLV,XL,20/CS: Brand: MEDLINE

## (undated) DEVICE — TOPAZ ICW: Brand: COBLATION

## (undated) DEVICE — Z DISCONTINUED APPLICATOR SURG PREP 0.35OZ 2% CHG 70% ISO ALC W/ HI LT

## (undated) DEVICE — SYRINGE BLB 50CC IRRIG PLIABLE FNGR FLNG GRAD FLSK DISP

## (undated) DEVICE — COUNTER NDL 10 COUNT HLD 20 FOAM BLK SGL MAG

## (undated) DEVICE — GAUZE,SPONGE,4"X4",8PLY,STRL,LF,10/TRAY: Brand: MEDLINE

## (undated) DEVICE — SUTURE ABSRB L6IN L37MM 0 GS-21 GRN 1/2 CIR TAPR PNT NDL VLOCL0306

## (undated) DEVICE — 1810 FOAM BLOCK NEEDLE COUNTER: Brand: DEVON

## (undated) DEVICE — ELECTRODE PT RET AD L9FT HI MOIST COND ADH HYDRGEL CORDED

## (undated) DEVICE — SOLUTION IRRIG 3000ML 0.9% SOD CHL USP UROMATIC PLAS CONT

## (undated) DEVICE — Device

## (undated) DEVICE — PAD,ABDOMINAL,8"X10",ST,LF: Brand: MEDLINE

## (undated) DEVICE — 4-PORT MANIFOLD: Brand: NEPTUNE 2

## (undated) DEVICE — VESSEL SEALER EXTEND: Brand: ENDOWRIST

## (undated) DEVICE — ELECTRO LUBE IS A SINGLE PATIENT USE DEVICE THAT IS INTENDED TO BE USED ON ELECTROSURGICAL ELECTRODES TO REDUCE STICKING.: Brand: KEY SURGICAL ELECTRO LUBE

## (undated) DEVICE — ZIMMER® STERILE DISPOSABLE TOURNIQUET CUFF WITH PROTECTIVE SLEEVE AND PLC, DUAL PORT, SINGLE BLADDER, 18 IN. (46 CM)

## (undated) DEVICE — PACK,AURORA,LAVH: Brand: MEDLINE

## (undated) DEVICE — GOWN SURG XL LNG LEN SPUNBOND REINF VELC TIE LEV 4 IMPERV

## (undated) DEVICE — SLING ARM 9 1/2X20IN L MULTIPAK

## (undated) DEVICE — SYRINGE MED 10ML TRNSLUC BRL PLUNG BLK MRK POLYPR CTRL

## (undated) DEVICE — BANDAGE COMPR W6INXL12FT SMOOTH FOR LIMB EXSANG ESMARCH

## (undated) DEVICE — TRAY,VAG PREP,2PR VNYL GLV,4 C: Brand: MEDLINE INDUSTRIES, INC.

## (undated) DEVICE — INTENDED FOR TISSUE SEPARATION, AND OTHER PROCEDURES THAT REQUIRE A SHARP SURGICAL BLADE TO PUNCTURE OR CUT.: Brand: BARD-PARKER ® STAINLESS STEEL BLADES

## (undated) DEVICE — STRIP,CLOSURE,WOUND,MEDI-STRIP,1/2X4: Brand: MEDLINE

## (undated) DEVICE — TRAY EPI SGL DOSE 18GA NDL CUST AULTMAN HOSP

## (undated) DEVICE — GLOVE ORANGE PI 8   MSG9080

## (undated) DEVICE — SCISSORS SURG DIA8MM MPLR CRV ENDOWRIST

## (undated) DEVICE — SHEARS LAP L45CM DIA5MM ULTRASONIC CRV TIP ADV HEMSTAS HARM

## (undated) DEVICE — Device: Brand: MEDEX

## (undated) DEVICE — NDL CNTR 40CT FM MAG: Brand: MEDLINE INDUSTRIES, INC.

## (undated) DEVICE — NDL, WHITACRE SPINAL, 22GX3.5": Brand: MEDLINE

## (undated) DEVICE — 40586 ADVANCED TRENDELENBURG POSITIONING KIT: Brand: 40586 ADVANCED TRENDELENBURG POSITIONING KIT

## (undated) DEVICE — NEEDLE SPNL 25GA L3.5IN BLU HUB S STL REG WALL FIT STYL W/

## (undated) DEVICE — BLOCK BITE 60FR CAREGUARD

## (undated) DEVICE — SHEET DRAPE FULL 70X100

## (undated) DEVICE — SOLUTION SURG PREP ANTIMICROBIAL 4 OZ SKIN WND EXIDINE

## (undated) DEVICE — SUTURE DEV SZ 0 L6IN N ABSORBABLE

## (undated) DEVICE — IV EXT SET, 30", 4.0ML, SLIDE CLAMP: Brand: MEDLINE

## (undated) DEVICE — ARM DRAPE

## (undated) DEVICE — Z INACTIVE USE 2660664 SOLUTION IRRIG 3000ML 0.9% SOD CHL USP UROMATIC PLAS CONT

## (undated) DEVICE — PMI PTFE COATED LAPAROSCOPIC WIRE L-HOOK 44 CM: Brand: PMI

## (undated) DEVICE — HYPODERMIC SAFETY NEEDLE: Brand: MAGELLAN

## (undated) DEVICE — APPLICATOR SURG XL L38CM FOR ARISTA ABSRB HEMSTAT FLEXITIP

## (undated) DEVICE — SKIN AFFIX SURG ADHESIVE 72/CS 0.55ML: Brand: MEDLINE

## (undated) DEVICE — TROCAR: Brand: KII SLEEVE

## (undated) DEVICE — SYRINGE MED 50ML LUERLOCK TIP

## (undated) DEVICE — BLADE,STAINLESS-STEEL,11,STRL,DISPOSABLE: Brand: MEDLINE

## (undated) DEVICE — SYRINGE, LUER LOCK, 5ML: Brand: MEDLINE

## (undated) DEVICE — NEEDLE SPNL L3.5IN PNK HUB S STL REG WALL FIT STYL W/ QNCKE

## (undated) DEVICE — SHEET,DRAPE,40X58,STERILE: Brand: MEDLINE

## (undated) DEVICE — TROCAR: Brand: KII FIOS FIRST ENTRY

## (undated) DEVICE — PAD,NON-ADHERENT,3X8,STERILE,LF,1/PK: Brand: MEDLINE

## (undated) DEVICE — GARMENT COMPR STD FOR 17IN CALF UNIF THER FLOTRN

## (undated) DEVICE — 3M™ STERI-DRAPE™ U-DRAPE 1015: Brand: STERI-DRAPE™

## (undated) DEVICE — TIP COVER ACCESSORY

## (undated) DEVICE — PROGRASP FORCEPS: Brand: ENDOWRIST

## (undated) DEVICE — SOLUTION IRRIG 1000ML 0.9% SOD CHL USP POUR PLAS BTL

## (undated) DEVICE — DRAPE 64X41IN RADIOLOGY C ARM EQUIP STER

## (undated) DEVICE — LENS CORD GYN 0-DEG 5 MM CIRCON

## (undated) DEVICE — BANDAGE COMPR FOAM 5 YDX6 IN TAN STRL COFLX

## (undated) DEVICE — SOLUTION IV IRRIG POUR BRL 0.9% SODIUM CHL 2F7124

## (undated) DEVICE — ANTI-FOG SOLUTION WITH FOAM PAD: Brand: DEVON

## (undated) DEVICE — NEEDLE SPNL 22GA L3.5IN BLK HUB S STL REG WALL FIT STYL W/

## (undated) DEVICE — GOWN SURG XL SMS FAB NONREINFORCED RAGLAN SLV HK LOOP CLSR

## (undated) DEVICE — CYSTO/BLADDER IRRIGATION SET, REGULATING CLAMP

## (undated) DEVICE — GOWN,SIRUS,POLYRNF,RAGLAN,XL,ST,30/CS: Brand: MEDLINE

## (undated) DEVICE — PEN: MARKING STD 100/CS: Brand: MEDICAL ACTION INDUSTRIES

## (undated) DEVICE — BLADE SHV L13CM DIA4MM DBL CUT COOLCUT

## (undated) DEVICE — SUTURE PROL SZ 3-0 L18IN NONABSORBABLE BLU L19MM PS-2 3/8 8687H

## (undated) DEVICE — MEDI-VAC NON-CONDUCTIVE SUCTION TUBING: Brand: CARDINAL HEALTH

## (undated) DEVICE — MEGA SUTURECUT ND: Brand: ENDOWRIST

## (undated) DEVICE — PACK SURG LAP CHOLE CUSTOM

## (undated) DEVICE — CANNULA SEAL

## (undated) DEVICE — 3M™ COBAN™ STERILE SELF-ADHERENT WRAP, 1584S, 4 IN X 5 YD (10 CM X 4,5 M), 18 ROLLS/CASE: Brand: 3M™ COBAN™

## (undated) DEVICE — SPONGE LAP W18XL18IN WHT COT 4 PLY FLD STRUNG RADPQ DISP ST 2 PER PACK

## (undated) DEVICE — HOOK LOCK LATEX FREE ELASTIC BANDAGE 3INX5YD

## (undated) DEVICE — SYSTEM ES CUP DIA3CM PNEUMO OCCL BLLN DISP FOR CLIN POS

## (undated) DEVICE — MICRO TIP WIPE: Brand: DEVON

## (undated) DEVICE — WARMER SCP LAP

## (undated) DEVICE — PACK,EXTREMITY,ORTHOMAX,5/CS: Brand: MEDLINE

## (undated) DEVICE — DRESSING HYDROFIBER AQUACEL AG ADVANTAGE 3.5X12 IN

## (undated) DEVICE — AGENT HEMSTAT 3GM PURIFIED PLNT STARCH PWD ABSRB ARISTA AH

## (undated) DEVICE — APPLIER CLP M/L SHFT DIA5MM 15 LIG LIGAMAX 5

## (undated) DEVICE — HANDLE CVR PATENTED RETENTION DISC STRL LIGHT SHLD

## (undated) DEVICE — TUBING, SUCTION, 3/16" X 10', STRAIGHT: Brand: MEDLINE

## (undated) DEVICE — DRESSING GZ W1XL8IN COT XRFRM N ADH OVERWRAP CURAD

## (undated) DEVICE — BLADELESS OBTURATOR: Brand: WECK VISTA

## (undated) DEVICE — DRAPE,SHOULDER,ORTHOMAX,W/POUCH,5/CS: Brand: MEDLINE

## (undated) DEVICE — CATHETER,URETHRAL,VINYL,MALE,16",16 FR: Brand: MEDLINE

## (undated) DEVICE — SLEEVE TRAC SPANDEX LAT W/ 4IN COBAN SUPERFICIAL RAD NRV PD

## (undated) DEVICE — PAD ABD CURITY TENDERSORB 5X9IN

## (undated) DEVICE — SOLUTION INJ ST H2O VIAFLX PLAS 1000ML CONT FOR DRUG DIL

## (undated) DEVICE — SOLUTION IRRIG 1000ML 09% SOD CHL USP PIC PLAS CONTAINER

## (undated) DEVICE — CHLORAPREP 26ML ORANGE

## (undated) DEVICE — PUMP SUC IRR TBNG L10FT W/ HNDPC ASSEMB STRYKEFLOW 2

## (undated) DEVICE — WIPES SKIN CLOTH READYPREP 9 X 10.5 IN 2% CHLORHEX GLUCONATE CHG PREOP

## (undated) DEVICE — TRAY PROCED HYSTEROSCOPY CIRCON 1

## (undated) DEVICE — GLOVE SURG SZ 8 L12IN FNGR THK94MIL STD WHT LTX FREE